# Patient Record
Sex: MALE | Race: WHITE | Employment: OTHER | URBAN - METROPOLITAN AREA
[De-identification: names, ages, dates, MRNs, and addresses within clinical notes are randomized per-mention and may not be internally consistent; named-entity substitution may affect disease eponyms.]

---

## 2017-03-01 ENCOUNTER — APPOINTMENT (OUTPATIENT)
Dept: CARDIAC REHAB | Age: 67
End: 2017-03-01
Attending: INTERNAL MEDICINE
Payer: MEDICARE

## 2017-03-13 ENCOUNTER — APPOINTMENT (OUTPATIENT)
Dept: CARDIAC REHAB | Age: 67
End: 2017-03-13
Attending: INTERNAL MEDICINE
Payer: MEDICARE

## 2017-03-15 ENCOUNTER — APPOINTMENT (OUTPATIENT)
Dept: CARDIAC REHAB | Age: 67
End: 2017-03-15
Attending: INTERNAL MEDICINE
Payer: MEDICARE

## 2017-03-17 ENCOUNTER — APPOINTMENT (OUTPATIENT)
Dept: CARDIAC REHAB | Age: 67
End: 2017-03-17
Attending: INTERNAL MEDICINE
Payer: MEDICARE

## 2017-03-20 ENCOUNTER — HOSPITAL ENCOUNTER (OUTPATIENT)
Dept: CARDIAC REHAB | Age: 67
Setting detail: THERAPIES SERIES
Discharge: HOME | End: 2017-03-20
Attending: INTERNAL MEDICINE | Admitting: INTERNAL MEDICINE
Payer: MEDICARE

## 2017-03-24 ENCOUNTER — APPOINTMENT (OUTPATIENT)
Dept: CARDIAC REHAB | Age: 67
End: 2017-03-24
Payer: MEDICARE

## 2017-03-27 ENCOUNTER — HOSPITAL ENCOUNTER (OUTPATIENT)
Dept: CARDIAC REHAB | Age: 67
Setting detail: THERAPIES SERIES
Discharge: HOME | End: 2017-03-27
Payer: MEDICARE

## 2017-03-27 VITALS — WEIGHT: 180.8 LBS | BODY MASS INDEX: 25.94 KG/M2

## 2017-03-27 PROCEDURE — 93798 PHYS/QHP OP CAR RHAB W/ECG: CPT

## 2017-03-29 ENCOUNTER — HOSPITAL ENCOUNTER (OUTPATIENT)
Dept: CARDIAC REHAB | Age: 67
Setting detail: THERAPIES SERIES
Discharge: HOME | End: 2017-03-29
Payer: MEDICARE

## 2017-03-29 VITALS — WEIGHT: 181 LBS | BODY MASS INDEX: 25.97 KG/M2

## 2017-03-29 PROCEDURE — 93798 PHYS/QHP OP CAR RHAB W/ECG: CPT

## 2017-03-31 ENCOUNTER — HOSPITAL ENCOUNTER (OUTPATIENT)
Dept: CARDIAC REHAB | Age: 67
Setting detail: THERAPIES SERIES
Discharge: HOME | End: 2017-03-31
Payer: MEDICARE

## 2017-03-31 VITALS — BODY MASS INDEX: 25.83 KG/M2 | WEIGHT: 180 LBS

## 2017-03-31 PROCEDURE — 93798 PHYS/QHP OP CAR RHAB W/ECG: CPT

## 2017-04-03 ENCOUNTER — HOSPITAL ENCOUNTER (OUTPATIENT)
Dept: CARDIAC REHAB | Age: 67
Setting detail: THERAPIES SERIES
Discharge: HOME | End: 2017-04-03
Payer: MEDICARE

## 2017-04-05 ENCOUNTER — APPOINTMENT (OUTPATIENT)
Dept: CARDIAC REHAB | Age: 67
End: 2017-04-05
Payer: MEDICARE

## 2017-04-07 ENCOUNTER — APPOINTMENT (OUTPATIENT)
Dept: CARDIAC REHAB | Age: 67
End: 2017-04-07
Payer: MEDICARE

## 2017-04-10 ENCOUNTER — APPOINTMENT (OUTPATIENT)
Dept: CARDIAC REHAB | Age: 67
End: 2017-04-10
Payer: MEDICARE

## 2017-04-12 ENCOUNTER — HOSPITAL ENCOUNTER (OUTPATIENT)
Dept: CARDIAC REHAB | Age: 67
Setting detail: THERAPIES SERIES
Discharge: HOME | End: 2017-04-12
Payer: MEDICARE

## 2017-04-12 VITALS — BODY MASS INDEX: 26.11 KG/M2 | WEIGHT: 182 LBS

## 2017-04-12 PROCEDURE — 93798 PHYS/QHP OP CAR RHAB W/ECG: CPT

## 2017-04-17 ENCOUNTER — HOSPITAL ENCOUNTER (OUTPATIENT)
Dept: CARDIAC REHAB | Age: 67
Setting detail: THERAPIES SERIES
Discharge: HOME | End: 2017-04-17
Payer: MEDICARE

## 2017-04-17 VITALS — WEIGHT: 180 LBS | BODY MASS INDEX: 25.83 KG/M2

## 2017-04-17 PROCEDURE — 93798 PHYS/QHP OP CAR RHAB W/ECG: CPT

## 2017-04-19 ENCOUNTER — HOSPITAL ENCOUNTER (OUTPATIENT)
Dept: CARDIAC REHAB | Age: 67
Setting detail: THERAPIES SERIES
Discharge: HOME | End: 2017-04-19
Payer: MEDICARE

## 2017-04-19 VITALS — WEIGHT: 182 LBS | BODY MASS INDEX: 26.11 KG/M2

## 2017-04-19 PROCEDURE — 93798 PHYS/QHP OP CAR RHAB W/ECG: CPT

## 2017-04-21 ENCOUNTER — HOSPITAL ENCOUNTER (OUTPATIENT)
Dept: CARDIAC REHAB | Age: 67
Setting detail: THERAPIES SERIES
Discharge: HOME | End: 2017-04-21
Attending: INTERNAL MEDICINE | Admitting: INTERNAL MEDICINE
Payer: MEDICARE

## 2017-04-21 VITALS — BODY MASS INDEX: 25.83 KG/M2 | WEIGHT: 180 LBS

## 2017-04-21 PROCEDURE — 93798 PHYS/QHP OP CAR RHAB W/ECG: CPT

## 2017-04-24 ENCOUNTER — HOSPITAL ENCOUNTER (OUTPATIENT)
Dept: CARDIAC REHAB | Age: 67
Setting detail: THERAPIES SERIES
Discharge: HOME | End: 2017-04-24
Attending: INTERNAL MEDICINE | Admitting: INTERNAL MEDICINE
Payer: MEDICARE

## 2017-04-24 VITALS — BODY MASS INDEX: 25.83 KG/M2 | WEIGHT: 180 LBS

## 2017-04-24 PROCEDURE — 93798 PHYS/QHP OP CAR RHAB W/ECG: CPT

## 2017-04-26 ENCOUNTER — HOSPITAL ENCOUNTER (OUTPATIENT)
Dept: CARDIAC REHAB | Age: 67
Setting detail: THERAPIES SERIES
Discharge: HOME | End: 2017-04-26
Attending: INTERNAL MEDICINE | Admitting: INTERNAL MEDICINE
Payer: MEDICARE

## 2017-04-26 VITALS — BODY MASS INDEX: 25.54 KG/M2 | WEIGHT: 178 LBS

## 2017-04-26 PROCEDURE — 93798 PHYS/QHP OP CAR RHAB W/ECG: CPT

## 2017-04-28 ENCOUNTER — HOSPITAL ENCOUNTER (OUTPATIENT)
Dept: CARDIAC REHAB | Age: 67
Setting detail: THERAPIES SERIES
Discharge: HOME | End: 2017-04-28
Payer: MEDICARE

## 2017-04-28 VITALS — BODY MASS INDEX: 25.97 KG/M2 | WEIGHT: 181 LBS

## 2017-04-28 PROCEDURE — 93798 PHYS/QHP OP CAR RHAB W/ECG: CPT

## 2017-05-03 ENCOUNTER — HOSPITAL ENCOUNTER (OUTPATIENT)
Dept: CARDIAC REHAB | Age: 67
Setting detail: THERAPIES SERIES
Discharge: HOME | End: 2017-05-03
Payer: MEDICARE

## 2017-05-05 ENCOUNTER — HOSPITAL ENCOUNTER (OUTPATIENT)
Dept: CARDIAC REHAB | Age: 67
Setting detail: THERAPIES SERIES
Discharge: HOME | End: 2017-05-05
Payer: MEDICARE

## 2017-05-05 VITALS — BODY MASS INDEX: 25.83 KG/M2 | WEIGHT: 180 LBS

## 2017-05-05 PROCEDURE — 93798 PHYS/QHP OP CAR RHAB W/ECG: CPT

## 2017-05-08 ENCOUNTER — HOSPITAL ENCOUNTER (OUTPATIENT)
Dept: CARDIAC REHAB | Age: 67
Setting detail: THERAPIES SERIES
Discharge: HOME | End: 2017-05-08
Payer: MEDICARE

## 2017-05-08 VITALS — BODY MASS INDEX: 26.26 KG/M2 | WEIGHT: 183 LBS

## 2017-05-08 PROCEDURE — 93798 PHYS/QHP OP CAR RHAB W/ECG: CPT

## 2017-05-10 ENCOUNTER — HOSPITAL ENCOUNTER (OUTPATIENT)
Dept: CARDIAC REHAB | Age: 67
Setting detail: THERAPIES SERIES
Discharge: HOME | End: 2017-05-10
Payer: MEDICARE

## 2017-05-10 PROCEDURE — 93798 PHYS/QHP OP CAR RHAB W/ECG: CPT

## 2017-05-12 ENCOUNTER — HOSPITAL ENCOUNTER (OUTPATIENT)
Dept: CARDIAC REHAB | Age: 67
Setting detail: THERAPIES SERIES
Discharge: HOME | End: 2017-05-12
Payer: MEDICARE

## 2017-05-12 VITALS — BODY MASS INDEX: 25.97 KG/M2 | WEIGHT: 181 LBS

## 2017-05-12 PROCEDURE — 93798 PHYS/QHP OP CAR RHAB W/ECG: CPT

## 2017-05-15 ENCOUNTER — HOSPITAL ENCOUNTER (OUTPATIENT)
Dept: CARDIAC REHAB | Age: 67
Setting detail: THERAPIES SERIES
Discharge: HOME | End: 2017-05-15
Payer: MEDICARE

## 2017-05-15 VITALS — BODY MASS INDEX: 26.26 KG/M2 | WEIGHT: 183 LBS

## 2017-05-15 PROCEDURE — 93798 PHYS/QHP OP CAR RHAB W/ECG: CPT

## 2017-05-17 VITALS — WEIGHT: 180 LBS | BODY MASS INDEX: 25.83 KG/M2

## 2017-05-19 ENCOUNTER — HOSPITAL ENCOUNTER (OUTPATIENT)
Dept: CARDIAC REHAB | Age: 67
Setting detail: THERAPIES SERIES
Discharge: HOME | End: 2017-05-19
Payer: MEDICARE

## 2017-05-19 VITALS — WEIGHT: 182 LBS | BODY MASS INDEX: 26.11 KG/M2

## 2017-05-19 PROCEDURE — 93798 PHYS/QHP OP CAR RHAB W/ECG: CPT

## 2017-05-22 ENCOUNTER — HOSPITAL ENCOUNTER (OUTPATIENT)
Dept: CARDIAC REHAB | Age: 67
Setting detail: THERAPIES SERIES
Discharge: HOME | End: 2017-05-22
Payer: MEDICARE

## 2017-05-22 VITALS — WEIGHT: 182.8 LBS | BODY MASS INDEX: 26.23 KG/M2

## 2017-05-22 PROCEDURE — 93798 PHYS/QHP OP CAR RHAB W/ECG: CPT

## 2017-05-24 ENCOUNTER — HOSPITAL ENCOUNTER (OUTPATIENT)
Dept: CARDIAC REHAB | Age: 67
Setting detail: THERAPIES SERIES
Discharge: HOME | End: 2017-05-24
Payer: MEDICARE

## 2017-05-24 VITALS — WEIGHT: 181 LBS | BODY MASS INDEX: 25.97 KG/M2

## 2017-05-24 PROCEDURE — 93798 PHYS/QHP OP CAR RHAB W/ECG: CPT

## 2017-05-31 ENCOUNTER — HOSPITAL ENCOUNTER (OUTPATIENT)
Dept: CARDIAC REHAB | Age: 67
Setting detail: THERAPIES SERIES
Discharge: HOME | End: 2017-05-31
Payer: MEDICARE

## 2017-05-31 VITALS — BODY MASS INDEX: 25.91 KG/M2 | HEIGHT: 70 IN | WEIGHT: 181 LBS

## 2017-05-31 PROCEDURE — 93798 PHYS/QHP OP CAR RHAB W/ECG: CPT

## 2017-06-02 ENCOUNTER — HOSPITAL ENCOUNTER (OUTPATIENT)
Dept: CARDIAC REHAB | Age: 67
Setting detail: THERAPIES SERIES
Discharge: HOME | End: 2017-06-02
Payer: MEDICARE

## 2017-06-02 VITALS — WEIGHT: 181 LBS | BODY MASS INDEX: 25.97 KG/M2

## 2017-06-02 PROCEDURE — 93798 PHYS/QHP OP CAR RHAB W/ECG: CPT

## 2017-09-07 ENCOUNTER — HOSPITAL ENCOUNTER (OUTPATIENT)
Age: 67
Setting detail: SPECIMEN
Discharge: HOME | End: 2017-09-07
Payer: MEDICARE

## 2017-09-11 LAB — DERMATOLOGY PATHOLOGY REPORT: NORMAL

## 2019-03-25 PROBLEM — G12.21 ALS (AMYOTROPHIC LATERAL SCLEROSIS) (HCC): Status: ACTIVE | Noted: 2019-03-25

## 2019-04-18 PROBLEM — M62.838 MUSCLE SPASM: Status: ACTIVE | Noted: 2019-04-18

## 2019-05-02 PROBLEM — I34.1 MVP (MITRAL VALVE PROLAPSE): Status: ACTIVE | Noted: 2019-05-02

## 2019-05-02 PROBLEM — G56.03 BILATERAL CARPAL TUNNEL SYNDROME: Status: ACTIVE | Noted: 2019-05-02

## 2019-05-02 PROBLEM — I71.22 ANEURYSM OF AORTIC ARCH: Status: ACTIVE | Noted: 2019-05-02

## 2019-05-02 PROBLEM — S62.639A: Status: ACTIVE | Noted: 2019-05-02

## 2019-05-02 PROBLEM — M48.00 SPINAL STENOSIS: Status: ACTIVE | Noted: 2019-05-02

## 2019-05-02 PROBLEM — G47.33 OBSTRUCTIVE SLEEP APNEA: Status: ACTIVE | Noted: 2019-05-02

## 2019-05-02 PROBLEM — I83.93 VARICOSE VEINS OF BOTH LOWER EXTREMITIES: Status: ACTIVE | Noted: 2019-05-02

## 2019-05-02 PROBLEM — S22.31XD CLOSED FRACTURE OF ONE RIB OF RIGHT SIDE WITH ROUTINE HEALING: Status: ACTIVE | Noted: 2019-05-02

## 2019-05-02 PROBLEM — M19.91 PRIMARY OSTEOARTHRITIS: Status: ACTIVE | Noted: 2019-05-02

## 2019-05-02 PROBLEM — I07.1 RHEUMATIC TRICUSPID VALVE REGURGITATION: Status: ACTIVE | Noted: 2019-05-02

## 2019-05-02 PROBLEM — N45.1 EPIDIDYMITIS: Status: ACTIVE | Noted: 2019-05-02

## 2019-05-02 PROBLEM — I35.9 AORTIC VALVE DISORDER: Status: ACTIVE | Noted: 2019-05-02

## 2019-05-02 PROBLEM — H93.13 TINNITUS OF BOTH EARS: Status: ACTIVE | Noted: 2019-05-02

## 2019-05-02 PROBLEM — L57.0 SENILE HYPERKERATOSIS: Status: ACTIVE | Noted: 2019-05-02

## 2019-05-02 PROBLEM — N28.1 ACQUIRED CYSTIC KIDNEY DISEASE: Status: ACTIVE | Noted: 2019-05-02

## 2019-05-02 PROBLEM — M48.061 SPINAL STENOSIS OF LUMBAR REGION: Status: ACTIVE | Noted: 2019-05-02

## 2019-05-02 PROBLEM — S06.0X9A CONCUSSION WITH LOSS OF CONSCIOUSNESS: Status: ACTIVE | Noted: 2019-05-02

## 2019-05-02 PROBLEM — I34.0 MITRAL VALVE REGURGITATION: Status: ACTIVE | Noted: 2019-05-02

## 2019-05-02 PROBLEM — H91.93 BILATERAL HEARING LOSS: Status: ACTIVE | Noted: 2019-05-02

## 2019-05-02 PROBLEM — I48.0 PAROXYSMAL ATRIAL FIBRILLATION (HCC): Status: ACTIVE | Noted: 2019-05-02

## 2019-05-02 PROBLEM — N20.2 CALCULUS OF KIDNEY AND URETER: Status: ACTIVE | Noted: 2019-05-02

## 2019-05-02 PROBLEM — Z86.79 HISTORY OF HYPERTENSION: Status: ACTIVE | Noted: 2019-05-02

## 2019-05-02 PROBLEM — I51.89 DIASTOLIC DYSFUNCTION: Status: ACTIVE | Noted: 2019-05-02

## 2019-05-02 PROBLEM — E16.2 HYPOGLYCEMIA: Status: ACTIVE | Noted: 2019-05-02

## 2019-05-02 PROBLEM — E21.3 HYPERPARATHYROIDISM (HCC): Status: ACTIVE | Noted: 2019-05-02

## 2019-05-02 PROBLEM — L20.89 OTHER ATOPIC DERMATITIS: Status: ACTIVE | Noted: 2019-05-02

## 2019-05-02 PROBLEM — N41.9 PROSTATITIS: Status: ACTIVE | Noted: 2019-05-02

## 2019-05-02 PROBLEM — I06.1 RHEUMATIC AORTIC VALVE INSUFFICIENCY: Status: ACTIVE | Noted: 2019-05-02

## 2019-05-02 PROBLEM — I77.810 AORTIC ROOT DILATATION (HCC): Status: ACTIVE | Noted: 2019-05-02

## 2019-05-02 PROBLEM — H52.4 PRESBYOPIA: Status: ACTIVE | Noted: 2019-05-02

## 2019-05-02 PROBLEM — M85.80 OSTEOPENIA: Status: ACTIVE | Noted: 2019-05-02

## 2019-05-02 PROBLEM — D75.89 MACROCYTOSIS: Status: ACTIVE | Noted: 2019-05-02

## 2019-05-02 PROBLEM — I49.1 SUPRAVENTRICULAR BEAT, PREMATURE: Status: ACTIVE | Noted: 2019-05-02

## 2019-05-02 PROBLEM — E78.49 OTHER HYPERLIPIDEMIA: Status: ACTIVE | Noted: 2019-05-02

## 2019-05-02 PROBLEM — N43.3 HYDROCELE: Status: ACTIVE | Noted: 2019-05-02

## 2019-05-02 PROBLEM — I51.7 BILATERAL ENLARGEMENT OF ATRIA: Status: ACTIVE | Noted: 2019-05-02

## 2019-06-09 PROBLEM — F43.9 STRESS: Status: ACTIVE | Noted: 2019-06-09

## 2019-06-09 PROBLEM — L30.9 DERMATITIS: Status: ACTIVE | Noted: 2019-06-09

## 2019-06-20 PROBLEM — R29.898 WEAKNESS OF BOTH LOWER EXTREMITIES: Status: ACTIVE | Noted: 2019-06-20

## 2019-07-24 PROBLEM — E88.81 INSULIN RESISTANCE: Status: ACTIVE | Noted: 2019-07-24

## 2019-07-30 PROBLEM — Z91.81 AT HIGH RISK FOR FALLS: Status: ACTIVE | Noted: 2019-07-30

## 2019-12-17 PROBLEM — R53.83 FATIGUE: Status: ACTIVE | Noted: 2019-12-17

## 2019-12-17 PROBLEM — D75.89 OTHER SPECIFIED DISEASES OF BLOOD AND BLOOD-FORMING ORGANS: Status: ACTIVE | Noted: 2018-10-10

## 2019-12-17 PROBLEM — E50.9 VITAMIN A DEFICIENCY: Status: ACTIVE | Noted: 2019-12-17

## 2019-12-17 PROBLEM — I51.7 BILATERAL ENLARGEMENT OF ATRIA: Status: ACTIVE | Noted: 2018-04-25

## 2019-12-17 PROBLEM — Z13.88: Status: ACTIVE | Noted: 2019-12-17

## 2019-12-17 PROBLEM — G12.21 AMYOTROPHIC LATERAL SCLEROSIS (ALS) (HCC): Status: ACTIVE | Noted: 2018-10-02

## 2019-12-17 PROBLEM — M62.50 MUSCULAR ATROPHY: Status: ACTIVE | Noted: 2019-12-17

## 2019-12-17 PROBLEM — M48.02 CERVICAL STENOSIS OF SPINE: Status: ACTIVE | Noted: 2019-12-17

## 2019-12-17 PROBLEM — Q20.9 CARDIAC SEGMENTAL CONFIGURATION WITH ATRIAL CONFIGURATION UNKNOWN, VENTRICULAR CONFIGURATION UNKNOWN, AND INVERTED NORMALLY ALIGNED GREAT ARTERIES: Status: ACTIVE | Noted: 2018-09-26

## 2019-12-17 PROBLEM — Q20.8 CARDIAC SEGMENTAL CONFIGURATION WITH ATRIAL CONFIGURATION UNKNOWN, VENTRICULAR CONFIGURATION UNKNOWN, AND INVERTED NORMALLY ALIGNED GREAT ARTERIES: Status: ACTIVE | Noted: 2018-09-26

## 2019-12-18 ENCOUNTER — HOSPITAL ENCOUNTER (OUTPATIENT)
Dept: MRI IMAGING | Age: 69
Discharge: HOME | End: 2019-12-20
Payer: MEDICARE

## 2019-12-18 ENCOUNTER — HOSPITAL ENCOUNTER (OUTPATIENT)
Age: 69
Discharge: HOME | End: 2019-12-18
Payer: MEDICARE

## 2019-12-18 DIAGNOSIS — M48.02 CERVICAL STENOSIS OF SPINE: ICD-10-CM

## 2019-12-18 DIAGNOSIS — R29.898 WEAKNESS OF BOTH LOWER EXTREMITIES: ICD-10-CM

## 2019-12-18 DIAGNOSIS — M62.50 MUSCULAR ATROPHY, UNSPECIFIED SITE: ICD-10-CM

## 2019-12-18 DIAGNOSIS — D75.89 MACROCYTOSIS: ICD-10-CM

## 2019-12-18 DIAGNOSIS — R53.83 FATIGUE, UNSPECIFIED TYPE: ICD-10-CM

## 2019-12-18 DIAGNOSIS — Z13.88 HEAVY METAL POISON. SCREEN: ICD-10-CM

## 2019-12-18 DIAGNOSIS — E78.49 OTHER HYPERLIPIDEMIA: ICD-10-CM

## 2019-12-18 DIAGNOSIS — G12.21 ALS (AMYOTROPHIC LATERAL SCLEROSIS) (HCC): ICD-10-CM

## 2019-12-18 DIAGNOSIS — Z91.81 AT HIGH RISK FOR FALLS: ICD-10-CM

## 2019-12-18 DIAGNOSIS — M62.838 MUSCLE SPASM: ICD-10-CM

## 2019-12-18 DIAGNOSIS — M48.061 SPINAL STENOSIS OF LUMBAR REGION, UNSPECIFIED WHETHER NEUROGENIC CLAUDICATION PRESENT: ICD-10-CM

## 2019-12-18 DIAGNOSIS — I48.0 PAROXYSMAL ATRIAL FIBRILLATION (HCC): ICD-10-CM

## 2019-12-18 DIAGNOSIS — E50.9 VITAMIN A DEFICIENCY: ICD-10-CM

## 2019-12-18 LAB
ABSOLUTE EOS #: 0.1 K/UL (ref 0–0.4)
ABSOLUTE IMMATURE GRANULOCYTE: ABNORMAL K/UL (ref 0–0.3)
ABSOLUTE LYMPH #: 1.1 K/UL (ref 1–4.8)
ABSOLUTE MONO #: 0.7 K/UL (ref 0.1–1.3)
ALBUMIN SERPL-MCNC: 3.9 G/DL (ref 3.5–5.2)
ALBUMIN/GLOBULIN RATIO: ABNORMAL (ref 1–2.5)
ALP BLD-CCNC: 75 U/L (ref 40–129)
ALT SERPL-CCNC: 55 U/L (ref 5–41)
ANION GAP SERPL CALCULATED.3IONS-SCNC: 9 MMOL/L (ref 9–17)
AST SERPL-CCNC: 58 U/L
BASOPHILS # BLD: 1 % (ref 0–2)
BASOPHILS ABSOLUTE: 0 K/UL (ref 0–0.2)
BILIRUB SERPL-MCNC: 0.58 MG/DL (ref 0.3–1.2)
BUN BLDV-MCNC: 17 MG/DL (ref 8–23)
BUN/CREAT BLD: ABNORMAL (ref 9–20)
CALCIUM SERPL-MCNC: 9.1 MG/DL (ref 8.6–10.4)
CERULOPLASMIN: 19 MG/DL (ref 15–30)
CHLORIDE BLD-SCNC: 98 MMOL/L (ref 98–107)
CO2: 28 MMOL/L (ref 20–31)
CREAT SERPL-MCNC: 0.54 MG/DL (ref 0.7–1.2)
DIFFERENTIAL TYPE: ABNORMAL
EOSINOPHILS RELATIVE PERCENT: 2 % (ref 0–4)
FERRITIN: 22 UG/L (ref 30–400)
GFR AFRICAN AMERICAN: >60 ML/MIN
GFR NON-AFRICAN AMERICAN: >60 ML/MIN
GFR SERPL CREATININE-BSD FRML MDRD: ABNORMAL ML/MIN/{1.73_M2}
GFR SERPL CREATININE-BSD FRML MDRD: ABNORMAL ML/MIN/{1.73_M2}
GLUCOSE FASTING: 95 MG/DL (ref 70–99)
HCT VFR BLD CALC: 47.8 % (ref 41–53)
HEMOGLOBIN: 15.9 G/DL (ref 13.5–17.5)
HIGH SENSITIVE C-REACTIVE PROTEIN: 0.6 MG/L
HOMOCYSTEINE: 7.3 UMOL/L
IMMATURE GRANULOCYTES: ABNORMAL %
IRON SATURATION: 39 % (ref 20–55)
IRON: 114 UG/DL (ref 59–158)
LYMPHOCYTES # BLD: 13 % (ref 24–44)
MCH RBC QN AUTO: 32.4 PG (ref 26–34)
MCHC RBC AUTO-ENTMCNC: 33.4 G/DL (ref 31–37)
MCV RBC AUTO: 97 FL (ref 80–100)
MONOCYTES # BLD: 9 % (ref 1–7)
NRBC AUTOMATED: ABNORMAL PER 100 WBC
PDW BLD-RTO: 15.4 % (ref 11.5–14.9)
PLATELET # BLD: 269 K/UL (ref 150–450)
PLATELET ESTIMATE: ABNORMAL
PMV BLD AUTO: 8.3 FL (ref 6–12)
POTASSIUM SERPL-SCNC: 4 MMOL/L (ref 3.7–5.3)
RBC # BLD: 4.92 M/UL (ref 4.5–5.9)
RBC # BLD: ABNORMAL 10*6/UL
SEDIMENTATION RATE, ERYTHROCYTE: 2 MM (ref 0–15)
SEG NEUTROPHILS: 75 % (ref 36–66)
SEGMENTED NEUTROPHILS ABSOLUTE COUNT: 6 K/UL (ref 1.3–9.1)
SODIUM BLD-SCNC: 135 MMOL/L (ref 135–144)
T3 FREE: 3.21 PG/ML (ref 2.02–4.43)
THYROXINE, FREE: 1.03 NG/DL (ref 0.93–1.7)
TOTAL CK: 1200 U/L (ref 39–308)
TOTAL IRON BINDING CAPACITY: 291 UG/DL (ref 250–450)
TOTAL PROTEIN: 7.4 G/DL (ref 6.4–8.3)
TSH SERPL DL<=0.05 MIU/L-ACNC: 3.29 MIU/L (ref 0.3–5)
UNSATURATED IRON BINDING CAPACITY: 177 UG/DL (ref 112–347)
URIC ACID: 4.3 MG/DL (ref 3.4–7)
VITAMIN B-12: 1997 PG/ML (ref 232–1245)
WBC # BLD: 8 K/UL (ref 3.5–11)
WBC # BLD: ABNORMAL 10*3/UL

## 2019-12-18 PROCEDURE — 83090 ASSAY OF HOMOCYSTEINE: CPT

## 2019-12-18 PROCEDURE — 85025 COMPLETE CBC W/AUTO DIFF WBC: CPT

## 2019-12-18 PROCEDURE — 84481 FREE ASSAY (FT-3): CPT

## 2019-12-18 PROCEDURE — 86355 B CELLS TOTAL COUNT: CPT

## 2019-12-18 PROCEDURE — 84443 ASSAY THYROID STIM HORMONE: CPT

## 2019-12-18 PROCEDURE — 84630 ASSAY OF ZINC: CPT

## 2019-12-18 PROCEDURE — 83735 ASSAY OF MAGNESIUM: CPT

## 2019-12-18 PROCEDURE — 72141 MRI NECK SPINE W/O DYE: CPT

## 2019-12-18 PROCEDURE — 82728 ASSAY OF FERRITIN: CPT

## 2019-12-18 PROCEDURE — 84590 ASSAY OF VITAMIN A: CPT

## 2019-12-18 PROCEDURE — 83540 ASSAY OF IRON: CPT

## 2019-12-18 PROCEDURE — 82390 ASSAY OF CERULOPLASMIN: CPT

## 2019-12-18 PROCEDURE — 82550 ASSAY OF CK (CPK): CPT

## 2019-12-18 PROCEDURE — 86357 NK CELLS TOTAL COUNT: CPT

## 2019-12-18 PROCEDURE — 85651 RBC SED RATE NONAUTOMATED: CPT

## 2019-12-18 PROCEDURE — 83550 IRON BINDING TEST: CPT

## 2019-12-18 PROCEDURE — 82747 ASSAY OF FOLIC ACID RBC: CPT

## 2019-12-18 PROCEDURE — 86141 C-REACTIVE PROTEIN HS: CPT

## 2019-12-18 PROCEDURE — 84550 ASSAY OF BLOOD/URIC ACID: CPT

## 2019-12-18 PROCEDURE — 86360 T CELL ABSOLUTE COUNT/RATIO: CPT

## 2019-12-18 PROCEDURE — 83520 IMMUNOASSAY QUANT NOS NONAB: CPT

## 2019-12-18 PROCEDURE — 82607 VITAMIN B-12: CPT

## 2019-12-18 PROCEDURE — 84439 ASSAY OF FREE THYROXINE: CPT

## 2019-12-18 PROCEDURE — 36415 COLL VENOUS BLD VENIPUNCTURE: CPT

## 2019-12-18 PROCEDURE — 86359 T CELLS TOTAL COUNT: CPT

## 2019-12-18 PROCEDURE — 80053 COMPREHEN METABOLIC PANEL: CPT

## 2019-12-18 PROCEDURE — 82525 ASSAY OF COPPER: CPT

## 2019-12-19 LAB
% NK (CD56): 5 % (ref 6–29)
AB NK (CD56): 58 /UL (ref 90–600)
ABSOLUTE CD 3: 895 /UL (ref 411–2061)
ABSOLUTE CD 4 HELPER: 720 /UL (ref 309–1571)
ABSOLUTE CD 8 (SUPP): 139 /UL (ref 282–999)
ABSOLUTE CD19 B CELL: 163 /UL (ref 71–567)
CD19 B CELL: 14 % (ref 5–25)
CD3 % TOTAL T CELLS: 77 % (ref 57–94)
CD4 % HELPER T CELL: 62 % (ref 27–64)
CD4:CD8: 5.17 (ref 0.6–2.8)
CD8 % SUPPRESSOR T CELL: 12 % (ref 17–48)
LYMPHOCYTES # BLD: 14 % (ref 24–44)
WBC # BLD: 8.3 K/UL (ref 3.5–11)

## 2019-12-20 LAB
COPPER: 81.1 UG/DL (ref 70–140)
HCT VFR BLD CALC: 47.7 % (ref 41–53)
RBC FOLATE: 453.5 NG/ML (ref 280–903)

## 2019-12-21 LAB
CARNITINE ESTER/FREE (RATIO): 0.4 (ref 0.1–1)
CARNITINE ESTERIFIED: 15 UMOL/L (ref 5–29)
CARNITINE FREE: 39 UMOL/L (ref 25–60)
CARNITINE TOTAL: 54 UMOL/L (ref 34–86)
RETINYL PALMITATE: <0.02 MG/L (ref 0–0.1)
VITAMIN A LEVEL: 0.53 MG/L (ref 0.3–1.2)
VITAMIN A, INTERP: NORMAL
ZINC: 71.4 UG/DL (ref 60–120)

## 2019-12-22 LAB
SEND OUT REPORT: NORMAL
TEST NAME: NORMAL

## 2019-12-23 LAB — MAGNESIUM RBC: 1.9 MMOL/L (ref 1.5–3.1)

## 2020-06-09 PROBLEM — R19.7 DIARRHEA: Status: ACTIVE | Noted: 2020-06-09

## 2020-06-26 ENCOUNTER — HOSPITAL ENCOUNTER (INPATIENT)
Age: 70
LOS: 6 days | Discharge: NURSING, REHAB OR LONG-TERM CARE | DRG: 243 | End: 2020-07-02
Attending: EMERGENCY MEDICINE | Admitting: INTERNAL MEDICINE
Payer: MEDICARE

## 2020-06-26 ENCOUNTER — APPOINTMENT (OUTPATIENT)
Dept: GENERAL RADIOLOGY | Age: 70
DRG: 243 | End: 2020-06-26
Payer: MEDICARE

## 2020-06-26 PROBLEM — I48.92 ATRIAL FLUTTER (HCC): Status: ACTIVE | Noted: 2020-06-26

## 2020-06-26 LAB
% CKMB: 7 % (ref 0–3.5)
ABSOLUTE EOS #: 0.03 K/UL (ref 0–0.44)
ABSOLUTE IMMATURE GRANULOCYTE: 0.03 K/UL (ref 0–0.3)
ABSOLUTE LYMPH #: 0.82 K/UL (ref 1.1–3.7)
ABSOLUTE MONO #: 0.89 K/UL (ref 0.1–1.2)
ANION GAP SERPL CALCULATED.3IONS-SCNC: 12 MMOL/L (ref 9–17)
BASOPHILS # BLD: 0 % (ref 0–2)
BASOPHILS ABSOLUTE: 0.03 K/UL (ref 0–0.2)
BNP INTERPRETATION: ABNORMAL
BUN BLDV-MCNC: 33 MG/DL (ref 8–23)
BUN/CREAT BLD: 75 (ref 9–20)
CALCIUM SERPL-MCNC: 9.4 MG/DL (ref 8.6–10.4)
CHLORIDE BLD-SCNC: 94 MMOL/L (ref 98–107)
CK MB: 26.3 NG/ML
CKMB INTERPRETATION: ABNORMAL
CO2: 29 MMOL/L (ref 20–31)
CREAT SERPL-MCNC: 0.44 MG/DL (ref 0.7–1.2)
DIFFERENTIAL TYPE: ABNORMAL
EOSINOPHILS RELATIVE PERCENT: 0 % (ref 1–4)
GFR AFRICAN AMERICAN: >60 ML/MIN
GFR NON-AFRICAN AMERICAN: >60 ML/MIN
GFR SERPL CREATININE-BSD FRML MDRD: ABNORMAL ML/MIN/{1.73_M2}
GFR SERPL CREATININE-BSD FRML MDRD: ABNORMAL ML/MIN/{1.73_M2}
GLUCOSE BLD-MCNC: 119 MG/DL (ref 70–99)
HCT VFR BLD CALC: 46.8 % (ref 40.7–50.3)
HEMOGLOBIN: 15.6 G/DL (ref 13–17)
IMMATURE GRANULOCYTES: 0 %
INR BLD: 1
LYMPHOCYTES # BLD: 9 % (ref 24–43)
MAGNESIUM: 2.3 MG/DL (ref 1.6–2.6)
MCH RBC QN AUTO: 34.2 PG (ref 25.2–33.5)
MCHC RBC AUTO-ENTMCNC: 33.3 G/DL (ref 28.4–34.8)
MCV RBC AUTO: 102.6 FL (ref 82.6–102.9)
MONOCYTES # BLD: 10 % (ref 3–12)
MYOGLOBIN: 304 NG/ML (ref 28–72)
NRBC AUTOMATED: 0 PER 100 WBC
PARTIAL THROMBOPLASTIN TIME: 28.2 SEC (ref 23.9–33.8)
PARTIAL THROMBOPLASTIN TIME: 29.6 SEC (ref 23.9–33.8)
PDW BLD-RTO: 13.1 % (ref 11.8–14.4)
PLATELET # BLD: 312 K/UL (ref 138–453)
PLATELET ESTIMATE: ABNORMAL
PMV BLD AUTO: 10.1 FL (ref 8.1–13.5)
POTASSIUM SERPL-SCNC: 4.3 MMOL/L (ref 3.7–5.3)
PRO-BNP: 1449 PG/ML
PROTHROMBIN TIME: 12.6 SEC (ref 11.5–14.2)
RBC # BLD: 4.56 M/UL (ref 4.21–5.77)
RBC # BLD: ABNORMAL 10*6/UL
SARS-COV-2, PCR: NORMAL
SARS-COV-2, RAPID: NOT DETECTED
SARS-COV-2: NORMAL
SEDIMENTATION RATE, ERYTHROCYTE: 5 MM (ref 0–20)
SEG NEUTROPHILS: 81 % (ref 36–65)
SEGMENTED NEUTROPHILS ABSOLUTE COUNT: 7.6 K/UL (ref 1.5–8.1)
SODIUM BLD-SCNC: 135 MMOL/L (ref 135–144)
SOURCE: NORMAL
THYROXINE, FREE: 1.25 NG/DL (ref 0.93–1.7)
TOTAL CK: 375 U/L (ref 39–308)
TROPONIN INTERP: ABNORMAL
TROPONIN T: ABNORMAL NG/ML
TROPONIN, HIGH SENSITIVITY: 102 NG/L (ref 0–22)
TROPONIN, HIGH SENSITIVITY: 112 NG/L (ref 0–22)
TROPONIN, HIGH SENSITIVITY: 89 NG/L (ref 0–22)
TSH SERPL DL<=0.05 MIU/L-ACNC: 3.54 MIU/L (ref 0.3–5)
WBC # BLD: 9.4 K/UL (ref 3.5–11.3)
WBC # BLD: ABNORMAL 10*3/UL

## 2020-06-26 PROCEDURE — 99285 EMERGENCY DEPT VISIT HI MDM: CPT

## 2020-06-26 PROCEDURE — 71045 X-RAY EXAM CHEST 1 VIEW: CPT

## 2020-06-26 PROCEDURE — 82550 ASSAY OF CK (CPK): CPT

## 2020-06-26 PROCEDURE — 2580000003 HC RX 258: Performed by: EMERGENCY MEDICINE

## 2020-06-26 PROCEDURE — 2500000003 HC RX 250 WO HCPCS: Performed by: EMERGENCY MEDICINE

## 2020-06-26 PROCEDURE — 6370000000 HC RX 637 (ALT 250 FOR IP): Performed by: NURSE PRACTITIONER

## 2020-06-26 PROCEDURE — 83874 ASSAY OF MYOGLOBIN: CPT

## 2020-06-26 PROCEDURE — 84439 ASSAY OF FREE THYROXINE: CPT

## 2020-06-26 PROCEDURE — 96361 HYDRATE IV INFUSION ADD-ON: CPT

## 2020-06-26 PROCEDURE — 85610 PROTHROMBIN TIME: CPT

## 2020-06-26 PROCEDURE — 84443 ASSAY THYROID STIM HORMONE: CPT

## 2020-06-26 PROCEDURE — 96375 TX/PRO/DX INJ NEW DRUG ADDON: CPT

## 2020-06-26 PROCEDURE — 85730 THROMBOPLASTIN TIME PARTIAL: CPT

## 2020-06-26 PROCEDURE — 83735 ASSAY OF MAGNESIUM: CPT

## 2020-06-26 PROCEDURE — 6360000002 HC RX W HCPCS: Performed by: EMERGENCY MEDICINE

## 2020-06-26 PROCEDURE — 83880 ASSAY OF NATRIURETIC PEPTIDE: CPT

## 2020-06-26 PROCEDURE — 85025 COMPLETE CBC W/AUTO DIFF WBC: CPT

## 2020-06-26 PROCEDURE — 80048 BASIC METABOLIC PNL TOTAL CA: CPT

## 2020-06-26 PROCEDURE — 2060000000 HC ICU INTERMEDIATE R&B

## 2020-06-26 PROCEDURE — 36415 COLL VENOUS BLD VENIPUNCTURE: CPT

## 2020-06-26 PROCEDURE — U0002 COVID-19 LAB TEST NON-CDC: HCPCS

## 2020-06-26 PROCEDURE — 2580000003 HC RX 258: Performed by: NURSE PRACTITIONER

## 2020-06-26 PROCEDURE — 6370000000 HC RX 637 (ALT 250 FOR IP): Performed by: INTERNAL MEDICINE

## 2020-06-26 PROCEDURE — 6370000000 HC RX 637 (ALT 250 FOR IP): Performed by: EMERGENCY MEDICINE

## 2020-06-26 PROCEDURE — 99223 1ST HOSP IP/OBS HIGH 75: CPT | Performed by: INTERNAL MEDICINE

## 2020-06-26 PROCEDURE — 85652 RBC SED RATE AUTOMATED: CPT

## 2020-06-26 PROCEDURE — 84484 ASSAY OF TROPONIN QUANT: CPT

## 2020-06-26 PROCEDURE — 96374 THER/PROPH/DIAG INJ IV PUSH: CPT

## 2020-06-26 PROCEDURE — 82553 CREATINE MB FRACTION: CPT

## 2020-06-26 PROCEDURE — 93005 ELECTROCARDIOGRAM TRACING: CPT | Performed by: EMERGENCY MEDICINE

## 2020-06-26 RX ORDER — DILTIAZEM HYDROCHLORIDE 5 MG/ML
10 INJECTION INTRAVENOUS ONCE
Status: COMPLETED | OUTPATIENT
Start: 2020-06-26 | End: 2020-06-26

## 2020-06-26 RX ORDER — SOTALOL HYDROCHLORIDE 80 MG/1
80 TABLET ORAL 2 TIMES DAILY
Status: DISCONTINUED | OUTPATIENT
Start: 2020-06-26 | End: 2020-06-27

## 2020-06-26 RX ORDER — ASPIRIN 81 MG/1
324 TABLET, CHEWABLE ORAL ONCE
Status: COMPLETED | OUTPATIENT
Start: 2020-06-26 | End: 2020-06-26

## 2020-06-26 RX ORDER — ADENOSINE 3 MG/ML
INJECTION, SOLUTION INTRAVENOUS
Status: DISPENSED
Start: 2020-06-26 | End: 2020-06-27

## 2020-06-26 RX ORDER — PANTOPRAZOLE SODIUM 20 MG/1
20 TABLET, DELAYED RELEASE ORAL DAILY
Status: DISCONTINUED | OUTPATIENT
Start: 2020-06-26 | End: 2020-07-02 | Stop reason: HOSPADM

## 2020-06-26 RX ORDER — HEPARIN SODIUM 1000 [USP'U]/ML
60 INJECTION, SOLUTION INTRAVENOUS; SUBCUTANEOUS ONCE
Status: COMPLETED | OUTPATIENT
Start: 2020-06-26 | End: 2020-06-26

## 2020-06-26 RX ORDER — HEPARIN SODIUM 1000 [USP'U]/ML
60 INJECTION, SOLUTION INTRAVENOUS; SUBCUTANEOUS PRN
Status: DISCONTINUED | OUTPATIENT
Start: 2020-06-26 | End: 2020-06-26 | Stop reason: ALTCHOICE

## 2020-06-26 RX ORDER — HEPARIN SODIUM 10000 [USP'U]/100ML
12 INJECTION, SOLUTION INTRAVENOUS CONTINUOUS
Status: DISCONTINUED | OUTPATIENT
Start: 2020-06-26 | End: 2020-06-26

## 2020-06-26 RX ORDER — ACETAMINOPHEN 325 MG/1
650 TABLET ORAL EVERY 6 HOURS PRN
Status: DISCONTINUED | OUTPATIENT
Start: 2020-06-26 | End: 2020-07-02 | Stop reason: HOSPADM

## 2020-06-26 RX ORDER — ADENOSINE 3 MG/ML
6 INJECTION, SOLUTION INTRAVENOUS ONCE
Status: COMPLETED | OUTPATIENT
Start: 2020-06-26 | End: 2020-06-26

## 2020-06-26 RX ORDER — ACETAMINOPHEN 650 MG/1
650 SUPPOSITORY RECTAL EVERY 6 HOURS PRN
Status: DISCONTINUED | OUTPATIENT
Start: 2020-06-26 | End: 2020-07-02 | Stop reason: HOSPADM

## 2020-06-26 RX ORDER — SODIUM CHLORIDE 0.9 % (FLUSH) 0.9 %
10 SYRINGE (ML) INJECTION EVERY 12 HOURS SCHEDULED
Status: DISCONTINUED | OUTPATIENT
Start: 2020-06-26 | End: 2020-07-01 | Stop reason: SDUPTHER

## 2020-06-26 RX ORDER — ONDANSETRON 2 MG/ML
4 INJECTION INTRAMUSCULAR; INTRAVENOUS EVERY 6 HOURS PRN
Status: DISCONTINUED | OUTPATIENT
Start: 2020-06-26 | End: 2020-06-28

## 2020-06-26 RX ORDER — VITAMIN B COMPLEX
1000 TABLET ORAL DAILY
Status: DISCONTINUED | OUTPATIENT
Start: 2020-06-26 | End: 2020-07-02 | Stop reason: HOSPADM

## 2020-06-26 RX ORDER — POLYETHYLENE GLYCOL 3350 17 G/17G
17 POWDER, FOR SOLUTION ORAL DAILY PRN
Status: DISCONTINUED | OUTPATIENT
Start: 2020-06-26 | End: 2020-07-02 | Stop reason: HOSPADM

## 2020-06-26 RX ORDER — HEPARIN SODIUM 1000 [USP'U]/ML
30 INJECTION, SOLUTION INTRAVENOUS; SUBCUTANEOUS PRN
Status: DISCONTINUED | OUTPATIENT
Start: 2020-06-26 | End: 2020-06-26 | Stop reason: ALTCHOICE

## 2020-06-26 RX ORDER — M-VIT,TX,IRON,MINS/CALC/FOLIC 27MG-0.4MG
1 TABLET ORAL DAILY
Status: DISCONTINUED | OUTPATIENT
Start: 2020-06-26 | End: 2020-07-02 | Stop reason: HOSPADM

## 2020-06-26 RX ORDER — SODIUM CHLORIDE 0.9 % (FLUSH) 0.9 %
10 SYRINGE (ML) INJECTION PRN
Status: DISCONTINUED | OUTPATIENT
Start: 2020-06-26 | End: 2020-07-01 | Stop reason: SDUPTHER

## 2020-06-26 RX ORDER — 0.9 % SODIUM CHLORIDE 0.9 %
1000 INTRAVENOUS SOLUTION INTRAVENOUS ONCE
Status: COMPLETED | OUTPATIENT
Start: 2020-06-26 | End: 2020-06-26

## 2020-06-26 RX ORDER — SOTALOL HYDROCHLORIDE 80 MG/1
80 TABLET ORAL 2 TIMES DAILY
Status: DISCONTINUED | OUTPATIENT
Start: 2020-06-26 | End: 2020-06-26

## 2020-06-26 RX ORDER — ASPIRIN 81 MG/1
81 TABLET ORAL DAILY
Status: DISCONTINUED | OUTPATIENT
Start: 2020-06-26 | End: 2020-07-02 | Stop reason: HOSPADM

## 2020-06-26 RX ADMIN — APIXABAN 5 MG: 5 TABLET, FILM COATED ORAL at 20:53

## 2020-06-26 RX ADMIN — ADENOSINE 6 MG: 3 INJECTION, SOLUTION INTRAVENOUS at 14:26

## 2020-06-26 RX ADMIN — SODIUM CHLORIDE, PRESERVATIVE FREE 10 ML: 5 INJECTION INTRAVENOUS at 20:54

## 2020-06-26 RX ADMIN — SOTALOL HYDROCHLORIDE 80 MG: 80 TABLET ORAL at 17:17

## 2020-06-26 RX ADMIN — FAMOTIDINE 20 MG: 10 INJECTION, SOLUTION INTRAVENOUS at 15:24

## 2020-06-26 RX ADMIN — SODIUM CHLORIDE 1000 ML: 9 INJECTION, SOLUTION INTRAVENOUS at 14:37

## 2020-06-26 RX ADMIN — HEPARIN SODIUM AND DEXTROSE 12 UNITS/KG/HR: 10000; 5 INJECTION INTRAVENOUS at 15:40

## 2020-06-26 RX ADMIN — DILTIAZEM HYDROCHLORIDE 15 MG: 5 INJECTION INTRAVENOUS at 14:31

## 2020-06-26 RX ADMIN — PANTOPRAZOLE SODIUM 20 MG: 20 TABLET, DELAYED RELEASE ORAL at 22:14

## 2020-06-26 RX ADMIN — HEPARIN SODIUM 3700 UNITS: 1000 INJECTION INTRAVENOUS; SUBCUTANEOUS at 15:41

## 2020-06-26 RX ADMIN — ASPIRIN 81 MG 324 MG: 81 TABLET ORAL at 15:24

## 2020-06-26 ASSESSMENT — PAIN SCALES - GENERAL: PAINLEVEL_OUTOF10: 0

## 2020-06-26 NOTE — PROGRESS NOTES
Patient had 6 second pause on monitor. Staff entered room and patient was asymptomatic but stated that prior to the pause, he could feel it and felt very short of breath. Patient HR back to 160 in a flutter. Will continue to closely monitor.

## 2020-06-26 NOTE — ED NOTES
Pt currently resting in the position of comfort, denies chest pain, denies needs.        Kathi Dyer RN  06/26/20 7502

## 2020-06-26 NOTE — PROGRESS NOTES
.Patient admitted to room 1026. VS taken, telemetry placed and call light given/within reach. Patient A&O and given room orientation. Orders released/reviewed, initial assessment completed and navigator started. See chart for more detail.

## 2020-06-26 NOTE — CONSULTS
Reason for consult: Atrial flutter with RVR    History of Present Illness  Mr. Juli Hurtado is a 66 yo patient known to our practice for paroxysmal atrial fibrillation, AVR, and hypercholesterolemia who was referred to the ED after he was seen in the office and found to be in atrial flutter with RVR. He has been progressively short of breath for the past month. He had to use oxygen and sleep in a recliner last night. He was supposed to have a sleep study done at the [facility]. He denies chest pain, PND, dizziness, palpitations, or leg edema. Cardiology is asked to evaluate. Patient History  Personal History:  Reviewed UTD  Comment:  - Essentially normal coronary arteries on a cardiac catheterization performed 9/7/16 which also revealed aortic aneurysm   - History of hypertension (in his teens)   - hypercholesterolemia   - paroxysmal atrial fibrillation   - history of PVCs   - history of MVP   - ALS   - History of +2 aortic valve regurgitation and aortic root aneurysm, status post insertion of biological composite valve graft   - eczema   - carpal tunnel syndrome   - hyperparathyroidism, status post parathyroid removal   - inguinal hernia repair   - vasectomy   - CARINA, untreated   - varicose veins   - DJD   - normal LV function, mild to moderately dilated LA, mildly dilated RA, well functioning AVR, MVP, mild MR, mild TR, borderline pulmonary hypertension, and evidence of LV diastolic dysfunction documented on an echocardiogram performed 4/17/18    Social History:  Never smoker  Light caffeine intake  Light alcohol intake  Exercises regularly    Family History:  Family hx of CAD * grandmother  Allergies  PENICILLIN   SULFA (sulfonamide)       Medications  --Coconut Oil 1 Tbsp tid PRN   --Brain Octane 1-3 tsp daily   --[id number] 13g BID   --TUDCA 2 po bid   --Echinacea 400 MG Capsule 2 po qday prn   --MitoQ 4 caps on an empty stomach   --Cod Liver Oil Oil 1 tsp PRN   --Liposomal C 1 tsp daily   --L-Serine . 5cc BID

## 2020-06-26 NOTE — PROGRESS NOTES
Second 6 second pause occurred around 15 minutes ago witnessed by charge nurse Jonathon Colon RN. Cariology paged. Patient up to room 2032. Report given to Nicko Anderson, [facility].

## 2020-06-26 NOTE — PROGRESS NOTES
Writer spoke with Dr. Dawit López regarding patients 2 pauses. No new orders at this time. Pearl Taveras RN updated.

## 2020-06-26 NOTE — PROGRESS NOTES
Dr. Sonali Ambrose at bedside. Plan to move patient upstairs to CVTU at shift change. Continuous pulse ox in place. Bed alarm in place. Will continue to closely monitor.

## 2020-06-26 NOTE — H&P
[facility]    HISTORY AND PHYSICAL EXAMINATION            Date:   6/26/2020  Patient name:  Hayden Nieto  Date of admission:  6/26/2020  2:19 PM  MRN:   [id number]  Account:  [de-identified]  YOB: 1950  PCP:    Jayna Kim MD  Room:   [de-identified]  Code Status:    Full Code    Chief Complaint:     Chief Complaint   Patient presents with    Tachycardia    Shortness of Breath       History Obtained From:     patient, spouse, family member -daughter, electronic medical record    History of Present Illness:     Hayden Nieto is a 65 y.o. Non-/non  male who presents with Tachycardia and Shortness of Breath  Is a known patient of paroxysmal atrial fibrillation, AVR and dyslipidemia with other multiple medical problems including ALS, went to his cardiologist office for regular visit where he was found to be in atrial flutter with RVR. Patient stated that he has been getting progressively short of breath for the last 1 to 2 months, he uses oxygen via concentrator by breathing and at home and he is not on oxygen via nasal cannula  Patient states he has lost 30 pounds weight in the last 2 months which he attributes to his ALS.   He is lost significant muscle mass recently more on the left side  He is not on prescription medicines for a long time and is taking multiple herbal medications    He was given Adenocard in ER and subsequently has been started on Cardizem drip as well as sotalol   COVID test is negative  He is supposed to get sleep study in near future  Post to get sleep study in near future  Past Medical History:     Past Medical History:   Diagnosis Date    ALS (amyotrophic lateral [facility]) ([facility]     Aortic root aneurysm ([facility] 11/08/2016    Aortic valve replaced 11/08/2016    Hypertension         Past Surgical History:     Past Surgical History:   Procedure Laterality Date    HERNIA REPAIR      PARATHYROID GLAND SURGERY  1980        Medications Prior to Admission:     Prior to Admission medications    Medication Sig Start Date End Date Taking? Authorizing Provider   SPIRULINA PO Take by mouth   Yes Historical Provider, MD   vitamin D (CHOLECALCIFEROL) 1000 UNIT TABS tablet Take 1,000 Units by mouth daily   Yes Historical Provider, MD   Vitamin E 100 UNITS TABS Take 60 Units by mouth daily   Yes Historical Provider, MD   SELENIUM PO Take 75 mcg by mouth daily   Yes Historical Provider, MD   SOYA LECITHIN PO Take 1,000 mg by mouth daily   Yes Historical Provider, MD   Multiple Vitamins-Minerals (THERAPEUTIC MULTIVITAMIN-MINERALS) tablet Take 1 tablet by mouth daily   Yes Historical Provider, MD   OMEGA-3 FATTY ACIDS-VITAMIN E PO Take 1 tablet by mouth daily   Yes Historical Provider, MD   Probiotic Product (PROBIOTIC PO) Take 1 tablet by mouth daily   Yes Historical Provider, MD   Ascorbic Acid (VITAMIN C) 500 MG tablet Take 120 mg by mouth daily   Yes Historical Provider, MD   fluticasone (FLONASE) 50 MCG/ACT nasal spray 1 spray by Nasal route daily  Patient taking differently: 1 spray by Nasal route daily as needed for Rhinitis or Allergies  3/31/20   Zuly Emmanuel MD   Handicap Placard MISC by Does not apply route Dx: spinal stenosis, ALS    Duration: 5 years till 2/25/2020 2/25/20   Zuly Emmanuel MD   aspirin 81 MG tablet Take 81 mg by mouth three times a week     Historical Provider, MD   acetaminophen (TYLENOL) 325 MG tablet Take 650 mg by mouth every 6 hours as needed for Pain    Historical Provider, MD        Allergies:     Ciprofloxacin; Lasix [furosemide]; Penicillins; Sulfa antibiotics; and Duloxetine    Social History:     Tobacco:    reports that he has never smoked. He has never used smokeless tobacco.  Alcohol:      reports current alcohol use of about 1.0 standard drinks of alcohol per week. Drug Use:  reports no history of drug use.     Family History:     Family History   Problem Relation Age of Onset    Lung Cancer Mother     Heart Disease Father        Review of Systems:     Positive and Negative as described in HPI. CONSTITUTIONAL:  negative for fevers, chills, sweats, +fatigue, +weight loss  HEENT:  negative for vision, hearing changes, runny nose, throat pain  RESPIRATORY:  + for shortness of breath, denies cough, congestion, wheezing  CARDIOVASCULAR:  negative for chest pain, +palpitations  GASTROINTESTINAL:  negative for nausea, vomiting, diarrhea, constipation, change in bowel habits, abdominal pain   GENITOURINARY:  negative for difficulty of urination, burning with urination, frequency   INTEGUMENT:  negative for rash, skin lesions, easy bruising   HEMATOLOGIC/LYMPHATIC:  negative for swelling/edema   ALLERGIC/IMMUNOLOGIC:  negative for urticaria , itching  ENDOCRINE:  negative increase in drinking, increase in urination, hot or cold intolerance  MUSCULOSKELETAL: + Progressive muscle loss due to ALS  NEUROLOGICAL:  negative for headaches, dizziness, lightheadedness, numbness, pain, tingling extremities  BEHAVIOR/PSYCH:  negative for depression, anxiety    Physical Exam:   /83   Pulse 156   Temp 98.1 °F (36.7 °C) (Oral)   Resp 30   Ht 5' 10\" (1.778 m)   Wt 136 lb (61.7 kg)   SpO2 99%   BMI 19.51 kg/m²   Temp (24hrs), Av °F (36.7 °C), Min:97.8 °F (36.6 °C), Max:98.1 °F (36.7 °C)    No results for input(s): POCGLU in the last 72 hours.   No intake or output data in the 24 hours ending 20 1714    General Appearance:  alert, ill looking, cachectic, short of breath while talking  Mental status: oriented to person, place, and time  Head:  normocephalic, atraumatic  Eye: no icterus, redness, pupils equal and reactive, extraocular eye movements intact, conjunctiva clear  Ear: normal external ear, no discharge, hearing intact  Nose:  no drainage noted  Mouth: mucous membranes moist  Neck: supple, no carotid bruits, thyroid not palpable  Lungs: Bilateral equal air entry, diminished breath sounds at bases, no wheezing, no Rales   cardiovascular: Irregular rhythm, no murmur, gallop, rub.   Abdomen: Soft, nontender, nondistended, normal bowel sounds, no hepatomegaly or splenomegaly  Neurologic: Significant muscle wasting in both upper and lower extremities left more than right  Skin: No gross lesions, rashes, bruising or bleeding on exposed skin area  Extremities:  peripheral pulses palpable, no pedal edema or calf pain with palpation  Psych: normal affect     Investigations:      Laboratory Testing:  Recent Results (from the past 24 hour(s))   EKG 12 Lead    Collection Time: 06/26/20  2:19 PM   Result Value Ref Range    Ventricular Rate 160 BPM    Atrial Rate 160 BPM    P-R Interval 136 ms    QRS Duration 100 ms    Q-T Interval 232 ms    QTc Calculation (Bazett) 378 ms    R Axis 81 degrees    T Axis -89 degrees   Basic Metabolic Panel    Collection Time: 06/26/20  2:27 PM   Result Value Ref Range    Glucose 119 (H) 70 - 99 mg/dL    BUN 33 (H) 8 - 23 mg/dL    CREATININE 0.44 (L) 0.70 - 1.20 mg/dL    Bun/Cre Ratio 75 (H) 9 - 20    Calcium 9.4 8.6 - 10.4 mg/dL    Sodium 135 135 - 144 mmol/L    Potassium 4.3 3.7 - 5.3 mmol/L    Chloride 94 (L) 98 - 107 mmol/L    CO2 29 20 - 31 mmol/L    Anion Gap 12 9 - 17 mmol/L    GFR Non-African American >60 >60 mL/min    GFR African American >60 >60 mL/min    GFR Comment          GFR Staging NOT REPORTED    Brain Natriuretic Peptide    Collection Time: 06/26/20  2:27 PM   Result Value Ref Range    Pro-BNP 1,449 (H) <300 pg/mL    BNP Interpretation Pro-BNP Reference Range:    CBC Auto Differential    Collection Time: 06/26/20  2:27 PM   Result Value Ref Range    WBC 9.4 3.5 - 11.3 k/uL    RBC 4.56 4.21 - 5.77 m/uL    Hemoglobin 15.6 13.0 - 17.0 g/dL    Hematocrit 46.8 40.7 - 50.3 %    .6 82.6 - 102.9 fL    MCH 34.2 (H) 25.2 - 33.5 pg    MCHC 33.3 28.4 - 34.8 g/dL    RDW 13.1 11.8 - 14.4 %    Platelets 872 372 - 251 k/uL    MPV 10.1 8.1 - 13.5 fL NRBC Automated 0.0 0.0 per 100 WBC    Differential Type NOT REPORTED     Seg Neutrophils 81 (H) 36 - 65 %    Lymphocytes 9 (L) 24 - 43 %    Monocytes 10 3 - 12 %    Eosinophils % 0 (L) 1 - 4 %    Basophils 0 0 - 2 %    Immature Granulocytes 0 0 %    Segs Absolute 7.60 1.50 - 8.10 k/uL    Absolute Lymph # 0.82 (L) 1.10 - 3.70 k/uL    Absolute Mono # 0.89 0.10 - 1.20 k/uL    Absolute Eos # 0.03 0.00 - 0.44 k/uL    Basophils Absolute 0.03 0.00 - 0.20 k/uL    Absolute Immature Granulocyte 0.03 0.00 - 0.30 k/uL    WBC Morphology NOT REPORTED     RBC Morphology NOT REPORTED     Platelet Estimate NOT REPORTED    Magnesium    Collection Time: 06/26/20  2:27 PM   Result Value Ref Range    Magnesium 2.3 1.6 - 2.6 mg/dL   TSH without Reflex    Collection Time: 06/26/20  2:27 PM   Result Value Ref Range    TSH 3.54 0.30 - 5.00 mIU/L   T4, Free    Collection Time: 06/26/20  2:27 PM   Result Value Ref Range    Thyroxine, Free 1.25 0.93 - 1.70 ng/dL   Sedimentation Rate    Collection Time: 06/26/20  2:27 PM   Result Value Ref Range    Sed Rate 5 0 - 20 mm   Creat Kinase-MB Iso    Collection Time: 06/26/20  2:27 PM   Result Value Ref Range    Total  (H) 39 - 308 U/L    CK-MB 26.3 (H) <10.5 ng/mL    % CKMB 7.0 (H) 0.0 - 3.5 %    CKMB Interpretation COMPATIBLE WITH CARDIAC MUSCLE ORIGIN    TROP/MYOGLOBIN    Collection Time: 06/26/20  2:27 PM   Result Value Ref Range    Troponin, High Sensitivity 112 (HH) 0 - 22 ng/L    Troponin T NOT REPORTED <0.03 ng/mL    Troponin Interp NOT REPORTED     Myoglobin 304 (H) 28 - 72 ng/mL   APTT    Collection Time: 06/26/20  2:27 PM   Result Value Ref Range    PTT 28.2 23.9 - 33.8 sec   Protime-INR    Collection Time: 06/26/20  2:27 PM   Result Value Ref Range    Protime 12.6 11.5 - 14.2 sec    INR 1.0    COVID-19    Collection Time: 06/26/20  4:00 PM   Result Value Ref Range    SARS-CoV-2          SARS-CoV-2, Rapid Not Detected Not Detected    Source . NASOPHARYNGEAL SWAB     SARS-CoV-2, PCR Imaging/Diagnostics:  Xr Chest Portable    Result Date: 6/26/2020  No radiographic evidence of acute pulmonary disease. Assessment :      Hospital Problems           Last Modified POA    * (Principal) Atrial flutter ([address] 6/26/2020 Yes    ALS (amyotrophic lateral sclerosis) ([address] 2/02/3273 Yes    Diastolic dysfunction 3/20/7949 Yes    Aneurysm of aortic arch ([address] 6/26/2020 Yes    Paroxysmal atrial fibrillation ([address] 6/26/2020 Yes    Aortic valve disorder 6/26/2020 Yes    Bilateral hearing loss 6/26/2020 Yes    History of hypertension 6/26/2020 Yes    Hyperparathyroidism ([address] 6/26/2020 Yes    MVP (mitral valve prolapse) 6/26/2020 Yes    Obstructive sleep apnea 6/26/2020 Yes    Muscular atrophy 6/26/2020 Yes          Plan:     Patient status inpatient in the Cardiovascular ICU    1. Continue Cardizem drip  2. Started on Eliquis by cardiology  3. Started on Betapace by cardiology  4. GI prophylaxis with Protonix  5. Monitor labs in morning    Consultations:   IP CONSULT TO CARDIOLOGY  IP CONSULT TO INTERNAL MEDICINE     Patient is admitted as inpatient status because of co-morbidities listed above, severity of signs and symptoms as outlined, requirement for current medical therapies and most importantly because of direct risk to patient if care not provided in a hospital setting.     Pia Francisco MD  6/26/2020  5:14 PM    Copy sent to Dr. Suhas Isabel MD

## 2020-06-26 NOTE — ED PROVIDER NOTES
[address] [place] [address] ED  eMERGENCY dEPARTMENT eNCOUnter      Pt Name: Kwame Whatley  MRN: [id number]  [place] 1950  Date of evaluation: 6/26/2020  Provider: Oneyda Jorgensen MD    [address] [place] [address]       Chief Complaint   Patient presents with    Tachycardia    Shortness of Breath         HISTORY OF PRESENT ILLNESS  (Location/Symptom, Timing/Onset, Context/Setting, Quality, Duration, Modifying Factors, Severity.)   Kwame Whatley is a 65 y.o. male who presents to the emergency department for evaluation of shortness of breath and palpitations. Patient was at the office of his cardiologist today due to his shortness of breath. He states he has had waxing and waning symptoms over the last several days. He was found to be and a tachydysrhythmia. EKG on arrival demonstrates a tachycardic rhythm in the 160s question SVT versus A. fib flutter pattern. Patient states he has shortness of breath but not really chest pain. Nursing Notes were reviewed. ALLERGIES     Ciprofloxacin; Lasix [furosemide]; Penicillins; Sulfa antibiotics; and Duloxetine    CURRENT MEDICATIONS       Previous Medications    ACETAMINOPHEN (TYLENOL) 325 MG TABLET    Take 650 mg by mouth every 6 hours as needed for Pain    AMIODARONE (PACERONE) 100 MG TABLET    Take 100 mg by mouth    ASCORBIC ACID (VITAMIN C) 500 MG TABLET    Take 120 mg by mouth daily    ASPIRIN 81 MG TABLET    Take 81 mg by mouth daily    B COMPLEX-C CAPS    Take 1 tablet by mouth daily    BETAMETHASONE DIPROPIONATE (DIPROLENE) 0.05 % OINTMENT    Apply topically daily.     BOSWELLIA TAIWO PO    Take 25 mg by mouth daily    CAPSICUM, CAYENNE, (CAYENNE PEPPER PO)    Take 20 mg by mouth daily    COLLAGEN 500 MG CAPS    Take 1,000 mg by mouth daily    FLUTICASONE (FLONASE) 50 MCG/ACT NASAL SPRAY    1 spray by Nasal route daily    GNP GARLIC EXTRACT PO    Take 600 mg by mouth daily    GUGGULIPID-BLACK PEPPER (GUGLIPID/BIOPERINE PO)    Take 2.5 mg by mouth daily HANDICAP PLACARD MISC    by Does not apply route Dx: spinal stenosis, ALS    Duration: 5 years till 2020    HAWTHORN BERRY PO    Take 600 mg by mouth daily    HORSE CHESTNUT PO    Take 400 mg by mouth daily    INOSINE POWD    7.5 mg by Does not apply route daily    LECITHIN PO    Take 3.1 g by mouth daily    LYCOPENE PO    Take 7 mg by mouth daily    MAGNESIUM OXIDE (MAG-OX PO)    Take 10 mg by mouth daily    MULTIPLE VITAMINS-MINERALS (THERAPEUTIC MULTIVITAMIN-MINERALS) TABLET    Take 1 tablet by mouth daily    OLIVE LEAF EXTRACT PO    Take 100 mg by mouth daily    OMEGA-3 FATTY ACIDS-VITAMIN E PO    Take 1 tablet by mouth daily    PANTOPRAZOLE (PROTONIX) 20 MG TABLET    Take 20 mg by mouth daily    PHOSPHORUS REPLACEMENT PROTOCOL    60 mg by Other route daily    PHYTOSTEROLS PO    Take 265 mg by mouth daily    PROBIOTIC PRODUCT (PROBIOTIC PO)    Take 1 tablet by mouth daily    QUERCETIN DIHYDRATE POWD    150 mg by Does not apply route daily    SAW PALMETTO 160 MG TABS    Take 320 mg by mouth daily    SELENIUM PO    Take 75 mcg by mouth daily    SOYA LECITHIN PO    Take 1,000 mg by mouth daily    SPIRULINA PO    Take by mouth    VITAMIN D (CHOLECALCIFEROL) 1000 UNIT TABS TABLET    Take 1,000 Units by mouth daily    VITAMIN E 100 UNITS TABS    Take 60 Units by mouth daily    WARFARIN (COUMADIN) 5 MG TABLET    Take 5 mg by mouth TAKE AS DIRECTED BY WALE SUMNER    ZINC 25 MG TABS    Take 25 mg by mouth daily       PAST MEDICAL HISTORY         Diagnosis Date    [facility]     Aortic root aneurysm (HCC) 2016    Aortic valve replaced 2016    Hypertension        SURGICAL HISTORY           Procedure Laterality Date    HERNIA REPAIR      PARATHYROID GLAND SURGERY           FAMILY HISTORY           Problem Relation Age of Onset    Lung Cancer Mother     Heart Disease Father      Family Status   Relation Name Status    Mother   at age 80    Father   at age 80        SOCIAL HISTORY      reports that he has never smoked. He has never used smokeless tobacco. He reports current alcohol use of about 1.0 standard drinks of alcohol per week. He reports that he does not use drugs. REVIEW OF SYSTEMS    (2-9 systems for level 4, 10 or more for level 5)     Review of Systems   All other systems reviewed and are negative. Marilee Dobson  Except as noted above the remainder of the review of systems was reviewed and negative. PHYSICAL EXAM    (up to 7 for level 4, 8 or more for level 5)     Vitals:    06/26/20 1428 06/26/20 1435 06/26/20 1440 06/26/20 1505   BP: (!) 126/108  116/81 110/78   Pulse: 158 79 104 79   Resp:   16 16   Temp:       TempSrc:       SpO2: 96% 94% 95% 96%   Weight:       Height:           Physical exam reflects a well-nourished well-hydrated male. He is in a tachydysrhythmia in the 160s. He does appear short of breath. He is mildly tachypneic. He is afebrile. Pulse ox is 96% on room air. He is not grossly hypoxic. He is alert and conversive and appropriate in behavior. Integument warm and dry. Oral pharyngeal exam is without lesion. Heart tachycardic but regular on initial evaluation. Lungs are diminished although clear. Abdomen is soft throughout no focal pain. Patient does have known history of ALS and does wear straps around his lower extremities. Extremities otherwise show no cyanosis clubbing or edema no calf swelling erythema or asymmetry. Visualized integument without rash or lesion. He has no new neurologic deficits with regard to his ALS symptoms are stable. DIAGNOSTIC RESULTS     EKG: All EKG's are interpreted by the Emergency Department Physician who either signs or Co-signs this chart in the absence of a cardiologist.    EKG demonstrates a regular tachycardic rhythm in the 160s versus A. fib flutter.     RADIOLOGY:   Non-plain film images such as CT, Ultrasound and MRI are read by the radiologist. Mark Forde radiographic images are visualized and preliminarily interpreted by the emergency physician with the below findings:        Interpretation per the Radiologist below, if available at the time of this note:    XR CHEST PORTABLE   Final Result   No radiographic evidence of acute pulmonary disease. LABS:  Labs Reviewed   BASIC METABOLIC PANEL - Abnormal; Notable for the following components:       Result Value    Glucose 119 (*)     BUN 33 (*)     CREATININE 0.44 (*)     Bun/Cre Ratio 75 (*)     Chloride 94 (*)     All other components within normal limits   BRAIN NATRIURETIC PEPTIDE - Abnormal; Notable for the following components:    Pro-BNP 1,449 (*)     All other components within normal limits   CBC WITH AUTO DIFFERENTIAL - Abnormal; Notable for the following components:    MCH 34.2 (*)     Seg Neutrophils 81 (*)     Lymphocytes 9 (*)     Eosinophils % 0 (*)     Absolute Lymph # 0.82 (*)     All other components within normal limits   CK ISOENZYMES - Abnormal; Notable for the following components: Total  (*)     CK-MB 26.3 (*)     % CKMB 7.0 (*)     All other components within normal limits   TROP/MYOGLOBIN - Abnormal; Notable for the following components:    Troponin, High Sensitivity 112 (*)     Myoglobin 304 (*)     All other components within normal limits   MAGNESIUM   TSH WITHOUT REFLEX   T4, FREE   SEDIMENTATION RATE   APTT   APTT       All other labs were within normal range or not returned as of this dictation. EMERGENCY DEPARTMENT COURSE and DIFFERENTIAL DIAGNOSIS/MDM:   Vitals:    Vitals:    06/26/20 1428 06/26/20 1435 06/26/20 1440 06/26/20 1505   BP: (!) 126/108  116/81 110/78   Pulse: 158 79 104 79   Resp:   16 16   Temp:       TempSrc:       SpO2: 96% 94% 95% 96%   Weight:       Height:             2:45 PM EDT  Patient had been evaluated on arrival.  Initially 6 mg of Adenocard utilized to isolate rhythm. Rhythm identified as a flutter. Rate did not remain controlled. Cardizem bolus utilized with success and rate control. Patient now remains in a flutter with a rate of approximately 100. He is much more comfortable. Cardizem drip at the bedside to be utilized if patient's ventricular response rate again climbs. Laboratory and imaging studies are requested. 3:11 PM EDT  Patient's ventricular response rate remained stable in the 70s. He is feeling better. Laboratory studies are returning thus far without significant abnormalities. Patient's cardiology service was contacted with regard to admission. Admission is requested to internal medicine service with cardiac consultation. 3:22 PM EDT  Patient's cardiac enzyme panel returned significantly elevated. Will heparinize. CONSULTS:  IP CONSULT TO CARDIOLOGY  IP CONSULT TO INTERNAL MEDICINE    PROCEDURES:  None    FINAL IMPRESSION      1. Atrial flutter, unspecified type [facility])          DISPOSITION/PLAN   DISPOSITION Decision To Admit 06/26/2020 03:04:18 PM      PATIENT REFERRED TO:   No follow-up provider specified. DISCHARGE MEDICATIONS:     New Prescriptions    No medications on file       CRITICAL CARE TIME   Total Critical Care time was 45 minutes, excluding separately reportable procedures. There was a high probability of clinically significant/life threatening deterioration in the patient's condition which required my urgent intervention. Care with regard to recurrent evaluation of patient vital signs and status, review of previous medical records, patient and family counseling, order and review of multiple medications to include titratable drips , order and review of multiple laboratory and imaging investigations, coordination of care with emergency department staff, coordination of care with consulting and admitting physicians, coordination of admission arrangements.     (Please note that portions of this note were completed with a voice recognition program.  Efforts were made to edit the dictations but occasionally words are mis-transcribed.)    Mateo Libman, MD  Attending Emergency Physician          Mateo Libman, MD  06/26/20 5871

## 2020-06-26 NOTE — PROGRESS NOTES
Transitions of Care Pharmacy Service   Medication Review    Patient does not currently take any Prescription medications. Patient takes a wide range of OTC supplements. Pharmacy attempted to inquire about them but patient does not currently have list on him. Wife to bring list and/or bottles of medications so list can be accurately updated.     Gloria Valencia [facility]  Transitions of Care Pharmacy Service  Phone:  [phone]  Fax: [phone]

## 2020-06-27 LAB
ABSOLUTE EOS #: <0.03 K/UL (ref 0–0.44)
ABSOLUTE IMMATURE GRANULOCYTE: 0.02 K/UL (ref 0–0.3)
ABSOLUTE LYMPH #: 0.82 K/UL (ref 1.1–3.7)
ABSOLUTE MONO #: 0.62 K/UL (ref 0.1–1.2)
ALBUMIN SERPL-MCNC: 3.5 G/DL (ref 3.5–5.2)
ALBUMIN/GLOBULIN RATIO: ABNORMAL (ref 1–2.5)
ALP BLD-CCNC: 58 U/L (ref 40–129)
ALT SERPL-CCNC: 39 U/L (ref 5–41)
ANION GAP SERPL CALCULATED.3IONS-SCNC: 9 MMOL/L (ref 9–17)
AST SERPL-CCNC: 27 U/L
BASOPHILS # BLD: 0 % (ref 0–2)
BASOPHILS ABSOLUTE: 0.03 K/UL (ref 0–0.2)
BILIRUB SERPL-MCNC: 0.36 MG/DL (ref 0.3–1.2)
BNP INTERPRETATION: ABNORMAL
BUN BLDV-MCNC: 26 MG/DL (ref 8–23)
BUN/CREAT BLD: ABNORMAL (ref 9–20)
CALCIUM SERPL-MCNC: 8.9 MG/DL (ref 8.6–10.4)
CHLORIDE BLD-SCNC: 97 MMOL/L (ref 98–107)
CO2: 30 MMOL/L (ref 20–31)
CREAT SERPL-MCNC: <0.4 MG/DL (ref 0.7–1.2)
DIFFERENTIAL TYPE: ABNORMAL
EKG ATRIAL RATE: 152 BPM
EKG ATRIAL RATE: 160 BPM
EKG P AXIS: 99 DEGREES
EKG P-R INTERVAL: 118 MS
EKG P-R INTERVAL: 136 MS
EKG Q-T INTERVAL: 232 MS
EKG Q-T INTERVAL: 296 MS
EKG QRS DURATION: 100 MS
EKG QRS DURATION: 100 MS
EKG QTC CALCULATION (BAZETT): 378 MS
EKG QTC CALCULATION (BAZETT): 470 MS
EKG R AXIS: 81 DEGREES
EKG R AXIS: 88 DEGREES
EKG T AXIS: -89 DEGREES
EKG T AXIS: 110 DEGREES
EKG VENTRICULAR RATE: 152 BPM
EKG VENTRICULAR RATE: 160 BPM
EOSINOPHILS RELATIVE PERCENT: 0 % (ref 1–4)
GFR AFRICAN AMERICAN: ABNORMAL ML/MIN
GFR NON-AFRICAN AMERICAN: ABNORMAL ML/MIN
GFR SERPL CREATININE-BSD FRML MDRD: ABNORMAL ML/MIN/{1.73_M2}
GFR SERPL CREATININE-BSD FRML MDRD: ABNORMAL ML/MIN/{1.73_M2}
GLUCOSE BLD-MCNC: 143 MG/DL (ref 70–99)
HCT VFR BLD CALC: 45.8 % (ref 40.7–50.3)
HEMOGLOBIN: 14.9 G/DL (ref 13–17)
IMMATURE GRANULOCYTES: 0 %
INR BLD: 1
LV EF: 65 %
LVEF MODALITY: NORMAL
LYMPHOCYTES # BLD: 11 % (ref 24–43)
MAGNESIUM: 2 MG/DL (ref 1.6–2.6)
MCH RBC QN AUTO: 33.6 PG (ref 25.2–33.5)
MCHC RBC AUTO-ENTMCNC: 32.5 G/DL (ref 28.4–34.8)
MCV RBC AUTO: 103.4 FL (ref 82.6–102.9)
MONOCYTES # BLD: 9 % (ref 3–12)
MYOGLOBIN: 164 NG/ML (ref 28–72)
NRBC AUTOMATED: 0 PER 100 WBC
PARTIAL THROMBOPLASTIN TIME: 29.5 SEC (ref 23.9–33.8)
PARTIAL THROMBOPLASTIN TIME: 31 SEC (ref 23.9–33.8)
PARTIAL THROMBOPLASTIN TIME: 31.1 SEC (ref 23.9–33.8)
PDW BLD-RTO: 12.9 % (ref 11.8–14.4)
PLATELET # BLD: 282 K/UL (ref 138–453)
PLATELET ESTIMATE: ABNORMAL
PMV BLD AUTO: 10.1 FL (ref 8.1–13.5)
POTASSIUM SERPL-SCNC: 4.6 MMOL/L (ref 3.7–5.3)
PRO-BNP: 1287 PG/ML
PROTHROMBIN TIME: 13.4 SEC (ref 11.5–14.2)
RBC # BLD: 4.43 M/UL (ref 4.21–5.77)
RBC # BLD: ABNORMAL 10*6/UL
SEG NEUTROPHILS: 80 % (ref 36–65)
SEGMENTED NEUTROPHILS ABSOLUTE COUNT: 5.77 K/UL (ref 1.5–8.1)
SODIUM BLD-SCNC: 136 MMOL/L (ref 135–144)
TOTAL CK: 259 U/L (ref 39–308)
TOTAL PROTEIN: 6.3 G/DL (ref 6.4–8.3)
TSH SERPL DL<=0.05 MIU/L-ACNC: 2.7 MIU/L (ref 0.3–5)
WBC # BLD: 7.3 K/UL (ref 3.5–11.3)
WBC # BLD: ABNORMAL 10*3/UL

## 2020-06-27 PROCEDURE — 84443 ASSAY THYROID STIM HORMONE: CPT

## 2020-06-27 PROCEDURE — 83880 ASSAY OF NATRIURETIC PEPTIDE: CPT

## 2020-06-27 PROCEDURE — 93306 TTE W/DOPPLER COMPLETE: CPT

## 2020-06-27 PROCEDURE — 83874 ASSAY OF MYOGLOBIN: CPT

## 2020-06-27 PROCEDURE — 85025 COMPLETE CBC W/AUTO DIFF WBC: CPT

## 2020-06-27 PROCEDURE — 85610 PROTHROMBIN TIME: CPT

## 2020-06-27 PROCEDURE — 6360000002 HC RX W HCPCS: Performed by: INTERNAL MEDICINE

## 2020-06-27 PROCEDURE — 2500000003 HC RX 250 WO HCPCS: Performed by: INTERNAL MEDICINE

## 2020-06-27 PROCEDURE — 0JH606Z INSERTION OF PACEMAKER, DUAL CHAMBER INTO CHEST SUBCUTANEOUS TISSUE AND FASCIA, OPEN APPROACH: ICD-10-PCS | Performed by: INTERNAL MEDICINE

## 2020-06-27 PROCEDURE — 82550 ASSAY OF CK (CPK): CPT

## 2020-06-27 PROCEDURE — 2580000003 HC RX 258: Performed by: NURSE PRACTITIONER

## 2020-06-27 PROCEDURE — 6370000000 HC RX 637 (ALT 250 FOR IP): Performed by: NURSE PRACTITIONER

## 2020-06-27 PROCEDURE — 94761 N-INVAS EAR/PLS OXIMETRY MLT: CPT

## 2020-06-27 PROCEDURE — 2060000000 HC ICU INTERMEDIATE R&B

## 2020-06-27 PROCEDURE — 85730 THROMBOPLASTIN TIME PARTIAL: CPT

## 2020-06-27 PROCEDURE — 80053 COMPREHEN METABOLIC PANEL: CPT

## 2020-06-27 PROCEDURE — 93005 ELECTROCARDIOGRAM TRACING: CPT | Performed by: NURSE PRACTITIONER

## 2020-06-27 PROCEDURE — 99232 SBSQ HOSP IP/OBS MODERATE 35: CPT | Performed by: INTERNAL MEDICINE

## 2020-06-27 PROCEDURE — 2700000000 HC OXYGEN THERAPY PER DAY

## 2020-06-27 PROCEDURE — 94660 CPAP INITIATION&MGMT: CPT

## 2020-06-27 PROCEDURE — 6370000000 HC RX 637 (ALT 250 FOR IP): Performed by: INTERNAL MEDICINE

## 2020-06-27 PROCEDURE — 36415 COLL VENOUS BLD VENIPUNCTURE: CPT

## 2020-06-27 PROCEDURE — 83735 ASSAY OF MAGNESIUM: CPT

## 2020-06-27 PROCEDURE — 6360000002 HC RX W HCPCS

## 2020-06-27 RX ORDER — HYDROCODONE BITARTRATE AND ACETAMINOPHEN 5; 325 MG/1; MG/1
1 TABLET ORAL EVERY 4 HOURS PRN
Status: DISCONTINUED | OUTPATIENT
Start: 2020-06-27 | End: 2020-07-02 | Stop reason: HOSPADM

## 2020-06-27 RX ORDER — FENTANYL CITRATE 50 UG/ML
100 INJECTION, SOLUTION INTRAMUSCULAR; INTRAVENOUS ONCE
Status: COMPLETED | OUTPATIENT
Start: 2020-06-27 | End: 2020-06-27

## 2020-06-27 RX ORDER — NALOXONE HYDROCHLORIDE 0.4 MG/ML
INJECTION, SOLUTION INTRAMUSCULAR; INTRAVENOUS; SUBCUTANEOUS
Status: COMPLETED
Start: 2020-06-27 | End: 2020-06-27

## 2020-06-27 RX ORDER — SOTALOL HYDROCHLORIDE 80 MG/1
40 TABLET ORAL 2 TIMES DAILY
Status: DISCONTINUED | OUTPATIENT
Start: 2020-06-27 | End: 2020-06-27

## 2020-06-27 RX ORDER — NALOXONE HYDROCHLORIDE 0.4 MG/ML
0.4 INJECTION, SOLUTION INTRAMUSCULAR; INTRAVENOUS; SUBCUTANEOUS PRN
Status: DISCONTINUED | OUTPATIENT
Start: 2020-06-27 | End: 2020-07-02 | Stop reason: HOSPADM

## 2020-06-27 RX ORDER — FLUMAZENIL 0.1 MG/ML
0.2 INJECTION, SOLUTION INTRAVENOUS ONCE
Status: COMPLETED | OUTPATIENT
Start: 2020-06-27 | End: 2020-06-27

## 2020-06-27 RX ORDER — HYDROCODONE BITARTRATE AND ACETAMINOPHEN 5; 325 MG/1; MG/1
2 TABLET ORAL EVERY 4 HOURS PRN
Status: DISCONTINUED | OUTPATIENT
Start: 2020-06-27 | End: 2020-07-02 | Stop reason: HOSPADM

## 2020-06-27 RX ORDER — MIDAZOLAM HYDROCHLORIDE 1 MG/ML
2 INJECTION INTRAMUSCULAR; INTRAVENOUS ONCE
Status: COMPLETED | OUTPATIENT
Start: 2020-06-27 | End: 2020-06-27

## 2020-06-27 RX ADMIN — NALOXONE HYDROCHLORIDE 0.2 MG: 0.4 INJECTION, SOLUTION INTRAMUSCULAR; INTRAVENOUS; SUBCUTANEOUS at 11:45

## 2020-06-27 RX ADMIN — FENTANYL CITRATE 50 MCG: 50 INJECTION, SOLUTION INTRAMUSCULAR; INTRAVENOUS at 11:32

## 2020-06-27 RX ADMIN — MIDAZOLAM 2 MG: 1 INJECTION INTRAMUSCULAR; INTRAVENOUS at 11:33

## 2020-06-27 RX ADMIN — SOTALOL HYDROCHLORIDE 40 MG: 80 TABLET ORAL at 05:31

## 2020-06-27 RX ADMIN — PANTOPRAZOLE SODIUM 20 MG: 20 TABLET, DELAYED RELEASE ORAL at 09:22

## 2020-06-27 RX ADMIN — MELATONIN 1000 UNITS: at 09:22

## 2020-06-27 RX ADMIN — MULTIPLE VITAMINS W/ MINERALS TAB 1 TABLET: TAB at 09:22

## 2020-06-27 RX ADMIN — APIXABAN 5 MG: 5 TABLET, FILM COATED ORAL at 09:22

## 2020-06-27 RX ADMIN — APIXABAN 5 MG: 5 TABLET, FILM COATED ORAL at 19:57

## 2020-06-27 RX ADMIN — ACETAMINOPHEN 650 MG: 325 TABLET ORAL at 03:36

## 2020-06-27 RX ADMIN — SODIUM CHLORIDE, PRESERVATIVE FREE 10 ML: 5 INJECTION INTRAVENOUS at 19:57

## 2020-06-27 RX ADMIN — ASPIRIN 81 MG: 81 TABLET, COATED ORAL at 09:21

## 2020-06-27 RX ADMIN — HYDROCODONE BITARTRATE AND ACETAMINOPHEN 1 TABLET: 5; 325 TABLET ORAL at 05:30

## 2020-06-27 RX ADMIN — SODIUM CHLORIDE, PRESERVATIVE FREE 10 ML: 5 INJECTION INTRAVENOUS at 09:21

## 2020-06-27 RX ADMIN — FLUMAZENIL 0.2 MG: 0.1 INJECTION, SOLUTION INTRAVENOUS at 11:40

## 2020-06-27 ASSESSMENT — PAIN SCALES - GENERAL
PAINLEVEL_OUTOF10: 5
PAINLEVEL_OUTOF10: 0
PAINLEVEL_OUTOF10: 0
PAINLEVEL_OUTOF10: 10
PAINLEVEL_OUTOF10: 6
PAINLEVEL_OUTOF10: 0

## 2020-06-27 NOTE — PLAN OF CARE
Problem: Falls - Risk of:  Goal: Will remain free from falls  Description: Will remain free from falls  6/27/2020 1750 by Shahrzad Herrera RN  Outcome: Ongoing  6/27/2020 0729 by Kel Addison RN  Outcome: Ongoing  6/27/2020 0728 by Kel Addison RN  Outcome: Ongoing  Goal: Absence of physical injury  Description: Absence of physical injury  6/27/2020 1750 by Shahrzad Herrera RN  Outcome: Ongoing  6/27/2020 0729 by Kel Addison RN  Outcome: Ongoing  6/27/2020 0728 by Kel Addison RN  Outcome: Ongoing     Problem: Airway Clearance - Ineffective:  Goal: Ability to maintain a clear airway will improve  Description: Ability to maintain a clear airway will improve  6/27/2020 1750 by Shahrzad Herrera RN  Outcome: Ongoing  6/27/2020 0729 by Kel Addison RN  Outcome: Ongoing     Problem: Cardiac:  Goal: Ability to maintain an adequate cardiac output will improve  Description: Ability to maintain an adequate cardiac output will improve  6/27/2020 1750 by Shahrzad Herrera RN  Outcome: Ongoing  6/27/2020 0729 by Kel Addison RN  Outcome: Ongoing  Goal: Complications related to the disease process, condition or treatment will be avoided or minimized  Description: Complications related to the disease process, condition or treatment will be avoided or minimized  6/27/2020 1750 by Shahrzad Herrera RN  Outcome: Ongoing  6/27/2020 0729 by Kel Addison RN  Outcome: Ongoing  Goal: Hemodynamic stability will improve  Description: Hemodynamic stability will improve  6/27/2020 1750 by Shahrzad Herrera RN  Outcome: Ongoing  6/27/2020 0729 by Kel Addison RN  Outcome: Ongoing  Goal: Risk factors for ineffective tissue perfusion will decrease  Description: Risk factors for ineffective tissue perfusion will decrease  6/27/2020 1750 by Shahrzad Herrera RN  Outcome: Ongoing  6/27/2020 0729 by Kel Addison RN  Outcome: Ongoing

## 2020-06-27 NOTE — PROGRESS NOTES
Cardiovascular progress Note          Patient name: Rishabh Lockhart    YOB: 1950  Date of admission:  6/26/2020       Patient seen, examined. Previous clinical entries reviewed. All available laboratory, imaging and ancillary data reviewed. Subjective:      He had multiple episodes of atrial flutter with rapid ventricular response with heart rate reaching into the 150s and pulses following low dose of sotalol earlier this morning. His blood pressure dropped down into the 80s. He was electively cardioverted back in sinus rhythm. He is resting comfortably now. Systems review:  Constitutional: No fever/chills. HENT: No headache, neck pain or neck stiffness. No sore throat or dysphagia. Gastrointestinal: No abdominal pain, nausea or vomiting. Cardiac: As Above  Respiratory: As above  Neurologic: No new focal weakness or numbness  Psychiatric: Normal mood and mentation       Examination:   Vitals: BP 88/71   Pulse 112   Temp 97.8 °F (36.6 °C) (Temporal)   Resp 26   Ht 5' 10\" (1.778 m)   Wt 136 lb (61.7 kg)   SpO2 (!) 89%   BMI 19.51 kg/m²     Intake/Output Summary (Last 24 hours) at 6/27/2020 1225  Last data filed at 6/27/2020 0716  Gross per 24 hour   Intake --   Output 500 ml   Net -500 ml       General appearance: Comfortable in no apparent distress. HEENT: No pallor. No icterus  Neck: Supple. Lungs:Generally decreased breath sounds  Heart: S1,S2  Abdomen: Soft  Extremities: No peripheral edema  Skin: No obvious rashes. Musculoskeletal: No obvious deformities. Neurologic: No focal deficits.      Labs/ Ancillary data:     CBC:   Recent Labs     06/26/20  1427 06/27/20  0347   WBC 9.4 7.3   HGB 15.6 14.9    282     BMP:    Recent Labs     06/26/20  1427 06/27/20  0347    136   K 4.3 4.6   CL 94* 97*   CO2 29 30   BUN 33* 26*   CREATININE 0.44* <0.40*   GLUCOSE 119* 143*     Hepatic:   Recent Labs     06/27/20  0347   AST 27   ALT 39   BILITOT 0.36   ALKPHOS 58 Troponin:   Recent Labs     06/26/20  1427 06/26/20  1743 06/26/20  2159   TROPONINT NOT REPORTED NOT REPORTED NOT REPORTED     INR:   Recent Labs     06/26/20  1427 06/27/20  0347   INR 1.0 1.0         Medications:   Scheduled Meds:   aspirin  81 mg Oral Daily    therapeutic multivitamin-minerals  1 tablet Oral Daily    pantoprazole  20 mg Oral Daily    Vitamin D  1,000 Units Oral Daily    sodium chloride flush  10 mL Intravenous 2 times per day    apixaban  5 mg Oral BID     Continuous Infusions:   dilTIAZem (CARDIZEM) 125 mg in dextrose 5% 125 mL infusion         Assessment/ Plan :   Atrial flutter-status post electrical cardioversion. Possible sick sinus syndrome. History of AVR. We will keep patient off sotalol for right now. Monitor patient closely. Follow-up on echocardiogram  We will follow patient with you. Thank you very much for allowing us to participate in the care of this patient. Please call us with any questions.       Electronically signed by Madison Warner MD on 6/27/2020 at 12:25 PM

## 2020-06-27 NOTE — PROCEDURES
Indication: Atrial flutter, Hemodynamic instability. Procedure: After Conscious sedation was achieved with 2 mg of Versed and 50 mcg of Fentanyl, a 250 J synchronous shock was given. Patient converted to sinus rhythm. Romazicon 0.2 mg and Narcan 0.2 mg IV were given to reverse sedation. Impression:  Electrical cardioversion of atrial fibrillation to sinus rhythm. Plan:  Continue anticoagulation.     Electronically signed by Seymour Boggs MD on 6/27/2020 at 12:24 PM

## 2020-06-27 NOTE — PROGRESS NOTES
Output 800 ml   Net -440 ml       Labs:  Hematology:  Recent Labs     06/26/20  1427 06/27/20  0347   WBC 9.4 7.3   RBC 4.56 4.43   HGB 15.6 14.9   HCT 46.8 45.8   .6 103.4*   MCH 34.2* 33.6*   MCHC 33.3 32.5   RDW 13.1 12.9    282   MPV 10.1 10.1   SEDRATE 5  --    INR 1.0 1.0     Chemistry:  Recent Labs     06/26/20  1427 06/26/20  1743 06/26/20  2159 06/27/20  0347     --   --  136   K 4.3  --   --  4.6   CL 94*  --   --  97*   CO2 29  --   --  30   GLUCOSE 119*  --   --  143*   BUN 33*  --   --  26*   CREATININE 0.44*  --   --  <0.40*   MG 2.3  --   --  2.0   ANIONGAP 12  --   --  9   LABGLOM >60  --   --  CANNOT BE CALCULATED   GFRAA >60  --   --  CANNOT BE CALCULATED   CALCIUM 9.4  --   --  8.9   PROBNP 1,449*  --   --  1,287*   TROPHS 112* 89* 102*  --    CKTOTAL 375*  --   --  259   CKMB 26.3*  --   --   --    MYOGLOBIN 304*  --   --  164*     Recent Labs     06/27/20 0347   PROT 6.3*   LABALBU 3.5   TSH 2.70   AST 27   ALT 39   ALKPHOS 58   BILITOT 0.36     ABG:No results found for: POCPH, PHART, PH, POCPCO2, SAT7ZVJ, PCO2, POCPO2, PO2ART, PO2, POCHCO3, FJE0EMK, HCO3, NBEA, PBEA, BEART, BE, THGBART, THB, EVF5ZXI, XHMB2IOU, K7JSJVDA, O2SAT, FIO2  No results found for: SPECIAL  No results found for: CULTURE    Radiology:  Xr Chest Portable    Result Date: 6/26/2020  No radiographic evidence of acute pulmonary disease.        Physical Examination:        General appearance:  alert, cachectic, still short of breath while talking  Mental Status:  oriented to person, place and time and normal affect  Lungs: Diminished breath sounds at bases  Heart: Irregular rhythm, no murmur  Abdomen:  soft, nontender, nondistended, normal bowel sounds, no masses, hepatomegaly, splenomegaly  Extremities: 6 significant wasting in both upper and lower extremities, left more than right  Skin:  no gross lesions, rashes, induration    Assessment:        Hospital Problems           Last Modified POA    * (Principal) Atrial flutter ([address] 6/26/2020 Yes    ALS (amyotrophic lateral sclerosis) ([address] 0/39/3075 Yes    Diastolic dysfunction 3/39/7486 Yes    Aneurysm of aortic arch ([address] 6/26/2020 Yes    Paroxysmal atrial fibrillation ([address] 6/26/2020 Yes    Aortic valve disorder 6/26/2020 Yes    Bilateral hearing loss 6/26/2020 Yes    History of hypertension 6/26/2020 Yes    Hyperparathyroidism ([address] 6/26/2020 Yes    MVP (mitral valve prolapse) 6/26/2020 Yes    Obstructive sleep apnea 6/26/2020 Yes    Muscular atrophy 6/26/2020 Yes          Plan:        1. Cardioversion as planned by cardiology  2. Continue Eliquis  3. Other cardiac medicines as recommended by cardiology after cardioversion  4. Continue oral PPIs  5.  Follow echo result    Emmanuelle Butler MD  6/27/2020  3:40 PM

## 2020-06-27 NOTE — PLAN OF CARE
Problem: Airway Clearance - Ineffective:  Goal: Ability to maintain a clear airway will improve  Description: Ability to maintain a clear airway will improve  Outcome: Ongoing     Problem: Cardiac:  Goal: Ability to maintain an adequate cardiac output will improve  Description: Ability to maintain an adequate cardiac output will improve  Outcome: Ongoing  Goal: Complications related to the disease process, condition or treatment will be avoided or minimized  Description: Complications related to the disease process, condition or treatment will be avoided or minimized  Outcome: Ongoing  Goal: Hemodynamic stability will improve  Description: Hemodynamic stability will improve  Outcome: Ongoing  Goal: Risk factors for ineffective tissue perfusion will decrease  Description: Risk factors for ineffective tissue perfusion will decrease  Outcome: Ongoing

## 2020-06-27 NOTE — FLOWSHEET NOTE
At the request of  wants to perform bedside Cardioversion, Genet ESTRELLA(Clinical Lead) notified and at bedside with crash cart, suction, oxygen, meds removed from omnicell a per 's request, consent obtained from patient, Cardiac patches on patient & hooked up to AED & telemetry,  at bedside.

## 2020-06-27 NOTE — CARE COORDINATION
Case Management Initial Discharge Plan  Pawel Mckeon,         Readmission Risk              Risk of Unplanned Readmission:        11             Met with:patient to discuss discharge plans. Information verified: address, contacts, phone number, , insurance Yes  PCP: Carlee Farrell MD  Date of last visit: 20    Insurance Provider: Ana Zavaleta and [facility]    Discharge Planning  Current Residence:  [address] [place] [address]  Living Arrangements:  Spouse/Significant Other   Home has 1 stories/3 stairs to climb  Support Systems:  Spouse/Significant Other, Family Members  Current Services PTA:  none Agency:   Patient able to perform ADL's:Assisted  DME in home:  Rollator, scooter  DME used to aid ambulation prior to admission:     DME used during admission:      Potential Assistance Needed:  Outpatient PT/OT, [place]    Pharmacy:    Potential Assistance Purchasing Medications:  No  Does patient want to participate in local refill/ meds to beds program?  No    Patient agreeable to home care: Yes  Freedom of choice provided:  yes      Type of Home Care Services:  None  Patient expects to be discharged to:  home    Prior SNF/Rehab Placement and Facility: none  Agreeable to SNF/Rehab: yes  [place] of choice provided: yes   Evaluation: yes    Expected Discharge date:  20  Follow Up Appointment: Best Day/ Time: Monday AM    Transportation provider: family  Transportation arrangements needed for discharge: tbd    Discharge Plan: Met with patient and wife at bedside to discuss dc plan. Pt and spouse reside in 1 story home, and are going to be moving soon. Pt has ALS and follows with [facility] Justin [facility]. Patient is requesting PT/OT in the home, specifically someone with experience with neurological deficit disorders. Follow for home care and dme needs.        Electronically signed by NIKOLE Mendez on 20 at 2:10 PM EDT

## 2020-06-27 NOTE — FLOWSHEET NOTE
Patient shares about his ALS, states about his medical conditions. States the struggles he is going through. Shares about his wife and how the disease has effected her. Frank states to patient, a time line of 5-6 years. Patient shares feelings about what he is going through. States strong carloz.  shared in prayer, listening, support. Follow up as needed.      06/27/20 1334   Encounter Summary   Services provided to: Patient   [facility]   Support System Spouse   Continue Visiting   (6-27-20)   Complexity of Encounter Moderate   Length of Encounter 30 minutes   Spiritual Assessment Completed Yes   Routine   Type Initial   Spiritual/Tenriism   Type Spiritual support   Assessment Approachable;Calm   Intervention Active listening;Explored feelings, thoughts, concerns;Prayer;Discussed belief system/Cheondoism practices/carloz;Discussed relationship with God;Discussed illness/injury and it's impact   Outcome Expressed gratitude;Receptive;Engaged in conversation;Expressed feelings/needs/concerns

## 2020-06-27 NOTE — PROGRESS NOTES
06/27/20 0431   NIV Type   NIV Started/Stopped Off  (pt not tolerating)   attempted ST stand with pt, pt not tolerating.  RN infomred

## 2020-06-28 LAB
ABSOLUTE EOS #: 0.05 K/UL (ref 0–0.44)
ABSOLUTE IMMATURE GRANULOCYTE: 0.02 K/UL (ref 0–0.3)
ABSOLUTE LYMPH #: 1.11 K/UL (ref 1.1–3.7)
ABSOLUTE MONO #: 0.74 K/UL (ref 0.1–1.2)
ALBUMIN SERPL-MCNC: 3.5 G/DL (ref 3.5–5.2)
ALBUMIN/GLOBULIN RATIO: ABNORMAL (ref 1–2.5)
ALP BLD-CCNC: 56 U/L (ref 40–129)
ALT SERPL-CCNC: 33 U/L (ref 5–41)
ANION GAP SERPL CALCULATED.3IONS-SCNC: 6 MMOL/L (ref 9–17)
AST SERPL-CCNC: 20 U/L
BASOPHILS # BLD: 0 % (ref 0–2)
BASOPHILS ABSOLUTE: 0.03 K/UL (ref 0–0.2)
BILIRUB SERPL-MCNC: 0.24 MG/DL (ref 0.3–1.2)
BUN BLDV-MCNC: 25 MG/DL (ref 8–23)
BUN/CREAT BLD: ABNORMAL (ref 9–20)
CALCIUM SERPL-MCNC: 8.8 MG/DL (ref 8.6–10.4)
CHLORIDE BLD-SCNC: 96 MMOL/L (ref 98–107)
CO2: 34 MMOL/L (ref 20–31)
CREAT SERPL-MCNC: <0.4 MG/DL (ref 0.7–1.2)
DIFFERENTIAL TYPE: ABNORMAL
EOSINOPHILS RELATIVE PERCENT: 1 % (ref 1–4)
GFR AFRICAN AMERICAN: ABNORMAL ML/MIN
GFR NON-AFRICAN AMERICAN: ABNORMAL ML/MIN
GFR SERPL CREATININE-BSD FRML MDRD: ABNORMAL ML/MIN/{1.73_M2}
GFR SERPL CREATININE-BSD FRML MDRD: ABNORMAL ML/MIN/{1.73_M2}
GLUCOSE BLD-MCNC: 108 MG/DL (ref 70–99)
GLUCOSE BLD-MCNC: 116 MG/DL (ref 75–110)
HCT VFR BLD CALC: 45.1 % (ref 40.7–50.3)
HEMOGLOBIN: 14.4 G/DL (ref 13–17)
IMMATURE GRANULOCYTES: 0 %
LYMPHOCYTES # BLD: 13 % (ref 24–43)
MCH RBC QN AUTO: 34.1 PG (ref 25.2–33.5)
MCHC RBC AUTO-ENTMCNC: 31.9 G/DL (ref 28.4–34.8)
MCV RBC AUTO: 106.9 FL (ref 82.6–102.9)
MONOCYTES # BLD: 9 % (ref 3–12)
NRBC AUTOMATED: 0 PER 100 WBC
PDW BLD-RTO: 12.8 % (ref 11.8–14.4)
PLATELET # BLD: 258 K/UL (ref 138–453)
PLATELET ESTIMATE: ABNORMAL
PMV BLD AUTO: 9.8 FL (ref 8.1–13.5)
POTASSIUM SERPL-SCNC: 4.4 MMOL/L (ref 3.7–5.3)
RBC # BLD: 4.22 M/UL (ref 4.21–5.77)
RBC # BLD: ABNORMAL 10*6/UL
SEG NEUTROPHILS: 77 % (ref 36–65)
SEGMENTED NEUTROPHILS ABSOLUTE COUNT: 6.32 K/UL (ref 1.5–8.1)
SODIUM BLD-SCNC: 136 MMOL/L (ref 135–144)
TOTAL PROTEIN: 6.2 G/DL (ref 6.4–8.3)
WBC # BLD: 8.3 K/UL (ref 3.5–11.3)
WBC # BLD: ABNORMAL 10*3/UL

## 2020-06-28 PROCEDURE — 36415 COLL VENOUS BLD VENIPUNCTURE: CPT

## 2020-06-28 PROCEDURE — 2500000003 HC RX 250 WO HCPCS: Performed by: INTERNAL MEDICINE

## 2020-06-28 PROCEDURE — 82947 ASSAY GLUCOSE BLOOD QUANT: CPT

## 2020-06-28 PROCEDURE — 85025 COMPLETE CBC W/AUTO DIFF WBC: CPT

## 2020-06-28 PROCEDURE — 2060000000 HC ICU INTERMEDIATE R&B

## 2020-06-28 PROCEDURE — 2580000003 HC RX 258: Performed by: INTERNAL MEDICINE

## 2020-06-28 PROCEDURE — 2580000003 HC RX 258: Performed by: NURSE PRACTITIONER

## 2020-06-28 PROCEDURE — 6360000002 HC RX W HCPCS: Performed by: INTERNAL MEDICINE

## 2020-06-28 PROCEDURE — 6370000000 HC RX 637 (ALT 250 FOR IP): Performed by: INTERNAL MEDICINE

## 2020-06-28 PROCEDURE — 6370000000 HC RX 637 (ALT 250 FOR IP): Performed by: NURSE PRACTITIONER

## 2020-06-28 PROCEDURE — 99232 SBSQ HOSP IP/OBS MODERATE 35: CPT | Performed by: INTERNAL MEDICINE

## 2020-06-28 PROCEDURE — 80053 COMPREHEN METABOLIC PANEL: CPT

## 2020-06-28 RX ORDER — ONDANSETRON 2 MG/ML
4 INJECTION INTRAMUSCULAR; INTRAVENOUS EVERY 4 HOURS PRN
Status: DISCONTINUED | OUTPATIENT
Start: 2020-06-28 | End: 2020-07-02 | Stop reason: HOSPADM

## 2020-06-28 RX ORDER — SOTALOL HYDROCHLORIDE 80 MG/1
40 TABLET ORAL 2 TIMES DAILY
Status: DISCONTINUED | OUTPATIENT
Start: 2020-06-28 | End: 2020-06-28

## 2020-06-28 RX ADMIN — SODIUM CHLORIDE, PRESERVATIVE FREE 10 ML: 5 INJECTION INTRAVENOUS at 20:51

## 2020-06-28 RX ADMIN — ONDANSETRON 4 MG: 2 INJECTION INTRAMUSCULAR; INTRAVENOUS at 17:16

## 2020-06-28 RX ADMIN — MULTIPLE VITAMINS W/ MINERALS TAB 1 TABLET: TAB at 08:23

## 2020-06-28 RX ADMIN — Medication 10 ML: at 08:24

## 2020-06-28 RX ADMIN — GLUCAGON HYDROCHLORIDE 3 MG/HR: KIT at 14:09

## 2020-06-28 RX ADMIN — APIXABAN 5 MG: 5 TABLET, FILM COATED ORAL at 20:51

## 2020-06-28 RX ADMIN — PANTOPRAZOLE SODIUM 20 MG: 20 TABLET, DELAYED RELEASE ORAL at 08:23

## 2020-06-28 RX ADMIN — ASPIRIN 81 MG: 81 TABLET, COATED ORAL at 08:23

## 2020-06-28 RX ADMIN — APIXABAN 5 MG: 5 TABLET, FILM COATED ORAL at 08:23

## 2020-06-28 RX ADMIN — SODIUM CHLORIDE, PRESERVATIVE FREE 10 ML: 5 INJECTION INTRAVENOUS at 08:22

## 2020-06-28 RX ADMIN — MELATONIN 1000 UNITS: at 08:23

## 2020-06-28 RX ADMIN — SOTALOL HYDROCHLORIDE 40 MG: 80 TABLET ORAL at 11:29

## 2020-06-28 RX ADMIN — GLUCAGON HYDROCHLORIDE 5 MG/HR: KIT at 16:30

## 2020-06-28 ASSESSMENT — PAIN SCALES - GENERAL
PAINLEVEL_OUTOF10: 0

## 2020-06-28 NOTE — PLAN OF CARE
Problem: Falls - Risk of:  Goal: Will remain free from falls  Description: Will remain free from falls  6/27/2020 2203 by Marcus Rosales RN  Outcome: Ongoing  6/27/2020 1750 by Brenton Luque RN  Outcome: Ongoing  Goal: Absence of physical injury  Description: Absence of physical injury  6/27/2020 2203 by Marcus Rosales RN  Outcome: Ongoing  6/27/2020 1750 by Brenton Luque RN  Outcome: Ongoing     Problem: Airway Clearance - Ineffective:  Goal: Ability to maintain a clear airway will improve  Description: Ability to maintain a clear airway will improve  6/27/2020 2203 by Marcus Rosales RN  Outcome: Ongoing  6/27/2020 1750 by Brenton Luque RN  Outcome: Ongoing     Problem: Cardiac:  Goal: Ability to maintain an adequate cardiac output will improve  Description: Ability to maintain an adequate cardiac output will improve  6/27/2020 2203 by Marcus Rosales RN  Outcome: Ongoing  6/27/2020 1750 by Brenton Luque RN  Outcome: Ongoing  Goal: Complications related to the disease process, condition or treatment will be avoided or minimized  Description: Complications related to the disease process, condition or treatment will be avoided or minimized  6/27/2020 2203 by Marcus Rosales RN  Outcome: Ongoing  6/27/2020 1750 by Brenton Luque RN  Outcome: Ongoing  Goal: Hemodynamic stability will improve  Description: Hemodynamic stability will improve  6/27/2020 2203 by Marcus Rosales RN  Outcome: Ongoing  6/27/2020 1750 by Brenton Luque RN  Outcome: Ongoing  Goal: Risk factors for ineffective tissue perfusion will decrease  Description: Risk factors for ineffective tissue perfusion will decrease  6/27/2020 2203 by Marcus Rosales RN  Outcome: Ongoing  6/27/2020 1750 by Brenton Luque RN  Outcome: Ongoing

## 2020-06-28 NOTE — PLAN OF CARE
Problem: Falls - Risk of:  Goal: Will remain free from falls  Description: Will remain free from falls  Outcome: Ongoing  Goal: Absence of physical injury  Description: Absence of physical injury  Outcome: Ongoing     Problem: Airway Clearance - Ineffective:  Goal: Ability to maintain a clear airway will improve  Description: Ability to maintain a clear airway will improve  Outcome: Ongoing     Problem: Cardiac:  Goal: Ability to maintain an adequate cardiac output will improve  Description: Ability to maintain an adequate cardiac output will improve  Outcome: Ongoing  Goal: Complications related to the disease process, condition or treatment will be avoided or minimized  Description: Complications related to the disease process, condition or treatment will be avoided or minimized  Outcome: Ongoing  Goal: Hemodynamic stability will improve  Description: Hemodynamic stability will improve  Outcome: Ongoing  Goal: Risk factors for ineffective tissue perfusion will decrease  Description: Risk factors for ineffective tissue perfusion will decrease  Outcome: Ongoing

## 2020-06-28 NOTE — PROGRESS NOTES
[address]    Progress Note    6/28/2020    8:37 AM    Name:   Desean Aquino  MRN:     [id number]     Acct:      [de-identified]   Room:   2032/2032-01  IP Day:  2  Admit Date:  6/26/2020  2:19 PM    PCP:   Kwame Murdock MD  Code Status:  Full Code    Subjective:     C/C:   Chief Complaint   Patient presents with    Tachycardia    Shortness of Breath     Interval History Status:   Patient had cardioversion yesterday and sinus rhythm was restored  Overnight heart rate had gone down to 30s while sleeping but on waking up was around 67  Still mildly short of breath while talking  Echocardiogram result is pending  Brief History:   Desean Aquino is a 65 y.o. Non-/non  male who presents with Tachycardia and Shortness of Breath  Is a known patient of paroxysmal atrial fibrillation, AVR and dyslipidemia with other multiple medical problems including ALS, went to his cardiologist office for regular visit where he was found to be in atrial flutter with RVR. Patient stated that he has been getting progressively short of breath for the last 1 to 2 months, he uses oxygen via concentrator by breathing and at home and he is not on oxygen via nasal cannula  Patient states he has lost 30 pounds weight in the last 2 months which he attributes to his ALS.   He is lost significant muscle mass recently more on the left side  He is not on prescription medicines for a long time and is taking multiple herbal medications     He was given Adenocard in ER and subsequently has been started on Cardizem drip as well as sotalol   COVID test is negative  He is supposed to get sleep study in near future    6/27/2020  Patient remains in atrial flutter  Was started on aspirin, sotalol and Eliquis by cardiology yesterday  Had 6 seconds pauses x2 yesterday but went into atrial flutter after that  Denies any symptoms at present  Cardiology plans to do cardioversion today    Review of Systems:     Constitutional:  negative for chills, fevers, sweats,+ generalized weakness  Respiratory:  negative for cough,+ dyspnea on exertion, + dyspnea while talking  Cardiovascular:  negative for chest pain, chest pressure/discomfort, lower extremity edema, palpitations  Gastrointestinal:  negative for abdominal pain, constipation, diarrhea, nausea, vomiting  Neurological:  negative for dizziness, headache    Medications: Allergies: Allergies   Allergen Reactions    Ciprofloxacin      Can not take anything in this group with his ALS    Lasix [Furosemide]     Penicillins     Sulfa Antibiotics     Duloxetine        Current Meds:   Scheduled Meds:    aspirin  81 mg Oral Daily    therapeutic multivitamin-minerals  1 tablet Oral Daily    pantoprazole  20 mg Oral Daily    Vitamin D  1,000 Units Oral Daily    sodium chloride flush  10 mL Intravenous 2 times per day    apixaban  5 mg Oral BID     Continuous Infusions:    dilTIAZem (CARDIZEM) 125 mg in dextrose 5% 125 mL infusion       PRN Meds: HYDROcodone 5 mg - acetaminophen **OR** HYDROcodone 5 mg - acetaminophen, naloxone, sodium chloride flush, acetaminophen **OR** acetaminophen, polyethylene glycol, perflutren lipid microspheres, ondansetron    Data:     Past Medical History:   has a past medical history of ALS (amyotrophic lateral sclerosis) ([facility] [address] Aortic root aneurysm (HCC), Aortic valve replaced, and Hypertension. Social History:   reports that he has never smoked. He has never used smokeless tobacco. He reports current alcohol use of about 1.0 standard drinks of alcohol per week. He reports that he does not use drugs.      Family History:   Family History   Problem Relation Age of Onset    Lung Cancer Mother     Heart Disease Father        Vitals:  BP (!) 143/72   Pulse 67   Temp 97.9 °F (36.6 °C) (Temporal)   Resp 18   Ht 5' 10\" (1.778 m)   Wt 136 lb (61.7 kg)   SpO2 95%   BMI 19.51 kg/m²   Temp (24hrs), Av.7 °F (36.5 °C), Min:97.3 °F (36.3 °C), Max:97.9 °F (36.6 °C)    No results for input(s): POCGLU in the last 72 hours. I/O (24Hr): Intake/Output Summary (Last 24 hours) at 2020 0837  Last data filed at 2020 0800  Gross per 24 hour   Intake 360 ml   Output 500 ml   Net -140 ml       Labs:  Hematology:  Recent Labs     20  1427 20  0336   WBC 9.4 7.3 8.3   RBC 4.56 4.43 4.22   HGB 15.6 14.9 14.4   HCT 46.8 45.8 45.1   .6 103.4* 106.9*   MCH 34.2* 33.6* 34.1*   MCHC 33.3 32.5 31.9   RDW 13.1 12.9 12.8    282 258   MPV 10.1 10.1 9.8   SEDRATE 5  --   --    INR 1.0 1.0  --      Chemistry:  Recent Labs     20  1427 20  1743 20  2159 20  0336     --   --  136 136   K 4.3  --   --  4.6 4.4   CL 94*  --   --  97* 96*   CO2 29  --   --  30 34*   GLUCOSE 119*  --   --  143* 108*   BUN 33*  --   --  26* 25*   CREATININE 0.44*  --   --  <0.40* <0.40*   MG 2.3  --   --  2.0  --    ANIONGAP 12  --   --  9 6*   LABGLOM >60  --   --  CANNOT BE CALCULATED CANNOT BE CALCULATED   GFRAA >60  --   --  CANNOT BE CALCULATED CANNOT BE CALCULATED   CALCIUM 9.4  --   --  8.9 8.8   PROBNP 1,449*  --   --  1,287*  --    TROPHS 112* 89* 102*  --   --    CKTOTAL 375*  --   --  259  --    CKMB 26.3*  --   --   --   --    MYOGLOBIN 304*  --   --  164*  --      Recent Labs     20  0347 20  0336   PROT 6.3* 6.2*   LABALBU 3.5 3.5   TSH 2.70  --    AST 27 20   ALT 39 33   ALKPHOS 58 56   BILITOT 0.36 0.24*     ABG:No results found for: POCPH, PHART, PH, POCPCO2, WAA7ESZ, PCO2, POCPO2, PO2ART, PO2, POCHCO3, NFX9GEH, HCO3, NBEA, PBEA, BEART, BE, THGBART, THB, BWO2PDP, ERHH1DDS, M7AOYVUU, O2SAT, FIO2  No results found for: SPECIAL  No results found for: CULTURE    Radiology:  Xr Chest Portable    Result Date: 2020  No radiographic evidence of acute pulmonary disease.        Physical Examination:        General appearance:  alert, cachectic, still short of breath while talking  Mental Status:  oriented to person, place and time and normal affect  Lungs: Diminished breath sounds at bases  Heart: Regular rhythm, no murmur  Abdomen:  soft, nontender, nondistended, normal bowel sounds, no masses, hepatomegaly, splenomegaly  Extremities:significant wasting in both upper and lower extremities, left more than right  Skin:  no gross lesions, rashes, induration    Assessment:        Hospital Problems           Last Modified POA    * (Principal) Atrial flutter (HCC)-SR status post cardioversion 6/26/2020 Yes    ALS (amyotrophic lateral sclerosis) ([address] 3/90/4370 Yes    Diastolic dysfunction 6/03/7733 Yes    Aneurysm of aortic arch ([address] 6/26/2020 Yes    Paroxysmal atrial fibrillation ([address] 6/26/2020 Yes    Aortic valve disorder 6/26/2020 Yes    Bilateral hearing loss 6/26/2020 Yes    History of hypertension 6/26/2020 Yes    Hyperparathyroidism ([address] 6/26/2020 Yes    MVP (mitral valve prolapse) 6/26/2020 Yes    Obstructive sleep apnea 6/26/2020 Yes    Muscular atrophy 6/26/2020 Yes          Plan:          1. Continue Eliquis  2. Other cardiac medicines as recommended by cardiology after cardioversion  3. Continue oral PPIs  4. Follow echo result  5.  Incentive spirometry    Amparo Dejesus MD  6/28/2020  8:37 AM

## 2020-06-28 NOTE — PROGRESS NOTES
Cardiovascular progress Note          Patient name: Lucinda Choe    YOB: 1950  Date of admission:  6/26/2020       Patient seen, examined. Previous clinical entries reviewed. All available laboratory, imaging and ancillary data reviewed. Subjective:      Is remained in sinus rhythm after the cardioversion. Occasional episodes of bradycardia with heart rates going all the way down to the 30s. Blood pressure is slightly elevated. Echocardiogram showed normal aortic valve function. Left ventricular function appears to be preserved. Systems review:  Constitutional: No fever/chills. HENT: No headache, neck pain or neck stiffness. No sore throat or dysphagia. Gastrointestinal: No abdominal pain, nausea or vomiting. Cardiac: As Above  Respiratory: As above  Neurologic: No new focal weakness or numbness  Psychiatric: Normal mood and mentation       Examination:   Vitals: BP (!) 143/72   Pulse 67   Temp 97.9 °F (36.6 °C) (Temporal)   Resp 18   Ht 5' 10\" (1.778 m)   Wt 136 lb (61.7 kg)   SpO2 95%   BMI 19.51 kg/m²     Intake/Output Summary (Last 24 hours) at 6/28/2020 1053  Last data filed at 6/28/2020 0900  Gross per 24 hour   Intake 360 ml   Output 300 ml   Net 60 ml       General appearance: Comfortable in no apparent distress. HEENT: No pallor. No icterus  Neck: Supple. Lungs:Generally decreased breath sounds  Heart: S1,S2  Abdomen: Soft  Extremities: No peripheral edema  Skin: No obvious rashes. Musculoskeletal: No obvious deformities. Neurologic: No focal deficits.      Labs/ Ancillary data:     CBC:   Recent Labs     06/26/20  1427 06/27/20  0347 06/28/20  0336   WBC 9.4 7.3 8.3   HGB 15.6 14.9 14.4    282 258     BMP:    Recent Labs     06/26/20  1427 06/27/20  0347 06/28/20  0336    136 136   K 4.3 4.6 4.4   CL 94* 97* 96*   CO2 29 30 34*   BUN 33* 26* 25*   CREATININE 0.44* <0.40* <0.40*   GLUCOSE 119* 143* 108*     Hepatic:   Recent Labs 06/27/20  0347 06/28/20  0336   AST 27 20   ALT 39 33   BILITOT 0.36 0.24*   ALKPHOS 58 56     Troponin:   Recent Labs     06/26/20  1427 06/26/20  1743 06/26/20  2159   TROPONINT NOT REPORTED NOT REPORTED NOT REPORTED     INR:   Recent Labs     06/26/20  1427 06/27/20  0347   INR 1.0 1.0         Medications:   Scheduled Meds:   sotalol  40 mg Oral BID    aspirin  81 mg Oral Daily    therapeutic multivitamin-minerals  1 tablet Oral Daily    pantoprazole  20 mg Oral Daily    Vitamin D  1,000 Units Oral Daily    sodium chloride flush  10 mL Intravenous 2 times per day    apixaban  5 mg Oral BID     Continuous Infusions:      Assessment/ Plan :   Atrial flutter-status post electrical cardioversion. Possible sick sinus syndrome. History of AVR. We will restart patient on low-dose sotalol later today. Monitor patient closely. We will follow patient with you. Thank you very much for allowing us to participate in the care of this patient. Please call us with any questions.       Electronically signed by Marianna Bejarano MD on 6/28/2020 at 10:53 AM

## 2020-06-28 NOTE — FLOWSHEET NOTE
phoned RN instructed he ordered a glucagon drip and spoke with the pharmacy and they will send up medication when ready.

## 2020-06-28 NOTE — FLOWSHEET NOTE
responded to page was informed of patient having 4-6 second pauses Order for discountinue sotalol and physician will call back. Continue to monitor. Cardiac patches applied to patient.

## 2020-06-29 LAB
ABSOLUTE EOS #: <0.03 K/UL (ref 0–0.44)
ABSOLUTE IMMATURE GRANULOCYTE: 0.06 K/UL (ref 0–0.3)
ABSOLUTE LYMPH #: 0.96 K/UL (ref 1.1–3.7)
ABSOLUTE MONO #: 0.79 K/UL (ref 0.1–1.2)
ALBUMIN SERPL-MCNC: 4.1 G/DL (ref 3.5–5.2)
ALBUMIN/GLOBULIN RATIO: ABNORMAL (ref 1–2.5)
ALP BLD-CCNC: 65 U/L (ref 40–129)
ALT SERPL-CCNC: 43 U/L (ref 5–41)
ANION GAP SERPL CALCULATED.3IONS-SCNC: 11 MMOL/L (ref 9–17)
AST SERPL-CCNC: 33 U/L
BASOPHILS # BLD: 0 % (ref 0–2)
BASOPHILS ABSOLUTE: 0.05 K/UL (ref 0–0.2)
BILIRUB SERPL-MCNC: 0.34 MG/DL (ref 0.3–1.2)
BUN BLDV-MCNC: 23 MG/DL (ref 8–23)
BUN/CREAT BLD: 55 (ref 9–20)
CALCIUM SERPL-MCNC: 9.4 MG/DL (ref 8.6–10.4)
CHLORIDE BLD-SCNC: 92 MMOL/L (ref 98–107)
CO2: 35 MMOL/L (ref 20–31)
CREAT SERPL-MCNC: 0.42 MG/DL (ref 0.7–1.2)
DIFFERENTIAL TYPE: ABNORMAL
EOSINOPHILS RELATIVE PERCENT: 0 % (ref 1–4)
GFR AFRICAN AMERICAN: >60 ML/MIN
GFR NON-AFRICAN AMERICAN: >60 ML/MIN
GFR SERPL CREATININE-BSD FRML MDRD: ABNORMAL ML/MIN/{1.73_M2}
GFR SERPL CREATININE-BSD FRML MDRD: ABNORMAL ML/MIN/{1.73_M2}
GLUCOSE BLD-MCNC: 69 MG/DL (ref 70–99)
GLUCOSE BLD-MCNC: 94 MG/DL (ref 75–110)
HCT VFR BLD CALC: 50.1 % (ref 40.7–50.3)
HEMOGLOBIN: 15.9 G/DL (ref 13–17)
IMMATURE GRANULOCYTES: 1 %
LYMPHOCYTES # BLD: 8 % (ref 24–43)
MCH RBC QN AUTO: 33.9 PG (ref 25.2–33.5)
MCHC RBC AUTO-ENTMCNC: 31.7 G/DL (ref 28.4–34.8)
MCV RBC AUTO: 106.8 FL (ref 82.6–102.9)
MONOCYTES # BLD: 7 % (ref 3–12)
NRBC AUTOMATED: 0 PER 100 WBC
PDW BLD-RTO: 12.6 % (ref 11.8–14.4)
PLATELET # BLD: 278 K/UL (ref 138–453)
PLATELET ESTIMATE: ABNORMAL
PMV BLD AUTO: 10.1 FL (ref 8.1–13.5)
POTASSIUM SERPL-SCNC: 5.2 MMOL/L (ref 3.7–5.3)
RBC # BLD: 4.69 M/UL (ref 4.21–5.77)
RBC # BLD: ABNORMAL 10*6/UL
SEG NEUTROPHILS: 84 % (ref 36–65)
SEGMENTED NEUTROPHILS ABSOLUTE COUNT: 9.76 K/UL (ref 1.5–8.1)
SODIUM BLD-SCNC: 138 MMOL/L (ref 135–144)
TOTAL PROTEIN: 7.3 G/DL (ref 6.4–8.3)
WBC # BLD: 11.6 K/UL (ref 3.5–11.3)
WBC # BLD: ABNORMAL 10*3/UL

## 2020-06-29 PROCEDURE — 82947 ASSAY GLUCOSE BLOOD QUANT: CPT

## 2020-06-29 PROCEDURE — 6370000000 HC RX 637 (ALT 250 FOR IP): Performed by: NURSE PRACTITIONER

## 2020-06-29 PROCEDURE — 97530 THERAPEUTIC ACTIVITIES: CPT

## 2020-06-29 PROCEDURE — 97110 THERAPEUTIC EXERCISES: CPT

## 2020-06-29 PROCEDURE — 97167 OT EVAL HIGH COMPLEX 60 MIN: CPT

## 2020-06-29 PROCEDURE — 2580000003 HC RX 258: Performed by: NURSE PRACTITIONER

## 2020-06-29 PROCEDURE — 99232 SBSQ HOSP IP/OBS MODERATE 35: CPT | Performed by: INTERNAL MEDICINE

## 2020-06-29 PROCEDURE — 97116 GAIT TRAINING THERAPY: CPT

## 2020-06-29 PROCEDURE — 36415 COLL VENOUS BLD VENIPUNCTURE: CPT

## 2020-06-29 PROCEDURE — 2700000000 HC OXYGEN THERAPY PER DAY

## 2020-06-29 PROCEDURE — 97162 PT EVAL MOD COMPLEX 30 MIN: CPT

## 2020-06-29 PROCEDURE — 94761 N-INVAS EAR/PLS OXIMETRY MLT: CPT

## 2020-06-29 PROCEDURE — 2060000000 HC ICU INTERMEDIATE R&B

## 2020-06-29 PROCEDURE — 85025 COMPLETE CBC W/AUTO DIFF WBC: CPT

## 2020-06-29 PROCEDURE — 80053 COMPREHEN METABOLIC PANEL: CPT

## 2020-06-29 PROCEDURE — 97535 SELF CARE MNGMENT TRAINING: CPT

## 2020-06-29 RX ADMIN — SODIUM CHLORIDE, PRESERVATIVE FREE 10 ML: 5 INJECTION INTRAVENOUS at 07:43

## 2020-06-29 RX ADMIN — APIXABAN 5 MG: 5 TABLET, FILM COATED ORAL at 07:42

## 2020-06-29 RX ADMIN — MULTIPLE VITAMINS W/ MINERALS TAB 1 TABLET: TAB at 07:42

## 2020-06-29 RX ADMIN — ASPIRIN 81 MG: 81 TABLET, COATED ORAL at 07:42

## 2020-06-29 RX ADMIN — SODIUM CHLORIDE, PRESERVATIVE FREE 10 ML: 5 INJECTION INTRAVENOUS at 21:26

## 2020-06-29 RX ADMIN — MELATONIN 1000 UNITS: at 07:42

## 2020-06-29 RX ADMIN — Medication 10 ML: at 07:42

## 2020-06-29 RX ADMIN — PANTOPRAZOLE SODIUM 20 MG: 20 TABLET, DELAYED RELEASE ORAL at 07:42

## 2020-06-29 ASSESSMENT — PAIN SCALES - GENERAL
PAINLEVEL_OUTOF10: 0

## 2020-06-29 NOTE — PROGRESS NOTES
(amyotrophic lateral sclerosis) ([facility] [address] Aortic root aneurysm ([facility] [address] Aortic valve replaced, and Hypertension. has a past surgical history that includes Parathyroid gland surgery (1980); hernia repair; and Cardioversion (06/27/2020). Restrictions  Restrictions/Precautions  Restrictions/Precautions: General Precautions, Fall Risk  Required Braces or Orthoses?: No  Position Activity Restriction  Other position/activity restrictions: up with assist, telemetry  Vision/Hearing  Vision: Within Functional Limits  Hearing: Within functional limits     Subjective  General  Chart Reviewed: Yes  Patient assessed for rehabilitation services?: Yes  Additional Pertinent Hx: ALS, aortic valve replacement  Response To Previous Treatment: Not applicable  General Comment  Comments: RNmalinda PT  Subjective  Subjective: Pt agreeable to PT  Pain Screening  Patient Currently in Pain: Denies          Orientation  Orientation  Overall Orientation Status: Within Functional Limits(but has slow response)  Social/Functional History  Social/Functional History  Lives With: Spouse  Type of Home: (Condo)  Home Layout: One level  Home Access: Stairs to enter with rails  Entrance Stairs - Rails: Left  Bathroom Shower/Tub: Walk-in shower  Bathroom Equipment: Grab bars in shower, Built-in shower seat  Home Equipment: 4 wheeled walker  ADL Assistance: Needs assistance  Homemaking Assistance: Needs assistance  Active : No  Patient's  Info: spouse  Mode of Transportation: Family  Occupation: Retired  Type of occupation: IT  Leisure & Hobbies: reading  Additional Comments: pt fell within last month  Cognition   Cognition  Overall Cognitive Status: Exceptions  Arousal/Alertness: Delayed responses to stimuli  Following Commands: Follows one step commands with increased time; Follows one step commands with repetition; Inconsistently follows commands  Attention Span: Attends with cues to redirect  Memory: Appears intact  Safety Judgement: Decreased awareness of need for assistance;Decreased awareness of need for safety  Problem Solving: Decreased awareness of errors;Assistance required to identify errors made;Assistance required to correct errors made;Assistance required to generate solutions;Assistance required to implement solutions  Insights: Decreased awareness of deficits  Initiation: Does not require cues  Sequencing: Requires cues for some    Objective     Observation/Palpation  Observation: resting in bed. thin & frail, has multiple lines including O2@ 2L/min    AROM RLE (degrees)  RLE AROM: WFL  AROM LLE (degrees)  LLE AROM : WFL  AROM RUE (degrees)  RUE AROM : WFL  AROM LUE (degrees)  LUE AROM : WFL  Strength RLE  Comment: 4/5 except DF/PF 3-/5  Strength LLE  Comment: 4/5 except DF/PF 3-/5  Strength RUE  Strength RUE: WNL  Strength LUE  Strength LUE: WNL  Tone RLE  RLE Tone: Normotonic  Tone LLE  LLE Tone: Normotonic  Motor Control  Gross Motor?: WFL  Sensation  Overall Sensation Status: WFL  Bed mobility  Rolling to Left: Supervision  Rolling to Right: Supervision  Supine to Sit: Minimal assistance  Sit to Supine: Minimal assistance  Scooting: Minimal assistance  Comment: Cues/assist to reach to bedrail & use of upper body to assist   Pt sat at EOB for 5 minutes to rest after bed mobility & prepare lines for standing & ambulation. Transfers  Sit to Stand:  Moderate Assistance  Stand to sit: Moderate Assistance  Bed to Chair: Moderate assistance  Stand Pivot Transfers: Minimal Assistance  Lateral Transfers: Minimal Assistance  Comment: Needed Ed on use of upper body for safe sit/stand  Ambulation  Ambulation?: Yes  Ambulation 1  Surface: level tile  Device: Rollator  Assistance: Minimal assistance  Quality of Gait: step to pattern  Gait Deviations: Decreased step length;Decreased step height  Distance: 12ft     Balance  Sitting - Static: Good  Sitting - Dynamic: Good  Standing - Static: Good;-  Standing - Dynamic: Fair  Exercises  Comments:  Ed pt on functional mobility, safety awareness, prevention of sedentary complications   All lines intact, call light within reach, and patient positioned comfortably at end of treatment. All patient needs addressed prior to ending therapy session. Plan   Plan  Times per week: 1-2x/d,5-6d/week  Current Treatment Recommendations: Strengthening, Balance Training, Transfer Training, Endurance Training, Gait Training, Stair training, Home Exercise Program, Safety Education & Training  Safety Devices  Type of devices: Bed alarm in place, Call light within reach, Chair alarm in place, Gait belt, Patient at risk for falls, Left in chair, Nurse notified    G-Code       OutComes Score                                                  AM-PAC Score  AM-PAC Inpatient Mobility Raw Score : 16 (06/29/20 0827)  AM-PAC Inpatient T-Scale Score : 40.78 (06/29/20 0827)  Mobility Inpatient CMS 0-100% Score: 54.16 (06/29/20 0827)  Mobility Inpatient CMS G-Code Modifier : CK (06/29/20 0827)          Goals  Short term goals  Time Frame for Short term goals: 12 visits  Short term goal 1: Inc bed-mobility & transfers to independent to enable pt to safely get in/OOB  Short term goal 2: Inc gait to amb 350ft or > indep w/ RW to enable pt to return to previous level of independence  Short term goal 3: Pt able to go up/down 3 steps with one rail indep; Short term goal 4: Pt able to tolerate 30-40 min of activity to include 15-20 reps of ex & functional mobility including 5 minutes of standing to facilitate activity tolerance to [facility];   Short term goal 5: Ed pt on home ex's, safety & energy principles & issue written home program;       Therapy Time   Individual Concurrent Group Co-treatment   Time In 0745         Time Out 0829         Minutes 44           Additional 10 minutes for chart review    Timed Code Treatment Minutes: Rick Marcelo, PT

## 2020-06-29 NOTE — PROGRESS NOTES
Subjective: Denies CP. Breathing is feeling better. RN reports patient has been off balance and had intermittent confusion today. No further bradycardia or pauses.      VS: /73   Pulse 90   Temp 98.5 °F (36.9 °C) (Temporal)   Resp 18   Ht 5' 10\" (1.778 m)   Wt 134 lb (60.8 kg)   SpO2 95%   BMI 19.23 kg/m²     Wt:     I/O: In: 120 [P.O.:120]  Out: 1375 [Urine:1375]    Monitor: SR    Meds: Scheduled Meds:   aspirin  81 mg Oral Daily    therapeutic multivitamin-minerals  1 tablet Oral Daily    pantoprazole  20 mg Oral Daily    Vitamin D  1,000 Units Oral Daily    sodium chloride flush  10 mL Intravenous 2 times per day    apixaban  5 mg Oral BID     Continuous Infusions:   glucagon (rDNA) infusion Stopped (06/28/20 1820)     PRN Meds:.ondansetron, HYDROcodone 5 mg - acetaminophen **OR** HYDROcodone 5 mg - acetaminophen, naloxone, sodium chloride flush, acetaminophen **OR** acetaminophen, polyethylene glycol, perflutren lipid microspheres     Exam:General Appearance: AOX3, NAD  Skin:pink, warm, dry  Pulmonary/Chest: Diminished, tachypneic  Cardiovascular:S1S2, RRR, negative JVD  Extremities:No peripheral edema    Labs:  CBC with Differential:    Lab Results   Component Value Date    WBC 11.6 06/29/2020    RBC 4.69 06/29/2020    HGB 15.9 06/29/2020    HCT 50.1 06/29/2020     06/29/2020    .8 06/29/2020    MCH 33.9 06/29/2020    MCHC 31.7 06/29/2020    RDW 12.6 06/29/2020    LYMPHOPCT 8 06/29/2020    MONOPCT 7 06/29/2020    BASOPCT 0 06/29/2020    MONOSABS 0.79 06/29/2020    LYMPHSABS 0.96 06/29/2020    EOSABS <0.03 06/29/2020    BASOSABS 0.05 06/29/2020    DIFFTYPE NOT REPORTED 06/29/2020     CMP:    Lab Results   Component Value Date     06/29/2020    K 5.2 06/29/2020    CL 92 06/29/2020    CO2 35 06/29/2020    BUN 23 06/29/2020    CREATININE 0.42 06/29/2020    GFRAA >60 06/29/2020    LABGLOM >60 06/29/2020    GLUCOSE 69 06/29/2020    PROT 7.3 06/29/2020    LABALBU 4.1 06/29/2020 CALCIUM 9.4 06/29/2020    BILITOT 0.34 06/29/2020    ALKPHOS 65 06/29/2020    AST 33 06/29/2020    ALT 43 06/29/2020       A/P:1. Paroxysmal atrial flutter  -In SR. Antidysrhythmics discontinued d/t bradycardia/pauses. AC with Eliquis.      2. Hx AVR  -Stable. No evidence of CHF.        Will discuss with Dr. Gladys Moy    Electronically signed by MATEO Baumann CNP on 6/29/2020 at 1:16 PM     Chart reviewed  Pt examined  Agree with above  Tachybrady syndrome  Options were discussed with pt and wife. Will proceed with  on Wed.   Will hold eliquis

## 2020-06-29 NOTE — PROGRESS NOTES
(1980); hernia repair; and Cardioversion (06/27/2020).            Restrictions  Restrictions/Precautions  Restrictions/Precautions: General Precautions, Fall Risk  Required Braces or Orthoses?: No  Position Activity Restriction  Other position/activity restrictions: up with assist, telemetry, O2(B LE AFO- should be worn with stand and mobility)    Subjective   General  Chart Reviewed: Yes  Patient assessed for rehabilitation services?: Yes  Family / Caregiver Present: Yes  Patient Currently in Pain: Denies  Pain Assessment  Pain Assessment: 0-10  Pain Level: 0  Vital Signs  Temp: 98.5 °F (36.9 °C)  Temp Source: Temporal  Heart Rate Source: Monitor  Resp: 18  BP: 116/73  BP Location: Right upper arm  BP Upper/Lower: Upper  Patient Position: Semi fowlers  Level of Consciousness: Alert  Patient Currently in Pain: Denies  Oxygen Therapy  SpO2: 95 %  Pulse Oximeter Device Mode: Continuous  Pulse Oximeter Device Location: Finger  O2 Device: Nasal cannula  O2 Flow Rate (L/min): 2 L/min  Social/Functional History  Social/Functional History  Lives With: Spouse  Type of Home: (condo)  Home Layout: One level(planning to currently move to smaller home)  Home Access: Stairs to enter with rails  Entrance Stairs - Rails: Left  Bathroom Shower/Tub: Walk-in shower  Bathroom Equipment: Grab bars in shower, Built-in shower seat, Hand-held shower(taller commode)  Home Equipment: 4 wheeled walker  Receives Help From: (wife and family in the area)  ADL Assistance: Needs assistance  Bath: Independent  Dressing: Independent  Grooming: Independent  Feeding: Independent  Toileting: Independent  Homemaking Assistance: Needs assistance  Homemaking Responsibilities: No  Ambulation Assistance: Independent(4 wheel walker)  Transfer Assistance: Independent  Active : No  Patient's  Info: spouse  Mode of Transportation: Family  Occupation: Retired  Type of occupation: IT  Leisure & Hobbies: reading  Additional Comments: Pt has fallen 2-3 times within last month per wife       Objective   Vision: Impaired  Vision Exceptions: Wears glasses for reading  Hearing: Within functional limits    Orientation  Overall Orientation Status: Impaired  Orientation Level: Oriented X4  Observation/Palpation  Posture: Good(use of RW for UB support)  Observation: resting in bed. thin & frail, has multiple lines including O2@ 2L/min  Balance  Sitting Balance: Contact guard assistance  Standing Balance: Minimal assistance  Standing Balance  Time: 4 minutes, then second time 2 minutes, then 1 minute  Activity: weight shift and LE movement, B UE support on RW  Functional Mobility  Functional - Mobility Device: Rolling Walker  Activity: Other(Move to [facility])  Assist Level: Moderate assistance(cues to squence task and hand/foot placement for safety)  Functional Mobility Comments: Pt wearing B LE othotics, Lt LE spontaneously buckled X 2  Toilet Transfers  Toilet Transfer: Moderate assistance(Lt LE spontaneously buckle)  ADL  Feeding: Minimal assistance(difficulty holding utensils)  Grooming: Minimal assistance(difficulty holding items)  UE Bathing: Minimal assistance  LE Bathing: Maximum assistance  UE Dressing: Minimal assistance  LE Dressing: Maximum assistance  Toileting: Moderate assistance  Tone RUE  RUE Tone: Hypertonic  Tone LUE  LUE Tone: Hypertonic  Coordination  Movements Are Fluid And Coordinated: No  Coordination and Movement description: Fine motor impairments;Gross motor impairments; Ataxia; Tremors;Decreased accuracy; Right UE;Left UE  Functional Activity Tolerance  Functional Activity Tolerance:  Tolerates 30 min exercise with multiple rests  Additional Comments: Pt kept closing his eyes- cues for alert level throughout session(Instructions on pursed lip breathing- handout provided)  Bed mobility  Rolling to Left: Supervision  Rolling to Right: Supervision  Supine to Sit: Contact guard assistance  Sit to Supine: Contact guard assistance  Scooting: Contact guard assistance  Comment: cue for hand position   Transfers  Sit to stand: Minimal assistance  Stand to sit: Minimal assistance     Cognition  Overall Cognitive Status: Exceptions  Arousal/Alertness: Delayed responses to stimuli  Following Commands: Follows one step commands with increased time; Follows one step commands with repetition; Inconsistently follows commands  Attention Span: Attends with cues to redirect  Memory: Appears intact  Safety Judgement: Decreased awareness of need for assistance;Decreased awareness of need for safety  Problem Solving: Decreased awareness of errors;Assistance required to identify errors made;Assistance required to correct errors made;Assistance required to generate solutions;Assistance required to implement solutions  Insights: Decreased awareness of deficits  Initiation: Does not require cues  Sequencing: Requires cues for some        Sensation  Overall Sensation Status: Impaired  Additional Comments: Numbness B fingertips        LUE AROM (degrees)  LUE AROM : WNL  Left Hand AROM (degrees)  Left Hand AROM: WNL  RUE AROM (degrees)  RUE AROM : WNL  Right Hand AROM (degrees)  Right Hand AROM: WNL  LUE Strength  Gross LUE Strength: Exceptions to [facility]  L Shoulder Flex: 4/5  L Elbow Flex: 4/5  L Hand General: 4-/5  RUE Strength  Gross RUE Strength: Exceptions to [facility]  R Shoulder Flex: 4/5  R Elbow Flex: 4/5  R Hand General: 4-/5     Hand Dominance  Hand Dominance: Right             Plan   Plan  Times per week: 5-6X/wk  Times per day: Daily  Current Treatment Recommendations: Strengthening, Balance Training, Functional Mobility Training, Endurance Training, Safety Education & Training, Patient/Caregiver Education & Training, Equipment Evaluation, Education, & procurement, Self-Care / ADL    G-Code     OutComes Score                                                  AM-PAC Score        -[facility] Inpatient Daily Activity Raw Score: 14 (06/29/20 9340)  AM-PAC Inpatient ADL T-Scale Score : 33.39

## 2020-06-29 NOTE — FLOWSHEET NOTE
06/28/20 2300   Provider Notification   Reason for Communication Review case   Provider Name Dr. Juve Anthony   Provider Notification Physician   Method of Communication Call   Response No new orders   Notification Time [phone]     RN spoke to the physician regarding the pt's elevated BP. Update given. Physician stated he is ok with a SBP as high as 200 d/t the pt's sensitivity BP medications and lowering his HR. No new orders placed. Will continue to monitor.

## 2020-06-29 NOTE — CARE COORDINATION
Social work: Noted PT/OT recommendation of acute rehab. Met with patient and spouse, they would like referral to [facility]. Referral faxed. SW asked RN to get order for PM&R Dr. Meredith Perkins from Dr. Debi Tolliver. The Plan for Transition of Care is related to the following treatment goals: Discharge to SNF to gain strength while recovering with PT/OT strengthening, teaching, training, monitoring and medication management. The Patient and/or patient representative  was provided with a choice of provider and agrees   with the discharge plan. [x] Yes [] No    Freedom of choice list was provided with basic dialogue that supports the patient's individualized plan of care/goals, treatment preferences and shares the quality data associated with the providers.  [x] Yes [] No

## 2020-06-29 NOTE — PLAN OF CARE
Problem: Falls - Risk of:  Goal: Will remain free from falls  Description: Will remain free from falls  6/29/2020 0421 by Beata Jesus RN  Outcome: Ongoing     Problem: Falls - Risk of:  Goal: Absence of physical injury  Description: Absence of physical injury  6/29/2020 0421 by Beata Jesus RN  Outcome: Ongoing     Problem: Airway Clearance - Ineffective:  Goal: Ability to maintain a clear airway will improve  Description: Ability to maintain a clear airway will improve  6/29/2020 0421 by Beata Jesus RN  Outcome: Ongoing     Problem: Cardiac:  Goal: Ability to maintain an adequate cardiac output will improve  Description: Ability to maintain an adequate cardiac output will improve  6/29/2020 0421 by Beata Jesus RN  Outcome: Ongoing     Problem: Cardiac:  Goal: Complications related to the disease process, condition or treatment will be avoided or minimized  Description: Complications related to the disease process, condition or treatment will be avoided or minimized  6/29/2020 0421 by Beata Jesus RN  Outcome: Ongoing     Problem: Cardiac:  Goal: Hemodynamic stability will improve  Description: Hemodynamic stability will improve  6/29/2020 0421 by Beata Jesus RN  Outcome: Ongoing     Problem: Cardiac:  Goal: Risk factors for ineffective tissue perfusion will decrease  Description: Risk factors for ineffective tissue perfusion will decrease  6/29/2020 0421 by Beata Jesus RN  Outcome: Ongoing

## 2020-06-29 NOTE — PROGRESS NOTES
[facility]    Progress Note    6/29/2020    3:58 PM    Name:   Bonny Boggs  MRN:     [id number]     [place]:      [de-identified]   Room:   2032/2032-01  IP Day:  3  Admit Date:  6/26/2020  2:19 PM    PCP:   Anika Mcclure MD  Code Status:  Full Code    Subjective:     C/C:   Chief Complaint   Patient presents with    Tachycardia    Shortness of Breath     Interval History Status:   Patient had cardioversion 3/27/2020 and sinus rhythm was restored currently heart rate is 90s  Overnight heart rate had gone down to 30s while sleeping but on waking up was around 67 was on glucagon drip which has been stopped after midnight  Still mildly short of breath while talking  Blood sugars were 69 today which have improved after eating  Patient was confused since morning which is new  Echocardiogram result is pending  Brief History:   Bonny Boggs is a 65 y.o. Non-/non  male who presents with Tachycardia and Shortness of Breath  Is a known patient of paroxysmal atrial fibrillation, AVR and dyslipidemia with other multiple medical problems including ALS, went to his cardiologist office for regular visit where he was found to be in atrial flutter with RVR. Patient stated that he has been getting progressively short of breath for the last 1 to 2 months, he uses oxygen via concentrator by breathing and at home and he is not on oxygen via nasal cannula  Patient states he has lost 30 pounds weight in the last 2 months which he attributes to his ALS.   He is lost significant muscle mass recently more on the left side  He is not on prescription medicines for a long time and is taking multiple herbal medications     He was given Adenocard in ER and subsequently has been started on Cardizem drip as well as sotalol   COVID test is negative  He is supposed to get sleep study in near future    6/27/2020  Patient remains in atrial flutter  Was started on aspirin, sotalol and Eliquis by cardiology yesterday  Had 6 seconds pauses x2 yesterday but went into atrial flutter after that  Denies any symptoms at present  Cardiology plans to do cardioversion today    Review of Systems:     Constitutional:  negative for chills, fevers, sweats,+ generalized weakness,+ confusion  Respiratory:  negative for cough,+ dyspnea on exertion, + dyspnea while talking  Cardiovascular:  negative for chest pain, chest pressure/discomfort, lower extremity edema, palpitations  Gastrointestinal:  negative for abdominal pain, constipation, diarrhea, nausea, vomiting  Neurological:  negative for dizziness, headache    Medications: Allergies: Allergies   Allergen Reactions    Ciprofloxacin      Can not take anything in this group with his ALS    Lasix [Furosemide]     Penicillins     Sulfa Antibiotics     Duloxetine        Current Meds:   Scheduled Meds:    aspirin  81 mg Oral Daily    therapeutic multivitamin-minerals  1 tablet Oral Daily    pantoprazole  20 mg Oral Daily    Vitamin D  1,000 Units Oral Daily    sodium chloride flush  10 mL Intravenous 2 times per day     Continuous Infusions:    glucagon (rDNA) infusion Stopped (06/28/20 1820)     PRN Meds: ondansetron, HYDROcodone 5 mg - acetaminophen **OR** HYDROcodone 5 mg - acetaminophen, naloxone, sodium chloride flush, acetaminophen **OR** acetaminophen, polyethylene glycol, perflutren lipid microspheres    Data:     Past Medical History:   has a past medical history of ALS (amyotrophic lateral sclerosis) ([facility] [address] Aortic root aneurysm ([facility] [address] Aortic valve replaced, and Hypertension. Social History:   reports that he has never smoked. He has never used smokeless tobacco. He reports current alcohol use of about 1.0 standard drinks of alcohol per week. He reports that he does not use drugs.      Family History:   Family History   Problem Relation Age of Onset    Lung Cancer Mother     Heart Disease Father        Vitals:  BP 116/73   Pulse 90   Temp 98.5 °F (36.9 °C) (Temporal)   Resp 18   Ht 5' 10\" (1.778 m)   Wt 134 lb (60.8 kg)   SpO2 95%   BMI 19.23 kg/m²   Temp (24hrs), Av °F (36.7 °C), Min:97.5 °F (36.4 °C), Max:98.5 °F (36.9 °C)    Recent Labs     20  1706 20  0851   POCGLU 116* 94       I/O (24Hr):     Intake/Output Summary (Last 24 hours) at 2020 1558  Last data filed at 2020 1438  Gross per 24 hour   Intake 360 ml   Output 1575 ml   Net -1215 ml       Labs:  Hematology:  Recent Labs     20  0336 20  0429   WBC 7.3 8.3 11.6*   RBC 4.43 4.22 4.69   HGB 14.9 14.4 15.9   HCT 45.8 45.1 50.1   .4* 106.9* 106.8*   MCH 33.6* 34.1* 33.9*   MCHC 32.5 31.9 31.7   RDW 12.9 12.8 12.6    258 278   MPV 10.1 9.8 10.1   INR 1.0  --   --      Chemistry:  Recent Labs     20  1743 20  2159 20  0336 20  0429   NA  --   --  136 136 138   K  --   --  4.6 4.4 5.2   CL  --   --  97* 96* 92*   CO2  --   --  30 34* 35*   GLUCOSE  --   --  143* 108* 69*   BUN  --   --  26* 25* 23   CREATININE  --   --  <0.40* <0.40* 0.42*   MG  --   --  2.0  --   --    ANIONGAP  --   --  9 6* 11   LABGLOM  --   --  CANNOT BE CALCULATED CANNOT BE CALCULATED >60   GFRAA  --   --  CANNOT BE CALCULATED CANNOT BE CALCULATED >60   CALCIUM  --   --  8.9 8.8 9.4   PROBNP  --   --  1,287*  --   --    TROPHS 89* 102*  --   --   --    CKTOTAL  --   --  259  --   --    MYOGLOBIN  --   --  164*  --   --      Recent Labs     20  0347 20  0336 20  1706 20  0429 20  0851   PROT 6.3* 6.2*  --  7.3  --    LABALBU 3.5 3.5  --  4.1  --    TSH 2.70  --   --   --   --    AST 27 20  --  33  --    ALT 39 33  --  43*  --    ALKPHOS 58 56  --  65  --    BILITOT 0.36 0.24*  --  0.34  --    POCGLU  --   --  116*  --  94     ABG:No results found for: POCPH, PHART, PH, POCPCO2, WMA6RTK, PCO2, POCPO2, PO2ART, PO2, POCHCO3, VBO3HNC, HCO3, NBEA, PBEA, BEART, BE, THGBART, THB, LDU5EJL, PXOT3UPR, X1QLWIMM, O2SAT, FIO2  No results found for: SPECIAL  No results found for: CULTURE    Radiology:  Xr Chest Portable    Result Date: 6/26/2020  No radiographic evidence of acute pulmonary disease. Physical Examination:        General appearance:  alert, cachectic, still short of breath while talking  Mental Status: Having mild confusion today  Lungs: Diminished breath sounds at bases  Heart: Regular rhythm, no murmur  Abdomen:  soft, nontender, nondistended, normal bowel sounds, no masses, hepatomegaly, splenomegaly  Extremities:significant wasting in both upper and lower extremities, left more than right  Skin:  no gross lesions, rashes, induration    Assessment:        Hospital Problems           Last Modified POA    * (Principal) Atrial flutter (HCC)-SR status post cardioversion 6/26/2020 Yes    ALS (amyotrophic lateral sclerosis) ([address] 9/18/4034 Yes    Diastolic dysfunction 9/56/4344 Yes    Aneurysm of aortic arch ([address] 6/26/2020 Yes    Paroxysmal atrial fibrillation ([address] 6/26/2020 Yes    Aortic valve disorder 6/26/2020 Yes    Bilateral hearing loss 6/26/2020 Yes    History of hypertension 6/26/2020 Yes    Hyperparathyroidism ([address] 6/26/2020 Yes    MVP (mitral valve prolapse) 6/26/2020 Yes    Obstructive sleep apnea 6/26/2020 Yes    Muscular atrophy 6/26/2020 Yes          Plan:          1. Continue Eliquis till evaluated by by cardiology today  2. Other cardiac medicines as recommended by cardiology after cardioversion  3. Continue oral PPIs  4. Follow echo result  5.  Incentive spirometry    [address] MD  6/29/2020  3:58 PM

## 2020-06-30 PROBLEM — Z79.01 ANTICOAGULATED ON COUMADIN: Status: ACTIVE | Noted: 2020-06-30

## 2020-06-30 PROBLEM — I49.5 TACHY-BRADY SYNDROME (HCC): Status: ACTIVE | Noted: 2020-06-30

## 2020-06-30 LAB
ABSOLUTE EOS #: 0.06 K/UL (ref 0–0.44)
ABSOLUTE IMMATURE GRANULOCYTE: 0.01 K/UL (ref 0–0.3)
ABSOLUTE LYMPH #: 1.17 K/UL (ref 1.1–3.7)
ABSOLUTE MONO #: 0.86 K/UL (ref 0.1–1.2)
ALBUMIN SERPL-MCNC: 3.7 G/DL (ref 3.5–5.2)
ALBUMIN/GLOBULIN RATIO: ABNORMAL (ref 1–2.5)
ALP BLD-CCNC: 57 U/L (ref 40–129)
ALT SERPL-CCNC: 34 U/L (ref 5–41)
ANION GAP SERPL CALCULATED.3IONS-SCNC: 7 MMOL/L (ref 9–17)
AST SERPL-CCNC: 24 U/L
BASOPHILS # BLD: 0 % (ref 0–2)
BASOPHILS ABSOLUTE: <0.03 K/UL (ref 0–0.2)
BILIRUB SERPL-MCNC: 0.46 MG/DL (ref 0.3–1.2)
BUN BLDV-MCNC: 20 MG/DL (ref 8–23)
BUN/CREAT BLD: ABNORMAL (ref 9–20)
CALCIUM SERPL-MCNC: 9.3 MG/DL (ref 8.6–10.4)
CHLORIDE BLD-SCNC: 91 MMOL/L (ref 98–107)
CO2: 38 MMOL/L (ref 20–31)
CREAT SERPL-MCNC: <0.4 MG/DL (ref 0.7–1.2)
DIFFERENTIAL TYPE: ABNORMAL
EOSINOPHILS RELATIVE PERCENT: 1 % (ref 1–4)
GFR AFRICAN AMERICAN: ABNORMAL ML/MIN
GFR NON-AFRICAN AMERICAN: ABNORMAL ML/MIN
GFR SERPL CREATININE-BSD FRML MDRD: ABNORMAL ML/MIN/{1.73_M2}
GFR SERPL CREATININE-BSD FRML MDRD: ABNORMAL ML/MIN/{1.73_M2}
GLUCOSE BLD-MCNC: 98 MG/DL (ref 70–99)
HCT VFR BLD CALC: 43.5 % (ref 40.7–50.3)
HEMOGLOBIN: 14.2 G/DL (ref 13–17)
IMMATURE GRANULOCYTES: 0 %
LYMPHOCYTES # BLD: 16 % (ref 24–43)
MCH RBC QN AUTO: 34.5 PG (ref 25.2–33.5)
MCHC RBC AUTO-ENTMCNC: 32.6 G/DL (ref 28–38)
MCV RBC AUTO: 105.6 FL (ref 82.6–102.9)
MONOCYTES # BLD: 12 % (ref 3–12)
NRBC AUTOMATED: ABNORMAL PER 100 WBC
PDW BLD-RTO: 12.7 % (ref 11.8–14.4)
PLATELET # BLD: 256 K/UL (ref 138–453)
PLATELET ESTIMATE: ABNORMAL
PMV BLD AUTO: 9.8 FL (ref 8.1–13.5)
POTASSIUM SERPL-SCNC: 4.5 MMOL/L (ref 3.7–5.3)
RBC # BLD: 4.12 M/UL (ref 4.21–5.77)
RBC # BLD: ABNORMAL 10*6/UL
SEG NEUTROPHILS: 72 % (ref 36–65)
SEGMENTED NEUTROPHILS ABSOLUTE COUNT: 5.35 K/UL (ref 1.5–8.1)
SODIUM BLD-SCNC: 136 MMOL/L (ref 135–144)
TOTAL PROTEIN: 6.3 G/DL (ref 6.4–8.3)
WBC # BLD: 7.5 K/UL (ref 3.5–11.3)
WBC # BLD: ABNORMAL 10*3/UL

## 2020-06-30 PROCEDURE — 97530 THERAPEUTIC ACTIVITIES: CPT

## 2020-06-30 PROCEDURE — 99232 SBSQ HOSP IP/OBS MODERATE 35: CPT | Performed by: INTERNAL MEDICINE

## 2020-06-30 PROCEDURE — 2700000000 HC OXYGEN THERAPY PER DAY

## 2020-06-30 PROCEDURE — 6370000000 HC RX 637 (ALT 250 FOR IP): Performed by: NURSE PRACTITIONER

## 2020-06-30 PROCEDURE — 2060000000 HC ICU INTERMEDIATE R&B

## 2020-06-30 PROCEDURE — 6370000000 HC RX 637 (ALT 250 FOR IP): Performed by: INTERNAL MEDICINE

## 2020-06-30 PROCEDURE — 97116 GAIT TRAINING THERAPY: CPT

## 2020-06-30 PROCEDURE — 97110 THERAPEUTIC EXERCISES: CPT

## 2020-06-30 PROCEDURE — 2580000003 HC RX 258: Performed by: NURSE PRACTITIONER

## 2020-06-30 PROCEDURE — 36415 COLL VENOUS BLD VENIPUNCTURE: CPT

## 2020-06-30 PROCEDURE — 80053 COMPREHEN METABOLIC PANEL: CPT

## 2020-06-30 PROCEDURE — 85025 COMPLETE CBC W/AUTO DIFF WBC: CPT

## 2020-06-30 PROCEDURE — 97535 SELF CARE MNGMENT TRAINING: CPT

## 2020-06-30 RX ORDER — BISACODYL 10 MG
10 SUPPOSITORY, RECTAL RECTAL DAILY PRN
Status: DISCONTINUED | OUTPATIENT
Start: 2020-06-30 | End: 2020-07-02 | Stop reason: HOSPADM

## 2020-06-30 RX ORDER — HYDRALAZINE HYDROCHLORIDE 20 MG/ML
10 INJECTION INTRAMUSCULAR; INTRAVENOUS EVERY 6 HOURS PRN
Status: DISCONTINUED | OUTPATIENT
Start: 2020-06-30 | End: 2020-07-02 | Stop reason: HOSPADM

## 2020-06-30 RX ORDER — DOCUSATE SODIUM 100 MG/1
100 CAPSULE, LIQUID FILLED ORAL DAILY
Status: DISCONTINUED | OUTPATIENT
Start: 2020-06-30 | End: 2020-07-02 | Stop reason: HOSPADM

## 2020-06-30 RX ADMIN — BISACODYL 10 MG: 10 SUPPOSITORY RECTAL at 18:18

## 2020-06-30 RX ADMIN — POLYETHYLENE GLYCOL (3350) 17 G: 17 POWDER, FOR SOLUTION ORAL at 15:59

## 2020-06-30 RX ADMIN — DOCUSATE SODIUM 100 MG: 100 CAPSULE, LIQUID FILLED ORAL at 20:14

## 2020-06-30 RX ADMIN — SODIUM CHLORIDE, PRESERVATIVE FREE 10 ML: 5 INJECTION INTRAVENOUS at 08:24

## 2020-06-30 RX ADMIN — SODIUM CHLORIDE, PRESERVATIVE FREE 10 ML: 5 INJECTION INTRAVENOUS at 20:33

## 2020-06-30 RX ADMIN — MULTIPLE VITAMINS W/ MINERALS TAB 1 TABLET: TAB at 08:22

## 2020-06-30 RX ADMIN — MELATONIN 1000 UNITS: at 08:22

## 2020-06-30 RX ADMIN — PANTOPRAZOLE SODIUM 20 MG: 20 TABLET, DELAYED RELEASE ORAL at 08:22

## 2020-06-30 ASSESSMENT — PAIN SCALES - GENERAL
PAINLEVEL_OUTOF10: 0

## 2020-06-30 ASSESSMENT — ENCOUNTER SYMPTOMS
SHORTNESS OF BREATH: 1
CONSTIPATION: 1
ABDOMINAL PAIN: 0
VOMITING: 0
NAUSEA: 0
COUGH: 0

## 2020-06-30 NOTE — PLAN OF CARE
Problem: Falls - Risk of:  Goal: Will remain free from falls  Description: Will remain free from falls  Outcome: Met This Shift  Patient is a fall risk during this admission. Fall risk assessment was performed. Patient is absent of falls. Bed is in the lowest position. Wheels on the bed are locked. Call light and bed side table are within reach. Clutter is removed. Patient was educated to call out when needing assistance or wanting to get out of bed. Patient offered toileting assistance during rounding. Hourly rounds have been performed.        Problem: Airway Clearance - Ineffective:  Goal: Ability to maintain a clear airway will improve  Description: Ability to maintain a clear airway will improve  Outcome: Met This Shift     Problem: Cardiac:  Goal: Ability to maintain an adequate cardiac output will improve  Description: Ability to maintain an adequate cardiac output will improve  Outcome: Ongoing

## 2020-06-30 NOTE — PROGRESS NOTES
[facility]    Patient Name:  Minerva Galindo    :1950       SUBJECTIVE:  Patient sitting up in the chair. He denies any chest pain or dyspnea at this time. Plans for PPM tomorrow. VS: BP (!) 128/100   Pulse 76   Temp 98.2 °F (36.8 °C) (Temporal)   Resp 14   Ht 5' 10\" (1.778 m)   Wt 134 lb (60.8 kg)   SpO2 98%   BMI 19.23 kg/m²   Wt: Weight change:   I/O: I/O last 3 completed shifts: In: 240 [P.O.:240]  Out: 925 [Urine:925]  I/O this shift:  In: 210 [P.O.:200; I.V.:10]  Out: -   Monitor: SR    Meds:  Scheduled Meds:   aspirin  81 mg Oral Daily    therapeutic multivitamin-minerals  1 tablet Oral Daily    pantoprazole  20 mg Oral Daily    Vitamin D  1,000 Units Oral Daily    sodium chloride flush  10 mL Intravenous 2 times per day      Continuous Infusions:   glucagon (rDNA) infusion Stopped (20 1820)     PRN Meds: ondansetron, HYDROcodone 5 mg - acetaminophen **OR** HYDROcodone 5 mg - acetaminophen, naloxone, sodium chloride flush, acetaminophen **OR** acetaminophen, polyethylene glycol, perflutren lipid microspheres     Physical Exam:    General Appearance: A&Ox3, NAD. Skin: Pink, warm and dry. Pulmonary/Chest: Diminished throughout. Cardiovascular: S1S2, RRR, negative JVD. Extremities: Negative peripheral edema.     Labs:    CBC with Differential:    Lab Results   Component Value Date    WBC 7.5 2020    RBC 4.12 2020    HGB 14.2 2020    HCT 43.5 2020     2020    .6 2020    MCH 34.5 2020    MCHC 32.6 2020    RDW 12.7 2020     CMP:     Lab Results   Component Value Date     2020    K 4.5 2020    CL 91 2020    CO2 38 2020    BUN 20 2020    CREATININE <0.40 2020    PROT 6.3 2020    LABALBU 3.7 2020    CALCIUM 9.3 2020    BILITOT 0.46 2020         Assessment/Plan:    Principal Problem:    Atrial flutter (HCC)  Active Problems:    ALS

## 2020-06-30 NOTE — CARE COORDINATION
nHpredict Inpatient Visit    Patient/family seen: No: writer offsite     Provided patient/family with copy of nHpredict tool: No: writer offsite      All questions answered at the time of visit. Informed patient/family that the nHpredict is a tool, to be used as a guide, and should not alter their currently established discharge plan. Current discharge plan: IPR at [facility] completed with case management: Yes- secure email to [employer] of CM/ SW   Copy of predict tool and interqual report imported to Media       Please find attached the InterQual Assessment, InterQual Review Summary and nHPredict Outcome Report for Gian Lindsay,  1950. Criteria for IRF is NOT MET due to documentation does not support that the patient can tolerate at least three hours/day at least five days/week of intensive rehabilitative therapies. The nHPredict is indicating slightly greater gain from SNF with a 15 day estimated length of stay versus home health with 5 visits.     Thank you,          Jennifer Bhardwaj RN, BSN  Centralized Care Coordinator  C: [phone]

## 2020-06-30 NOTE — PROGRESS NOTES
[facility]    Progress Note    6/30/2020    5:56 PM    Name:   Dar Ho  MRN:     [id number]     [place]:      [de-identified]   Room:   2032/2032-01  IP Day:  4  Admit Date:  6/26/2020  2:19 PM    PCP:   Nya Clayton MD  Code Status:  Full Code    Subjective:     C/C:   Chief Complaint   Patient presents with    Tachycardia    Shortness of Breath     Interval History Status:   BP still labile at times  Constipated  Up to chair  No chest pain  No palpitations  Less dyspnea on exertion    Data Base Updates:  Glucose 98 mg/dL     BUN 20 mg/dL   CREATININE <0.40Low       Brief History:     As documented in the medical record:  Lindsay Turner is a 65 y.o. Non-/non  male who presents with Tachycardia and Shortness of Breath  Is a known patient of paroxysmal atrial fibrillation, AVR and dyslipidemia with other multiple medical problems including ALS, went to his cardiologist office for regular visit where he was found to be in atrial flutter with RVR. Patient stated that he has been getting progressively short of breath for the last 1 to 2 months, he uses oxygen via concentrator by breathing and at home and he is not on oxygen via nasal cannula  Patient states he has lost 30 pounds weight in the last 2 months which he attributes to his ALS.   He is lost significant muscle mass recently more on the left side  He is not on prescription medicines for a long time and is taking multiple herbal medications     He was given Adenocard in ER and subsequently has been started on Cardizem drip as well as sotalol   COVID test is negative  He is supposed to get sleep study in near future     6/27/2020  Patient remains in atrial flutter  Was started on aspirin, sotalol and Eliquis by cardiology yesterday  Had 6 seconds pauses x2 yesterday but went into atrial flutter after that  Denies any symptoms at present  Cardiology plans to do cardioversion today\"     Database has included:    Results for Bree Bell (MRN [id number]) as of 6/30/2020 17:57   Ref. Range 7/10/2019 00:00 12/18/2019 09:56 6/26/2020 14:27 6/27/2020 03:47   T3, Free Latest Ref Range: 2.02 - 4.43 pg/mL  3.21     TSH Latest Ref Range: 0.30 - 5.00 mIU/L 1.95 3.29 3.54 2.70   Thyroxine, Free Latest Ref Range: 0.93 - 1.70 ng/dL  1.03 1.25    T4 Free Unknown 0.80        The patient was cardioverted  HR's remain labile   PPM evaluation in progress     Medications: Allergies: Allergies   Allergen Reactions    Ciprofloxacin      Can not take anything in this group with his ALS    Lasix [Furosemide]     Penicillins     Sulfa Antibiotics     Duloxetine        Current Meds:   Scheduled Meds:    [START ON 7/1/2020] vancomycin  1,000 mg Intravenous Once    aspirin  81 mg Oral Daily    therapeutic multivitamin-minerals  1 tablet Oral Daily    pantoprazole  20 mg Oral Daily    Vitamin D  1,000 Units Oral Daily    sodium chloride flush  10 mL Intravenous 2 times per day     Continuous Infusions:    glucagon (rDNA) infusion Stopped (06/28/20 1820)     PRN Meds: ondansetron, HYDROcodone 5 mg - acetaminophen **OR** HYDROcodone 5 mg - acetaminophen, naloxone, sodium chloride flush, acetaminophen **OR** acetaminophen, polyethylene glycol, perflutren lipid microspheres    Data:     Past Medical History:   has a past medical history of ALS (amyotrophic lateral sclerosis) ([address] Aortic root aneurysm ([facility] [place] [address] Aortic valve replaced, and Hypertension. Social History:   reports that he has never smoked. He has never used smokeless tobacco. He reports current alcohol use of about 1.0 standard drinks of alcohol per week. He reports that he does not use drugs.      Family History:   Family History   Problem Relation Age of Onset    Lung Cancer Mother     Heart Disease Father        Vitals:  BP (!) 186/83   Pulse 81   Temp 98.5 °F (36.9 °C) (Temporal)   Resp 16   Ht 5' 10\" (1.778 m) Wt 134 lb (60.8 kg)   SpO2 92%   BMI 19.23 kg/m²   Temp (24hrs), Av.3 °F (36.8 °C), Min:98 °F (36.7 °C), Max:98.7 °F (37.1 °C)    Recent Labs     20  1706 20  0851   POCGLU 116* 94       I/O (24Hr): Intake/Output Summary (Last 24 hours) at 2020 1756  Last data filed at 2020 1200  Gross per 24 hour   Intake 610 ml   Output 725 ml   Net -115 ml         Review of Systems:     Review of Systems   Constitutional: Positive for activity change (Decreased). Respiratory: Positive for shortness of breath (Dyspnea on exertion). Negative for cough. Cardiovascular: Negative for chest pain and palpitations. Gastrointestinal: Positive for constipation. Negative for abdominal pain, nausea and vomiting. Genitourinary: Negative for flank pain and hematuria. Neurological: Negative for dizziness and syncope. Chronic foot drop from known ALS       Physical Examination:        Physical Exam  Vitals signs reviewed. Constitutional:       General: He is not in acute distress. Appearance: He is not diaphoretic. HENT:      Head: Normocephalic. Nose: Nose normal.   Eyes:      General: No scleral icterus. Conjunctiva/sclera: Conjunctivae normal.   Neck:      Musculoskeletal: Neck supple. Trachea: No tracheal deviation. Cardiovascular:      Rate and Rhythm: Normal rate and regular rhythm. Pulmonary:      Effort: Pulmonary effort is normal. No respiratory distress. Breath sounds: Normal breath sounds. No wheezing or rales. Chest:      Chest wall: No tenderness. Abdominal:      General: Bowel sounds are normal. There is no distension. Palpations: Abdomen is soft. Tenderness: There is no abdominal tenderness. Musculoskeletal:         General: No tenderness. Skin:     General: Skin is warm and dry.    Neurological:      Comments: Left foot drop           Labs:  Hematology:  Recent Labs     20  0336 20  0429 20  0451   WBC 8.3 11.6* 7. 5   RBC 4.22 4.69 4.12*   HGB 14.4 15.9 14.2   HCT 45.1 50.1 43.5   .9* 106.8* 105.6*   MCH 34.1* 33.9* 34.5*   MCHC 31.9 31.7 32.6   RDW 12.8 12.6 12.7    278 256   MPV 9.8 10.1 9.8     Chemistry:  Recent Labs     06/28/20  0336 06/29/20  0429 06/30/20  0451    138 136   K 4.4 5.2 4.5   CL 96* 92* 91*   CO2 34* 35* 38*   GLUCOSE 108* 69* 98   BUN 25* 23 20   CREATININE <0.40* 0.42* <0.40*   ANIONGAP 6* 11 7*   LABGLOM CANNOT BE CALCULATED >60 CANNOT BE CALCULATED   GFRAA CANNOT BE CALCULATED >60 CANNOT BE CALCULATED   CALCIUM 8.8 9.4 9.3     Recent Labs     06/28/20 0336 06/28/20  1706 06/29/20 0429 06/29/20  0851 06/30/20  0451   PROT 6.2*  --  7.3  --  6.3*   LABALBU 3.5  --  4.1  --  3.7   AST 20  --  33  --  24   ALT 33  --  43*  --  34   ALKPHOS 56  --  65  --  57   BILITOT 0.24*  --  0.34  --  0.46   POCGLU  --  116*  --  94  --          Radiology:    Xr Chest Portable    Result Date: 6/26/2020  No radiographic evidence of acute pulmonary disease.        Assessment:        Primary Problem  Tachy-ralf syndrome [facility])    Active Hospital Problems    Diagnosis Date Noted    Tachy-ralf syndrome ([facility] [address] [I49.5] 06/30/2020    Atrial flutter ([facility] [address] [I48.92] 06/26/2020    Muscular atrophy [M62.50] 12/17/2019    Bilateral hearing loss [H91.93] 10/67/8737    Diastolic dysfunction [S66.38] 05/02/2019    History of hypertension [Z86.79] 05/02/2019    Hyperparathyroidism ([facility] [address] [E21.3] 05/02/2019    MVP (mitral valve prolapse) [I34.1] 05/02/2019    Obstructive sleep apnea [G47.33] 05/02/2019    Paroxysmal atrial fibrillation ([facility] [I48.0] 05/02/2019    ALS (amyotrophic lateral sclerosis) ([facility] [G12.21] 10/02/2018    Aneurysm of aortic arch ([facility] [I71.2] 12/22/2016    Aortic valve disorder [I35.9] 11/11/2016    S/P CABG (coronary artery bypass graft) [Z95.1] 11/11/2016       Plan:        Cardiology evaluation in progress  Pacemaker implantation scheduled for tomorrow  Blood Pressure - Monitor and control: hydralazine prn   Optimize cardio-pulmonary function  CPAP  Anticoagulation per cardiology  Follow-up as scheduled at [facility] for ALS  Laxatives as needed    IP CONSULT TO CARDIOLOGY  IP CONSULT TO INTERNAL MEDICINE  IP CONSULT TO RESPIRATORY CARE  IP CONSULT TO Max Mitchell DO  6/30/2020  5:56 PM

## 2020-06-30 NOTE — DISCHARGE INSTR - COC
Continuity of Care Form    Patient Name: Judie Devries   :  1950  MRN:  [id number]    Admit date:  2020  Discharge date:  20    Code Status Order: Full Code   Advance Directives:   Advance Care Flowsheet Documentation     Date/Time Healthcare Directive Type of Healthcare Directive Copy in [address] Agent's Name Healthcare Agent's Phone Number    [phone]  Yes, patient has an advance directive for healthcare treatment  Durable power of  for health care;Living will  No, copy requested from family  Spouse  Renée Cardenas  --          Admitting Physician:  Robbie Serrano DO  PCP: Suhas Isabel MD    Discharging Nurse: [facility] Unit/Room#:   Discharging Unit Phone Number: [phone]    Emergency Contact:   Extended Emergency Contact Information  Primary Emergency Contact: Shelby Cats  Address: [address] [place] [address],[address]           [address] Phone: [phone]  Relation: Spouse  Secondary Emergency Contact: [address] Phone: [phone]  Relation: Child    Past Surgical History:  Past Surgical History:   Procedure Laterality Date    CARDIOVERSION  2020    w/    HERNIA REPAIR      PARATHYROID GLAND SURGERY         Immunization History: There is no immunization history on file for this patient.     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    Spinal stenosis of lumbar region M48.061    Calculus of kidney and ureter N20.2    Hypoglycemia Z59.0    Diastolic dysfunction Q38.16    Bilateral enlargement of atria I51.7    Mitral valve regurgitation I34.0    Aneurysm of aortic arch ([facility]) I71.2    Paroxysmal atrial fibrillation ([facility]) I48.0    Aortic valve disorder I35.9    Bilateral hearing loss H91.93    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 31XD    Tinnitus of both ears H93.13    Concussion with loss of consciousness S06. 0X9A    Supraventricular beat, premature I49.1    Varicose veins of both lower extremities I83.93    Intervertebral disc prolapse with impingement M51.9    Obstructive sleep apnea G47.33    Prostatitis N41.9    Stress F43.9    Dermatitis L30.9    Weakness of both lower extremities R29.898    Insulin resistance E88.81    At high risk for falls Z91.81    Other specified diseases of blood and blood-forming organs D75.89    S/P CABG (coronary artery bypass graft) Z95.1    Cardiac segmental configuration with atrial configuration unknown, ventricular configuration unknown, and inverted normally aligned great arteries Q20.8    Vitamin A deficiency E50.9    Fatigue R53.83    Heavy metal poison.  screen Z13.88    Muscular atrophy M62.50    Cervical stenosis of spine M48.02    Diarrhea R19.7    Atrial flutter (HCC) I48.92       Isolation/Infection:   Isolation          No Isolation        Patient Infection Status     Infection Onset Added Last Indicated Last Indicated By Review Planned Expiration Resolved Resolved By    None active    Resolved    C-diff Rule Out 06/25/20 06/25/20 06/15/20 Clostridium Difficile Toxin/Antigen (Ordered)   06/25/20 Rule-Out Test Resulted          Nurse Assessment:  Last Vital Signs: BP (!) 128/100   Pulse 76   Temp 98.2 °F (36.8 °C) (Temporal)   Resp 14   Ht 5' 10\" (1.778 m)   Wt 134 lb (60.8 kg)   SpO2 98%   BMI 19.23 kg/m²     Last documented pain score (0-10 scale): Pain Level: 0  Last Weight:   Wt Readings from Last 1 Encounters:   06/29/20 134 lb (60.8 kg)     Mental Status: Disoriented to time  disoriented, oriented and alert    IV Access:  - None    Nursing Mobility/ADLs:  Walking   Assisted  Transfer  Assisted  Bathing  Assisted  Dressing  Assisted  Toileting  Assisted  Feeding  Assisted  Med Admin  Assisted  Med Delivery   whole    Wound Care Documentation and Therapy: Skin checks . Incision left chest pacemaker- can remove dressing in the AM         Elimination:  Continence:   · Bowel: No at times   · Bladder: Yes  Urinary Catheter: None   Colostomy/Ileostomy/Ileal Conduit: No       Date of Last BM: 7/2/20    Intake/Output Summary (Last 24 hours) at 6/30/2020 1545  Last data filed at 6/30/2020 1200  Gross per 24 hour   Intake 610 ml   Output 725 ml   Net -115 ml     I/O last 3 completed shifts: In: 56 [P.O.:600; I.V.:10]  Out: 725 [Urine:725]    Safety Concerns:     History of Falls (last 30 days), bleeding risk- coumadin to start 7/3 Dr. Chichi Siddiqui following results. Impairments/Disabilities:      Paralysis - LLE and weakness , hx of ALS     Nutrition Therapy:  Current Nutrition Therapy:   - Oral Diet:  Cardiac    Routes of Feeding: Oral  Liquids: No Restrictions  Daily Fluid Restriction: no  Last Modified Barium Swallow with Video (Video Swallowing Test): not done    Treatments at the Time of Hospital Discharge:   Respiratory Treatments: see mar   Oxygen Therapy:  is not on home oxygen therapy.   Ventilator:    - No ventilator support    Rehab Therapies: Physical Therapy and Occupational Therapy  Weight Bearing Status/Restrictions: No weight bearing restirctions, Post pacemaker restrictions to LUE   Other Medical Equipment (for information only, NOT a DME order):  walker and braces Bilt foot /ankle   Other Treatments:   1. Skilled RN assessment  2. If eliquis to be restarted RN did place call to patient pharmacy for cost assessment and would be over 400 dollars. Patient cannot afford, will need to discuss with cardiology regarding potential transition to coumadin. Patient's personal belongings (please select all that are sent with patient):  Glasses    RN SIGNATURE:  Electronically signed by Shari Allred RN on 7/2/20 at 6:03 PM EDT    CASE MANAGEMENT/SOCIAL WORK SECTION    Inpatient Status Date: 6/26    Readmission Risk Assessment Score:  Readmission Risk              Risk of Unplanned Readmission:        9           Discharging to Facility/ Agency   Name: [facility]:   [facility]   Address:  [address], [address] [place] [address]  · Phone:   [phone]  · Fax: [phone]    / signature: Electronically signed by NIKOLE Sanders on 6/30/20 at 3:46 PM EDT    PHYSICIAN SECTION    Prognosis: {Prognosis:7758953600}    Condition at Discharge: [address] [place] [address] Patient Condition:[id number]}    Rehab Potential (if transferring to Rehab): {Prognosis:9559270987}    Recommended Labs or Other Treatments After Discharge: ***    Physician Certification: I certify the above information and transfer of Tapan Monk  is necessary for the continuing treatment of the diagnosis listed and that he requires {Admit to Appropriate Level of Care:54661} for {GREATER/LESS:487818638} 30 days.      Update Admission H&P: {CHP DME Changes in GCJAM:064369896}    PHYSICIAN SIGNATURE:  {Esignature:[id number]}

## 2020-06-30 NOTE — CARE COORDINATION
Social work: [facility] to f/u on status of acceptance. Await call back. Update provided to pt/family, they are still interested in ARU at this time.

## 2020-06-30 NOTE — PROGRESS NOTES
Physical Therapy  Facility/Department: [facility]  Daily Treatment Note  NAME: Kwame Whatley  : 1950  MRN: [id number]    Date of Service: 2020    Discharge Recommendations:  Inpatient Rehab       Assessment   Body structures, Functions, Activity limitations: Decreased functional mobility ; Decreased strength;Decreased safe awareness;Decreased endurance;Decreased balance  Assessment: Pt tolerated session well with deficits noted in bed mobility, transfers, ambulation, balance, and endurance this session. Recommend D/C to IP rehab. Prognosis: Good  Decision Making: Medium Complexity  Clinical Presentation: evolving  REQUIRES PT FOLLOW UP: Yes  Activity Tolerance  Activity Tolerance: Patient limited by fatigue;Patient limited by endurance     Patient Diagnosis(es): The encounter diagnosis was Atrial flutter, unspecified type ([facility] [address] has a past medical history of ALS (amyotrophic lateral sclerosis) ([facility] [address] Aortic root aneurysm ([facility] [address] Aortic valve replaced, and Hypertension. has a past surgical history that includes Parathyroid gland surgery (); hernia repair; and Cardioversion (2020). Restrictions  Restrictions/Precautions  Restrictions/Precautions: General Precautions, Fall Risk  Required Braces or Orthoses?: No  Position Activity Restriction  Other position/activity restrictions: up with assist, telemetry, O2, BLE AFO  Subjective   General  Chart Reviewed: Yes  Additional Pertinent Hx: ALS, aortic valve replacement  Subjective  Subjective: Pt agreeable to PT  General Comment  Comments: Lanette ESTRELLA PT          Orientation  Orientation  Overall Orientation Status: Within Functional Limits  Cognition      Objective      Transfers  Sit to Stand:  Moderate Assistance  Stand to sit: Minimal Assistance  Bed to Chair: Minimal assistance  Stand Pivot Transfers: Minimal Assistance  Lateral Transfers: Minimal Assistance  Ambulation  Ambulation?: Yes  More Ambulation?: Yes  Ambulation 1  Surface: level tile  Device: Rollator ( B AFO's)  Assistance: Minimal assistance  Quality of Gait: step to pattern, slightly unsteady  Gait Deviations: Decreased step length;Decreased step height  Distance: 25ft  Comments: Verbal cues to reach back for chair before sitting. (SpO2 95% after ambulation on room air)  Ambulation 2  Surface - 2: level tile  Device 2: Rollator  Assistance 2: Contact guard assistance;Minimal assistance  Quality of Gait 2: slightly unsteady at times, decreased endurance  Gait Deviations: Slow Agueda;Decreased step length;Decreased step height  Distance: 25ft  Comments: SpO2 92-95% after ambulation on room air. Stairs/Curb  Stairs?: No     Balance  Sitting - Static: Good  Sitting - Dynamic: Good  Standing - Static: Good;-  Standing - Dynamic: Fair  Exercises  Comments: Instruction on LE exercises for ROM and circulation  Other Activities: Assisted pt to the bathroom with Min A for donning undergarments and CGA for hand washing. AM-PAC Score     AM-PAC Inpatient Mobility without Stair Climbing Raw Score : 14 (06/30/20 1621)  AM-PAC Inpatient without Stair Climbing T-Scale Score : 40.85 (06/30/20 1621)  Mobility Inpatient CMS 0-100% Score: 53.33 (06/30/20 1621)  Mobility Inpatient without Stair CMS G-Code Modifier : CK (06/30/20 1621)       Goals  Short term goals  Time Frame for Short term goals: 12 visits  Short term goal 1: Inc bed-mobility & transfers to independent to enable pt to safely get in/OOB  Short term goal 2: Inc gait to amb 350ft or > indep w/ RW to enable pt to return to previous level of independence  Short term goal 3: Pt able to go up/down 3 steps with one rail indep; Short term goal 4: Pt able to tolerate 30-40 min of activity to include 15-20 reps of ex & functional mobility including 5 minutes of standing to facilitate activity tolerance to [facility];   Short term goal 5: Ed pt on home ex's, safety & energy principles & issue written home program;    Plan    Plan  Times per week: 1-2x/d,5-6d/week  Current Treatment Recommendations: Strengthening, Balance Training, Transfer Training, Endurance Training, Gait Training, Stair training, Home Exercise Program, Safety Education & Training  Safety Devices  Type of devices: Nurse notified, Left in chair, Gait belt, Call light within reach, Chair alarm in place, All fall risk precautions in place     Therapy Time   Individual Concurrent Group Co-treatment   Time In  1111         Time Out  1159         Minutes  [address] [place], [place]

## 2020-06-30 NOTE — CONSULTS
Physical Medicine & Rehabilitation    CHART REVIEW      Admitting Physician: Adonis Paulino DO    Primary Care Provider: Jayna Kim MD     Reason for Consult:  Acute Inpatient Rehabilitation    Chief Complaint: Cardioversion, ALS    History of Present Illness:  Referring Provider is requesting an evaluation for appropriate placement upon discharge from acute care. Mr. Hayden Nieto is a 65 y.o. male who was admitted to [facility] on 6/26/2020 with Tachycardia and Shortness of Breath    64-year-old male with history of ALS, paroxysmal atrial fibrillation, AVR, hypercholesterolemia admitted with atrial flutter. He has had progressive shortness of breath for the past month. .  He was supposed to have it sleep study at [facility]. Cardiology: Status post cardioversion, now in sinus rhythm, antiarrhythmics discontinued due to bradycardia/pauses. Plan on BPMP on Wednesday? Eliquis now to be on hold    Internal medicine-follow echo results, incentive spirometry, as post cardioversion. Functional History:  PTA: Independent with all activities. Current:  PT:  Restrictions/Precautions: General Precautions, Fall Risk  Other position/activity restrictions: up with assist, telemetry, O2(B LE AFO- should be worn with stand and mobility)   Transfers  Sit to Stand:  Moderate Assistance  Stand to sit: Moderate Assistance  Bed to Chair: Moderate assistance  Stand Pivot Transfers: Minimal Assistance  Lateral Transfers: Minimal Assistance  Comment: Needed Ed on use of upper body for safe sit/stand  Ambulation 1  Surface: level tile  Device: Rollator  Assistance: Minimal assistance  Quality of Gait: step to pattern  Gait Deviations: Decreased step length, Decreased step height  Distance: 12ft        OT:   ADL  Feeding: Minimal assistance(difficulty holding utensils)  Grooming: Minimal assistance(difficulty holding items)  UE Bathing: Minimal assistance  LE Bathing: Maximum assistance  UE Dressing: Minimal assistance  LE Dressing: Maximum assistance  Toileting: Moderate assistance      ST:     Past Medical History:        Diagnosis Date    ALS [facility]amyotrophic lateral [facility]) ([facility] [address]     Aortic root aneurysm ([facility] [address] 11/08/2016    Aortic valve replaced 11/08/2016    Hypertension        Past Surgical History:        Procedure Laterality Date    CARDIOVERSION  06/27/2020    w/    HERNIA REPAIR      PARATHYROID GLAND SURGERY  1980       Allergies:     Allergies   Allergen Reactions    Ciprofloxacin      Can not take anything in this group with his ALS    Lasix [Furosemide]     Penicillins     Sulfa Antibiotics     Duloxetine         Current Medications:   Current Facility-Administered Medications: glucagon (rDNA) 10 mg in dextrose 5 % 50 mL infusion, 3 mg/hr, Intravenous, Continuous  ondansetron (ZOFRAN) injection 4 mg, 4 mg, Intravenous, Q4H PRN  HYDROcodone-acetaminophen (NORCO) 5-325 MG per tablet 1 tablet, 1 tablet, Oral, Q4H PRN **OR** HYDROcodone-acetaminophen (NORCO) 5-325 MG per tablet 2 tablet, 2 tablet, Oral, Q4H PRN  naloxone (NARCAN) injection 0.4 mg, 0.4 mg, Intravenous, PRN  aspirin EC tablet 81 mg, 81 mg, Oral, Daily  therapeutic multivitamin-minerals 1 tablet, 1 tablet, Oral, Daily  pantoprazole (PROTONIX) tablet 20 mg, 20 mg, Oral, Daily  vitamin D (CHOLECALCIFEROL) tablet 1,000 Units, 1,000 Units, Oral, Daily  sodium chloride flush 0.9 % injection 10 mL, 10 mL, Intravenous, 2 times per day  sodium chloride flush 0.9 % injection 10 mL, 10 mL, Intravenous, PRN  acetaminophen (TYLENOL) tablet 650 mg, 650 mg, Oral, Q6H PRN **OR** acetaminophen (TYLENOL) suppository 650 mg, 650 mg, Rectal, Q6H PRN  polyethylene glycol (GLYCOLAX) packet 17 g, 17 g, Oral, Daily PRN  perflutren lipid microspheres (DEFINITY) injection 1.65 mg, 1.5 mL, Intravenous, ONCE PRN    Social History:  Social History     Socioeconomic History    Marital status:      Spouse name: Not on file    Number of children: Not on file    Years of education: Not on file    Highest education level: Not on file   Occupational History    Not on file   Social Needs    Financial resource strain: Not on file    Food insecurity     Worry: Not on file     Inability: Not on file    Transportation needs     Medical: Not on file     Non-medical: Not on file   Tobacco Use    Smoking status: Never Smoker    Smokeless tobacco: Never Used   Substance and Sexual Activity    Alcohol use:  Yes     Alcohol/week: 1.0 standard drinks     Types: 1 Cans of beer per week    Drug use: No    Sexual activity: Not on file   Lifestyle    Physical activity     Days per week: Not on file     Minutes per session: Not on file    Stress: Not on file   Relationships    Social connections     Talks on phone: Not on file     Gets together: Not on file     Attends Gnosticist service: Not on file     Active member of club or organization: Not on file     Attends meetings of clubs or organizations: Not on file     Relationship status: Not on file    Intimate partner violence     Fear of current or ex partner: Not on file     Emotionally abused: Not on file     Physically abused: Not on file     Forced sexual activity: Not on file   Other Topics Concern    Not on file   Social History Narrative    Not on file     Social/Functional History  Lives With: Spouse  Type of Home: (condo)  Home Layout: One level(planning to currently move to smaller home)  Home Access: Stairs to enter with rails  Entrance Stairs - Rails: Left  Bathroom Shower/Tub: Walk-in shower  Bathroom Equipment: Grab bars in shower, Built-in shower seat, Hand-held shower(taller commode)  Home Equipment: 4 wheeled walker  Receives Help From: (wife and family in the area)  ADL Assistance: Needs assistance  Bath: Independent  Dressing: Independent  Grooming: Independent  Feeding: Independent  Toileting: Independent  Homemaking Assistance: Needs assistance  Homemaking Responsibilities: No  Ambulation Assistance: Independent(4 wheel walker)  Transfer Assistance: Independent  Active : No  Patient's  Info: spouse  Mode of Transportation: Family  Occupation: Retired  Type of occupation: IT  Leisure & Hobbies: reading  Additional Comments: Pt has fallen 2-3 times within last month per wife    Family History:       Problem Relation Age of Onset    Lung Cancer Mother     Heart Disease Father            BP (!) 130/90   Pulse 60   Temp 98.7 °F (37.1 °C) (Temporal)   Resp 16   Ht 5' 10\" (1.778 m)   Wt 134 lb (60.8 kg)   SpO2 93%   BMI 19.23 kg/m²       Diagnostics:  CBC   Lab Results   Component Value Date    WBC 7.5 06/30/2020    RBC 4.12 06/30/2020    HGB 14.2 06/30/2020    HCT 43.5 06/30/2020    .6 06/30/2020    RDW 12.7 06/30/2020     06/30/2020     BMP    Lab Results   Component Value Date     06/30/2020    K 4.5 06/30/2020    CL 91 06/30/2020    CO2 38 06/30/2020    BUN 20 06/30/2020     Uric Acid  No components found for: URIC  VITAMIN B12 No components found for: B12  PT/INR  No results found for: PTINR    Radiology:     Impression: Mr. Puja Ingarm is a 65 y.o. male with a history of Atrial flutter ([facility] [address]    1. Paroxysmal A. fib-status post cardioversion-aspirin  2. ALS(amyotrophic lateral sclerosis)  3. Obstructive sleep apnea- sleep study pending  4. Hypertension, aortic valve replacement  5. Pain-Norco    Recommendations:  1. Diagnosis: Cardioversion, history of ALS  2. Therapy: Min to mod assist ADLs, ambulates min assist, transfers mod assist  3. Medical  Necessity: As above, ALS  4. Support: Clarify, spouse  5. Rehab recommendation: Clarify baseline function-required assistance prior, if decline in function would benefit from acute inpatient rehabilitation to get back to baseline and family training  6. DVT proph: Currently off Eliquis today, recommend Lovenox/heparin for DVT prophylaxis     Please call with questions. Bertha Garcia. Luke Sheldon MD          This note is created with the assistance of a speech recognition program.  While intending to generate a document that actually reflects the content of the visit, the document can still have some errors including those of syntax and sound a like substitutions which may escape proof reading.   In such instances, actual meaning can be extrapolated by contextual diversion

## 2020-06-30 NOTE — PROGRESS NOTES
Occupational Therapy  Facility/Department: [facility]  Daily Treatment Note  NAME: Tracey Alonso  : 1950  MRN: [id number]    Date of Service: 2020    Discharge Recommendations:  IP Rehab       Assessment   Performance deficits / Impairments: Decreased functional mobility ; Decreased ADL status; Decreased strength;Decreased endurance;Decreased balance;Decreased high-level IADLs;Decreased fine motor control;Decreased coordination  Prognosis: Good  OT Education: OT Role;ADL Adaptive Strategies; Plan of Care;Transfer Training;Family Education;Equipment; Energy Conservation  Patient Education: education provided on sock aid, reacher, pursed lip breathing, importance of use of AFO's, importance of rest breaks  REQUIRES OT FOLLOW UP: Yes  Activity Tolerance  Activity Tolerance: Patient Tolerated treatment well  Safety Devices  Safety Devices in place: Yes  Type of devices: Call light within reach;Nurse notified; Bed alarm in place; Left in chair;Chair alarm in place; Patient at risk for falls  Equipment Recommendations  Equipment Needed: Yes         Patient Diagnosis(es): The encounter diagnosis was Atrial flutter, unspecified type ([facility] [address] has a past medical history of ALS (amyotrophic lateral sclerosis) ([facility] [address] Aortic root aneurysm ([facility] [address] Aortic valve replaced, and Hypertension. has a past surgical history that includes Parathyroid gland surgery (); hernia repair; and Cardioversion (2020).     Restrictions  Restrictions/Precautions  Restrictions/Precautions: General Precautions, Fall Risk  Required Braces or Orthoses?: No  Position Activity Restriction  Other position/activity restrictions: up with assist, telemetry, O2, BLE AFO  Subjective   General  Chart Reviewed: Yes  Patient assessed for rehabilitation services?: Yes  Response to previous treatment: Patient with no complaints from previous session  Family / Caregiver Present: No      Orientation  Orientation  Overall Orientation Status: Within Functional Limits  Objective    ADL  Grooming: Setup;Stand by assistance; Increased time to complete(increased time and effort to complete d/t FM impairments)  UE Bathing: Setup;Stand by assistance; Increased time to complete(increased time)  LE Bathing: Setup; Increased time to complete;Stand by assistance  UE Dressing: Minimal assistance;Setup; Increased time to complete(to don/doff gown with line mgt)  LE Dressing: Setup; Increased time to complete;Contact guard assistance;Stand by assistance(CGA to increase steadiness while standing to don underwear. SBA to don/doff B socks seated in chair)  Toileting: Contact guard assistance  Additional Comments: Patient participated in sponge bath seated in chair requiring increased time and cues for redirection d/t patient conversing about situation and past medical history.          Balance  Sitting Balance: Stand by assistance  Standing Balance: Contact guard assistance  Standing Balance  Time: ~3 minutes  Activity: self care tasks  Comment: Patient is more stedy with rollator for support vs standing at chair for geovany hygiene, patient required hand held assist for increased safety  Functional Mobility  Functional - Mobility Device: 4-Wheeled Walker  Activity: To/from bathroom  Assist Level: Contact guard assistance  Functional Mobility Comments: Patient completed functional mobility from EOB>bathroom then to bed side chair patient required cues to focus on BLE's d/t L foot drop during mobility, environment scanning, pacing, and pursed lip breathing to increase safety  Toilet Transfers  Toilet - Technique: Ambulating  Equipment Used: Grab bars  Toilet Transfer: Contact guard assistance  Toilet Transfers Comments: verbal cue for locking rollator and use of grab bars  Bed mobility  Supine to Sit: Contact guard assistance  Sit to Supine: Contact guard assistance  Scooting: Contact guard assistance  Comment: HOB elvated  Transfers  Sit to stand: Contact guard assistance  Stand to home       Therapy Time   Individual Concurrent Group Co-treatment   Time In 3300         Time Out 0940         Minutes 61           Upon writer exit, call light within reach, pt retired to chair. All lines intact and patient positioned comfortably. All patient needs addressed prior to ending therapy session. Chart reviewed prior to treatment and patient is agreeable for therapy. RN reports patient is medically stable for therapy treatment this date. Stimulating environment provided through opening blinds, turning on lights, turning on TV, turning up volume to appropriate level, raising HOB, and opening door.       Derryl Buzz, TEE/L

## 2020-07-01 ENCOUNTER — APPOINTMENT (OUTPATIENT)
Dept: GENERAL RADIOLOGY | Age: 70
DRG: 243 | End: 2020-07-01
Payer: MEDICARE

## 2020-07-01 PROBLEM — Z95.0 HISTORY OF PERMANENT CARDIAC PACEMAKER PLACEMENT: Status: ACTIVE | Noted: 2020-07-01

## 2020-07-01 LAB
ABSOLUTE EOS #: 0.03 K/UL (ref 0–0.44)
ABSOLUTE IMMATURE GRANULOCYTE: 0.03 K/UL (ref 0–0.3)
ABSOLUTE LYMPH #: 0.95 K/UL (ref 1.1–3.7)
ABSOLUTE MONO #: 0.92 K/UL (ref 0.1–1.2)
ALBUMIN SERPL-MCNC: 3.9 G/DL (ref 3.5–5.2)
ALBUMIN/GLOBULIN RATIO: ABNORMAL (ref 1–2.5)
ALP BLD-CCNC: 63 U/L (ref 40–129)
ALT SERPL-CCNC: 35 U/L (ref 5–41)
ANION GAP SERPL CALCULATED.3IONS-SCNC: 8 MMOL/L (ref 9–17)
AST SERPL-CCNC: 26 U/L
BASOPHILS # BLD: 0 % (ref 0–2)
BASOPHILS ABSOLUTE: 0.03 K/UL (ref 0–0.2)
BILIRUB SERPL-MCNC: 0.46 MG/DL (ref 0.3–1.2)
BUN BLDV-MCNC: 17 MG/DL (ref 8–23)
BUN/CREAT BLD: ABNORMAL (ref 9–20)
CALCIUM SERPL-MCNC: 9.3 MG/DL (ref 8.6–10.4)
CHLORIDE BLD-SCNC: 90 MMOL/L (ref 98–107)
CO2: 35 MMOL/L (ref 20–31)
CREAT SERPL-MCNC: <0.4 MG/DL (ref 0.7–1.2)
DIFFERENTIAL TYPE: ABNORMAL
EOSINOPHILS RELATIVE PERCENT: 0 % (ref 1–4)
GFR AFRICAN AMERICAN: ABNORMAL ML/MIN
GFR NON-AFRICAN AMERICAN: ABNORMAL ML/MIN
GFR SERPL CREATININE-BSD FRML MDRD: ABNORMAL ML/MIN/{1.73_M2}
GFR SERPL CREATININE-BSD FRML MDRD: ABNORMAL ML/MIN/{1.73_M2}
GLUCOSE BLD-MCNC: 124 MG/DL (ref 70–99)
HCT VFR BLD CALC: 43.6 % (ref 40.7–50.3)
HEMOGLOBIN: 14.8 G/DL (ref 13–17)
IMMATURE GRANULOCYTES: 0 %
LYMPHOCYTES # BLD: 9 % (ref 24–43)
MCH RBC QN AUTO: 34.6 PG (ref 25.2–33.5)
MCHC RBC AUTO-ENTMCNC: 33.9 G/DL (ref 28.4–34.8)
MCV RBC AUTO: 101.9 FL (ref 82.6–102.9)
MONOCYTES # BLD: 8 % (ref 3–12)
NRBC AUTOMATED: 0 PER 100 WBC
PDW BLD-RTO: 12.5 % (ref 11.8–14.4)
PLATELET # BLD: 261 K/UL (ref 138–453)
PLATELET ESTIMATE: ABNORMAL
PMV BLD AUTO: 9.5 FL (ref 8.1–13.5)
POTASSIUM SERPL-SCNC: 4.5 MMOL/L (ref 3.7–5.3)
RBC # BLD: 4.28 M/UL (ref 4.21–5.77)
RBC # BLD: ABNORMAL 10*6/UL
SEG NEUTROPHILS: 83 % (ref 36–65)
SEGMENTED NEUTROPHILS ABSOLUTE COUNT: 9.23 K/UL (ref 1.5–8.1)
SODIUM BLD-SCNC: 133 MMOL/L (ref 135–144)
TOTAL PROTEIN: 6.6 G/DL (ref 6.4–8.3)
WBC # BLD: 11.2 K/UL (ref 3.5–11.3)
WBC # BLD: ABNORMAL 10*3/UL

## 2020-07-01 PROCEDURE — 97110 THERAPEUTIC EXERCISES: CPT

## 2020-07-01 PROCEDURE — C1894 INTRO/SHEATH, NON-LASER: HCPCS

## 2020-07-01 PROCEDURE — 80053 COMPREHEN METABOLIC PANEL: CPT

## 2020-07-01 PROCEDURE — 6370000000 HC RX 637 (ALT 250 FOR IP): Performed by: NURSE PRACTITIONER

## 2020-07-01 PROCEDURE — 6360000002 HC RX W HCPCS: Performed by: INTERNAL MEDICINE

## 2020-07-01 PROCEDURE — 85025 COMPLETE CBC W/AUTO DIFF WBC: CPT

## 2020-07-01 PROCEDURE — 2709999900 HC NON-CHARGEABLE SUPPLY

## 2020-07-01 PROCEDURE — 6360000002 HC RX W HCPCS

## 2020-07-01 PROCEDURE — 2500000003 HC RX 250 WO HCPCS

## 2020-07-01 PROCEDURE — 97116 GAIT TRAINING THERAPY: CPT

## 2020-07-01 PROCEDURE — 99232 SBSQ HOSP IP/OBS MODERATE 35: CPT | Performed by: INTERNAL MEDICINE

## 2020-07-01 PROCEDURE — 71045 X-RAY EXAM CHEST 1 VIEW: CPT

## 2020-07-01 PROCEDURE — 2060000000 HC ICU INTERMEDIATE R&B

## 2020-07-01 PROCEDURE — C1898 LEAD, PMKR, OTHER THAN TRANS: HCPCS

## 2020-07-01 PROCEDURE — 5A2204Z RESTORATION OF CARDIAC RHYTHM, SINGLE: ICD-10-PCS | Performed by: INTERNAL MEDICINE

## 2020-07-01 PROCEDURE — 2580000003 HC RX 258: Performed by: INTERNAL MEDICINE

## 2020-07-01 PROCEDURE — 97535 SELF CARE MNGMENT TRAINING: CPT

## 2020-07-01 PROCEDURE — 6370000000 HC RX 637 (ALT 250 FOR IP): Performed by: INTERNAL MEDICINE

## 2020-07-01 PROCEDURE — C1769 GUIDE WIRE: HCPCS

## 2020-07-01 PROCEDURE — 02H63JZ INSERTION OF PACEMAKER LEAD INTO RIGHT ATRIUM, PERCUTANEOUS APPROACH: ICD-10-PCS | Performed by: INTERNAL MEDICINE

## 2020-07-01 PROCEDURE — 33208 INSRT HEART PM ATRIAL & VENT: CPT | Performed by: INTERNAL MEDICINE

## 2020-07-01 PROCEDURE — 36415 COLL VENOUS BLD VENIPUNCTURE: CPT

## 2020-07-01 PROCEDURE — C1785 PMKR, DUAL, RATE-RESP: HCPCS

## 2020-07-01 PROCEDURE — 2580000003 HC RX 258: Performed by: NURSE PRACTITIONER

## 2020-07-01 PROCEDURE — 02HK3JZ INSERTION OF PACEMAKER LEAD INTO RIGHT VENTRICLE, PERCUTANEOUS APPROACH: ICD-10-PCS | Performed by: INTERNAL MEDICINE

## 2020-07-01 RX ORDER — SODIUM CHLORIDE 0.9 % (FLUSH) 0.9 %
10 SYRINGE (ML) INJECTION PRN
Status: DISCONTINUED | OUTPATIENT
Start: 2020-07-01 | End: 2020-07-02 | Stop reason: HOSPADM

## 2020-07-01 RX ORDER — SODIUM CHLORIDE 0.9 % (FLUSH) 0.9 %
10 SYRINGE (ML) INJECTION EVERY 12 HOURS SCHEDULED
Status: DISCONTINUED | OUTPATIENT
Start: 2020-07-01 | End: 2020-07-02 | Stop reason: HOSPADM

## 2020-07-01 RX ADMIN — SODIUM CHLORIDE, PRESERVATIVE FREE 10 ML: 5 INJECTION INTRAVENOUS at 08:45

## 2020-07-01 RX ADMIN — POLYETHYLENE GLYCOL (3350) 17 G: 17 POWDER, FOR SOLUTION ORAL at 20:27

## 2020-07-01 RX ADMIN — VANCOMYCIN HYDROCHLORIDE 1000 MG: 1 INJECTION, POWDER, LYOPHILIZED, FOR SOLUTION INTRAVENOUS at 14:00

## 2020-07-01 RX ADMIN — PANTOPRAZOLE SODIUM 20 MG: 20 TABLET, DELAYED RELEASE ORAL at 08:43

## 2020-07-01 RX ADMIN — DOCUSATE SODIUM 100 MG: 100 CAPSULE, LIQUID FILLED ORAL at 08:42

## 2020-07-01 RX ADMIN — MULTIPLE VITAMINS W/ MINERALS TAB 1 TABLET: TAB at 08:42

## 2020-07-01 RX ADMIN — MELATONIN 1000 UNITS: at 08:42

## 2020-07-01 ASSESSMENT — PAIN SCALES - GENERAL
PAINLEVEL_OUTOF10: 0

## 2020-07-01 ASSESSMENT — ENCOUNTER SYMPTOMS
ABDOMINAL PAIN: 0
VOMITING: 0
NAUSEA: 0
CONSTIPATION: 1
COUGH: 0
SHORTNESS OF BREATH: 1

## 2020-07-01 NOTE — PROCEDURES
Procedure: Dual chamber pacer placement. Indication: Sick sinus syndrome, atrial flutter, Severe bradycardia with pauses. Method: After informed consent was obtained, patient was brought to EP lab and attached to electrocardiographic and hemodynamic monitoring. Conscious sedation was performed with Versed and Fentanyl. Left deltopectoral area was prepped and draped in the normal fashion. Access was obtained in the left subclavian vein and a guidewire was retained in place. Following that a transverse incision was Made and the guidewire was tunneled into incision. Using a standar sheath, an additional guidewire was retained in place. Using one retained guidewire and a standard sheath, a ventricular lead was sutured in place after after suitable pacing and sensing thresholds were obtained. Using the second retained guidewire, an atrial lead was advance to the right atrium and sutured in place after suitable pacing and sensing thresholds were obtained. Wound was irrigated with antibiotic solution, the leads were attached to the generator and the wound was closed with 2 layers of 2.0 Vicryl and one layer of 4.0 Vicryl. Steri-strip was placed over the incision and a pressure dressing was applied over the wound. Medications: Versed 3 mg, Fentanyl 50 mcg, Vancomycin 1 gm. Technical data:   Generator:New device -- Biotronik, Model #: Edora 8 DR-T, Serial [id number]  Right Atrial lead: Biotronik Model #: Solia [id number] Serial #: [id number], Impedance - 546 ohms, p-wave 3.3 mV, Threshold -0.6 V @0.4 mS  Right ventricular lead: Biotronik - Model #:Solia S60 Serial #: [id number], Impedance - 741 ohms, R-wave 13.4 mV, Threshold - 0.6 V @ 0.4 mS     Complications: None    Blood loss: Less than 20 mL. Impression:   Successful implantation of dual chamber pacemaker. Plan:   Follow up chest X-ray. Wound check, Device check tomorrow.     Electronically signed by Javi Fernandes MD on 7/1/2020 at 2:54 PM

## 2020-07-01 NOTE — PROGRESS NOTES
Occupational Therapy  Facility/Department: [facility]  Daily Treatment Note  NAME: Conrad Perales  : 1950  MRN: [id number]    Date of Service: 2020    Discharge Recommendations:  IP Rehab       Assessment   Performance deficits / Impairments: Decreased functional mobility ; Decreased ADL status; Decreased strength;Decreased endurance;Decreased balance;Decreased high-level IADLs;Decreased fine motor control;Decreased coordination  Prognosis: Good  OT Education: OT Role;Plan of Care;Transfer Training;Energy Conservation  REQUIRES OT FOLLOW UP: Yes  Activity Tolerance  Activity Tolerance: Patient Tolerated treatment well  Safety Devices  Safety Devices in place: Yes  Type of devices: Call light within reach;Nurse notified; Left in chair;Chair alarm in place; Patient at risk for falls  Equipment Recommendations  Equipment Needed: Yes         Patient Diagnosis(es): The encounter diagnosis was Atrial flutter, unspecified type ([facility] [address] has a past medical history of ALS (amyotrophic lateral sclerosis) ([facility] [address] Aortic root aneurysm ([facility] [address] Aortic valve replaced, and Hypertension. has a past surgical history that includes Parathyroid gland surgery (); hernia repair; and Cardioversion (2020). Restrictions  Restrictions/Precautions  Restrictions/Precautions: General Precautions, Fall Risk  Required Braces or Orthoses?: No  Position Activity Restriction  Other position/activity restrictions: up with assist, telemetry, O2, BLE AFO  Subjective   General  Chart Reviewed: Yes  Patient assessed for rehabilitation services?: Yes  Response to previous treatment: Patient with no complaints from previous session  Family / Caregiver Present: No      Orientation  Orientation  Overall Orientation Status: Within Functional Limits  Objective    ADL  Grooming: Setup;Stand by assistance; Increased time to complete;Maximum assistance(MAX A for shaving d/t patient's poor FMC.  SBA and increased time to complete for standing at sink for additional grooming tasks)  LE Dressing: Setup; Increased time to complete;Stand by assistance;Minimal assistance(Patient able to don/doff socks, don shoes/AFO, but required assist to tie B shoes d/t poor [address])        Balance  Sitting Balance: Supervision  Standing Balance: Stand by assistance  Standing Balance  Time: ~5 minutes  Activity: self care tasks  Functional Mobility  Functional - Mobility Device: 4-Wheeled Walker  Activity: To/from bathroom  Assist Level: Contact guard assistance  Functional Mobility Comments: Patient completed functional mobility to/from bathroom with CGA for safety with AFO's on. Patient is more steady with AFO's but requires MAX cues for posture and min cues for environment scanning  Bed mobility  Supine to Sit: Contact guard assistance  Sit to Supine: Contact guard assistance  Scooting: Contact guard assistance  Transfers  Sit to stand: Stand by assistance  Stand to sit: Stand by assistance  Transfer Comments: cues for safety with 4WW                       Cognition  Overall Cognitive Status: Exceptions  Arousal/Alertness: Appropriate responses to stimuli  Following Commands: Follows all commands without difficulty  Attention Span: Appears intact; Attends with cues to redirect  Memory: Appears intact  Safety Judgement: Decreased awareness of need for assistance;Decreased awareness of need for safety  Problem Solving: Decreased awareness of errors;Assistance required to identify errors made;Assistance required to correct errors made;Assistance required to generate solutions;Assistance required to implement solutions  Insights: Decreased awareness of deficits  Initiation: Does not require cues  Sequencing: Requires cues for some                                         Plan   Plan  Times per week: 5-6X/wk  Times per day: Daily  Current Treatment Recommendations: Strengthening, Balance Training, Functional Mobility Training, Endurance Training, Safety Education & Training, Patient/Caregiver Education & Training, Equipment Evaluation, Education, & procurement, Self-Care / ADL    AM-PAC Score        AM-PAC Inpatient Daily Activity Raw Score: 18 (07/01/20 1026)  AM-PAC Inpatient ADL T-Scale Score : 38.66 (07/01/20 1026)  ADL Inpatient CMS 0-100% Score: 46.65 (07/01/20 1026)  ADL Inpatient CMS G-Code Modifier : CK (07/01/20 1026)    Goals  Short term goals  Time Frame for Short term goals: During LOS, pt will complete:   Short term goal 1: self feeding with Vishal, AE PRN. Short term goal 2: grooming task with Vishal, AE PRN. Short term goal 3: complete UB bathe and dress with SBA. Short term goal 4: complete LB bathe and dress with min assist.  Short term goal 5: complete ADL transfers with Vishal. Patient Goals   Patient goals : Return home       Therapy Time   Individual Concurrent Group Co-treatment   Time In 0813         Time Out 7745         Minutes 34           Upon writer exit, call light within reach, pt retired to chair. All lines intact and patient positioned comfortably. All patient needs addressed prior to ending therapy session. Chart reviewed prior to treatment and patient is agreeable for therapy. RN reports patient is medically stable for therapy treatment this date. Stimulating environment provided through opening blinds, turning on lights, turning on TV, turning up volume to appropriate level, raising HOB, and opening door.       SATYA Salcido

## 2020-07-01 NOTE — PROGRESS NOTES
[facility]    Progress Note    7/1/2020    6:37 PM    Name:   Dar Ho  MRN:     [id number]     [place]:      [de-identified]   Room:   2042/2042-01  IP Day:  5  Admit Date:  6/26/2020  2:19 PM    PCP:   Nya Clayton MD  Code Status:  Full Code    Subjective:     C/C:   Chief Complaint   Patient presents with    Tachycardia    Shortness of Breath     Interval History Status:   Has been n.p.o. On call for pacemaker  Had BM after suppository    Data Base Updates:  Sodium 133Low     Glucose 124High mg/dL     BUN 17 mg/dL CREATININE <0.40Low       Brief History:     As documented in the medical record:  Lindsay Turner is a 65 y.o. Non-/non  male who presents with Tachycardia and Shortness of Breath  Is a known patient of paroxysmal atrial fibrillation, AVR and dyslipidemia with other multiple medical problems including ALS, went to his cardiologist office for regular visit where he was found to be in atrial flutter with RVR. Patient stated that he has been getting progressively short of breath for the last 1 to 2 months, he uses oxygen via concentrator by breathing and at home and he is not on oxygen via nasal cannula  Patient states he has lost 30 pounds weight in the last 2 months which he attributes to his ALS.   He is lost significant muscle mass recently more on the left side  He is not on prescription medicines for a long time and is taking multiple herbal medications     He was given Adenocard in ER and subsequently has been started on Cardizem drip as well as sotalol   COVID test is negative  He is supposed to get sleep study in near future     6/27/2020  Patient remains in atrial flutter  Was started on aspirin, sotalol and Eliquis by cardiology yesterday  Had 6 seconds pauses x2 yesterday but went into atrial flutter after that  Denies any symptoms at present  Cardiology plans to do cardioversion today\" Database has included:    Results for Blanca Nix (MRN [id number]) as of 6/30/2020 17:57   Ref. Range 7/10/2019 00:00 12/18/2019 09:56 6/26/2020 14:27 6/27/2020 03:47   T3, Free Latest Ref Range: 2.02 - 4.43 pg/mL  3.21     TSH Latest Ref Range: 0.30 - 5.00 mIU/L 1.95 3.29 3.54 2.70   Thyroxine, Free Latest Ref Range: 0.93 - 1.70 ng/dL  1.03 1.25    T4 Free Unknown 0.80        The patient was cardioverted  HR's remain labile     On 7/1 the patient underwent  Dual-chamber permanent pacemaker implantation    Follow-up chest x-ray:  Impression:   No acute cardiopulmonary process. New pacemaker with leads presumably in the right atrium and right ventricle. Medications: Allergies: Allergies   Allergen Reactions    Ciprofloxacin      Can not take anything in this group with his ALS    Lasix [Furosemide]     Penicillins     Sulfa Antibiotics     Duloxetine        Current Meds:   Scheduled Meds:    sodium chloride flush  10 mL Intravenous 2 times per day    docusate sodium  100 mg Oral Daily    aspirin  81 mg Oral Daily    therapeutic multivitamin-minerals  1 tablet Oral Daily    pantoprazole  20 mg Oral Daily    Vitamin D  1,000 Units Oral Daily     Continuous Infusions:    glucagon (rDNA) infusion Stopped (06/28/20 1820)     PRN Meds: sodium chloride flush, bisacodyl, hydrALAZINE, ondansetron, HYDROcodone 5 mg - acetaminophen **OR** HYDROcodone 5 mg - acetaminophen, naloxone, acetaminophen **OR** acetaminophen, polyethylene glycol, perflutren lipid microspheres    Data:     Past Medical History:   has a past medical history of ALS (amyotrophic lateral sclerosis) ([facility] [address] Aortic root aneurysm ([facility] [address] Aortic valve replaced, and Hypertension. Social History:   reports that he has never smoked. He has never used smokeless tobacco. He reports current alcohol use of about 1.0 standard drinks of alcohol per week. He reports that he does not use drugs.      Family History:   Family History   Problem Relation Age of Onset    Lung Cancer Mother     Heart Disease Father        Vitals:  /84   Pulse 82   Temp 97.4 °F (36.3 °C) (Temporal)   Resp 16   Ht 5' 10\" (1.778 m)   Wt 145 lb (65.8 kg)   SpO2 100%   BMI 20.81 kg/m²   Temp (24hrs), Av.2 °F (36.8 °C), Min:97.4 °F (36.3 °C), Max:99 °F (37.2 °C)    Recent Labs     20  0851   POCGLU 94       I/O (24Hr): Intake/Output Summary (Last 24 hours) at 2020 1837  Last data filed at 2020 1549  Gross per 24 hour   Intake 15 ml   Output 500 ml   Net -485 ml         Review of Systems:     Review of Systems   Constitutional: Positive for activity change (Decreased). Respiratory: Positive for shortness of breath (Dyspnea on exertion). Negative for cough. Cardiovascular: Positive for chest pain (Mild incisional pain). Negative for palpitations. Gastrointestinal: Positive for constipation. Negative for abdominal pain, nausea and vomiting. Genitourinary: Negative for flank pain and hematuria. Neurological: Negative for dizziness and syncope. Chronic foot drop from known ALS       Physical Examination:        Physical Exam  Vitals signs reviewed. Constitutional:       General: He is not in acute distress. Appearance: He is not diaphoretic. HENT:      Head: Normocephalic. Nose: Nose normal.   Eyes:      General: No scleral icterus. Conjunctiva/sclera: Conjunctivae normal.   Neck:      Musculoskeletal: Neck supple. Trachea: No tracheal deviation. Cardiovascular:      Rate and Rhythm: Normal rate and regular rhythm. Pulmonary:      Effort: Pulmonary effort is normal. No respiratory distress. Breath sounds: Normal breath sounds. No wheezing or rales. Chest:      Chest wall: No tenderness. Abdominal:      General: Bowel sounds are normal. There is no distension. Palpations: Abdomen is soft. Tenderness: There is no abdominal tenderness. Musculoskeletal:         General: No tenderness. Skin:     General: Skin is warm and dry. Comments: Incision dressed, dry, clean   Neurological:      Comments: Left foot drop           Labs:  Hematology:  Recent Labs     06/29/20 0429 06/30/20 0451 07/01/20 0457   WBC 11.6* 7.5 11.2   RBC 4.69 4.12* 4.28   HGB 15.9 14.2 14.8   HCT 50.1 43.5 43.6   .8* 105.6* 101.9   MCH 33.9* 34.5* 34.6*   MCHC 31.7 32.6 33.9   RDW 12.6 12.7 12.5    256 261   MPV 10.1 9.8 9.5     Chemistry:  Recent Labs     06/29/20 0429 06/30/20 0451 07/01/20 0457    136 133*   K 5.2 4.5 4.5   CL 92* 91* 90*   CO2 35* 38* 35*   GLUCOSE 69* 98 124*   BUN 23 20 17   CREATININE 0.42* <0.40* <0.40*   ANIONGAP 11 7* 8*   LABGLOM >60 CANNOT BE CALCULATED CANNOT BE CALCULATED   GFRAA >60 CANNOT BE CALCULATED CANNOT BE CALCULATED   CALCIUM 9.4 9.3 9.3     Recent Labs     06/29/20 0429 06/29/20  0851 06/30/20 0451 07/01/20 0457   PROT 7.3  --  6.3* 6.6   LABALBU 4.1  --  3.7 3.9   AST 33  --  24 26   ALT 43*  --  34 35   ALKPHOS 65  --  57 63   BILITOT 0.34  --  0.46 0.46   POCGLU  --  94  --   --          Radiology:    Xr Chest Portable    Result Date: 6/26/2020  No radiographic evidence of acute pulmonary disease.        Assessment:        Primary Problem  Tachy-ralf syndrome [facility])    Active Hospital Problems    Diagnosis Date Noted    Tachy-ralf syndrome ([facility] [address] [I49.5] 06/30/2020    Anticoagulated on Coumadin [Z79.01] 06/30/2020    Atrial flutter ([facility] [address] [I48.92] 06/26/2020    Muscular atrophy [M62.50] 12/17/2019    Bilateral hearing loss [H91.93] 90/01/8720    Diastolic dysfunction [B36.94] 05/02/2019    History of hypertension [Z86.79] 05/02/2019    [facility]roidism ([facility] [E21.3] 05/02/2019    MVP (mitral valve prolapse) [I34.1] 05/02/2019    Obstructive sleep apnea [G47.33] 05/02/2019    Paroxysmal atrial fibrillation ([facility] [I48.0] 05/02/2019    ALS (amyotrophic lateral sclerosis) ([facility] [G12.21] 10/02/2018    Aneurysm of aortic arch ([facility] [I71.2] 12/22/2016    Aortic valve disorder [I35.9] 11/11/2016    S/P CABG (coronary artery bypass graft) [Z95.1] 11/11/2016       Plan:        Cardiology evaluation in progress  Pacemaker implanted  Blood Pressure - Monitor and control: hydralazine prn   Optimize cardio-pulmonary function  CPAP  Anticoagulation per cardiology  Follow-up as scheduled at [facility] for ALS  Laxatives as needed  Fall precautions  Anticipate discharge 7/2 if stable    IP CONSULT TO CARDIOLOGY  IP CONSULT TO INTERNAL MEDICINE  IP CONSULT TO RESPIRATORY CARE  IP CONSULT TO Max Mitchell DO  7/1/2020  6:37 PM

## 2020-07-01 NOTE — CARE COORDINATION
Discharge planning    Chart reviewed. Patient to go to [facility] acute rehab on discharge. Was on eliquis and now held . Did add to [address] [place] to have CM check on eliquis for cost analysis and to see if PA is needed. If cost too high may have to follow up with cardiology regarding transition to coumadin.

## 2020-07-01 NOTE — PROGRESS NOTES
Physical Therapy  Facility/Department: [facility] CVICU  Daily Treatment Note  NAME: Shaun Lorenzo  : 1950  MRN: [id number]    Date of Service: 2020    Discharge Recommendations:  Home with Home health PT        Assessment   Body structures, Functions, Activity limitations: Decreased functional mobility ; Decreased strength;Decreased safe awareness;Decreased endurance;Decreased balance  Assessment: Pt states  that he is having second thoughts on going to IP rehab and wants to be D/C to home. Pt states he is more comfortable at home and wants to get back to his routine. Pt requires continued IP PT & is appropriate to D/C Home with 24 hr supervision assist & HH PT to maximize independence with functional mobility, balance, safety awareness & activity tolerance. Prognosis: Good  Decision Making: Medium Complexity  REQUIRES PT FOLLOW UP: Yes  Activity Tolerance  Activity Tolerance: Patient limited by endurance; Patient limited by fatigue     Patient Diagnosis(es): The encounter diagnosis was Atrial flutter, unspecified type ([address] has a past medical history of ALS (amyotrophic lateral sclerosis) ([facility] [address] Aortic root aneurysm ([facility] [address] Aortic valve replaced, and Hypertension. has a past surgical history that includes Parathyroid gland surgery (); hernia repair; and Cardioversion (2020). Restrictions  Restrictions/Precautions  Restrictions/Precautions: General Precautions, Fall Risk  Required Braces or Orthoses?: No  Position Activity Restriction  Other position/activity restrictions: up with assist, telemetry, O2, BLE AFO  Subjective   General  Chart Reviewed:  Yes  Additional Pertinent Hx: ALS, aortic valve replacement  Subjective  Subjective: Pt agreeable to PT  General Comment  Comments: Jossie ESTRELLA PT          Orientation  Orientation  Overall Orientation Status: Within Functional Limits  Cognition      Objective   Bed mobility  Rolling: Contact guard assistance  Sit to supine: Minimal assistance for LE's  Scooting: Contact guard assistance  Transfers  Sit to Stand: Minimal Assistance(unsteady initial stand)  Stand to sit: Contact guard assistance  Bed to Chair: Minimal assistance  Lateral Transfers: Minimal Assistance  Ambulation  Ambulation?: Yes  More Ambulation?: No  Ambulation 1  Surface: level tile  Device: Rollator  Assistance: Contact guard assistance;Minimal assistance  Quality of Gait: step to pattern, slightly unsteady  Gait Deviations: Decreased step length;Decreased step height  Distance: 25ft  Comments: Improved safety with transfers  Stairs/Curb  Stairs?: No     Balance  Sitting - Static: Good  Sitting - Dynamic: Good  Standing - Static: Good;-  Standing - Dynamic: Fair  Exercises  Comments: reviewed UE restrictions post op pacemaker placement        AM-PAC Score     AM-PAC Inpatient Mobility without Stair Climbing Raw Score : 14 (07/01/20 1008)  AM-PAC Inpatient without Stair Climbing T-Scale Score : 40.85 (07/01/20 1008)  Mobility Inpatient CMS 0-100% Score: 53.33 (07/01/20 1008)  Mobility Inpatient without Stair CMS G-Code Modifier : CK (07/01/20 1008)       Goals  Short term goals  Time Frame for Short term goals: 12 visits  Short term goal 1: Inc bed-mobility & transfers to independent to enable pt to safely get in/OOB  Short term goal 2: Inc gait to amb 350ft or > indep w/ RW to enable pt to return to previous level of independence  Short term goal 3: Pt able to go up/down 3 steps with one rail indep; Short term goal 4: Pt able to tolerate 30-40 min of activity to include 15-20 reps of ex & functional mobility including 5 minutes of standing to facilitate activity tolerance to [facility];   Short term goal 5: Ed pt on home ex's, safety & energy principles & issue written home program;    Plan    Plan  Times per week: 1-2x/d,5-6d/week  Current Treatment Recommendations: Strengthening, Balance Training, Transfer Training, Endurance Training, Gait Training, Stair training, Home Exercise Program, Safety Education & Training  Safety Devices  Type of devices: Nurse notified, Left in bed, Gait belt, Call light within reach, Bed alarm in place, All fall risk precautions in place     Therapy Time   Individual Concurrent Group Co-treatment   Time In  0932         Time Out  1000         Minutes  [place], [place]

## 2020-07-01 NOTE — PROGRESS NOTES
Patient arrived to unit from cath lab at this time. Per Report:   Patient underwent pacer placement   with Dr. Bobo Luke    Pt received 3 of Versed & 50 of Fentanyl. No complications noted to site at this time and distal pulses remain palpable. Pt denies all pain and complaints at this time. Vitals stable as charted . See flowsheets for further info. Writer will continue to monitor.

## 2020-07-01 NOTE — PLAN OF CARE
Problem: Falls - Risk of:  Goal: Will remain free from falls  Description: Will remain free from falls  Outcome: Met This Shift     Problem: Airway Clearance - Ineffective:  Goal: Ability to maintain a clear airway will improve  Description: Ability to maintain a clear airway will improve  Outcome: Met This Shift     Problem: Cardiac:  Goal: Ability to maintain an adequate cardiac output will improve  Description: Ability to maintain an adequate cardiac output will improve  Outcome: Met This Shift

## 2020-07-02 ENCOUNTER — HOSPITAL ENCOUNTER (INPATIENT)
Age: 70
LOS: 11 days | Discharge: HOME | DRG: 308 | End: 2020-07-13
Attending: PHYSICAL MEDICINE & REHABILITATION | Admitting: PHYSICAL MEDICINE & REHABILITATION
Payer: MEDICARE

## 2020-07-02 ENCOUNTER — APPOINTMENT (OUTPATIENT)
Dept: GENERAL RADIOLOGY | Age: 70
DRG: 243 | End: 2020-07-02
Payer: MEDICARE

## 2020-07-02 VITALS
SYSTOLIC BLOOD PRESSURE: 135 MMHG | HEART RATE: 67 BPM | DIASTOLIC BLOOD PRESSURE: 78 MMHG | BODY MASS INDEX: 19.97 KG/M2 | RESPIRATION RATE: 18 BRPM | HEIGHT: 70 IN | TEMPERATURE: 98.6 F | OXYGEN SATURATION: 92 % | WEIGHT: 139.5 LBS

## 2020-07-02 PROBLEM — Q24.9 CARDIAC ABNORMALITY: Status: ACTIVE | Noted: 2020-07-02

## 2020-07-02 LAB
ANION GAP SERPL CALCULATED.3IONS-SCNC: 9 MMOL/L (ref 9–17)
BUN BLDV-MCNC: 13 MG/DL (ref 8–23)
BUN/CREAT BLD: ABNORMAL (ref 9–20)
CALCIUM SERPL-MCNC: 9.2 MG/DL (ref 8.6–10.4)
CHLORIDE BLD-SCNC: 91 MMOL/L (ref 98–107)
CO2: 36 MMOL/L (ref 20–31)
CREAT SERPL-MCNC: <0.4 MG/DL (ref 0.7–1.2)
GFR AFRICAN AMERICAN: ABNORMAL ML/MIN
GFR NON-AFRICAN AMERICAN: ABNORMAL ML/MIN
GFR SERPL CREATININE-BSD FRML MDRD: ABNORMAL ML/MIN/{1.73_M2}
GFR SERPL CREATININE-BSD FRML MDRD: ABNORMAL ML/MIN/{1.73_M2}
GLUCOSE BLD-MCNC: 112 MG/DL (ref 70–99)
HCT VFR BLD CALC: 44.4 % (ref 40.7–50.3)
HEMOGLOBIN: 14.6 G/DL (ref 13–17)
MAGNESIUM: 1.7 MG/DL (ref 1.6–2.6)
MCH RBC QN AUTO: 34 PG (ref 25.2–33.5)
MCHC RBC AUTO-ENTMCNC: 32.9 G/DL (ref 28.4–34.8)
MCV RBC AUTO: 103.3 FL (ref 82.6–102.9)
NRBC AUTOMATED: 0 PER 100 WBC
PDW BLD-RTO: 12.6 % (ref 11.8–14.4)
PLATELET # BLD: 250 K/UL (ref 138–453)
PMV BLD AUTO: 9.6 FL (ref 8.1–13.5)
POTASSIUM SERPL-SCNC: 4.3 MMOL/L (ref 3.7–5.3)
RBC # BLD: 4.3 M/UL (ref 4.21–5.77)
SODIUM BLD-SCNC: 136 MMOL/L (ref 135–144)
WBC # BLD: 9.6 K/UL (ref 3.5–11.3)

## 2020-07-02 PROCEDURE — 2580000003 HC RX 258: Performed by: INTERNAL MEDICINE

## 2020-07-02 PROCEDURE — 97535 SELF CARE MNGMENT TRAINING: CPT

## 2020-07-02 PROCEDURE — 6370000000 HC RX 637 (ALT 250 FOR IP): Performed by: INTERNAL MEDICINE

## 2020-07-02 PROCEDURE — 80048 BASIC METABOLIC PNL TOTAL CA: CPT

## 2020-07-02 PROCEDURE — 99239 HOSP IP/OBS DSCHRG MGMT >30: CPT | Performed by: INTERNAL MEDICINE

## 2020-07-02 PROCEDURE — 6370000000 HC RX 637 (ALT 250 FOR IP): Performed by: NURSE PRACTITIONER

## 2020-07-02 PROCEDURE — 97530 THERAPEUTIC ACTIVITIES: CPT

## 2020-07-02 PROCEDURE — 1180000000 HC REHAB R&B

## 2020-07-02 PROCEDURE — 83735 ASSAY OF MAGNESIUM: CPT

## 2020-07-02 PROCEDURE — 85027 COMPLETE CBC AUTOMATED: CPT

## 2020-07-02 PROCEDURE — 36415 COLL VENOUS BLD VENIPUNCTURE: CPT

## 2020-07-02 PROCEDURE — 71045 X-RAY EXAM CHEST 1 VIEW: CPT

## 2020-07-02 RX ORDER — SOTALOL HYDROCHLORIDE 80 MG/1
40 TABLET ORAL 2 TIMES DAILY
Status: CANCELLED | OUTPATIENT
Start: 2020-07-02

## 2020-07-02 RX ORDER — DOCUSATE SODIUM 100 MG/1
100 CAPSULE, LIQUID FILLED ORAL DAILY
Status: CANCELLED | OUTPATIENT
Start: 2020-07-03

## 2020-07-02 RX ORDER — SOTALOL HYDROCHLORIDE 80 MG/1
40 TABLET ORAL 2 TIMES DAILY
Status: DISCONTINUED | OUTPATIENT
Start: 2020-07-03 | End: 2020-07-13 | Stop reason: HOSPADM

## 2020-07-02 RX ORDER — PANTOPRAZOLE SODIUM 20 MG/1
20 TABLET, DELAYED RELEASE ORAL DAILY
Status: CANCELLED | OUTPATIENT
Start: 2020-07-03

## 2020-07-02 RX ORDER — WARFARIN SODIUM 2.5 MG/1
2.5 TABLET ORAL DAILY
Status: DISCONTINUED | OUTPATIENT
Start: 2020-07-02 | End: 2020-07-02

## 2020-07-02 RX ORDER — ASPIRIN 81 MG/1
81 TABLET ORAL DAILY
Status: DISCONTINUED | OUTPATIENT
Start: 2020-07-03 | End: 2020-07-13 | Stop reason: HOSPADM

## 2020-07-02 RX ORDER — SOTALOL HYDROCHLORIDE 80 MG/1
40 TABLET ORAL 2 TIMES DAILY
Status: DISCONTINUED | OUTPATIENT
Start: 2020-07-02 | End: 2020-07-02 | Stop reason: HOSPADM

## 2020-07-02 RX ORDER — ACETAMINOPHEN 325 MG/1
650 TABLET ORAL EVERY 6 HOURS PRN
Status: DISCONTINUED | OUTPATIENT
Start: 2020-07-02 | End: 2020-07-13 | Stop reason: HOSPADM

## 2020-07-02 RX ORDER — ONDANSETRON 2 MG/ML
4 INJECTION INTRAMUSCULAR; INTRAVENOUS EVERY 4 HOURS PRN
Status: CANCELLED | OUTPATIENT
Start: 2020-07-02

## 2020-07-02 RX ORDER — NALOXONE HYDROCHLORIDE 0.4 MG/ML
0.4 INJECTION, SOLUTION INTRAMUSCULAR; INTRAVENOUS; SUBCUTANEOUS PRN
Status: DISCONTINUED | OUTPATIENT
Start: 2020-07-02 | End: 2020-07-13 | Stop reason: HOSPADM

## 2020-07-02 RX ORDER — BISACODYL 10 MG
10 SUPPOSITORY, RECTAL RECTAL DAILY PRN
Status: DISCONTINUED | OUTPATIENT
Start: 2020-07-02 | End: 2020-07-13 | Stop reason: HOSPADM

## 2020-07-02 RX ORDER — POLYETHYLENE GLYCOL 3350 17 G/17G
17 POWDER, FOR SOLUTION ORAL DAILY PRN
Status: DISCONTINUED | OUTPATIENT
Start: 2020-07-02 | End: 2020-07-02 | Stop reason: SDUPTHER

## 2020-07-02 RX ORDER — HYDROCODONE BITARTRATE AND ACETAMINOPHEN 5; 325 MG/1; MG/1
2 TABLET ORAL EVERY 4 HOURS PRN
Status: DISCONTINUED | OUTPATIENT
Start: 2020-07-02 | End: 2020-07-13 | Stop reason: HOSPADM

## 2020-07-02 RX ORDER — DOCUSATE SODIUM 100 MG/1
100 CAPSULE, LIQUID FILLED ORAL DAILY
Status: DISCONTINUED | OUTPATIENT
Start: 2020-07-03 | End: 2020-07-06

## 2020-07-02 RX ORDER — ACETAMINOPHEN 650 MG/1
650 SUPPOSITORY RECTAL EVERY 6 HOURS PRN
Status: DISCONTINUED | OUTPATIENT
Start: 2020-07-02 | End: 2020-07-13 | Stop reason: HOSPADM

## 2020-07-02 RX ORDER — SODIUM CHLORIDE 0.9 % (FLUSH) 0.9 %
10 SYRINGE (ML) INJECTION EVERY 12 HOURS SCHEDULED
Status: CANCELLED | OUTPATIENT
Start: 2020-07-02

## 2020-07-02 RX ORDER — ASPIRIN 81 MG/1
81 TABLET ORAL DAILY
Status: CANCELLED | OUTPATIENT
Start: 2020-07-03

## 2020-07-02 RX ORDER — VITAMIN B COMPLEX
1000 TABLET ORAL DAILY
Status: DISCONTINUED | OUTPATIENT
Start: 2020-07-03 | End: 2020-07-13 | Stop reason: HOSPADM

## 2020-07-02 RX ORDER — HYDRALAZINE HYDROCHLORIDE 20 MG/ML
10 INJECTION INTRAMUSCULAR; INTRAVENOUS EVERY 6 HOURS PRN
Status: DISCONTINUED | OUTPATIENT
Start: 2020-07-02 | End: 2020-07-02

## 2020-07-02 RX ORDER — M-VIT,TX,IRON,MINS/CALC/FOLIC 27MG-0.4MG
1 TABLET ORAL DAILY
Status: DISCONTINUED | OUTPATIENT
Start: 2020-07-03 | End: 2020-07-13 | Stop reason: HOSPADM

## 2020-07-02 RX ORDER — BISACODYL 10 MG
10 SUPPOSITORY, RECTAL RECTAL DAILY PRN
Status: DISCONTINUED | OUTPATIENT
Start: 2020-07-02 | End: 2020-07-02 | Stop reason: SDUPTHER

## 2020-07-02 RX ORDER — SENNA PLUS 8.6 MG/1
1 TABLET ORAL DAILY PRN
Status: DISCONTINUED | OUTPATIENT
Start: 2020-07-02 | End: 2020-07-03

## 2020-07-02 RX ORDER — SODIUM CHLORIDE 0.9 % (FLUSH) 0.9 %
10 SYRINGE (ML) INJECTION PRN
Status: DISCONTINUED | OUTPATIENT
Start: 2020-07-02 | End: 2020-07-08

## 2020-07-02 RX ORDER — POLYETHYLENE GLYCOL 3350 17 G/17G
17 POWDER, FOR SOLUTION ORAL DAILY PRN
Status: CANCELLED | OUTPATIENT
Start: 2020-07-02

## 2020-07-02 RX ORDER — BISACODYL 10 MG
10 SUPPOSITORY, RECTAL RECTAL DAILY PRN
Status: CANCELLED | OUTPATIENT
Start: 2020-07-02

## 2020-07-02 RX ORDER — ONDANSETRON 2 MG/ML
4 INJECTION INTRAMUSCULAR; INTRAVENOUS EVERY 4 HOURS PRN
Status: DISCONTINUED | OUTPATIENT
Start: 2020-07-02 | End: 2020-07-02 | Stop reason: SDUPTHER

## 2020-07-02 RX ORDER — WARFARIN SODIUM 2.5 MG/1
2.5 TABLET ORAL DAILY
Status: DISCONTINUED | OUTPATIENT
Start: 2020-07-03 | End: 2020-07-02 | Stop reason: HOSPADM

## 2020-07-02 RX ORDER — POLYETHYLENE GLYCOL 3350 17 G/17G
17 POWDER, FOR SOLUTION ORAL DAILY
Status: DISCONTINUED | OUTPATIENT
Start: 2020-07-03 | End: 2020-07-06

## 2020-07-02 RX ORDER — HYDROCODONE BITARTRATE AND ACETAMINOPHEN 5; 325 MG/1; MG/1
2 TABLET ORAL EVERY 4 HOURS PRN
Status: CANCELLED | OUTPATIENT
Start: 2020-07-02

## 2020-07-02 RX ORDER — HYDROCODONE BITARTRATE AND ACETAMINOPHEN 5; 325 MG/1; MG/1
1 TABLET ORAL EVERY 4 HOURS PRN
Status: DISCONTINUED | OUTPATIENT
Start: 2020-07-02 | End: 2020-07-13 | Stop reason: HOSPADM

## 2020-07-02 RX ORDER — NALOXONE HYDROCHLORIDE 0.4 MG/ML
0.4 INJECTION, SOLUTION INTRAMUSCULAR; INTRAVENOUS; SUBCUTANEOUS PRN
Status: CANCELLED | OUTPATIENT
Start: 2020-07-02

## 2020-07-02 RX ORDER — PANTOPRAZOLE SODIUM 20 MG/1
20 TABLET, DELAYED RELEASE ORAL DAILY
Status: DISCONTINUED | OUTPATIENT
Start: 2020-07-03 | End: 2020-07-13 | Stop reason: HOSPADM

## 2020-07-02 RX ORDER — ACETAMINOPHEN 650 MG/1
650 SUPPOSITORY RECTAL EVERY 6 HOURS PRN
Status: CANCELLED | OUTPATIENT
Start: 2020-07-02

## 2020-07-02 RX ORDER — M-VIT,TX,IRON,MINS/CALC/FOLIC 27MG-0.4MG
1 TABLET ORAL DAILY
Status: CANCELLED | OUTPATIENT
Start: 2020-07-03

## 2020-07-02 RX ORDER — SODIUM CHLORIDE 0.9 % (FLUSH) 0.9 %
10 SYRINGE (ML) INJECTION PRN
Status: CANCELLED | OUTPATIENT
Start: 2020-07-02

## 2020-07-02 RX ORDER — ONDANSETRON 4 MG/1
4 TABLET, FILM COATED ORAL EVERY 6 HOURS PRN
Status: DISCONTINUED | OUTPATIENT
Start: 2020-07-02 | End: 2020-07-13 | Stop reason: HOSPADM

## 2020-07-02 RX ORDER — HYDRALAZINE HYDROCHLORIDE 20 MG/ML
10 INJECTION INTRAMUSCULAR; INTRAVENOUS EVERY 6 HOURS PRN
Status: CANCELLED | OUTPATIENT
Start: 2020-07-02

## 2020-07-02 RX ORDER — ACETAMINOPHEN 325 MG/1
650 TABLET ORAL EVERY 6 HOURS PRN
Status: CANCELLED | OUTPATIENT
Start: 2020-07-02

## 2020-07-02 RX ORDER — VITAMIN B COMPLEX
1000 TABLET ORAL DAILY
Status: CANCELLED | OUTPATIENT
Start: 2020-07-03

## 2020-07-02 RX ORDER — SODIUM CHLORIDE 0.9 % (FLUSH) 0.9 %
10 SYRINGE (ML) INJECTION EVERY 12 HOURS SCHEDULED
Status: DISCONTINUED | OUTPATIENT
Start: 2020-07-03 | End: 2020-07-08

## 2020-07-02 RX ORDER — HYDROCODONE BITARTRATE AND ACETAMINOPHEN 5; 325 MG/1; MG/1
1 TABLET ORAL EVERY 4 HOURS PRN
Status: CANCELLED | OUTPATIENT
Start: 2020-07-02

## 2020-07-02 RX ADMIN — MELATONIN 1000 UNITS: at 08:37

## 2020-07-02 RX ADMIN — DOCUSATE SODIUM 100 MG: 100 CAPSULE, LIQUID FILLED ORAL at 08:37

## 2020-07-02 RX ADMIN — PANTOPRAZOLE SODIUM 20 MG: 20 TABLET, DELAYED RELEASE ORAL at 08:37

## 2020-07-02 RX ADMIN — Medication 10 ML: at 08:38

## 2020-07-02 RX ADMIN — MULTIPLE VITAMINS W/ MINERALS TAB 1 TABLET: TAB at 08:37

## 2020-07-02 RX ADMIN — SOTALOL HYDROCHLORIDE 40 MG: 80 TABLET ORAL at 18:32

## 2020-07-02 RX ADMIN — ASPIRIN 81 MG: 81 TABLET, COATED ORAL at 08:37

## 2020-07-02 ASSESSMENT — PAIN SCALES - GENERAL
PAINLEVEL_OUTOF10: 0

## 2020-07-02 ASSESSMENT — ENCOUNTER SYMPTOMS
NAUSEA: 0
CONSTIPATION: 1
ABDOMINAL PAIN: 0
VOMITING: 0
SHORTNESS OF BREATH: 1
COUGH: 0

## 2020-07-02 NOTE — PROGRESS NOTES
study in near future     6/27/2020  Patient remains in atrial flutter  Was started on aspirin, sotalol and Eliquis by cardiology yesterday  Had 6 seconds pauses x2 yesterday but went into atrial flutter after that  Denies any symptoms at present  Cardiology plans to do cardioversion today\"     Database has included:    Results for Farhan Chin (MRN [id number]) as of 6/30/2020 17:57   Ref. Range 7/10/2019 00:00 12/18/2019 09:56 6/26/2020 14:27 6/27/2020 03:47   T3, Free Latest Ref Range: 2.02 - 4.43 pg/mL  3.21     TSH Latest Ref Range: 0.30 - 5.00 mIU/L 1.95 3.29 3.54 2.70   Thyroxine, Free Latest Ref Range: 0.93 - 1.70 ng/dL  1.03 1.25    T4 Free Unknown 0.80        The patient was cardioverted  HR's remain labile     The patient did develop a fecal impaction  He was disimpacted  Laxatives ordered as needed    On 7/1 the patient underwent  Dual-chamber permanent pacemaker implantation    Postop day 1 permanent pacemaker implantation  Had some oozing last night  Dr. Rita Joseph has cleaned the wound, applied new Steri-Strips, and pressure dressing    Follow-up chest x-ray:  Impression:   No acute cardiopulmonary process. New pacemaker with leads presumably in the right atrium and right ventricle. The patient's condition was stabilized  Discharge planning initiated      Medications: Allergies:     Allergies   Allergen Reactions    Ciprofloxacin      Can not take anything in this group with his ALS    Lasix [Furosemide]     Penicillins     Sulfa Antibiotics     Duloxetine        Current Meds:   Scheduled Meds:    sotalol  40 mg Oral BID    sodium chloride flush  10 mL Intravenous 2 times per day    docusate sodium  100 mg Oral Daily    aspirin  81 mg Oral Daily    therapeutic multivitamin-minerals  1 tablet Oral Daily    pantoprazole  20 mg Oral Daily    Vitamin D  1,000 Units Oral Daily     Continuous Infusions:    glucagon (rDNA) infusion Stopped (06/28/20 1820)     PRN Meds: sodium chloride flush, Nose: Nose normal.   Eyes:      General: No scleral icterus. Conjunctiva/sclera: Conjunctivae normal.   Neck:      Musculoskeletal: Neck supple. Trachea: No tracheal deviation. Cardiovascular:      Rate and Rhythm: Normal rate and regular rhythm. Pulmonary:      Effort: Pulmonary effort is normal. No respiratory distress. Breath sounds: Normal breath sounds. No wheezing or rales. Chest:      Chest wall: No tenderness. Abdominal:      General: Bowel sounds are normal. There is no distension. Palpations: Abdomen is soft. Tenderness: There is no abdominal tenderness. Musculoskeletal:         General: No tenderness. Skin:     General: Skin is warm and dry. Comments: Incision dressed, dry, clean   Neurological:      Comments: Left foot drop           Labs:  Hematology:  Recent Labs     06/30/20 0451 07/01/20 0457 07/02/20 0441   WBC 7.5 11.2 9.6   RBC 4.12* 4.28 4.30   HGB 14.2 14.8 14.6   HCT 43.5 43.6 44.4   .6* 101.9 103.3*   MCH 34.5* 34.6* 34.0*   MCHC 32.6 33.9 32.9   RDW 12.7 12.5 12.6    261 250   MPV 9.8 9.5 9.6     Chemistry:  Recent Labs     06/30/20 0451 07/01/20 0457 07/02/20 0441    133* 136   K 4.5 4.5 4.3   CL 91* 90* 91*   CO2 38* 35* 36*   GLUCOSE 98 124* 112*   BUN 20 17 13   CREATININE <0.40* <0.40* <0.40*   MG  --   --  1.7   ANIONGAP 7* 8* 9   LABGLOM CANNOT BE CALCULATED CANNOT BE CALCULATED CANNOT BE CALCULATED   GFRAA CANNOT BE CALCULATED CANNOT BE CALCULATED CANNOT BE CALCULATED   CALCIUM 9.3 9.3 9.2     Recent Labs     06/30/20 0451 07/01/20 0457   PROT 6.3* 6.6   LABALBU 3.7 3.9   AST 24 26   ALT 34 35   ALKPHOS 57 61   BILITOT 0.46 0.46         Radiology:    Xr Chest Portable    Result Date: 6/26/2020  No radiographic evidence of acute pulmonary disease.        Assessment:        Primary Problem  Tachy-ralf syndrome [facility])    Active Hospital Problems    Diagnosis Date Noted    History of permanent cardiac pacemaker placement

## 2020-07-02 NOTE — PROGRESS NOTES
DATE: 2020    NAME: Howard Melo  MRN: [id number]   : 1950    Patient not seen this date for Physical Therapy due to:  [] Blood transfusion in progress  [x] Cancel by RN  [] Hemodialysis  []  Refusal by Patient   [] Spine Precautions   [] Strict Bedrest  [] Surgery  [] Testing      [] Other        [] PT being discontinued at this time. Patient independent. No further needs. [] PT being discontinued at this time as the patient has been transferred to hospice care. No further needs.     LISET CARRASQUILLO, PTA

## 2020-07-02 NOTE — PROGRESS NOTES
Cardiomediastinal silhouette and pulmonary vasculature are within normal   limits.  No focal airspace consolidation, pneumothorax, or pleural effusion. No free air beneath the diaphragm.  No acute osseous abnormality.           Impression   No acute intrathoracic process.           Assessment/Plan:    Principal Problem:    Tachy-ralf syndrome (HCC)  Active Problems:    ALS (amyotrophic lateral sclerosis) (HCC)    Diastolic dysfunction    Aneurysm of aortic arch (HCC)    Paroxysmal atrial fibrillation (HCC)    Aortic valve disorder    Bilateral hearing loss    History of hypertension    Hyperparathyroidism (HCC)    MVP (mitral valve prolapse)    Obstructive sleep apnea    S/P CABG (coronary artery bypass graft)    Muscular atrophy    Atrial flutter (HCC)    Anticoagulated on Coumadin    History of permanent cardiac pacemaker placement 7/1/2020:    Resolved Problems:    * No resolved hospital problems. *     1. Paroxysmal atrial flutter  -In SR. Eliquis and Sotalol remain on hold. Paper script written for cost analysis on Eliquis.     2. Hx AVR  -Stable. No evidence of CHF. 3. Tachy-ralf syndrome  -S/P PPM. All parameters WNL. Site developed oozing around 0400. Dr. Yamileth Pedraza at bedside to clean the site and apply new pressure dressing. Reviewed with Dr. Sunitha Gomez restart Sotalol 40mg BID. Electronically signed by MATEO Garcia CNP on 7/2/2020 at 11:32 AM      Chart reviewed  Pt examined  Agree with above  Sotalol to prevent A fib  Coumadin for anticoagulation  Ok to discharge to [facility] acute rehab.

## 2020-07-02 NOTE — PROGRESS NOTES
Physical Medicine & Rehabilitation  F/u Chart review note    7/2/2020 10:07 AM     CC: Ambulatory and ADL dysfunction due to     Brief history    78-year-old male with history of ALS, proximal atrial fibrillation, AVR, hypercholesterolemia admitted with a flutter-underwent cardioversion now in sinus rhythm. Off Eliquis. Have sleep apnea-was to have study at  of -deferred due to current admission. Rehabilitation:   PT:  Restrictions/Precautions: General Precautions, Fall Risk  Other position/activity restrictions: up with assist, telemetry, O2, BLE AFO   Transfers  Sit to Stand: Minimal Assistance(unsteady initial stand)  Stand to sit: Contact guard assistance  Bed to Chair: Minimal assistance  Stand Pivot Transfers: Minimal Assistance  Lateral Transfers: Minimal Assistance  Comment: Needed Ed on use of upper body for safe sit/stand  Ambulation 1  Surface: level tile  Device: Rollator  Assistance: Contact guard assistance, Minimal assistance  Quality of Gait: step to pattern, slightly unsteady  Gait Deviations: Decreased step length, Decreased step height  Distance: 25ft  Comments: Improved safety with transfers        OT:   ADL  Grooming: Setup;Stand by assistance; Increased time to complete;Maximum assistance(MAX A for shaving d/t patient's poor FMC. SBA and increased time to complete for standing at sink for additional grooming tasks)  LE Dressing: Setup; Increased time to complete;Stand by assistance;Minimal assistance(Patient able to don/doff socks, don shoes/AFO, but required assist to tie B shoes d/t poor FMC)  Balance  Sitting Balance: Supervision  Standing Balance: Stand by assistance  Standing Balance  Time: ~5 minutes      Objective:  /76   Pulse 79   Temp 97.7 °F (36.5 °C) (Temporal)   Resp 16   Ht 5' 10\" (1.778 m)   Wt 139 lb 8 oz (63.3 kg)   SpO2 93%   BMI 20.02 kg/m²  I Body mass index is 20.02 kg/m².  I   Wt Readings from Last 1 Encounters:   07/02/20 139 lb 8 oz (63.3 kg)      Temp (24hrs), Av.1 °F (36.7 °C), Min:97.4 °F (36.3 °C), Max:98.8 °F (37.1 °C)               Medications   Scheduled Meds:   sodium chloride flush  10 mL Intravenous 2 times per day    docusate sodium  100 mg Oral Daily    aspirin  81 mg Oral Daily    therapeutic multivitamin-minerals  1 tablet Oral Daily    pantoprazole  20 mg Oral Daily    Vitamin D  1,000 Units Oral Daily     Continuous Infusions:   glucagon (rDNA) infusion Stopped (20 1820)     PRN Meds:.sodium chloride flush, bisacodyl, hydrALAZINE, ondansetron, HYDROcodone 5 mg - acetaminophen **OR** HYDROcodone 5 mg - acetaminophen, naloxone, acetaminophen **OR** acetaminophen, polyethylene glycol, perflutren lipid microspheres     Diagnostics:     CBC:   Recent Labs     20   WBC 7.5 11.2 9.6   RBC 4.12* 4.28 4.30   HGB 14.2 14.8 14.6   HCT 43.5 43.6 44.4   .6* 101.9 103.3*   RDW 12.7 12.5 12.6    261 250     BMP:   Recent Labs     20    133* 136   K 4.5 4.5 4.3   CL 91* 90* 91*   CO2 38* 35* 36*   BUN 20 17 13   CREATININE <0.40* <0.40* <0.40*     BNP: No results for input(s): BNP in the last 72 hours. PT/INR: No results for input(s): PROTIME, INR in the last 72 hours. APTT: No results for input(s): APTT in the last 72 hours. CARDIAC ENZYMES: No results for input(s): CKMB, CKMBINDEX, TROPONINT in the last 72 hours. Invalid input(s): CKTOTAL;3  FASTING LIPID PANEL:  Lab Results   Component Value Date    CHOL 166 07/10/2019    HDL 40 07/10/2019    TRIG 62 07/10/2019     LIVER PROFILE:   Recent Labs     20   AST 24 26   ALT 34 35   BILITOT 0.46 0.46   ALKPHOS 57 63        I/O (24Hr): Intake/Output Summary (Last 24 hours) at 2020 1007  Last data filed at 2020 0600  Gross per 24 hour   Intake --   Output 500 ml   Net -500 ml       Glu last 24 hour  No results for input(s): POCGLU in the last 72 hours.     No results for input(s): CLARITYU, COLORU, PHUR, SPECGRAV, PROTEINU, RBCUA, BLOODU, BACTERIA, NITRU, WBCUA, LEUKOCYTESUR, YEAST, GLUCOSEU, BILIRUBINUR in the last 72 hours. Impression: Mr. Dar Ho is a 65 y.o. male with a history of Atrial flutter (HCC)     1. Paroxysmal A. fib-status post cardioversion-aspirin, plan for device check today  2. ALS(amyotrophic lateral sclerosis)  3. Obstructive sleep apnea- sleep study pending outpatient BiPAP  4. Hypertension, aortic valve replacement  5. Pain-Norco     Recommendations:  1. Diagnosis: Cardioversion, ALS  2. Therapy: Min to mod assist ADLs, ambulates min assist, transfers mod assist  3. Medical  Necessity: As above, ALS  4. Support: Clarify, spouse  5. Rehab recommendation:  informed decline in function from baseline, would benefit from acute inpatient rehabilitation when medically ready  6. DVT proph: Currently off Eliquis today, recommend Lovenox/heparin for DVT prophylaxis, if unable to start/contra indicated- will need Doppler screen      Please call with questions. Erasmo Luevano. Sarah Keating MD       This note is created with the assistance of a speech recognition program.  While intending to generate a document that actually reflects the content of the visit, the document can still have some errors including those of syntax and sound a like substitutions which may escape proof reading.   In such instances, actual meaning can be extrapolated by contextual diversion

## 2020-07-02 NOTE — CARE COORDINATION
Discharge planning    See notes per ss and acute rehab. Patient to go home now. SS looking into home care and waiting to connect with wife. Patient is new to eliquis and to hold for 24 hours per cardiology notes today. Spoke with Welliko and she will call rx into [facility] pharmacy to see if PA is needed. If needed will call writer back. CHARLOTTE in epic and will follow and assist as needed. Per Welliko  After first 30 day trial prescription would cost $400 a month with pt current insurance    Met with patient and updated on cost and will need to transition to coumadin as he cannot afford the cost of eliquis. He then stated that he is reconsidering acute rehab. Call to VLAD [facility] and she was already aware as she just spoke with wife.      Updated CHARLOTTE regarding eliquis

## 2020-07-02 NOTE — PROGRESS NOTES
Occupational Therapy  Facility/Department: [facility] CVICU  Daily Treatment Note  NAME: Tapan Monk  : 1950  MRN: [id number]    Date of Service: 2020    Discharge Recommendations:  IP Rehab       Assessment   Performance deficits / Impairments: Decreased functional mobility ; Decreased ADL status; Decreased strength;Decreased endurance;Decreased balance;Decreased high-level IADLs;Decreased fine motor control;Decreased coordination  Prognosis: Good  OT Education: OT Role;Plan of Care;Transfer Training;Energy Conservation;Precautions  REQUIRES OT FOLLOW UP: Yes  Activity Tolerance  Activity Tolerance: Patient Tolerated treatment well  Safety Devices  Safety Devices in place: Yes  Type of devices: Call light within reach;Nurse notified; Left in chair;Chair alarm in place; Patient at risk for falls         Patient Diagnosis(es): The encounter diagnosis was Atrial flutter, unspecified type ([facility] [address] has a past medical history of ALS (amyotrophic lateral sclerosis) ([facility] [address] Aortic root aneurysm ([facility] [address] Aortic valve replaced, and Hypertension. has a past surgical history that includes Parathyroid gland surgery (); hernia repair; and Cardioversion (2020).     Restrictions  Restrictions/Precautions  Restrictions/Precautions: General Precautions, Fall Risk  Required Braces or Orthoses?: No  Position Activity Restriction  Other position/activity restrictions: up with assist, telemetry, O2, BLE AFO, pacemaker precautions, sling   Subjective   General  Chart Reviewed: Yes  Patient assessed for rehabilitation services?: Yes  Response to previous treatment: Patient with no complaints from previous session  Family / Caregiver Present: No      Orientation  Orientation  Overall Orientation Status: Within Functional Limits  Objective    ADL  Grooming: Setup;Minimal assistance(seated, min A to squeeze tooth paste onto brush)  UE Dressing: Maximum assistance(TO don/doff gown with line mgt and don/doff sling for proper positioning)        Balance  Sitting Balance: Supervision  Standing Balance: Minimal assistance  Functional Mobility  Functional - Mobility Device: Other  Activity: Other  Assist Level: Minimal assistance(x2)  Functional Mobility Comments: Patient required MIN A x2 for functional mobility from EOB>bed side chair d/t RN reporting bleeding from incision site last night RN did not want patient using LUE on 4WW for support. Hand held assist provided for patient min A x2  and cues for pacing and safety  Bed mobility  Supine to Sit: Minimal assistance  Scooting: Minimal assistance  Comment: HOB elevated, increased time and effort for bed mob with sling limiting preformance  Transfers  Sit to stand: Minimal assistance  Stand to sit: Minimal assistance  Transfer Comments: sit<>stand min A with sling on patient and RN stated she did not want patient using LUE d/t bleeding espisode last night. Hand held assist provided d/t patient leaning over 4WW during mobility for support. Cognition  Overall Cognitive Status: Exceptions  Arousal/Alertness: Appropriate responses to stimuli  Following Commands: Follows all commands without difficulty  Attention Span: Appears intact; Attends with cues to redirect  Memory: Appears intact  Safety Judgement: Decreased awareness of need for assistance;Decreased awareness of need for safety  Problem Solving: Decreased awareness of errors;Assistance required to identify errors made;Assistance required to correct errors made;Assistance required to generate solutions;Assistance required to implement solutions  Insights: Decreased awareness of deficits  Initiation: Does not require cues  Sequencing: Requires cues for some                                         Plan   Plan  Times per week: 5-6X/wk  Times per day: Daily  Current Treatment Recommendations: Strengthening, Balance Training, Functional Mobility Training, Endurance Training, Safety Education & Training, Patient/Caregiver Education & Training, Equipment Evaluation, Education, & procurement, Self-Care / ADL  AM-PAC Score        AM-PAC Inpatient Daily Activity Raw Score: 18 (07/02/20 1253)  AM-PAC Inpatient ADL T-Scale Score : 38.66 (07/02/20 1253)  ADL Inpatient CMS 0-100% Score: 46.65 (07/02/20 1253)  ADL Inpatient CMS G-Code Modifier : CK (07/02/20 1253)    Goals  Short term goals  Time Frame for Short term goals: During LOS, pt will complete:   Short term goal 1: self feeding with Vishal, AE PRN. Short term goal 2: grooming task with Vishal, AE PRN. Short term goal 3: complete UB bathe and dress with SBA. Short term goal 4: complete LB bathe and dress with min assist.  Short term goal 5: complete ADL transfers with Vishal. Patient Goals   Patient goals : Return home       Therapy Time   Individual Concurrent Group Co-treatment   Time In 0559         Time Out 0935         Minutes 30           Upon writer exit, call light within reach, pt retired to chair. All lines intact and patient positioned comfortably. All patient needs addressed prior to ending therapy session. Chart reviewed prior to treatment and patient is agreeable for therapy. RN reports patient is medically stable for therapy treatment this date.       RANDAL Cano/SEDA

## 2020-07-02 NOTE — CARE COORDINATION
[facility] will need [facility]/Readmit [facility]. They will also need to know plan for anticoagulation.  Phi Barker

## 2020-07-02 NOTE — PROGRESS NOTES
Was notified by RN that patient had bleeding from the pacemaker site and his dressing was changed. I was never notified on this bleeding overnight or early this morning. On exam, dressing was soaked with blood. Dressing removed. Wound opened up. Steri-strips removed. Wound cleaned. Pressure applied. New steri-trips attached. Pressure dressing applied. If the wound has any more bleeding, he should be monitored. Otherwise can be discharged later today. Hold Eliquis for 24 hours.       Electronically signed by Ronny Atwood MD on 7/2/2020 at 11:40 AM

## 2020-07-02 NOTE — PROGRESS NOTES
Rcv'd vm from Donal Brewer, [facility], who states pt has stated he feels he is able to go home at this time. Rcv'd call from Donal Brewer stating she spoke with pt's wife who states pt needs to go to rehab.  Daniella states pt is agreeable to ARU. Notified  [facility] that per Dr Raeann Chakraborty pt is approp for ARU when medically ready. Requested plan for DVT prophylaxis as pt is requesting something other than Eliquis d/t cost.  Notified Donal Brewer will need d/c readmit completed, DVT plan documented, initiated, and continued, as well as report called to [phone] prior to pt's admit to ARU. Also notified Donal Brewer that per Dr Raeann Chakraborty, if pt resumes anticoagulation for DVT prophylaxis within a couple days, pt is ok to admit to ARU without BLE doppler screen, but if pt does not start anticoagulation for DVT prophylaxis within a few days, pt will need negative BLE dopplers prior to ARU admit. If dopplers would happen to be positive, pt would need tx plan documented and initiated prior to ARU admit. Pt must admit to ARU by Saturday 12 noon or wait until Monday to admit to ARU after another Admission Assessment can be completed. Admission Assessment completed and Dr Raeann Chakraborty notified.

## 2020-07-02 NOTE — CARE COORDINATION
Social Work-[facility] can admit tonight if patient is discharged and there is a plan for anticoagulation.  [address]

## 2020-07-02 NOTE — CARE COORDINATION
Social  Work-Phone call to wife. They are now considering [facility]. Left message for [facility].  Susan Jamison

## 2020-07-02 NOTE — PROGRESS NOTES
[facility]   Acute Inpatient Rehab Preadmission Assessment    Patient Name: Gaurang Rene        MRN: [id number]    : 1950  (65 y.o.)  Gender: male     Ad[facility]   [facility]  [place]   [x][facility]   []Outside Admission - Location:                                 [x]Initial         []Updated    Date of Onset / admission to the acute hospital:  20    Impairment group:  9 Cardiac    Did patient have surgery?   [x]No  []Yes:      Physicians: Daniel Valentin for clinical complications:  Mild to Moderate    Co-morbidities: ALS, CARINA, HTN, Pain    Financial Information  Primary insurance:  [x]Medicare [] Medicare HMO  []Commercial insurance    []Medicaid   [] Medicaid HMO []Workers Compensation   []Personal Pay    Secondary Insurance:  []Medicare [] Medicare HMO  [x]Commercial insurance    []Medicaid   []Workers Compensation []None    Precautions:   [x]Cardiac Precautions:  Pacemaker precautions    []Total hip precautions    []Weight Bearing status:  [x]Safety Precautions/Concerns  [x]Visually impaired:  Wears glasses for reading     []Hard of Hearing     Isolation Precautions:         []Yes  [x]No  If Yes:  [] Droplet  []Contact []Airborne     []VRE     []MRSA     []C-diff         [] TB       [] Other:        Physiatrist:  [x]        []   Dr. Jorge Doe  []   Dr. Martha Shah    Patients Occupation: Retired    Reviewed Lab and Diagnostic reports from Current Admission: Yes    Patients Prior Functional  Level: Prior Function  Receives Help From: (wife and family in the area)  ADL Assistance: Needs assistance  Bath: Independent  Dressing: Independent  Grooming: Independent  Feeding: Independent  Toileting: Independent  Homemaking Assistance: Needs assistance  Ambulation Assistance: Independent(4 wheel walker)  Transfer Assistance: Independent  Additional Comments: Pt has fallen 2-3 times within last month per wife    History of current illness:  68-year-old male with history of ALS, paroxysmal atrial fibrillation, AVR, hypercholesterolemia admitted with atrial flutter. He has had progressive shortness of breath for the past month. .  He was supposed to have it sleep study at [facility].      Cardiology: Status post cardioversion, now in sinus rhythm, antiarrhythmics discontinued due to bradycardia/pauses. Plan on BPMP on Wednesday? Eliquis now to be on hold     Internal medicine-follow echo results, incentive spirometry, as post cardioversion.       Current functional status for upper extremity ADLs:  UE Bathing: Setup, Stand by assistance, Increased time to complete(increased time)  UE Dressing: Maximum assistance(TO don/doff gown with line mgt and don/doff sling for proper positioning)    Current functional status for lower extremity ADLs:  LE Bathing: Setup, Increased time to complete, Stand by assistance  LE Dressing: Setup, Increased time to complete, Stand by assistance, Minimal assistance(Patient able to don/doff socks, don shoes/AFO, but required assist to tie B shoes d/t poor FMC)    Current functional status for bed, chair, wheelchair transfers:  Transfers  Sit to Stand: Minimal Assistance(unsteady initial stand)  Stand to sit: Contact guard assistance  Bed to Chair: Minimal assistance  Stand Pivot Transfers: Minimal Assistance  Lateral Transfers: Minimal Assistance  Comment: Needed Ed on use of upper body for safe sit/stand    Current functional status for toilet transfers:   Toilet Transfers  Toilet - Technique: Ambulating  Equipment Used: Grab bars  Toilet Transfer: Contact guard assistance  Toilet Transfers Comments: verbal cue for locking rollator and use of grab bars    Current functional status for locomotion:  Ambulation  Ambulation?: Yes  More Ambulation?: No  Ambulation 1  Surface: level tile  Device: Rollator  Assistance: Contact guard assistance, Minimal assistance  Quality of Gait: step to pattern, slightly unsteady  Gait Deviations: Decreased step length, Decreased step height  Distance: 25ft  Comments: Improved safety with transfers    Current functional status for comprehension: Complete independence    Current functional status for expression: Complete independence    Current functional status for social interaction: Complete independence    Current functional status for problem solving: Complete independence    Current functional status for memory: Complete independence    Current Deficits R/T Impairment: Impaired Functional Mobility and Decreased ADLs    Required Therapy:   [x] Physical Therapy  [x] Occupational Therapy   [] Speech Therapy    Additional Services:  [x]   [x] Recreational Therapy  [x] Nutrition  [] Dialysis  [] Other:     Rehab Justification:  Needs 3 hrs therapy per day or 15 hours per week:  Yes  Identified Rehab Nursing needs: Yes  Intense Interdisciplinary need:  Yes  Need for 24 hr physician supervision:  Yes  Measurable improved quality of life:  Yes  Willingness to participate:  Yes  Medical Necessity:  Yes  Patient able to tolerate care proposed:  Yes    Expected Discharge Destination/Functional Level:  Home with assist  Expected length of time to achieve that level of improvement: 1-2 weeks  Expected Post Discharge Treatments: Home with possible Home Care    Other information relevant to the care needs:  n/a    Acute Inpatient Rehabilitation Disclosure Statement provided to patient. Patient verbalized understanding. Copy placed on patient's light chart. I have reviewed and concur with the findings and results of the pre-admission screening assessment completed by the Inpatient Rehabilitation Admissions Coordinator.

## 2020-07-02 NOTE — PROGRESS NOTES
Script called in for Eliquis 5 mg BID . After first 30 day trial prescription would cost $400 a month with pt current insurance. Will update case management and Cardiology .

## 2020-07-02 NOTE — CARE COORDINATION
Social Work- Met with patient to discuss dc options. He states that he would like to return home. Discussed home care. mHe is agreeable. He states that his wife had home care in the past and would like to ujse the same agency. He thinks that it was [facility]. Phone call to wife. SW was unable to leave message. Will call wife again for preference.  Hesham Corea

## 2020-07-03 PROBLEM — E43 SEVERE MALNUTRITION (HCC): Chronic | Status: ACTIVE | Noted: 2020-07-03

## 2020-07-03 LAB
ALLEN TEST: ABNORMAL
ANION GAP SERPL CALCULATED.3IONS-SCNC: 6 MMOL/L (ref 9–17)
BUN BLDV-MCNC: 17 MG/DL (ref 8–23)
BUN/CREAT BLD: ABNORMAL (ref 9–20)
CALCIUM SERPL-MCNC: 9.4 MG/DL (ref 8.6–10.4)
CARBOXYHEMOGLOBIN: 1.2 % (ref 0–5)
CHLORIDE BLD-SCNC: 94 MMOL/L (ref 98–107)
CO2: 35 MMOL/L (ref 20–31)
CREAT SERPL-MCNC: <0.4 MG/DL (ref 0.7–1.2)
FIO2: ABNORMAL
GFR AFRICAN AMERICAN: ABNORMAL ML/MIN
GFR NON-AFRICAN AMERICAN: ABNORMAL ML/MIN
GFR SERPL CREATININE-BSD FRML MDRD: ABNORMAL ML/MIN/{1.73_M2}
GFR SERPL CREATININE-BSD FRML MDRD: ABNORMAL ML/MIN/{1.73_M2}
GLUCOSE BLD-MCNC: 108 MG/DL (ref 70–99)
HCO3 ARTERIAL: 40.5 MMOL/L (ref 22–26)
HCT VFR BLD CALC: 46.2 % (ref 41–53)
HEMOGLOBIN: 15.8 G/DL (ref 13.5–17.5)
INR BLD: 1
MCH RBC QN AUTO: 34.7 PG (ref 26–34)
MCHC RBC AUTO-ENTMCNC: 34.2 G/DL (ref 31–37)
MCV RBC AUTO: 101.5 FL (ref 80–100)
METHEMOGLOBIN: 0.9 % (ref 0–1.9)
MODE: ABNORMAL
NEGATIVE BASE EXCESS, ART: ABNORMAL MMOL/L (ref 0–2)
NOTIFICATION TIME: ABNORMAL
NOTIFICATION: ABNORMAL
NRBC AUTOMATED: ABNORMAL PER 100 WBC
O2 DEVICE/FLOW/%: ABNORMAL
O2 SAT, ARTERIAL: 93.1 % (ref 95–98)
OXYHEMOGLOBIN: ABNORMAL % (ref 95–98)
PATIENT TEMP: ABNORMAL
PCO2 ARTERIAL: 73.2 MMHG (ref 35–45)
PCO2, ART, TEMP ADJ: ABNORMAL (ref 35–45)
PDW BLD-RTO: 13.5 % (ref 11.5–14.9)
PEEP/CPAP: ABNORMAL
PH ARTERIAL: 7.35 (ref 7.35–7.45)
PH, ART, TEMP ADJ: ABNORMAL (ref 7.35–7.45)
PLATELET # BLD: 268 K/UL (ref 150–450)
PMV BLD AUTO: 8.3 FL (ref 6–12)
PO2 ARTERIAL: 81.2 MMHG (ref 80–100)
PO2, ART, TEMP ADJ: ABNORMAL MMHG (ref 80–100)
POSITIVE BASE EXCESS, ART: 14.9 MMOL/L (ref 0–2)
POTASSIUM SERPL-SCNC: 4.4 MMOL/L (ref 3.7–5.3)
PROTHROMBIN TIME: 13.4 SEC (ref 11.8–14.6)
PSV: ABNORMAL
PT. POSITION: ABNORMAL
RBC # BLD: 4.55 M/UL (ref 4.5–5.9)
RESPIRATORY RATE: 19
SAMPLE SITE: ABNORMAL
SET RATE: ABNORMAL
SODIUM BLD-SCNC: 135 MMOL/L (ref 135–144)
TEXT FOR RESPIRATORY: ABNORMAL
TOTAL HB: ABNORMAL G/DL (ref 12–16)
TOTAL RATE: ABNORMAL
VT: ABNORMAL
WBC # BLD: 8.2 K/UL (ref 3.5–11)

## 2020-07-03 PROCEDURE — 80048 BASIC METABOLIC PNL TOTAL CA: CPT

## 2020-07-03 PROCEDURE — 97530 THERAPEUTIC ACTIVITIES: CPT

## 2020-07-03 PROCEDURE — 36415 COLL VENOUS BLD VENIPUNCTURE: CPT

## 2020-07-03 PROCEDURE — 82805 BLOOD GASES W/O2 SATURATION: CPT

## 2020-07-03 PROCEDURE — 92610 EVALUATE SWALLOWING FUNCTION: CPT

## 2020-07-03 PROCEDURE — 6370000000 HC RX 637 (ALT 250 FOR IP): Performed by: INTERNAL MEDICINE

## 2020-07-03 PROCEDURE — 99222 1ST HOSP IP/OBS MODERATE 55: CPT | Performed by: INTERNAL MEDICINE

## 2020-07-03 PROCEDURE — 97116 GAIT TRAINING THERAPY: CPT

## 2020-07-03 PROCEDURE — 36600 WITHDRAWAL OF ARTERIAL BLOOD: CPT

## 2020-07-03 PROCEDURE — 99223 1ST HOSP IP/OBS HIGH 75: CPT | Performed by: PHYSICAL MEDICINE & REHABILITATION

## 2020-07-03 PROCEDURE — 85027 COMPLETE CBC AUTOMATED: CPT

## 2020-07-03 PROCEDURE — 85610 PROTHROMBIN TIME: CPT

## 2020-07-03 PROCEDURE — 6370000000 HC RX 637 (ALT 250 FOR IP): Performed by: PHYSICAL MEDICINE & REHABILITATION

## 2020-07-03 PROCEDURE — 97162 PT EVAL MOD COMPLEX 30 MIN: CPT

## 2020-07-03 PROCEDURE — 97110 THERAPEUTIC EXERCISES: CPT

## 2020-07-03 PROCEDURE — 97167 OT EVAL HIGH COMPLEX 60 MIN: CPT

## 2020-07-03 PROCEDURE — 92523 SPEECH SOUND LANG COMPREHEN: CPT

## 2020-07-03 PROCEDURE — 97535 SELF CARE MNGMENT TRAINING: CPT

## 2020-07-03 PROCEDURE — 1180000000 HC REHAB R&B

## 2020-07-03 PROCEDURE — 94660 CPAP INITIATION&MGMT: CPT

## 2020-07-03 RX ORDER — SENNA PLUS 8.6 MG/1
2 TABLET ORAL DAILY PRN
Status: DISCONTINUED | OUTPATIENT
Start: 2020-07-03 | End: 2020-07-13 | Stop reason: HOSPADM

## 2020-07-03 RX ORDER — WARFARIN SODIUM 5 MG/1
5 TABLET ORAL
Status: COMPLETED | OUTPATIENT
Start: 2020-07-03 | End: 2020-07-03

## 2020-07-03 RX ADMIN — PANTOPRAZOLE SODIUM 20 MG: 20 TABLET, DELAYED RELEASE ORAL at 07:39

## 2020-07-03 RX ADMIN — MULTIPLE VITAMINS W/ MINERALS TAB 1 TABLET: TAB at 07:39

## 2020-07-03 RX ADMIN — SOTALOL HYDROCHLORIDE 40 MG: 80 TABLET ORAL at 07:42

## 2020-07-03 RX ADMIN — SENNOSIDES 17.2 MG: 8.6 TABLET, FILM COATED ORAL at 20:58

## 2020-07-03 RX ADMIN — SOTALOL HYDROCHLORIDE 40 MG: 80 TABLET ORAL at 20:57

## 2020-07-03 RX ADMIN — WARFARIN SODIUM 5 MG: 5 TABLET ORAL at 17:07

## 2020-07-03 RX ADMIN — ASPIRIN 81 MG: 81 TABLET, COATED ORAL at 07:39

## 2020-07-03 RX ADMIN — DOCUSATE SODIUM 100 MG: 100 CAPSULE, LIQUID FILLED ORAL at 07:39

## 2020-07-03 RX ADMIN — HYDROCODONE BITARTRATE AND ACETAMINOPHEN 1 TABLET: 5; 325 TABLET ORAL at 20:58

## 2020-07-03 RX ADMIN — MELATONIN 1000 UNITS: at 07:38

## 2020-07-03 ASSESSMENT — PAIN DESCRIPTION - LOCATION
LOCATION: NECK

## 2020-07-03 ASSESSMENT — PAIN DESCRIPTION - PAIN TYPE
TYPE: CHRONIC PAIN

## 2020-07-03 ASSESSMENT — PAIN SCALES - GENERAL
PAINLEVEL_OUTOF10: 5
PAINLEVEL_OUTOF10: 5
PAINLEVEL_OUTOF10: 4

## 2020-07-03 ASSESSMENT — PAIN DESCRIPTION - DESCRIPTORS
DESCRIPTORS: ACHING
DESCRIPTORS: ACHING

## 2020-07-03 ASSESSMENT — 9 HOLE PEG TEST
TEST_RESULT: NOT TESTED
TEST_RESULT: NOT TESTED

## 2020-07-03 NOTE — PROGRESS NOTES
Exceptions: Wears glasses for reading  Hearing  Hearing: Within functional limits  Social/Functional History  Lives With: Spouse  Type of Home: House(Planning to move to an accessible house with level entryway )  Home Layout: One level  Home Access: Stairs to enter with rails  Entrance Stairs - Number of Steps: 3 steps at entrance   Entrance Stairs - Rails: Left(ascending handrail )  Bathroom Shower/Tub: Walk-in shower  Bathroom Toilet: Standard  Bathroom Equipment: Grab bars in shower, Built-in shower seat, Hand-held shower, Toilet raiser, Grab bars around toilet  Bathroom Accessibility: Walker accessible  Home Equipment: 4 wheeled walker(One one in vehicle - another walker in the house  )  [address] Help From: Family  ADL Assistance: Independent  Homemaking Responsibilities: No(SPouse performs )  Ambulation Assistance: Independent(4-wheeled walker )  Transfer Assistance: Independent  Active : No  Patient's  Info: spouse  Mode of Transportation: Family  Occupation: Retired  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   Leisure & Hobbies: reading  Additional Comments: Per Chart - Wife reports that pt has fallen 2-3 times within last month  Pain Assessment  Pain Assessment: 0-10  Pain Level: 5  Patient's Stated Pain Goal: No pain  Pain Type: Chronic pain  Pain Location: Neck  Pain Descriptors: Aching    Objective      Cognition  Arousal/Alertness: Appropriate responses to stimuli  Following Commands:  Follows one step commands with repetition(occasional episodes of decreased alertness )  Attention Span: Attends with cues to redirect  Memory: Appears intact  Safety Judgement: Good awareness of safety precautions, Decreased awareness of need for assistance  Problem Solving: Assistance required to generate solutions, Assistance required to implement solutions  Insights: Decreased awareness of deficits   Sensation  Overall Sensation Status: Impaired  Stereognosis: Partial deficits in the RUE, Partial deficits in the LUE  Additional Comments: Reports Numbness Bilater hand  fingertips     UE Function  Hand Dominance  Hand Dominance: Right        LUE Strength  L Elbow Flex: 4/5  L Hand General: 4/5  LUE Strength Comment: Post-pacemaker insertion left shoulder ROM restrictions; lacking intrinsic muscle strength   Left Hand Strength -  (lbs)  Handle Setting 2: 42# (norms 56-94# )   LUE Tone: Hypotonic     LUE AROM (degrees)  LUE General AROM: Left shoulder restrictions related to PaceMaker insertion      Left Hand AROM (degrees)  Left Hand AROM: WFL  Left Hand General AROM: Lacks intrinsic muscle strength - unable to spread finger and difficulty opposing to thumb   RUE Strength  Gross RUE Strength: WFL  R Hand General: 4/5  RUE Strength Comment: lacking intrinsic muscle strength    Right Hand Strength -  (lbs)  Handle Setting 2: 32#  (Norms 56-94# )   RUE Tone: Hypotonic     RUE AROM (degrees)  RUE AROM : WFL     Right Hand AROM (degrees)  Right Hand AROM: WFL  Right Hand General AROM: Lacks intrinsic muscle strength - unable to spread finger and difficulty opposing to thumb   Not measurable pressure            Left 9-Hole Peg Test: Not Tested(  Unable to prehend and manipulated test pegs )  Right 9-Hole Peg Test: Not Tested(  Unable to prehend and manipulated test pegs )         Fine Motor Skills  Coordination  Coordination and Movement description: Fine motor impairments, Ataxia, Gross motor impairments, Right UE, Left UE   Comment: Utilizes Tenodysis  pattern to hold utensils - lacking Hand Intrinsic Muscle strength    Left Hand Strength - Pinch (lbs)  Lateral: 3#   Tip: Not measurable pressure   Other: lacking intrinsic muscle strength - functionally uses tenodysis  patterns      Right Hand Strength - Pinch (lbs)  Lateral: 2#   Tip: Not measurable pressure   Other: lacking intrinsic muscle strength - functionally uses tenodysis  patterns      Quality of Movement Other  Comment: Utilizes Tenodysis  pattern to hold utensils - lacking Hand Intrinsic Muscle strength   Hand Assessment Comment: Impaired Hand fucntion related to his ALS with intrinsic muscle weakness - functionally uses a tenodysis grasp-realse pattern      Mobility  Supine to Sit: Contact guard assistance       Balance  Sitting Balance: Supervision  Standing Balance: Minimal assistance  Standing Balance  Time: ~5 minutes  Activity: self care tasks  Comment: Improved standing balance with hand-hold support   Functional Mobility  Functional - Mobility Device: Wheelchair  Activity: To/from bathroom  Assist Level: Minimal assistance  Bed mobility  Rolling to Right: Contact guard assistance  Supine to Sit: Contact guard assistance  Scooting: Contact guard assistance     Transfers  Stand Pivot Transfers: Moderate assistance  Toilet Transfers  Toilet - Technique: Stand pivot  Equipment Used: Raised toilet seat with rails  Toilet Transfer: Moderate assistance  Toilet Transfers Comments: Cuing and assist for transfer - aware to check for locked brakes on wheelchair and plans for hand position    Wheelchair Bed Transfers  Wheelchair/Bed - Technique: Stand pivot  Equipment Used: Bed, Wheelchair  Level of Asssistance: Moderate assistance  Wheelchair Transfers Comments: For initial transfer had hospital footies on feet, but not his AFOs       Activity Tolerance: Patient limited by fatigue, Patient limited by pain  Activity Tolerance: Requires therapuetic rest periods and use of supplemental O2 to maintain O2 sats in the 90s  range          ADL     ADL  Feeding: Scoop assist;Increased time to complete;Verbal cueing;Setup; Adaptive utensils (comment)(Enlarged Handles )  Grooming: Moderate assistance;Maximum assistance  UE Bathing: Setup;Stand by assistance; Increased time to complete  LE Bathing: Minimal assistance  UE Dressing:  Moderate assistance  LE Dressing: Maximum assistance(Bilateral AFO )  Toileting: Minimal assistance(Clothing Management )  Additional Functional Mobility Training, Endurance Training, Pain Management, Safety Education & Training, Patient/Caregiver Education & Training, Positioning, Self-Care / ADL  OT Education  OT Education: OT Role, ADL Adaptive Strategies, Energy Conservation, Orientation, Equipment, Plan of Care, Precautions       OT Individual Minutes  Time In: 0800  Time Out: 4916  Minutes: 110    Electronically signed by Robert Cerrato OT on 7/3/20 at 12:29 PM EDT

## 2020-07-03 NOTE — FLOWSHEET NOTE
Talked with Dr Shamir Garcia regarding bridging the pt since coumadin was started today. He states no need to bridge with bleeding around pacemaker.

## 2020-07-03 NOTE — CARE COORDINATION
BPCI-A Medical Bundle Patient:  Working DR  Admitting Location: ARU  Adm Date:   Date of Discharge:     Bundle End Date:     90-day post-acute outreach and tracking with qualifying DRG.

## 2020-07-03 NOTE — PROGRESS NOTES
Physical Therapy  [facility]: [facility]  Acute Rehabilitation Physical Therapy Evaluation     Date: 7/3/20  Patient Name: Lashon Shah       Room: 5281/7053-85  MRN: [id number]   Account: [de-identified]   : 1950  (65 y.o.)   Gender: male   Referring Practitioner: Dr Ethlyn Curling   Diagnosis: Cardiac abnormality;  history of Amyotrophic Lateral Sclerosis     Treatment Diagnosis: Difficulty walking, impaired tolerance to activity. Additional Pertinent Hx:  History of Amyotrphic Lateral Sclerosis,  Pacemaker placement 2020; Aortic Root Aneursim; Aortic Valve Replacement   Restrictions/Precautions: General Precautions; Fall Risk;Surgical Protocols;Cardiac(  Pacemaker Implant; ALS; Bilateral AFOs  )  Required Braces or Orthoses?: Yes    Right Lower Extremity Brace: Ankle Foot Orthotics  Left Lower Extremity Brace: Letty Foot Orthotics  Other position/activity restrictions: New Pacemaker precautions; Past Medical History:  has a past medical history of ALS (amyotrophic lateral sclerosis) ([address] Aortic root aneurysm ([address] Aortic valve replaced, and Hypertension. Past Surgical History:   has a past surgical history that includes Parathyroid gland surgery (); hernia repair; and Cardioversion (2020). Vital Signs  BP Location: Right Arm  Level of Consciousness: Alert  Patient Currently in Pain: Denies   Pain Screening  Patient Currently in Pain: Denies     Oxygen Therapy  SpO2: 98 %  O2 Device: Nasal cannula  O2 Flow Rate (L/min): 2 L/min          Lives With: Spouse  Type of Home: House(Planning to move to an accessible house with level entryway )  Home Layout: One level  Home Access: Stairs to enter with rails  Entrance Stairs - Number of Steps: 3  Entrance Stairs - Rails: Left(ascending handrail )  Bathroom Shower/Tub: Walk-in shower  Bathroom Toilet: Standard  Bathroom Equipment: Grab bars in shower;Built-in shower seat;Hand-held shower; Toilet raiser;Grab bars around toilet  Bathroom Accessibility: Walker accessible  Home Equipment: 4 wheeled walker(One one in vehicle - another walker in the house  )  Receives Help From: Family  ADL Assistance: Independent  Bath: Independent  Dressing: Independent  Grooming: Independent  Feeding: Independent  Toileting: Independent  Homemaking Assistance: Needs assistance  Homemaking Responsibilities: No(SPouse performs )  Ambulation Assistance: Independent(4-wheeled walker )  Transfer Assistance: Independent  Active : No  Patient's  Info: spouse  Mode of Transportation: Family  Occupation: Retired  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   Leisure & Hobbies: reading  Additional Comments: Per Chart - Wife reports that pt has fallen 2-3 times within last month  Overall Orientation Status: Within Functional Limits  Vision  Vision: Impaired  Vision Exceptions: Wears glasses for reading  Hearing  Hearing: Within functional limits  Subjective: Pt agreeable to PT    Objective:   AROM RLE (degrees)  RLE AROM: WFL  AROM LLE (degrees)  LLE AROM : WFL  Strength RLE  Comment: 4/5 except DF/PF 2/5  Strength LLE  Comment: 4-/5 except DF/PF 0/5  AROM RUE (degrees)  RUE AROM : WFL  AROM LUE (degrees)  LUE AROM : WFL  Strength RUE  Strength RUE: WNL  Strength LUE  Strength LUE: WNL              Gross Motor?: WFL  Overall Sensation Status: Impaired  Stereognosis: Partial deficits in the RUE;Partial deficits in the LUE  Additional Comments: Reports Numbness Bilater hand  fingertips    Bed Mobility:   Bed mobility  Rolling to Left: Unable to assess(Deferred- pt has new pace maker on Left side.)  Rolling to Right: Contact guard assistance  Supine to Sit: Contact guard assistance  Sit to Supine: Contact guard assistance  Scooting: Contact guard assistance  Comment: Moves slow, exertion increases with minimal acitivity.    Bed Mobility  Rolling: Contact guard assistance  Sit to Supine: Minimal assistance(for LE's)  Scooting: Contact guard assistance    Transfers:  Sit to Stand: Minimal Assistance  Stand to sit: Contact guard assistance  Bed to Chair: Minimal assistance  Stand Pivot Transfers: Minimal Assistance  Lateral Transfers: Minimal Assistance  Comment: Educated in minimizing use of UE, especially L UE due to new pacemaker. Ambulation 1  Surface: level tile  Device: Rollator  Other Apparatus: Left;Right;AFO  Assistance: Contact guard assistance;Minimal assistance  Quality of Gait: step to pattern, slightly unsteady, Left knee hyperextends. Fatiques easily, severe exertion with minimal activity, sao2 94% after ambulation, HR 68, no use of o2. needs atleast 3 to 4 minutes rest period prior toa nother ambulation. Gait Deviations: Decreased step length;Decreased step height  Distance: 38 ft , rests and another 30 ft (2MWT)  Comments: Improved safety with transfers, pt very fatiqued , had OT and PT session, assisted back to bed after PT session. Ambulation 2  Surface - 2: level tile  Device 2: Rollator  Other Apparatus 2: AFO; Left;Right  Assistance 2: Contact guard assistance;Minimal assistance  Quality of Gait 2: slightly unsteady at times, decreased endurance, sao2 94% after ambultion without o2. Gait Deviations: Slow [place]ued[place];Decreased step length;Decreased step height  Distance: 25ft (P:M)       Stairs/Curb  Stairs?: No                                                     Balance  Posture: Fair  Sitting - Static: Good  Sitting - Dynamic: Good  Standing - Static: Good;-  Standing - Dynamic: Fair  Comments: Standing balance with rollator. Exercises  Comments: reviewed UE restrictions after pacemaker placement                        Outcome measures  2MWT : 38 ft+30 ft with rollator.                Assessment  Body structures, Functions, Activity limitations: Decreased functional mobility , Decreased strength, Decreased safe awareness, Decreased endurance, Decreased balance  Assessment: Pt tolerated session fair with deficits noted in bed mobility, transfers, ambulation, balance, and endurance this session. Pt requires continued therapy to maximize independence with functional mobility, balance, safety awareness & activity tolerance  Specific instructions for Next Treatment: Monitor sao2 level, increase ambulation as able. Avoid pushing/pulling and overhaed activity with L UE due to new pacemaker insertion. Prognosis: Good  Discharge Recommendations: Home with Home health PT  Activity Tolerance: Patient limited by endurance; Patient limited by fatigue     Type of devices: Nurse notified, Left in bed, Gait belt, Call light within reach, All fall risk precautions in place     Plan  Times per week: 900 minutes /week for combined therapy of PT?OT due to decreased tolerance to activity. Strengthening, Balance Training, Transfer Training, Endurance Training, Gait Training, Stair training, Home Exercise Program, Safety Education & Training, Functional Mobility Training, Patient/Caregiver Education & Training, Equipment Evaluation, Education, & procurement    G-Codes       Patient Education  New Education Provided:  PT POC, GOAL, deep breathing, pacing activities. Learner: patient  Method: demonstration       Outcome: needs reinforcement     Current Treatment Recommendations: Strengthening, Balance Training, Transfer Training, Endurance Training, Gait Training, Stair training, Home Exercise Program, Safety Education & Training, Functional Mobility Training, Patient/Caregiver Education & Training, Equipment Evaluation, Education, & procurement    Goals  Short term goals  Time Frame for Short term goals: 5 days  Short term goal 1: Pt able to perfrom supine<>sit independently  Short term goal 2: Pt able to ambulate 50 to 70 ft with Rollator, SBA, mod exertion. Short term goal 3: Pt able to go up/down 3 steps with 2 rail min A  Short term goal 4: Pt able to tolerate 30-40 min of activity with pacing skills to improve endurance.   Short term goal 5: Pt able to transfer wit Rollator at [address] term goals  Time Frame for Long term goals : By LOS  Long term goal 1: Pt able to perform transfers independently. Long term goal 2:  Pt able to ambulate with a rollator dist of 80 to 100 ft, independently on level surfaces.    Long term goal 3: Pt able to  go up and down 5 steps with 1 HR, SBA  Long term goal 4: Improve 2MWT dist to 80 ft to improve endurance           07/03/20 0950 07/03/20 1550   PT Individual Minutes   Time In 0950 1455   Time Out 1036 1525   Minutes 46 30   Time Code Minutes   Timed Code Treatment Minutes 25 Minutes 30 Minutes       Electronically signed by Mona Avina PT on 7/3/20 at 3:51 PM EDT

## 2020-07-03 NOTE — DISCHARGE SUMMARY
W 84     Discharge Summary     Patient ID: Eda Guevara  :  1950   MRN: [id number]     ACCOUNT:  [de-identified]   Patient's PCP: Bill Maki MD  Admit Date: 2020   Discharge Date: 2020    Discharge Physician: Kush Coelho DO     The patient was seen and examined on day of discharge and this discharge summary is in conjunction with any daily progress note from day of discharge. Active Discharge Diagnoses:     Primary Problem  Tachy-ralf syndrome ([address] Problems    Diagnosis Date Noted    History of permanent cardiac pacemaker placement 2020:   [Z95.0] 2020    Tachy-ralf syndrome ([address] [I49.5] 2020    Anticoagulated on Coumadin [Z79.01] 2020    Atrial flutter ([address] [I48.92] 2020    Muscular atrophy [M62.50] 2019    Bilateral hearing loss [H91.93]     Diastolic dysfunction [L83.98] 2019    History of hypertension [Z86.79] 2019    Hyperparathyroidism ([address] [E21.3] 2019    MVP (mitral valve prolapse) [I34.1] 2019    Obstructive sleep apnea [G47.33] 2019    Paroxysmal atrial fibrillation ([address] [I48.0] 2019    ALS (amyotrophic lateral sclerosis) ([address] [G12.21] 10/02/2018    Aneurysm of aortic arch ([address] [I71.2] 2016    Aortic valve disorder [I35.9] 2016    S/P CABG (coronary artery bypass graft) [Z95.1] 2016         Hospital Course:     Brief History:  As documented in the medical record:  Maggy Ryan is a 65 y.o. Non-/non  male who presents with Tachycardia and Shortness of Breath  Is a known patient of paroxysmal atrial fibrillation, AVR and dyslipidemia with other multiple medical problems including ALS, went to his cardiologist office for regular visit where he was found to be in atrial flutter with RVR.   Patient stated that he has been getting progressively short of breath for the last 1 to 2 months, he uses oxygen via concentrator by breathing and at home and he is not on oxygen via nasal cannula  Patient states he has lost 30 pounds weight in the last 2 months which he attributes to his ALS. He is lost significant muscle mass recently more on the left side  He is not on prescription medicines for a long time and is taking multiple herbal medications     He was given Adenocard in ER and subsequently has been started on Cardizem drip as well as sotalol   COVID test is negative  He is supposed to get sleep study in near future     6/27/2020  Patient remains in atrial flutter  Was started on aspirin, sotalol and Eliquis by cardiology yesterday  Had 6 seconds pauses x2 yesterday but went into atrial flutter after that  Denies any symptoms at present  Cardiology plans to do cardioversion today\"      Database has included:     Results for Isis Hobbs (MRN [id number]) as of 6/30/2020 17:57    Ref. Range 7/10/2019 00:00 12/18/2019 09:56 6/26/2020 14:27 6/27/2020 03:47   T3, Free Latest Ref Range: 2.02 - 4.43 pg/mL   3.21       TSH Latest Ref Range: 0.30 - 5.00 mIU/L 1.95 3.29 3.54 2.70   Thyroxine, Free Latest Ref Range: 0.93 - 1.70 ng/dL   1.03 1.25     T4 Free Unknown 0.80            The patient was cardioverted  HR's remain labile      The patient did develop a fecal impaction  He was disimpacted  Laxatives ordered as needed     On 7/1 the patient underwent  Dual-chamber permanent pacemaker implantation     Postop day 1 permanent pacemaker implantation  Had some oozing last night  Dr. Hawk Palmer has cleaned the wound, applied new Steri-Strips, and pressure dressing     Follow-up chest x-ray:  Impression:   No acute cardiopulmonary process. New pacemaker with leads presumably in the right atrium and right ventricle.       The patient's condition was stabilized  Discharge planning initiated         Discharge plan:     Cardiology evaluation in progress  Pacemaker implanted  Blood Pressure - Monitor and control: hydralazine prn   Optimize cardio-pulmonary function  CPAP  Anticoagulation per cardiology  Eliquis on hold for 24 hours due to incisional bleeding  Follow-up as scheduled at [facility] for ALS  Laxatives as needed  Fall precautions  Discharge planning  Has been accepted at [facility] acute rehab  Will discharge when arrangements complete and ok with other services. Follow-up with PCP in one week, Reshma Villavicencio MD  Notify PCP of discharge   Med rec done  Discharge readmit orders placed  DCP 36 minutes plus      No discharge procedures on file.     Significant Diagnostic Studies:     Labs / Micro:  Labs:  Hematology:  Recent Labs     07/01/20 0457 07/02/20 0441 07/03/20  0548   WBC 11.2 9.6 8.2   RBC 4.28 4.30 4.55   HGB 14.8 14.6 15.8   HCT 43.6 44.4 46.2   .9 103.3* 101.5*   MCH 34.6* 34.0* 34.7*   MCHC 33.9 32.9 34.2   RDW 12.5 12.6 13.5    250 268   MPV 9.5 9.6 8.3   INR  --   --  1.0     Chemistry:  Recent Labs     07/01/20 0457 07/02/20 0441 07/03/20  0548   * 136 135   K 4.5 4.3 4.4   CL 90* 91* 94*   CO2 35* 36* 35*   GLUCOSE 124* 112* 108*   BUN 17 13 17   CREATININE <0.40* <0.40* <0.40*   MG  --  1.7  --    ANIONGAP 8* 9 6*   LABGLOM CANNOT BE CALCULATED CANNOT BE CALCULATED CANNOT BE CALCULATED   GFRAA CANNOT BE CALCULATED CANNOT BE CALCULATED CANNOT BE CALCULATED   CALCIUM 9.3 9.2 9.4     Recent Labs     07/01/20 0457   PROT 6.6   LABALBU 3.9   AST 26   ALT 35   ALKPHOS 63   BILITOT 0.46         Radiology:    Xr Chest Portable    Result Date: 7/2/2020  EXAMINATION: ONE XRAY VIEW OF THE CHEST 7/2/2020 11:00 am COMPARISON: 07/01/2020 HISTORY: ORDERING SYSTEM PROVIDED HISTORY: pacemaker placement TECHNOLOGIST PROVIDED HISTORY: pacemaker placement Reason for Exam: Port upright AP CXR - pacemaker placement Acuity: Unknown Type of Exam: Unknown FINDINGS: Left chest pacemaker device and median sternotomy wires are again noted. Cardiomediastinal silhouette and pulmonary vasculature are within normal limits. No focal airspace consolidation, pneumothorax, or pleural effusion. No free air beneath the diaphragm. No acute osseous abnormality. No acute intrathoracic process. Xr Chest Portable    Result Date: 7/1/2020  EXAMINATION: ONE XRAY VIEW OF THE CHEST 7/1/2020 3:24 pm COMPARISON: Chest radiograph performed 06/26/2020. HISTORY: ORDERING SYSTEM PROVIDED HISTORY: Pacemaker placement and rule out pneumothorax TECHNOLOGIST PROVIDED HISTORY: Pacemaker placement and rule out pneumothorax Reason for Exam: s/p pacemaker Acuity: Acute Type of Exam: Initial FINDINGS: There is no acute consolidation or effusion. There is no pneumothorax. The mediastinal structures are stable. There is a pacemaker with leads presumably in the right atrium and right ventricle. The upper abdomen is unremarkable. The extrathoracic soft tissues are unremarkable. No acute cardiopulmonary process. New pacemaker with leads presumably in the right atrium and right ventricle. Xr Chest Portable    Result Date: 6/26/2020  EXAMINATION: ONE XRAY VIEW OF THE CHEST 6/26/2020 2:53 pm COMPARISON: None. HISTORY: ORDERING SYSTEM PROVIDED HISTORY: Chest Pain TECHNOLOGIST PROVIDED HISTORY: Chest Pain Reason for Exam: Pt c/o SOB, tachy. AP UPRIGHT PORT Acuity: Acute Type of Exam: Initial FINDINGS: HEART/MEDIASTINUM: The cardiomediastinal silhouette is within normal limits. Median sternotomy wires are noted. Prosthetic cardiac valve is noted. PLEURA/LUNGS: There are no focal consolidations or pleural effusions. There is no appreciable pneumothorax. BONES/SOFT TISSUE: No acute abnormality. No radiographic evidence of acute pulmonary disease.          Consultations:    Consults:     Final Specialist Recommendations/Findings:   IP CONSULT TO CARDIOLOGY  IP CONSULT TO INTERNAL MEDICINE  IP CONSULT TO RESPIRATORY CARE  IP CONSULT TO PHYSICAL MEDICINE REHAB        Discharged Condition:    Stable     Disposition: [facility]    Physician Follow Up:   Andrew Dixon MD  [address] [place] [address]  [address]  [phone]    In 1 week      Jerelene Simmonds, MD [address] [facility]  [phone]    In 2 weeks         Activity:  activity as tolerated    Diet:  cardiac diet    Discharge Medications:      Medication List      ASK your doctor about these medications    acetaminophen 325 MG tablet  Commonly known as:  TYLENOL     aspirin 81 MG tablet     fluticasone 50 MCG/ACT nasal spray  Commonly known as:  Flonase  1 spray by Nasal route daily     Handicap Placard Misc  by Does not apply route Dx: spinal stenosis, ALS    Duration: 5 years till 2/25/2020     OMEGA-3 FATTY ACIDS-VITAMIN E PO     PROBIOTIC PO     SELENIUM PO     SOYA LECITHIN PO  Ask about: Which instructions should I use? SPIRULINA PO     therapeutic multivitamin-minerals tablet     vitamin C 500 MG tablet  Commonly known as:  ASCORBIC ACID     vitamin D 1000 UNIT Tabs tablet  Commonly known as:  CHOLECALCIFEROL     Vitamin E 100 units Tabs            Time Spent on discharge is  36 mins in patient examination, evaluation, counseling, medication reconciliation, discharge plan and follow up. Electronically signed by   Mk Parmar DO  7/3/2020  7:36 AM      Thank you Dr. Andrew Dixon MD for the opportunity to be involved in this patient's care.

## 2020-07-03 NOTE — PROGRESS NOTES
07/02- CXR- nothing acute    Date of Eval: 7/3/2020  Evaluating Therapist: Roxana Salomon    Current Diet level:  Current Diet : Regular  Current Liquid Diet : Thin      Primary Complaint   Pt. Has ALS, reports fatigue while eating. Pt. Reports he sees a dietician in [address] [facility] who specializes in [address] [facility] who is recommending PEG placement now, while he is able to tolerate the surgery, so that he will have it to supplement oral intake as his disease progresses. Pain:  Pain Assessment  Pain Assessment: 0-10  Pain Level: 5  Patient's Stated Pain Goal: No pain  Pain Type: Chronic pain  Pain Location: Neck  Pain Descriptors: Aching    Reason for Referral  Minerva Galindo was referred for a bedside swallow evaluation to assess the efficiency of his swallow function, identify signs and symptoms of aspiration and make recommendations regarding safe dietary consistencies, effective compensatory strategies, and safe eating environment. Impression  Dysphagia Diagnosis: Swallow function appears grossly intact  Dysphagia Impression : Pt. demonstrates no overt s/s aspiration on consistencies given. Due to pt. diagnosis, pt. endorses fatigue while eating. Soft and bite sized diet with thin liquids is recommended to conserve energy when eating. Dysphagia Outcome Severity Scale: Level 6: Within functional limits/Modified independence     Treatment Plan  Requires SLP Intervention: Yes             Recommended Diet and Intervention  Diet Solids Recommendation: Dysphagia Soft and Bite-Sized (Dysphagia III)  Liquid Consistency Recommendation: Thin        Therapeutic Interventions: Diet tolerance monitoring(recommended in 3-4 days, to give pt. time to see if this diet improves energy conservation when eating. )      ST agrees with dietician recommendations of PEG tube placement in the near future  to supplement oral intake as disease progresses and pt. If  Not able to meet nutritional needs orally.      Compensatory Swallowing

## 2020-07-03 NOTE — PROGRESS NOTES
Bipap set up and on standby in pt's room. Pt educated on treatment modality and is willing to try it later tonight.

## 2020-07-03 NOTE — CONSULTS
07/03/20  5:48 AM   Result Value Ref Range    WBC 8.2 3.5 - 11.0 k/uL    RBC 4.55 4.5 - 5.9 m/uL    Hemoglobin 15.8 13.5 - 17.5 g/dL    Hematocrit 46.2 41 - 53 %    .5 (H) 80 - 100 fL    MCH 34.7 (H) 26 - 34 pg    MCHC 34.2 31 - 37 g/dL    RDW 13.5 11.5 - 14.9 %    Platelets 766 049 - 424 k/uL    MPV 8.3 6.0 - 12.0 fL    NRBC Automated NOT REPORTED per 100 WBC   Basic Metabolic Panel w/ Reflex to MG    Collection Time: 07/03/20  5:48 AM   Result Value Ref Range    Glucose 108 (H) 70 - 99 mg/dL    BUN 17 8 - 23 mg/dL    CREATININE <0.40 (L) 0.70 - 1.20 mg/dL    Bun/Cre Ratio NOT REPORTED 9 - 20    Calcium 9.4 8.6 - 10.4 mg/dL    Sodium 135 135 - 144 mmol/L    Potassium 4.4 3.7 - 5.3 mmol/L    Chloride 94 (L) 98 - 107 mmol/L    CO2 35 (H) 20 - 31 mmol/L    Anion Gap 6 (L) 9 - 17 mmol/L    GFR Non- CANNOT BE CALCULATED >60 mL/min    GFR  CANNOT BE CALCULATED >60 mL/min    GFR Comment          GFR Staging NOT REPORTED    PROTIME-INR    Collection Time: 07/03/20  5:48 AM   Result Value Ref Range    Protime 13.4 11.8 - 14.6 sec    INR 1.0    Arterial Blood Gases    Collection Time: 07/03/20  2:45 PM   Result Value Ref Range    pH, Arterial 7.351 7.350 - 7.450    pCO2, Arterial 73.2 (HH) 35.0 - 45.0 mmHg    pO2, Arterial 81.2 80.0 - 100.0 mmHg    HCO3, Arterial 40.5 (H) 22.0 - 26.0 mmol/L    Positive Base Excess, Art 14.9 (H) 0.0 - 2.0 mmol/L    Negative Base Excess, Art NOT REPORTED 0.0 - 2.0 mmol/L    O2 Sat, Arterial 93.1 (L) 95 - 98 %    Total Hb NOT REPORTED 12.0 - 16.0 g/dl    Oxyhemoglobin NOT REPORTED 95.0 - 98.0 %    Carboxyhemoglobin 1.2 0 - 5 %    Methemoglobin 0.9 0.0 - 1.9 %    Pt Temp NOT REPORTED     pH, Art, Temp Adj NOT REPORTED 7.350 - 7.450    pCO2, Art, Temp Adj NOT REPORTED 35.0 - 45.0    pO2, Art, Temp Adj NOT REPORTED 80.0 - 100.0 mmHg    O2 Device/Flow/% ROOM AIR     Respiratory Rate 19     Rosalino Test PASS     Sample Site Right Radial Artery     Pt.  Position NOT REPORTED     Mode NOT REPORTED     Set Rate NOT REPORTED     Total Rate NOT REPORTED     VT NOT REPORTED     FIO2 NOT REPORTED     Peep/Cpap NOT REPORTED     PSV NOT REPORTED     Text for Respiratory NOT REPORTED     NOTIFICATION NOT REPORTED     NOTIFICATION TIME NOT REPORTED      No results for input(s): POCGLU in the last 72 hours. Xr Chest Portable    Result Date: 7/2/2020  EXAMINATION: ONE XRAY VIEW OF THE CHEST 7/2/2020 11:00 am COMPARISON: 07/01/2020 HISTORY: ORDERING SYSTEM PROVIDED HISTORY: pacemaker placement TECHNOLOGIST PROVIDED HISTORY: pacemaker placement Reason for Exam: Port upright AP CXR - pacemaker placement Acuity: Unknown Type of Exam: Unknown FINDINGS: Left chest pacemaker device and median sternotomy wires are again noted. Cardiomediastinal silhouette and pulmonary vasculature are within normal limits. No focal airspace consolidation, pneumothorax, or pleural effusion. No free air beneath the diaphragm. No acute osseous abnormality. No acute intrathoracic process. --Note from [address]  Was notified by RN that patient had bleeding from the pacemaker site and his dressing was changed. I was never notified on this bleeding overnight or early this morning. On exam, dressing was soaked with blood. Dressing removed. Wound opened up. Steri-strips removed. Wound cleaned. Pressure applied. New steri-trips attached. Pressure dressing applied. If the wound has any more bleeding, he should be monitored. Otherwise can be discharged later today. Hold Eliquis for 24 hours. Dr Sundar Mckeon note     1. Paroxysmal atrial flutter  -In SR. Eliquis and Sotalol remain on hold. Paper script written for cost analysis on Eliquis. 2. Hx AVR  -Stable. No evidence of CHF. 3. Tachy-ralf syndrome  -S/P PPM. All parameters WNL. Site developed oozing around 0400. Dr. Michelle Valentin at bedside to clean the site and apply new pressure dressing. Reviewed with Dr. Deanna Haynes.  Will restart Sotalol 40mg BID. 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 He has never used smokeless tobacco.  Alcohol:      reports current alcohol use of about 1.0 standard drinks of alcohol per week. Neurological ;                 No focal motor deficit ,                 No focal sensory deficit ,  Drug Use: reports no history of drug use. Review of Systems:     POSITIVE AND NEGATIVES AS DESCRIBED IN HISTORY OF PRESENT ILLNESS ;  IN ADDITION ;   Review of Systems          All other systems negative              Physical Exam:     Vitals:  /84   Pulse 67   Temp 97.5 °F (36.4 °C) (Oral)   Resp 20   Ht 5' 10\" (1.778 m)   Wt 136 lb 4.8 oz (61.8 kg)   SpO2 91%   BMI 19.56 kg/m²                 Body mass index is 19.56 kg/m². General Appearance:   Alert , CO-OPERATIVE ,     H E:E N T      No icterus, no pallor . No ptosis. No gross asymmetry  Or abnormality face            Skin:                             No rash or erythema  Neck:                            No mass , no thyroid enlargement                                           Pulmonary/Chest:        Clear to auscultation bilaterally . No wheezes, rales or rhonchi . Cardiovascular:            Normal rate, regular rhythm,                                          No murmur orGallop . Abdomen:                       Soft, non-tender                                           Normalbowels sounds,                                             Extremities:                    No  Edema .         Has a recent new pacemaker put in  History of CABG  History of aortic valve replacement  History of ALS with bilateral lower extremity weakness     Data:     Labs:    URINE ANALYSIS: No results found for: LABURIN     CBC:  Lab Results   Component Value Date    WBC 8.2 07/03/2020    HGB 15.8 07/03/2020     07/03/2020        BMP:    Lab Results   Component Value Date     07/03/2020    K 4.4 07/03/2020    CL 94 07/03/2020    CO2 35 07/03/2020    BUN 17 07/03/2020    CREATININE <0.40 07/03/2020    GLUCOSE 108 07/03/2020      LIVER PROFILE:  Lab Results   Component Value Date    ALT 35 07/01/2020    AST 26 07/01/2020    PROT 6.6 07/01/2020    BILITOT 0.46 07/01/2020    LABALBU 3.9 07/01/2020              Radiology:       Assessment :      Primary Problem  Active Problems:    Cardiac abnormality  Resolved Problems:    * No resolved hospital problems. *       No results for input(s): POCGLU in the last 72 hours. Vitals:    07/03/20 0830 07/03/20 0950 07/03/20 1050 07/03/20 1215   BP:       Pulse: 76 67     Resp:       Temp:       TempSrc:       SpO2: 94% 96% 98% 91%   Weight:       Height:             Plan:     1. Patient with ALS  2. Paroxysmal atrial fibrillation  3. History of tachybradycardia syndrome  4. Status post recent permanent pacemaker placement  5. On sotalol now  6. Was at [facility]  7. History of CABG and aortic valve replacement    7/3/20    · Patient did have some bleeding around the pacemaker placement and he prior to that was on Eliquis  · He was sent here on Coumadin pharmacy is dosing  · In view of recent surgery bridging is no indicated 1. Medications: Allergies:     Allergies   Allergen Reactions    Ciprofloxacin      Can not take anything in this group with his ALS    Lasix [Furosemide]     Penicillins     Sulfa Antibiotics     Duloxetine        Current Meds:   Scheduled Meds:    warfarin (COUMADIN) daily dosing (placeholder)   Other RX Placeholder    warfarin  5 mg Oral Once    sodium chloride flush 10 mL Intravenous 2 times per day    aspirin  81 mg Oral Daily    docusate sodium  100 mg Oral Daily    pantoprazole  20 mg Oral Daily    sotalol  40 mg Oral BID    therapeutic multivitamin-minerals  1 tablet Oral Daily    Vitamin D  1,000 Units Oral Daily    polyethylene glycol  17 g Oral Daily     Continuous Infusions:   PRN Meds: senna, magnesium hydroxide, sodium chloride flush, acetaminophen **OR** acetaminophen, HYDROcodone 5 mg - acetaminophen **OR** HYDROcodone 5 mg - acetaminophen, naloxone, bisacodyl, ondansetron       Thanks for consulting us . Will monitor vitals and clinical course , and  Optimize therapy  as needed . Sebasitán Zavala MD      Copy sentto Dr. Wilian Barcenas MD    Please note that this chart was generated using voice recognition Dragon dictation software. Although every effort was made to ensure the accuracy of this automated transcription, some errors in transcription may have occurred.

## 2020-07-03 NOTE — PROGRESS NOTES
Cardiac Consult to Dr Gladys Moy  Notified Dr Gladys Moy patient was transferred from [facility] [address] this eveing  Clarified orders with Dr Gladys Moy asked to be contacted when needed.

## 2020-07-03 NOTE — FLOWSHEET NOTE
Pt desaturates this AM after taking his o2 off. Has been 96% when checked and awake. Sleepy this AM, and a little groggy. Note discharge plan included CPAP but that no order was written. Dr Balbuena Seen consulted to see this pt. ABG ordered STAT

## 2020-07-03 NOTE — PROGRESS NOTES
ABG ordered with results:    PH - 7.35  CO2 - 73  PO2 - 81 (room air)  HCO3 - 40.5     COhb - 1.2%  methb - 0.9%    Compensated chronic respiratory failure. Pt awake and alert x 4. NAD noted.

## 2020-07-03 NOTE — FLOWSHEET NOTE
Pt transferred to care of transport service at 2100 for transfer to [address]  [facility] facility. Pt AOx4, pain free and transported via wheelchair on 2L O2. Pt departed with all personal belongings and paperwork.

## 2020-07-03 NOTE — PROGRESS NOTES
Speech Language Pathology  Facility/Department: [facility] ACUTE REHAB  Initial Speech/Language/Cognitive Assessment    NAME: Hayden Nieto  : 1950   MRN: [id number]  ADMISSION DATE: 2020  ADMITTING DIAGNOSIS: has ALS (amyotrophic lateral sclerosis) ([address] Muscle spasm; Macrocytosis; Rheumatic tricuspid valve regurgitation; Spinal stenosis of lumbar region; Calculus of kidney and ureter; Hypoglycemia; Diastolic dysfunction; Bilateral enlargement of atria; Mitral valve regurgitation; Aneurysm of aortic arch ([address] Paroxysmal atrial fibrillation ([address] Aortic valve disorder; Bilateral hearing loss; Acquired cystic kidney disease; History of hypertension; Other atopic dermatitis; Closed fracture of base of distal phalanx of finger; Epididymitis; Osteopenia; Hyperparathyroidism ([address] Primary osteoarthritis; Senile hyperkeratosis; MVP (mitral valve prolapse); Presbyopia; Hydrocele; Other hyperlipidemia; Carpal tunnel syndrome; Rheumatic aortic valve insufficiency; Closed fracture of one rib of right side with routine healing; Tinnitus of both ears; Concussion with loss of consciousness; Supraventricular beat, premature; Varicose veins of both lower extremities; Intervertebral disc prolapse with impingement; Obstructive sleep apnea; Prostatitis; Stress; Dermatitis; Weakness of both lower extremities; Insulin resistance; At high risk for falls; Other specified diseases of blood and blood-forming organs; S/P CABG (coronary artery bypass graft); Cardiac segmental configuration with atrial configuration unknown, ventricular configuration unknown, and inverted normally aligned great arteries; Vitamin A deficiency; Fatigue; Heavy metal poison. screen; Muscular atrophy; Cervical stenosis of spine; Diarrhea; Atrial flutter ([address] Tachy-ralf syndrome ([address] Anticoagulated on Coumadin;  History of permanent cardiac pacemaker placement 2020:  ; and Cardiac abnormality on their problem list.      Date of Eval: 7/3/2020 Evaluating Therapist: RAZ Colon    RECENT RESULTS  CT OF HEAD/MRI:   n/a    Primary Complaint:  Pt. Has ALS. Diagnosed 2 years ago, onset of symptoms began 3.5 years ago. Pain:  Pain Assessment  Pain Assessment: 0-10  Pain Level: 5  Patient's Stated Pain Goal: No pain  Pain Type: Chronic pain  Pain Location: Neck  Pain Descriptors: Aching    Assessment:      Diagnosis: Pt. demonstrated functional comprehension, expression and cognition during conversation. Pt. demonstrates reduced respiration for speaking, characterized by frequent breaths taken upon phonatory exhalation. ST discussed diaphragmatic breathing exercises,  pt. demonstrated good understanding and able to demonstrate [place]. No further ST recommended at this time. Pt. Encouraged to continue these breathing exercises on his own. Recommendations:  Requires SLP Intervention: No             Patient/family involved in developing goals and treatment plan: family not present    Subjective:   Previous level of function and limitations:         Vision  Vision: Impaired  Vision Exceptions: Wears glasses for reading  Hearing  Hearing: Within functional limits           Objective:     Oral/Motor  Oral Motor: Within functional limits    Auditory Comprehension  Comprehension: Within Functional Limits         Expression  Primary Mode of Expression: Verbal    Verbal Expression  Verbal Expression: Within functional limits         Motor Speech  Motor Speech:  Within functional limits           Cognition:      Orientation  Overall Orientation Status: Within Normal Limits  Attention  Attention: Within Functional Limits  Memory  Memory: Within Funtional Limits  Problem Solving  Problem Solving: Within Functional Limits  Abstract Reasoning  Abstract Reasoning: Within Functional Limits  Safety/Judgement  Safety/Judgement: Within Functional Limits         Education:  Patient Education Response: Verbalizes understanding;Demonstrated understanding Therapy Time:   Individual Concurrent Group Co-treatment   Time In 1300         Time Out 1315         Minutes [facility]. A.CCC/SLP    7/3/2020 2:30 PM

## 2020-07-03 NOTE — PROGRESS NOTES
Pt arrived to unit via EMS by wheelchair. Pt alert and oriented.   Oriented to unit    Nurse called [facility] to receive report

## 2020-07-03 NOTE — PLAN OF CARE
Nutrition Problem: Severe malnutrition  Intervention: Food and/or Nutrient Delivery: Continue current diet, Start ONS  Nutritional Goals: PO intake to meet % of estimated nutrition needs

## 2020-07-03 NOTE — CARE COORDINATION
Patient resting on Room Air with a Pulse Ox reading of 93%. Patient is in no apparent distress at this time. Patient states he has not worn BIPAP when he was a recent patient at [address] [place] [address] 100. Patient states he was suppose to have a sleep study done but this was not completed due to a recent heart admission. Patient states he is resting comfortably on nasal cannula at 2.0 lpm which is what he was on at [address] [place] [address]. Patient states he was not receiving any respiratory treatments while he was a patient at [address] [place] [address]. He was just discharged from [address] [place] [address] and transferred to this Rehab unit hours ago. Patient states he wears a mouth device at night to prevent any snoring or obstructions. Patient has this device in his possession. Patient states he has not taken any breathing treatments at home but does have home oxygen for when he uses an exercise bike. Above discussed with the patient's RN.     Oral Mason RRT

## 2020-07-03 NOTE — PLAN OF CARE
Problem: Falls - Risk of:  Goal: Will remain free from falls  Description: Will remain free from falls  Outcome: Ongoing     Problem: Falls - Risk of:  Goal: Absence of physical injury  Description: Absence of physical injury  Outcome: Ongoing     Problem: SAFETY  Goal: Free from accidental physical injury  Outcome: Ongoing     Problem: PAIN  Goal: Patient's pain/discomfort is manageable  Outcome: Ongoing     Problem: SKIN INTEGRITY  Goal: Skin integrity is maintained or improved  Outcome: Ongoing

## 2020-07-03 NOTE — CONSULTS
[address]                 [address] [facility], [address]                                  CONSULTATION    PATIENT NAME: Hardik Tran                   :        1950  MED REC NO:   [id number]                              ROOM:       4181  ACCOUNT NO:   [de-identified]                           ADMIT DATE: 2020  PROVIDER:     Vikash Bryson    CONSULT DATE:  2020    REASON FOR CONSULTATION:  History of ALS, evaluate for need for  noninvasive ventilator. HISTORY OF PRESENT ILLNESS:  The patient is an extremely pleasant  66-year-old male. He is a nonsmoker past or present. The patient was  admitted to [place] [address]. The patient was admitted  because of tachycardia, dyspnea. The patient does have a history of  atrial fibrillation with dyslipidemia, history of ALS. His ALS was  diagnosed 23 months ago. He had symptoms for 18 months. He follows  [facility]. Of note, there were plans for him to do a sleep study a few  days ago at [address] [place] [address],[address], [facility], but this was not  performed because the patient was admitted to the hospital.  He  reportedly was getting progressively worsening shortness of breath for  the past 1-2 months. He has oxygen via concentrator at home. Reportedly, he had 30-pound weight loss for two months. The patient  underwent medical management. He eventually was transferred to the [address] [place] [address] for strength training. He reportedly was not  started on a BiPAP at [facility] that is per his report. He is  on Coumadin, Betapace for his heart. PAST MEDICAL HISTORY:  Notable for a diagnosis of ALS, history of aortic  root aneurysm, history of aortic valve replacement in 2016,  history of hypertension.     PAST SURGICAL HISTORY:  Also includes parathyroid gland surgery and  hernia repair. ALLERGIES:  CIPROFLOXACIN, LASIX, PENICILLIN, SULFA, DULOXETINE. MEDICATIONS:  Per chart. SOCIAL HISTORY:  He is a nonsmoker past or present. REVIEW OF SYSTEMS:  As per HPI, otherwise systems reviewed and negative. PHYSICAL EXAMINATION:  GENERAL:  The patient is an extremely pleasant 64-year-old male. VITAL SIGNS:  Blood pressure 137/84, heart rate 78, respirations 20,  temperature max 98.1, O2 saturations 91-94% on 2 liters. His BMI is  only 19. 6. HEENT:  No supraclavicular lymph node enlargement. LUNGS:  Clear. No crackles, rales or wheezes. CARDIOVASCULAR:  Regular rhythm. Heart tones distant, but no obvious  murmur. ABDOMEN:  Soft. I do hear bowel sounds. EXTREMITIES:  No gross  pretibial edema. LABORATORY RESULTS:  BUN and creatinine 17/less than 0.4. I have a  serum bicarbonate on 06/26 which was only 30, now up to 35 on 07/03. White count normal at 8.2, hemoglobin 15.8, platelet count of 450. Arterial blood gas, the patient has a pH of 7.35, pCO2 of 73.2 with a  pO2 of 81.2. IMPRESSION:  The patient indeed has evidence of chronic respiratory  failure with moderate to severe hypercapnia. This is likely due to his  neuromuscular disease ALS. PLAN:  We will start the patient on BiPAP with continuous pulse ox in  the hopes that we can get his pCO2 under check. His pH is low normal at  this time. He currently is ordered Coumadin at this time. We will follow the patient while in-house and thank you Dr. Vicente Pruett for  the consult.         Penelope Barrera    D: 07/03/2020 15:40:21       T: 07/03/2020 16:47:01     [email or web address]  Job#: [id number]     Doc#: [id number]    CC:

## 2020-07-04 LAB
INR BLD: 1
PROTHROMBIN TIME: 13.4 SEC (ref 11.8–14.6)

## 2020-07-04 PROCEDURE — 99231 SBSQ HOSP IP/OBS SF/LOW 25: CPT | Performed by: INTERNAL MEDICINE

## 2020-07-04 PROCEDURE — 6370000000 HC RX 637 (ALT 250 FOR IP): Performed by: INTERNAL MEDICINE

## 2020-07-04 PROCEDURE — 85610 PROTHROMBIN TIME: CPT

## 2020-07-04 PROCEDURE — 2700000000 HC OXYGEN THERAPY PER DAY

## 2020-07-04 PROCEDURE — 1180000000 HC REHAB R&B

## 2020-07-04 PROCEDURE — 97110 THERAPEUTIC EXERCISES: CPT

## 2020-07-04 PROCEDURE — 94660 CPAP INITIATION&MGMT: CPT

## 2020-07-04 PROCEDURE — 6370000000 HC RX 637 (ALT 250 FOR IP): Performed by: PHYSICAL MEDICINE & REHABILITATION

## 2020-07-04 PROCEDURE — 94761 N-INVAS EAR/PLS OXIMETRY MLT: CPT

## 2020-07-04 PROCEDURE — 97530 THERAPEUTIC ACTIVITIES: CPT

## 2020-07-04 PROCEDURE — 92526 ORAL FUNCTION THERAPY: CPT

## 2020-07-04 PROCEDURE — 99232 SBSQ HOSP IP/OBS MODERATE 35: CPT | Performed by: PHYSICAL MEDICINE & REHABILITATION

## 2020-07-04 PROCEDURE — 36415 COLL VENOUS BLD VENIPUNCTURE: CPT

## 2020-07-04 PROCEDURE — 97116 GAIT TRAINING THERAPY: CPT

## 2020-07-04 RX ORDER — WARFARIN SODIUM 5 MG/1
5 TABLET ORAL
Status: COMPLETED | OUTPATIENT
Start: 2020-07-04 | End: 2020-07-04

## 2020-07-04 RX ADMIN — MELATONIN 1000 UNITS: at 08:31

## 2020-07-04 RX ADMIN — ASPIRIN 81 MG: 81 TABLET, COATED ORAL at 08:31

## 2020-07-04 RX ADMIN — DOCUSATE SODIUM 100 MG: 100 CAPSULE, LIQUID FILLED ORAL at 08:31

## 2020-07-04 RX ADMIN — PANTOPRAZOLE SODIUM 20 MG: 20 TABLET, DELAYED RELEASE ORAL at 08:31

## 2020-07-04 RX ADMIN — POLYETHYLENE GLYCOL 3350 17 G: 17 POWDER, FOR SOLUTION ORAL at 08:31

## 2020-07-04 RX ADMIN — SOTALOL HYDROCHLORIDE 40 MG: 80 TABLET ORAL at 08:32

## 2020-07-04 RX ADMIN — SOTALOL HYDROCHLORIDE 40 MG: 80 TABLET ORAL at 20:27

## 2020-07-04 RX ADMIN — WARFARIN SODIUM 5 MG: 5 TABLET ORAL at 18:04

## 2020-07-04 RX ADMIN — MULTIPLE VITAMINS W/ MINERALS TAB 1 TABLET: TAB at 08:31

## 2020-07-04 ASSESSMENT — PAIN DESCRIPTION - LOCATION: LOCATION: NECK

## 2020-07-04 ASSESSMENT — PAIN DESCRIPTION - PAIN TYPE: TYPE: ACUTE PAIN

## 2020-07-04 ASSESSMENT — PAIN - FUNCTIONAL ASSESSMENT: PAIN_FUNCTIONAL_ASSESSMENT: ACTIVITIES ARE NOT PREVENTED

## 2020-07-04 ASSESSMENT — PAIN SCALES - GENERAL
PAINLEVEL_OUTOF10: 4
PAINLEVEL_OUTOF10: 0
PAINLEVEL_OUTOF10: 0

## 2020-07-04 ASSESSMENT — PAIN DESCRIPTION - FREQUENCY: FREQUENCY: INTERMITTENT

## 2020-07-04 ASSESSMENT — PAIN DESCRIPTION - ORIENTATION: ORIENTATION: POSTERIOR

## 2020-07-04 ASSESSMENT — PAIN DESCRIPTION - DESCRIPTORS: DESCRIPTORS: ACHING

## 2020-07-04 NOTE — PLAN OF CARE
Problem: Falls - Risk of:  Goal: Will remain free from falls  Description: Will remain free from falls  Outcome: Met This Shift  Goal: Absence of physical injury  Description: Absence of physical injury  Outcome: Met This Shift     Problem: SAFETY  Goal: Free from accidental physical injury  Outcome: Met This Shift  Goal: Free from intentional harm  Outcome: Met This Shift     Problem: PAIN  Goal: Patient's pain/discomfort is manageable  Outcome: Met This Shift     Problem: SKIN INTEGRITY  Goal: Skin integrity is maintained or improved  Outcome: Met This Shift     Problem: Pain:  Goal: Pain level will decrease  Description: Pain level will decrease  Outcome: Met This Shift  Goal: Control of acute pain  Description: Control of acute pain  Outcome: Met This Shift  Goal: Control of chronic pain  Description: Control of chronic pain  Outcome: Met This Shift     Problem: Nutrition  Goal: Optimal nutrition therapy  Outcome: Met This Shift     Problem: Skin Integrity:  Goal: Will show no infection signs and symptoms  Description: Will show no infection signs and symptoms  Outcome: Met This Shift  Goal: Absence of new skin breakdown  Description: Absence of new skin breakdown  Outcome: Met This Shift

## 2020-07-04 NOTE — PROGRESS NOTES
[facility] Internal Medicine    CONSULTATION / HISTORY AND PHYSICAL EXAMINATION            Date:   7/4/2020  Patient name:  Meg Tovar  Date of admission:  7/2/2020  9:58 PM  MRN:   [id number]  Account:  [de-identified]  YOB: 1950  PCP:    Andrew Dixon MD  Room:   [de-identified]  Code Status:    Full Code    Physician Requesting Consult: Quentin Calderón MD    Reason for Consult: Medical management    Chief Complaint:     No chief complaint on file. ALS    History Obtained From:     patient, electronic medical record    History of Present Illness/ Interval History:   78-year-old with known ALS,  diagnosed 2 years ago, paroxysmal A. Fib on Coumadin, status post pacemaker placement for management of tachybradycardia syndrome. Engaging with PT OT, overall improving. Past Medical History:     Past Medical History:   Diagnosis Date    ALS (amyotrophic lateral sclerosis) ([facility]     Aortic root aneurysm ([facility] 11/08/2016    Aortic valve replaced 11/08/2016    Hypertension         Past Surgical History:     Past Surgical History:   Procedure Laterality Date    CARDIOVERSION  06/27/2020    w/    HERNIA REPAIR      PARATHYROID GLAND SURGERY  1980        Medications Prior to Admission:     Prior to Admission medications    Medication Sig Start Date End Date Taking?  Authorizing Provider   fluticasone (FLONASE) 50 MCG/ACT nasal spray 1 spray by Nasal route daily  Patient taking differently: 1 spray by Nasal route daily as needed for Rhinitis or Allergies  3/31/20   Andrew Dixon MD   Handicap Placard MISC by Does not apply route Dx: spinal stenosis, ALS    Duration: 5 years till 2/25/2020 2/25/20   Andrew Dixon MD   SPIRULINA PO Take by mouth    Historical Provider, MD   vitamin D (CHOLECALCIFEROL) 1000 UNIT TABS tablet Take 1,000 Units by mouth daily    Historical Provider, MD   Vitamin E 100 UNITS TABS Take 60 Units by mouth daily    Historical Provider, MD   SELENIUM PO Take 75 mcg by mouth daily    Historical Provider, MD   SOYA LECITHIN PO Take 1,000 mg by mouth daily    Historical Provider, MD   aspirin 81 MG tablet Take 81 mg by mouth three times a week     Historical Provider, MD   acetaminophen (TYLENOL) 325 MG tablet Take 650 mg by mouth every 6 hours as needed for Pain    Historical Provider, MD   Multiple Vitamins-Minerals (THERAPEUTIC MULTIVITAMIN-MINERALS) tablet Take 1 tablet by mouth daily    Historical Provider, MD   OMEGA-3 FATTY ACIDS-VITAMIN E PO Take 1 tablet by mouth daily    Historical Provider, MD   Probiotic Product (PROBIOTIC PO) Take 1 tablet by mouth daily    Historical Provider, MD   Ascorbic Acid (VITAMIN C) 500 MG tablet Take 120 mg by mouth daily    Historical Provider, MD        Allergies:     Ciprofloxacin; Lasix [furosemide]; Penicillins; Sulfa antibiotics; and Duloxetine    Social History:     Tobacco:    reports that he has never smoked. He has never used smokeless tobacco.  Alcohol:      reports current alcohol use of about 1.0 standard drinks of alcohol per week. Drug Use:  reports no history of drug use. Family History:     Family History   Problem Relation Age of Onset    Lung Cancer Mother     Heart Disease Father        Review of Systems:     Positive and Negative as described in HPI. Constitutional: Negative for chills, fatigue, fever and unexpected weight change. HENT: Negative for ear discharge, facial swelling, nosebleeds, sore throat, trouble swallowing and voice change. Eyes: Negative for redness and visual disturbance. Respiratory: Negative for cough, shortness of breath and wheezing. Cardiovascular: Negative for chest pain, palpitations and leg swelling. Gastrointestinal: Negative for abdominal pain, blood in stool, diarrhea and vomiting. Genitourinary: Negative for difficulty urinating, dysuria and flank pain. Musculoskeletal: Negative for joint swelling.    Skin: Negative for color change and rash. Neurological: Weakness secondary to ALS, negative for dizziness, seizures, syncope and headaches. Hematological: Negative for adenopathy. Does not bruise/bleed easily. Physical Exam:     BP (!) 130/99   Pulse 72 Comment: seated after amb  Temp 97.7 °F (36.5 °C) (Axillary)   Resp 16   Ht 5' 10\" (1.778 m)   Wt 136 lb 11 oz (62 kg)   SpO2 98%   BMI 19.61 kg/m²   Temp (24hrs), Av °F (36.7 °C), Min:97.7 °F (36.5 °C), Max:98.2 °F (36.8 °C)      General appearance:  alert, cooperative and no distress  Eyes: Anicteric sclera. Pupils are equally round and reactive to light. Extraocular movements are intact.   Lungs:  clear to auscultation bilaterally, normal effort  Heart:  regular rate and rhythm, no murmur, pacemaker site insertion dry, clean  Abdomen:  soft, nontender, nondistended, normal bowel sounds, no masses, hepatomegaly, splenomegaly  Extremities:  no edema, redness, tenderness in the calves  Skin:  no gross lesions, rashes, induration  Neuro:  Alert, oriented X 3, chronic foot drop    Investigations:      Laboratory Testing:  Lab Results   Component Value Date    WBC 8.2 2020    HGB 15.8 2020    HCT 46.2 2020    .5 (H) 2020     2020     Lab Results   Component Value Date     2020    K 4.4 2020    CL 94 2020    CO2 35 2020    BUN 17 2020    CREATININE <0.40 2020    GLUCOSE 108 2020    CALCIUM 9.4 2020         Assessment :      Primary Problem  <principal problem not specified>    Active Hospital Problems    Diagnosis Date Noted    Severe malnutrition ([facility] [E43] 2020    Cardiac abnormality [Q24.9] 2020    History of permanent cardiac pacemaker placement 2020:   [Z95.0] 2020    Anticoagulated on Coumadin [Z79.01] 2020    Tachy-ralf syndrome ([facility] [I49.5] 2020    Weakness of both lower extremities [R29.898] 2019    Paroxysmal atrial fibrillation ([facility] [I48.0] 05/02/2019    Rheumatic aortic valve insufficiency [I06.1] 05/02/2019    ALS (amyotrophic lateral sclerosis) ([facility] [G12.21] 10/02/2018    S/P CABG (coronary artery bypass graft) [Z95.1] 11/11/2016       Plan: Active Problems:    ALS (amyotrophic lateral sclerosis) (HCC)    Paroxysmal atrial fibrillation (HCC)    Rheumatic aortic valve insufficiency    Weakness of both lower extremities    S/P CABG (coronary artery bypass graft)    Tachy-ralf syndrome (HCC)    Anticoagulated on Coumadin    History of permanent cardiac pacemaker placement 7/1/2020:      Cardiac abnormality    Severe malnutrition ([facility]  Resolved Problems:    * No resolved hospital problems. *        1. Paroxysmal atrial flutter-currently in sinus rhythm, on  sotalol, Coumadin was started yesterday, INR 1, patient does not for bridging due to recent bleed around the time of pacemaker placement. 2. Tachybradycardia syndrome-currently heart rate controlled. 3. ALS-stable. 4. History of coronary artery disease status post CABG and aortic valve replacement-lately asymptomatic    Consultations:   IP CONSULT TO RESPIRATORY CARE  IP CONSULT TO INTERNAL MEDICINE  IP CONSULT TO DIETITIAN  IP CONSULT TO SOCIAL WORK  IP CONSULT TO RECREATION THERAPY  IP CONSULT TO CARDIOLOGY  PHARMACY TO DOSE WARFARIN  IP CONSULT TO PULMONOLOGY      Yessi White MD  7/4/2020  3:51 PM    Copy sent to Dr. Elli Louis MD    Please note that this chart was generated using voice recognition Dragon dictation software. Although every effort was made to ensure the accuracy of this automated transcription, some errors in transcription may have occurred.

## 2020-07-04 NOTE — PROGRESS NOTES
Physical Medicine & Rehabilitation  Progress Note      Subjective:      65year-old gentleman with debility secondary to atrial flutter with RVR and comorbid ALS. Patient is well, and has had no acute complaints or problems overnight. He reports improved sleep with BIPAP overnight. He is participating in therapies. Some poor appetite noted today. ROS:  Denies fevers, chills, sweats. No chest pain, palpitations, lightheadedness. Denies coughing, wheezing or shortness of breath. Denies abdominal pain, nausea, diarrhea or constipation. No new areas of joint pain. Denies new areas of numbness or weakness. Denies new anxiety or depression issues. No new skin problems. Rehabilitation:   Progressing in therapies. PT:  Restrictions/Precautions: General Precautions, Fall Risk, Surgical Protocols, Cardiac( Pacemaker Implant; ALS; Bilateral AFOs  )  Other position/activity restrictions: New Pacemaker precautions; Required Braces or Orthoses  Right Lower Extremity Brace: Ankle Foot Orthotics  Left Lower Extremity Brace: Letty Foot Orthotics   Transfers  Sit to Stand: Minimal Assistance  Stand to sit: Contact guard assistance  Bed to Chair: Minimal assistance  Stand Pivot Transfers: Minimal Assistance  Lateral Transfers: Minimal Assistance  Comment: Educated in minimizing use of UE, especially L UE due to new pacemaker. Ambulation 1  Surface: level tile  Device: Rollator  Other Apparatus: Left, Right, AFO  Assistance: Contact guard assistance, Minimal assistance  Quality of Gait: step to pattern, slightly unsteady, Left knee hyperextends. Fatiques easily, severe exertion with minimal activity, sao2 94% after ambulation, HR 68, no use of o2. needs atleast 3 to 4 minutes rest period prior toa nother ambulation.    Gait Deviations: Decreased step length, Decreased step height  Distance: 38 ft , rests and another 30 ft (2MWT)  Comments: Improved safety with transfers, pt very fatiqued , had OT and PT session, assisted back to bed after PT session. Transfers  Sit to Stand: Minimal Assistance  Stand to sit: Contact guard assistance  Bed to Chair: Minimal assistance  Stand Pivot Transfers: Minimal Assistance  Lateral Transfers: Minimal Assistance  Comment: Educated in minimizing use of UE, especially L UE due to new pacemaker. Ambulation  Ambulation?: Yes  More Ambulation?: No  Ambulation 1  Surface: level tile  Device: Rollator  Other Apparatus: Left, Right, AFO  Assistance: Contact guard assistance, Minimal assistance  Quality of Gait: step to pattern, slightly unsteady, Left knee hyperextends. Fatiques easily, severe exertion with minimal activity, sao2 94% after ambulation, HR 68, no use of o2. needs atleast 3 to 4 minutes rest period prior toa nother ambulation. Gait Deviations: Decreased step length, Decreased step height  Distance: 38 ft , rests and another 30 ft (2MWT)  Comments: Improved safety with transfers, pt very fatiqued , had OT and PT session, assisted back to bed after PT session. Surface: level tile  Ambulation 1  Surface: level tile  Device: Rollator  Other Apparatus: Left, Right, AFO  Assistance: Contact guard assistance, Minimal assistance  Quality of Gait: step to pattern, slightly unsteady, Left knee hyperextends. Fatiques easily, severe exertion with minimal activity, sao2 94% after ambulation, HR 68, no use of o2. needs atleast 3 to 4 minutes rest period prior toa nother ambulation. Gait Deviations: Decreased step length, Decreased step height  Distance: 38 ft , rests and another 30 ft (2MWT)  Comments: Improved safety with transfers, pt very fatiqued , had OT and PT session, assisted back to bed after PT session. OT:  ADL  Feeding: Scoop assist, Increased time to complete, Verbal cueing, Setup, Adaptive utensils (comment)(Enlarged Handles )  Grooming:  Moderate assistance, Maximum assistance  UE Bathing: Setup, Stand by assistance, Increased time to complete  LE Bathing: Minimal assistance  UE Dressing: Moderate assistance  LE Dressing: Maximum assistance(Bilateral AFO )  Toileting: Minimal assistance(Clothing Management )  Additional Comments: Patient participated in sponge bath seated in wheelchair at sink, requiring increased time and supplemental O2          Balance  Sitting Balance: Supervision  Standing Balance: Minimal assistance   Standing Balance  Time: ~5 minutes  Activity: self care tasks  Comment: Improved standing balance with hand-hold support   Functional Mobility  Functional - Mobility Device: Wheelchair  Activity: To/from bathroom  Assist Level: Minimal assistance  Apparatus Needs  Apparatus Needs: AFO  Bed mobility  Rolling to Left: Unable to assess(Deferred- pt has new pace maker on Left side.)  Rolling to Right: Contact guard assistance  Supine to Sit: Contact guard assistance  Sit to Supine: Contact guard assistance  Scooting: Contact guard assistance  Comment: Moves slow, exertion increases with minimal acitivity. Transfers  Stand Pivot Transfers: Moderate assistance   Toilet Transfers  Toilet - Technique: Stand pivot  Equipment Used: Raised toilet seat with rails  Toilet Transfer: Moderate assistance  Toilet Transfers Comments: Cuing and assist for transfer - aware to check for locked brakes on wheelchair and plans for hand position          Wheelchair Bed Transfers  Wheelchair/Bed - Technique: Stand pivot  Equipment Used: Bed, Wheelchair  Level of Asssistance: Moderate assistance  Wheelchair Transfers Comments: For initial transfer had hospital footies on feet, but not his AFOs     SPEECH:      Objective:  BP (!) 130/99   Pulse 70   Temp 97.7 °F (36.5 °C) (Axillary)   Resp 16   Ht 5' 10\" (1.778 m)   Wt 136 lb 11 oz (62 kg)   SpO2 96%   BMI 19.61 kg/m²       GEN: Well developed, thin gentleman, in NAD  HEENT:  NCAT. PERRL. EOMI. Mucous membranes pink and moist.   PULM:  Clear to ausculation. No rales or rhonchi. Respirations WNL and unlabored.    CV: Regular rate rhythm. No murmurs or gallops. Short shallow breaths. Wearing nasal cannula. GI:  Abdomen soft. Nontender. Non-distended. BS + and equal.    NEUROLOGICAL: A&O x3. Sensation intact to light touch. MSK:  Functional ROM all extremities. Motor testing 4/5 key muscles all extremities. SKIN: Warm dry and intact. Good turgor. Mild tenderness over L upper chest pacemaker site. EXTREMITIES:  No calf tenderness to palpation. No edema BLEs. Heather Sleet PSYCH: Mood WNL. Appropriately interactive. Affect WNL. Diagnostics:     CBC:   Recent Labs     07/02/20  0441 07/03/20  0548   WBC 9.6 8.2   RBC 4.30 4.55   HGB 14.6 15.8   HCT 44.4 46.2   .3* 101.5*   RDW 12.6 13.5    268     BMP:   Recent Labs     07/02/20  0441 07/03/20  0548    135   K 4.3 4.4   CL 91* 94*   CO2 36* 35*   BUN 13 17   CREATININE <0.40* <0.40*   GLUCOSE 112* 108*     BNP: No results for input(s): BNP in the last 72 hours. PT/INR:   Recent Labs     07/03/20  0548 07/04/20  0617   PROTIME 13.4 13.4   INR 1.0 1.0     APTT: No results for input(s): APTT in the last 72 hours. CARDIAC ENZYMES: No results for input(s): CKMB, CKMBINDEX, TROPONINT in the last 72 hours. Invalid input(s): CKTOTAL;3  FASTING LIPID PANEL:  Lab Results   Component Value Date    CHOL 166 07/10/2019    HDL 40 07/10/2019    TRIG 62 07/10/2019     LIVER PROFILE: No results for input(s): AST, ALT, ALB, BILIDIR, BILITOT, ALKPHOS in the last 72 hours.      Current Medications:   Current Facility-Administered Medications: warfarin (COUMADIN) daily dosing (placeholder), , Other, RX Placeholder  senna (SENOKOT) tablet 17.2 mg, 2 tablet, Oral, Daily PRN  magnesium hydroxide (MILK OF MAGNESIA) 400 MG/5ML suspension 15 mL, 15 mL, Oral, Daily PRN  sodium chloride flush 0.9 % injection 10 mL, 10 mL, Intravenous, 2 times per day  sodium chloride flush 0.9 % injection 10 mL, 10 mL, Intravenous, PRN  acetaminophen (TYLENOL) tablet 650 mg, 650 mg, Oral, Q6H PRN **OR** acetaminophen (TYLENOL) suppository 650 mg, 650 mg, Rectal, Q6H PRN  aspirin EC tablet 81 mg, 81 mg, Oral, Daily  docusate sodium (COLACE) capsule 100 mg, 100 mg, Oral, Daily  HYDROcodone-acetaminophen (NORCO) 5-325 MG per tablet 1 tablet, 1 tablet, Oral, Q4H PRN **OR** HYDROcodone-acetaminophen (NORCO) 5-325 MG per tablet 2 tablet, 2 tablet, Oral, Q4H PRN  naloxone (NARCAN) injection 0.4 mg, 0.4 mg, Intravenous, PRN  pantoprazole (PROTONIX) tablet 20 mg, 20 mg, Oral, Daily  sotalol (BETAPACE) tablet 40 mg, 40 mg, Oral, BID  therapeutic multivitamin-minerals 1 tablet, 1 tablet, Oral, Daily  Vitamin D (CHOLECALCIFEROL) tablet 1,000 Units, 1,000 Units, Oral, Daily  bisacodyl (DULCOLAX) suppository 10 mg, 10 mg, Rectal, Daily PRN  polyethylene glycol (GLYCOLAX) packet 17 g, 17 g, Oral, Daily  ondansetron (ZOFRAN) tablet 4 mg, 4 mg, Oral, Q6H PRN      Impression/Plan:   Impaired ADLs, gait, and mobility due to:      1. Debility secondary to atrial flutter and bradycardia:  PT/OT for gait, mobility, strengthening, endurance, ADLs, and self care. S/p cardioversion and pacemaker implantation. On ASA, sotalol. Has Tylenol or Norco prn pain. Patient sent with orders for prn IV hydralazine - asked nursing to clarify with cardiology. No elevation of L arm above shoulder for 30 days post-op. 2. Atrial flutter: Eliquis discontinued per records but patient sent with admission orders for both Eliquis and coumadin - asked nursing to clarify with cardiology. Started warfarin 7/3 with pharmacy to dose. Nursing to clarify with internal medicine whether there should be a bridge with Lovenox. 3. ALS: weakness and weight loss  4. Chronic hypercapneic respiratory failure: Pulmonology consult reviewed and appreciated - recommending BIPAP and continuous pulse ox. 5. Bowel management: Recent impaction: miralax daily, dolace daily, has dulcolax prn.  Will add senokot and milk of magnesia prn.   6. DVT Prophylaxis:  Coumadin with INR goal 2-3, SCD's while in bed and DEMARIO's while in bed. INR 1.   7. Internal medicine for medical management    Electronically signed by Clayton Kumar MD on 7/4/2020 at 10:00 AM      This note is created with the assistance of a speech recognition program.  While intending to generate a document that actually reflects the content of the visit, the document can still have some errors including those of syntax and sound a like substitutions which may escape proof reading. In such instances, actual meaning can be extrapolated by contextual diversion.

## 2020-07-04 NOTE — PROGRESS NOTES
Pharmacy Note  Warfarin Consult follow-up      Recent Labs     07/03/20  0548 07/04/20  0617   INR 1.0 1.0     Recent Labs     07/02/20  0441 07/03/20  0548   HGB 14.6 15.8   HCT 44.4 46.2    268       Significant Drug-Drug Interactions:  New warfarin drug-drug interactions: aspirin  Discontinued drug-drug interactions: none  Current warfarin drug-drug interactions: none      Date             INR        Dose given previous day  Dose scheduled for today  7/4/2020            1.0       5 mg           5 mg    Notes:     Transitioning from apixaban, last dose  6/29/20               Day 2 warfarin, give 5 mg again today  Daily PT/INR while inpatient. Debi Torres [facility].  [address]  7/4/2020 12:36 PM

## 2020-07-04 NOTE — PLAN OF CARE
Individualized Plan of [facility]    Rehabilitation physician: Dr Sugar Hunter Date: 7/2/2020     Rehabilitation Diagnosis: Cardiac abnormality [Q24.9]     Rehabilitation impairments: self care, mobility and motor dysfunction    Factors facilitating achievement of predicted outcomes: Family support, Motivated, Cooperative, Pleasant, Good insight into deficits and Has homemaker services  Barriers to the achievement of predicted outcomes: Decreased endurance, Upper extremity weakness, Lower extremity weakness, Long standing deficits, Medical complications and Medication managment    Patient Goals: Improve independence with mobility, Increase overall strength and endurance, Increase balance, Increase independence with activities of daily living, Assure adequate nutritional option for discharge and Provide appropriate patient and family education      NURSING:  Nursing goals for Merleen Ahumada while on the rehabilitation unit will include:  Adequate number of bowel movements, Urinate with no urinary retention >300ml in bladder, Maintain O2 SATs at an acceptable level during stay, Absence of skin breakdown while on the rehabilitation unit, Improved skin integrity via assessments including wound measurements, Avoidance of any hospital acquired infections, No signs/symptoms of infection at the wound site, Freedom from injury during hospitalization and Complete education with patient/family with understanding demonstrated regarding disease process and resultant impairment     In order to achieve these goals, nursing interventions may include bowel/bladder training, education for medical assistive devices, medication education, O2 saturation management, energy conservation, stress management techniques, fall prevention, alarms protocol, seating and positioning, skin/wound care, pressure relief instruction, dressing changes, infection protection, DVT prophylaxis, assistance with safe transfers , and/or assistance with bathroom activities and hygiene. PHYSICAL THERAPY:  Goals:        Short term goals  Time Frame for Short term goals: 5 days  Short term goal 1: Pt able to perfrom supine<>sit independently  Short term goal 2: Pt able to ambulate 50 to 70 ft with Rollator, SBA, mod exertion. Short term goal 3: Pt able to go up/down 3 steps with 2 rail min A  Short term goal 4: Pt able to tolerate 30-40 min of activity with pacing skills to improve endurance. Short term goal 5: [facility]  Long term goals  Time Frame for Long term goals : By LOS  Long term goal 1: Pt able to perform transfers independently. Long term goal 2:  Pt able to ambulate with a rollator dist of 80 to 100 ft, independently on level surfaces. Long term goal 3: Pt able to  go up and down 5 steps with 1 HR, SBA  Long term goal 4: Improve 2MWT dist to 80 ft to improve endurance    Plan of Care: Pt to be seen by physical therapy services Plan Of Care:  Due to impaired functional endurance and/or medical issues, the patient is to be seen for a combined total of at least a  900 minutes over 7 days of Physical, Occupational and/or Speech Therapy. per day at least 5 out of 7 days per week     Anticipated interventions may include therapeutic exercises, gait training, neuromuscular re-ed, transfer training, community reintegration, bed mobility, w/c mobility and training. OCCUPATIONAL THERAPY:  Goals:             Short term goals  Time Frame for Short term goals: 1 week   Short term goal 1: Perform UB self care after setup utilizing modified techniques and enlarged  for tools   Short term goal 2: Min Assist for Shaving; cuing for UB dressing;   Mod Assist for LB bathing and dressing   Short term goal 3: D/V understanding of Respiratory Rehab strategies to increase tolerance to care tasks - Pacing/phasing, supportive postures,  air exchange    Short term goal 4: Tolerate 10-25 minutes of generalized strengthening within precautions to support care needs    Long term goals  Time Frame for Long term goals : By Discharge   Long term goal 1: Mod I for UB feeding, grooming, bathing and dressing tasks using modified tehcniques and devices   Long term goal 2: SBA for LB bathing and dressing tasks   Long term goal 3: Apply energy conservation and breathing strategies to ADL tasks to support saf independence    Long term goal 4: D/V understanding of Home Program for Home safety, Hand strengthening (intrinsic), device availalbility      Plan of Care: Patient to be seen by occupational therapy services Plan Of Care:  Due to impaired functional endurance and/or medical issues, the patient is to be seen for a combined total of at least a  900 minutes over 7 days of Physical, Occupational and/or Speech Therapy. per day at least 5 out of 7 days per week     Anticipated interventions may include ADL and IADL retraining, strengthening, safety education and training, patient/caregiver education and training, equipment evaluation/ training/procurement, neuromuscular reeducation, wheelchair mobility training. SPEECH THERAPY:   Goals:      Dysphagia Goals: The patient will tolerate recommended diet without observed clinical signs of aspiration               Plan of Care: Pt to be seen by speech therapy services Plan Of Care:  Due to impaired functional endurance and/or medical issues, the patient is to be seen for a combined total of at least a  900 minutes over 7 days of Physical, Occupational and/or Speech Therapy. per day at least 5 out of 7 days per week    Anticipated interventions may include speech/language/communication therapy, cognitive training, group therapy, education, and/or dysphagia therapy based on the above goals.       CASE MANAGEMENT:  Goals:   Assist patient/family with discharge planning, patient/family counseling,  and coordination with insurance during the inpatient rehabilitation stay. Other members of the multidisciplinary rehabilitation team that will be involved in the patient's plan of care include recreational therapy, dietary, respiratory therapy, and neuropsychology. Medical issues being managed closely and that require 24 hour availability of a physician:  Swallowing precautions, Wound care, Pain management, Infection protection, DVT prophylaxis, Fall precautions, Fluid/Electrolyte balance, Nutritional status, Respiratory needs and History of heart disease                                           Physician anticipated functional outcomes: Improved independence with functional measures   Estimated length of stay for this admission 2 weeks  Medical Prognosis: Fair  Anticipated disposition: Home. The potential to achieve the above medical and rehabilitative goals is fair. This plan of care has been developed with the assistance and input of the multidisciplinary rehabilitation team.  The plan was reviewed with the patient on 7/4/2020. The patient has had the opportunity to provide input to the therapy team.    I have reviewed this Individualized Plan of Care and agree with its contents. Above documentation has been expanded, modified, adjusted to reflect the findings of my evaluations and goals for the patient.     Physician:  Electronically signed by Socorro Ni MD on 7/4/20 at 10:05 AM EDT

## 2020-07-04 NOTE — PROGRESS NOTES
Precautions: L side, AVOID PUSHING/PULLING AND OVERHEAD ACTIVITY TO L UE. Vital Signs  Pulse: 72(seated after amb)  Heart Rate Source: Monitor  Patient Currently in Pain: Yes  Pain Assessment: 0-10  Pain Level: 4  Pain Type: Acute pain  Pain Location: Neck(Writer educated pt to have wife bring in neck pillow )  Pain Orientation: Posterior  Pain Descriptors: Aching(\"I think I slept on my neck wrong. \")  Pain Frequency: Intermittent(Worsens with turning head)  Functional Pain Assessment: Activities are not prevented  Non-Pharmaceutical Pain Intervention(s): Ambulation/Increased Activity; Distraction; Emotional support; Rest  Response to Pain Intervention: Patient Satisfied  Multiple Pain Sites: No     Oxygen Therapy  SpO2: 94 %(increased to 97% ~10-15 sec with supine therex)  Pulse Oximeter Device Mode: Intermittent  Pulse Oximeter Device Location: Finger  O2 Device: Nasal cannula  O2 Flow Rate (L/min): 2 L/min  Patient Observation  Observations: Pt has 2 L of O2 via NC. Pt reports he's unable to improve hunched over posture d/t \"muscular loss of my back muscles from my ALS. \" Pt requires multiple rest breaks and easily fatigues with exertion but is continuously motivated throughout. While pt amb with 2L of O2, SpO2 sats 98-97% and HR 80. Pt's L LE is cold/purple in color compared to R LE; pt reports his Dr informed him that he has no circulation issues to L LE a while ago and that it's been like that for a while. Bed Mobility  Supine to Sit: Stand by assistance  Sit to Supine: Stand by assistance  Scooting: Stand by assistance(scooting along EOM)  Comment: Pt performs slowly and carefully-pt has some difficulty lifting L LE onto mat.  Pt performed with wedge and 4 pillows      Transfers:  Sit to Stand: Contact guard assistance  Stand to sit: Contact guard assistance  Bed to Chair: Contact guard assistance  Comments: Pt utilizes Rollator for transfers with fair/goodcarryover with hand placement and making sure brakes are locked for STS              Ambulation 1  Surface: level tile  Device: Rollator  Other Apparatus: Left;Right;AFO;Wheelchair follow;O2(2L O2 via NC)  Assistance: Minimal assistance;Contact guard assistance(Min A d/t increased fatigue with exertion)  Quality of Gait: Narrow FAUSTINO (pt almost crosses midline), slow pace, decreased step length and height  Gait Deviations: Slow Agueda;Decreased step length;Decreased step height  Distance: 110ft seated w/c rest break after completed    Ambulation 2  Surface - 2: level tile  Device 2: Rollator  Other Apparatus 2: AFO; Left;Right;O2;Wheelchair follow(O2 2 L via NC)  Assistance 2: Contact guard assistance;Minimal assistance(Min A d/t R knee buckle, pt able to self correct)  Quality of Gait 2: hunched trunk posture, narrow FAUSTINO, almost crosses midline, B knees hyperextend L>R  Gait Deviations: Slow Agueda;Decreased step length;Decreased step height  Distance: 60ftx1 45ftx1 ~50ftx1(3 seated rest breaks-pt's SpO2 98% and HR 79)     Stairs/Curb  Stairs?: No                                                     Posture: Fair  Sitting - Static: Good  Sitting - Dynamic: Good  Standing - Static: Good;-  Standing - Dynamic: Fair  Comments: Seated EOM and standing with Rollator     Other exercises?: Yes  Other exercises 1: Seated B LE therex with 1# ankle weight x10-12 reps to R LE and 2x5 to L LE with AAROM and orange tb with tactile cues  Other exercises 2: // bar mini squats and marches x8-10 reps with rest breaks between  Other exercises 3: Removal of B AFO @ end of AM and PM tx to maintain skin integrity-educated pt to removed ~2 hours to reduce sore formation  Other exercises 4: Monitor SpO2 and HR throughout d/t pt being easily SOB/fatigue  Other exercises 5: Supine B LE therex w 1# ankle weights 2x10 reps SAQ and ball squeezes, and orange tb resistance  Other exercises 6: Educated pt over pursed lip breathing technique with therex d/t SpO2 sats @ 94% with exertion; good days  Short term goal 1: Pt able to perfrom supine<>sit independently  Short term goal 2: Pt able to ambulate 50 to 70 ft with Rollator, SBA, mod exertion. Short term goal 3: Pt able to go up/down 3 steps with 2 rail min A  Short term goal 4: Pt able to tolerate 30-40 min of activity with pacing skills to improve endurance. Short term goal 5: Pt able to transfer wit Rollator at [facility] 54  Long term goals  Time Frame for Long term goals : By LOS  Long term goal 1: Pt able to perform transfers independently. Long term goal 2:  Pt able to ambulate with a rollator dist of 80 to 100 ft, independently on level surfaces.    Long term goal 3: Pt able to  go up and down 5 steps with 1 HR, SBA  Long term goal 4: Improve 2MWT dist to 80 ft to improve endurance        Electronically signed by Jonas Clemons PTA on 7/4/20 at 3:17 PM EDT         07/04/20 0838 07/04/20 1356   PT Individual Minutes   Time In 0660 1356   Time Out 0946 1455   Minutes 48 00

## 2020-07-04 NOTE — PLAN OF CARE
Problem: Falls - Risk of:  Goal: Will remain free from falls  Description: Will remain free from falls  Outcome: Met This Shift  Note: The patient remained free from falls this shift, call light within reach, bed in locked and lowest position. Side rails up x2. Continue to monitor closely. Problem: PAIN  Goal: Patient's pain/discomfort is manageable  Outcome: Met This Shift  Note: Patient's pain has been well controlled throughout the entire shift, please see MAR. Problem: SKIN INTEGRITY  Goal: Skin integrity is maintained or improved  Outcome: Met This Shift  Note: Pt surgery site clean. No oozing.  Dressing inforced     Problem: Skin Integrity:  Goal: Absence of new skin breakdown  Description: Absence of new skin breakdown  Outcome: Met This Shift  Note: Pt skin dry and intact

## 2020-07-05 LAB
INR BLD: 1
PROTHROMBIN TIME: 13.2 SEC (ref 11.8–14.6)

## 2020-07-05 PROCEDURE — 97110 THERAPEUTIC EXERCISES: CPT

## 2020-07-05 PROCEDURE — 1180000000 HC REHAB R&B

## 2020-07-05 PROCEDURE — 97530 THERAPEUTIC ACTIVITIES: CPT

## 2020-07-05 PROCEDURE — 6370000000 HC RX 637 (ALT 250 FOR IP): Performed by: INTERNAL MEDICINE

## 2020-07-05 PROCEDURE — 97535 SELF CARE MNGMENT TRAINING: CPT

## 2020-07-05 PROCEDURE — 36415 COLL VENOUS BLD VENIPUNCTURE: CPT

## 2020-07-05 PROCEDURE — 94660 CPAP INITIATION&MGMT: CPT

## 2020-07-05 PROCEDURE — 2700000000 HC OXYGEN THERAPY PER DAY

## 2020-07-05 PROCEDURE — 99231 SBSQ HOSP IP/OBS SF/LOW 25: CPT | Performed by: INTERNAL MEDICINE

## 2020-07-05 PROCEDURE — 94761 N-INVAS EAR/PLS OXIMETRY MLT: CPT

## 2020-07-05 PROCEDURE — 85610 PROTHROMBIN TIME: CPT

## 2020-07-05 PROCEDURE — 97116 GAIT TRAINING THERAPY: CPT

## 2020-07-05 RX ORDER — WARFARIN SODIUM 5 MG/1
5 TABLET ORAL
Status: COMPLETED | OUTPATIENT
Start: 2020-07-05 | End: 2020-07-05

## 2020-07-05 RX ADMIN — ASPIRIN 81 MG: 81 TABLET, COATED ORAL at 07:02

## 2020-07-05 RX ADMIN — PANTOPRAZOLE SODIUM 20 MG: 20 TABLET, DELAYED RELEASE ORAL at 07:02

## 2020-07-05 RX ADMIN — MULTIPLE VITAMINS W/ MINERALS TAB 1 TABLET: TAB at 07:02

## 2020-07-05 RX ADMIN — SOTALOL HYDROCHLORIDE 40 MG: 80 TABLET ORAL at 07:20

## 2020-07-05 RX ADMIN — MELATONIN 1000 UNITS: at 07:02

## 2020-07-05 RX ADMIN — DOCUSATE SODIUM 100 MG: 100 CAPSULE, LIQUID FILLED ORAL at 07:02

## 2020-07-05 RX ADMIN — WARFARIN SODIUM 5 MG: 5 TABLET ORAL at 16:36

## 2020-07-05 RX ADMIN — SOTALOL HYDROCHLORIDE 40 MG: 80 TABLET ORAL at 21:51

## 2020-07-05 ASSESSMENT — PAIN SCALES - GENERAL
PAINLEVEL_OUTOF10: 0

## 2020-07-05 ASSESSMENT — PAIN SCALES - WONG BAKER: WONGBAKER_NUMERICALRESPONSE: 0

## 2020-07-05 NOTE — PLAN OF CARE
Problem: Falls - Risk of:  Goal: Will remain free from falls  Description: Will remain free from falls  7/5/2020 0408 by Martin Jones RN  Outcome: Ongoing  Note: Pt remained absent from falls. Call light within reach. Bed locked and in lowest position. Problem: Falls - Risk of:  Goal: Absence of physical injury  Description: Absence of physical injury  7/5/2020 0408 by Martin Jones RN  Outcome: Ongoing  Note: Patient remains free of injury. safe environment maintained       Problem: SAFETY  Goal: Free from accidental physical injury  7/5/2020 0408 by Martin Jones RN  Outcome: Ongoing  Note: Patient remains free of injury. safe environment maintained       Problem: SAFETY  Goal: Free from intentional harm  7/5/2020 0408 by Martin Jones RN  Outcome: Ongoing  Note: Remains free of intentional harm     Problem: PAIN  Goal: Patient's pain/discomfort is manageable  7/5/2020 0408 by Martin Jones RN  Outcome: Ongoing  Note: Patient denies pain at this time. Will continue to monitor      Problem: SKIN INTEGRITY  Goal: Skin integrity is maintained or improved  7/5/2020 0408 by Martin Jones RN  Outcome: Ongoing  Note: Pt skin integrity remained intact, no new alterations noted. Head to toe completed, see chart assessment. Problem: Pain:  Goal: Pain level will decrease  Description: Pain level will decrease  7/5/2020 0408 by Martin Jones RN  Outcome: Ongoing  Note: Patient denies pain at this time. Will continue to monitor      Problem: Pain:  Goal: Control of acute pain  Description: Control of acute pain  7/5/2020 0408 by Martin Jones RN  Outcome: Ongoing  Note: Patient denies pain at this time. Will continue to monitor      Problem: Pain:  Goal: Control of chronic pain  Description: Control of chronic pain  7/5/2020 0408 by Martin Jones RN  Outcome: Ongoing  Note: Patient denies pain at this time.  Will continue to monitor      Problem: Nutrition  Goal: Optimal nutrition therapy  7/5/2020 0408 by Kateryna Sol RN  Outcome: Ongoing  Note: Adequate nutrition maintained      Problem: Skin Integrity:  Goal: Will show no infection signs and symptoms  Description: Will show no infection signs and symptoms  7/5/2020 0408 by Kateryna Sol RN  Outcome: Ongoing  Note: No new s/s of infection. Will continue to monitor        Problem: Skin Integrity:  Goal: Absence of new skin breakdown  Description: Absence of new skin breakdown  7/5/2020 0408 by Kateryna Sol RN  Outcome: Ongoing  Note: Pt skin integrity remained intact, no new alterations noted. Head to toe completed, see chart assessment.

## 2020-07-05 NOTE — PROGRESS NOTES
Physical Therapy  Facility/Department: [facility] ACUTE REHAB  Daily Treatment Note  NAME: Merleen Ahumada  : 1950  MRN: [id number]    Date of Service: 2020    Discharge Recommendations:  Home with Home health PT        Assessment   Body structures, Functions, Activity limitations: Decreased functional mobility ; Decreased strength;Decreased safe awareness;Decreased endurance;Decreased balance  Specific instructions for Next Treatment: Monitor sao2 level, increase ambulation as able. Avoid pushing/pulling and overhaed activity with L UE due to new pacemaker insertion. Prognosis: Good  Barriers to Learning: none known  REQUIRES PT FOLLOW UP: Yes  Activity Tolerance  Activity Tolerance: Patient Tolerated treatment well;Patient limited by fatigue;Patient limited by endurance     Patient Diagnosis(es): There were no encounter diagnoses. has a past medical history of ALS (amyotrophic lateral sclerosis) ([facility] [address] Aortic root aneurysm ([facility] [address] Aortic valve replaced, and Hypertension. has a past surgical history that includes Parathyroid gland surgery (); hernia repair; and Cardioversion (2020). Restrictions  Restrictions/Precautions  Restrictions/Precautions: General Precautions, Fall Risk, Surgical Protocols, Cardiac( Pacemaker Implant; ALS; Bilateral AFOs  )  Required Braces or Orthoses?: Yes  Required Braces or Orthoses  Right Lower Extremity Brace: Ankle Foot Orthotics  Left Lower Extremity Brace: Letty Foot Orthotics  Position Activity Restriction  Other position/activity restrictions: New Pacemaker precautions; Subjective   General  Chart Reviewed: (P) Yes  Additional Pertinent Hx: (P)  History of Amyotrphic Lateral Sclerosis,  Pacemaker placement 2020; Aortic Root Aneursim; Aortic Valve Replacement   Response To Previous Treatment: (P) Patient with no complaints from previous session.   Family / Caregiver Present: (P) No  Referring Practitioner: (P) Dr Hurtado   Subjective  Subjective: Pt brought to P.T gym, report sleeping poorly during the night d/t Bipap being uncomfortable. Agreeable to therapy. General Comment  Comments: Pacemaker Precautions: L side, AVOID PUSHING/PULLING AND OVERHEAD ACTIVITY TO L UE. Pain Screening  Patient Currently in Pain: (P) Yes  Pain Assessment  Pain Assessment: 0-10  Pain Level: 0  Response to Pain Intervention: Patient Satisfied  Vital Signs  Patient Currently in Pain: (P) Yes  Oxygen Therapy  SpO2: 96 %  Pulse Oximeter Device Mode: Intermittent  Pulse Oximeter Device Location: Finger  O2 Device: Nasal cannula(Nasal cannula brought to dept, crystal treatment w/o O2)       Orientation     Cognition      Objective   Bed mobility  Scooting: Stand by assistance(scooting along EOM)  Transfers  Sit to Stand: Contact guard assistance  Stand to sit: Contact guard assistance  Bed to Chair: Contact guard assistance  Stand Pivot Transfers: Minimal Assistance  Comment: Pt utilizes Rollator for transfers with good carryover with hand placement and making sure brakes are locked for Sit<>Stand. Ambulation  Ambulation?: Yes  More Ambulation?: Yes  Ambulation 1  Surface: level tile  Device: Rollator  Other Apparatus: AFO; Left;Right  Assistance: Minimal assistance;Contact guard assistance(Min A d/t increased fatigue with exertion)  Quality of Gait: Narrow FAUSTINO, slow pace  Gait Deviations: Slow Agueda;Decreased step length;Decreased step height  Distance: 125ft  w/ 2 standing rest break   Comments: I/S pt to take break PRN, pt has awareness of postural correction. Ambulation 2  Surface - 2: level tile  Device 2: Rollator  Other Apparatus 2: AFO; Left;Right  Assistance 2: Contact guard assistance  Quality of Gait 2: hunched trunk posture, narrow FAUSTINO, almost crosses midline, B knees hyperextend L>R  Gait Deviations: Slow Agueda;Decreased step length;Decreased step height  Distance: 75 ft x 2(2 standing rest break rest -pt's SpO2 94-96% and HR 70)  Stairs/Curb  Stairs?: No

## 2020-07-05 NOTE — PROGRESS NOTES
Formerly [facility], [facility] Internal Medicine    CONSULTATION / HISTORY AND PHYSICAL EXAMINATION            Date:   7/5/2020  Patient name:  Tracey Alonso  Date of admission:  7/2/2020  9:58 PM  MRN:   [id number]  Account:  [de-identified]  YOB: 1950  PCP:    Wilian Barcenas MD  Room:   [de-identified]  Code Status:    Full Code    Physician Requesting Consult: Marylee Rival, MD    Reason for Consult: Medical management    Chief Complaint:     No chief complaint on file. ALS    History Obtained From:     patient, electronic medical record    History of Present Illness/ Interval History:   49-year-old with known ALS,  diagnosed 2 years ago, paroxysmal A. Fib on Coumadin, status post pacemaker placement for management of tachybradycardia syndrome. Engaging with PT OT, overall improving. Past Medical History:     Past Medical History:   Diagnosis Date    ALS (amyotrophic lateral sclerosis) ([facility] [address]     Aortic root aneurysm ([facility] [address] 11/08/2016    Aortic valve replaced 11/08/2016    Hypertension         Past Surgical History:     Past Surgical History:   Procedure Laterality Date    CARDIOVERSION  06/27/2020    w/    HERNIA REPAIR      PARATHYROID GLAND SURGERY  1980        Medications Prior to Admission:     Prior to Admission medications    Medication Sig Start Date End Date Taking?  Authorizing Provider   fluticasone (FLONASE) 50 MCG/ACT nasal spray 1 spray by Nasal route daily  Patient taking differently: 1 spray by Nasal route daily as needed for Rhinitis or Allergies  3/31/20   Wilian Barcenas MD   Handicap Placard MISC by Does not apply route Dx: spinal stenosis, ALS    Duration: 5 years till 2/25/2020 2/25/20   Wilian Barcenas MD   SPIRULINA PO Take by mouth    Historical Provider, MD   vitamin D (CHOLECALCIFEROL) 1000 UNIT TABS tablet Take 1,000 Units by mouth daily    Historical Provider, MD   Vitamin E 100 UNITS TABS Take 60 Units by mouth daily    Historical Provider, MD   SELENIUM PO Take 75 mcg by mouth daily    Historical Provider, MD   SOYA LECITHIN PO Take 1,000 mg by mouth daily    Historical Provider, MD   aspirin 81 MG tablet Take 81 mg by mouth three times a week     Historical Provider, MD   acetaminophen (TYLENOL) 325 MG tablet Take 650 mg by mouth every 6 hours as needed for Pain    Historical Provider, MD   Multiple Vitamins-Minerals (THERAPEUTIC MULTIVITAMIN-MINERALS) tablet Take 1 tablet by mouth daily    Historical Provider, MD   OMEGA-3 FATTY ACIDS-VITAMIN E PO Take 1 tablet by mouth daily    Historical Provider, MD   Probiotic Product (PROBIOTIC PO) Take 1 tablet by mouth daily    Historical Provider, MD   Ascorbic Acid (VITAMIN C) 500 MG tablet Take 120 mg by mouth daily    Historical Provider, MD        Allergies:     Ciprofloxacin; Lasix [furosemide]; Penicillins; Sulfa antibiotics; and Duloxetine    Social History:     Tobacco:    reports that he has never smoked. He has never used smokeless tobacco.  Alcohol:      reports current alcohol use of about 1.0 standard drinks of alcohol per week. Drug Use:  reports no history of drug use. Family History:     Family History   Problem Relation Age of Onset    Lung Cancer Mother     Heart Disease Father        Review of Systems:     Positive and Negative as described in HPI. Constitutional: Negative for chills, fatigue, fever and unexpected weight change. HENT: Negative for ear discharge, facial swelling, nosebleeds, sore throat, trouble swallowing and voice change. Eyes: Negative for redness and visual disturbance. Respiratory: Negative for cough, shortness of breath and wheezing. Cardiovascular: Negative for chest pain, palpitations and leg swelling. Gastrointestinal: Negative for abdominal pain, blood in stool, diarrhea and vomiting. Genitourinary: Negative for difficulty urinating, dysuria and flank pain. Musculoskeletal: Negative for joint swelling.    Skin: Negative for color 05/02/2019    Rheumatic aortic valve insufficiency [I06.1] 05/02/2019    [facility] [G12.21] 10/02/2018    S/P CABG (coronary artery bypass graft) [Z95.1] 11/11/2016       Plan: Active Problems:    ALS (amyotrophic lateral sclerosis) (HCC)    Paroxysmal atrial fibrillation (HCC)    Rheumatic aortic valve insufficiency    Weakness of both lower extremities    S/P CABG (coronary artery bypass graft)    Tachy-ralf syndrome (HCC)    Anticoagulated on Coumadin    History of permanent cardiac pacemaker placement 7/1/2020:      Cardiac abnormality    Severe malnutrition ([facility] [address]  Resolved Problems:    * No resolved hospital problems. *        1. Paroxysmal atrial flutter-currently in sinus rhythm, on  sotalol, Coumadin was started yesterday, INR 1, patient does not for bridging due to recent bleed around the time of pacemaker placement. 2. Tachybradycardia syndrome-currently heart rate controlled. 3. ALS-stable. 4. History of coronary artery disease status post CABG and aortic valve replacement-lately asymptomatic  5. Chronic respiratory failure with moderate hypercapnia secondary to ALS-continue with BiPAP    7/5- doing well, improving with PT/OT. Continue current management. Consultations:   IP CONSULT TO RESPIRATORY CARE  IP CONSULT TO INTERNAL MEDICINE  IP CONSULT TO DIETITIAN  IP CONSULT TO SOCIAL WORK  IP CONSULT TO RECREATION THERAPY  IP CONSULT TO CARDIOLOGY  PHARMACY TO Baltazar Rain MD  7/5/2020  12:11 PM    Copy sent to Dr. Anika Mcclure MD    Please note that this chart was generated using voice recognition Dragon dictation software. Although every effort was made to ensure the accuracy of this automated transcription, some errors in transcription may have occurred.

## 2020-07-05 NOTE — PROGRESS NOTES
NUTRITION NOTE    Pt requesting gluten-free diet. Diet order revised to 3-4 gm Na, Gluten-Free, Dysphagia Soft and Bite-sized. Brittani Jaime R.D., L.LUPILLO.   Phone: [phone]

## 2020-07-05 NOTE — PROGRESS NOTES
[address]   ACUTE REHABILITATION OCCUPATIONAL THERAPY  DAILY NOTE    Date: 20  Patient Name: Dana Hearn      Room: 6201/1062-12    MRN: [id number]   : 1950  (65 y.o.)  Gender: male   Referring Practitioner: Dr Petar Mendez   Diagnosis: Cardiac abnormality;  history of Amyotrophic Lateral Sclerosis   Additional Pertinent Hx: History of Amyotrphic Lateral Sclerosis,  Pacemaker placement 2020; Aortic Root Aneursim; Aortic Valve Replacement     Assessment  Performance deficits / Impairments: Decreased functional mobility ; Decreased safe awareness;Decreased endurance;Decreased coordination;Decreased ADL status; Decreased strength  Assessment: Pt progressing well towards OT goals. Improved performance noted with ADLs, functional transfers, and functional mobility   Prognosis: Fair  Activity Tolerance: Patient Tolerated treatment well;Patient limited by fatigue  Activity Tolerance: Pt SPO2 94 seated EOB with 2 L nasal cannula. Able to maintain 94 without nasal cannula. Pt dropped to 92 after completing functional mobility to bathroom. Able to recover to 95 with seated rest break. Pt also became SOB after completing sink-side ADLs. Nasal cannula donned and O2 sats measured at 94. RN notified  Safety Devices in place: Yes  Type of devices: Call light within reach;Nurse notified; Patient at risk for falls; Left in chair  Restraints  Initially in place: No      Restrictions  Restrictions/Precautions: General Precautions, Fall Risk, Surgical Protocols, Cardiac( Pacemaker Implant; ALS; Bilateral AFOs  )  Other position/activity restrictions: New Pacemaker precautions; Required Braces or Orthoses  Right Lower Extremity Brace: Ankle Foot Orthotics  Left Lower Extremity Brace: Letty Foot Orthotics  Required Braces or Orthoses?: Yes  Equipment Used: Bed, Wheelchair    Subjective  Subjective: Pt reported difficulty sleeping last night due to reported poor BIPAP mask fit and beeping noises. Respiratory and RN notified. Comments: Pt supine in bed upon arrival, agreeable to participate. Pt pleasant and cooperative throughout. Patient Currently in Pain: Denies  Pain Level: 0    Restrictions/Precautions: General Precautions; Fall Risk;Surgical Protocols;Cardiac( Pacemaker Implant; ALS; Bilateral AFOs  )  Overall Orientation Status: Within Functional Limits  Orientation Level: Oriented X4       Objective  Cognition  Overall Cognitive Status: Exceptions  Arousal/Alertness: Appropriate responses to stimuli  Following Commands: Follows multistep commands with increased time; Follows one step commands consistently  Attention Span: Appears intact  Memory: Appears intact  Safety Judgement: Good awareness of safety precautions;Decreased awareness of need for assistance  Problem Solving: Assistance required to generate solutions;Assistance required to implement solutions  Insights: Decreased awareness of deficits  Initiation: Does not require cues  Sequencing: Does not require cues    Balance  Sitting Balance: Supervision(intermittent 1 UE support on bed while seated EOB without posterior support)  Standing Balance: Contact guard assistance     Bed mobility  Rolling to Right: Stand by assistance  Supine to Sit: Stand by assistance(HOB elevated)  Scooting: Stand by assistance  Comment: AM: Pt requires increased time. Demo'd appropriate technique and use of bed rail without verbal cueing. Transfers  Sit to stand: Contact guard assistance  Stand to sit: Contact guard assistance  Transfer Comments: AM: Pt demo'd appropriate hand/foot placement. Pt required 1 v/c to initiate transfer from bed with BUE. Pt with mild unsteadiness while transitioning BUE from bed to Rolator. No LOB.      Functional Mobility  Functional - Mobility Device: 4-Wheeled Walker  Activity: To/from bathroom  Assist Level: Contact guard assistance  Functional Mobility Comments: AM: Pt with anteriorly shifted posture while completing functional mobility. No verbal cueing required to attend to floor obstacles. 1 near posterior LOB, writer provided physical assist to correct. Pt also completed functional mobility down hallway with emphasis on fatigue management and taking rest breaks as necessary. Pt demo'd appropriate utilization of Rolator while turning to sit in seated. Mildly unsteady while turning 360 to sit on seated, no LOB. Toilet Transfers  Toilet - Technique: Stand pivot  Equipment Used: Grab bars  Toilet Transfer: Contact guard assistance  Toilet Transfers Comments: AM: Pt educated on use of grab bar to complete ADL transfers, good return. No verbal cueing required to lock Rolator brakes before transitioning to grab bar. Transition from stand > sit was slow and controlled. No LOB         ADL  Feeding: Increased time to complete;Verbal cueing;Setup; Adaptive utensils (comment)(built up spoon handle. Pt requires requiring increased time but is able to manage.)  Grooming: Stand by assistance;Supervision; Increased time to complete(pt completed oral care, hair grooming, and face hygiene. Writer provided environmental set-up to promote independence and safety. )  UE Bathing: Stand by assistance; Increased time to complete(BUE AROM and coordination sufficient to reach all parts. SBA due to mild instability while reaching across midline for flank and axillary regions.)  LE Bathing: Minimal assistance(assist for pericare while standing sink-side. Pt limited by decreased dynamic standing balance)  UE Dressing: Stand by assistance(Pt donned T-shirt seated in w/c with posterior support. BUE AROM and coordination sufficient to thread BUE, pull overhead, and adjust around trunk. No LOB or changes in seated balance throughout.)  LE Dressing: Moderate assistance(Pt donned pants, socks, shoes, and AFO. Pt required assist to tie shoes bilaterally. Pt also required assist to thread LLE.  CGA while standing sink-side to pull pants around waist. )  Toileting: Minimal assistance(minimal assist for standing balance while pt adjusted clothing before/after.)  Additional Comments: ADLs completed AM seated sink-side in w/c. Pt declined shower this date. Patient Education:  Patient Goals   Patient goals : Return home - able to care for self and not fall   Learner:patient  Method: demonstration and explanation       Outcome: demonstrated understanding and asked questions   OT Education  OT Education: OT Role;Energy Conservation;Transfer Training  Barriers to Learning: none    Plan  Plan  Times per week: 900/7   Times per day: Twice a day  Current Treatment Recommendations: Strengthening, Functional Mobility Training, Endurance Training, Pain Management, Safety Education & Training, Patient/Caregiver Education & Training, Positioning, Self-Care / ADL  Patient Goals   Patient goals : Return home - able to care for self and not fall   Short term goals  Time Frame for Short term goals: 1 week   Short term goal 1: Perform UB self care after setup utilizing modified techniques and enlarged  for tools   Short term goal 2: Min Assist for Shaving; cuing for UB dressing;   Mod Assist for LB bathing and dressing   Short term goal 3: D/V understanding of Respiratory Rehab strategies to increase tolerance to care tasks - Pacing/phasing, supportive postures,  air exchange    Short term goal 4: Tolerate 10-25 minutes of generalized strengthening within precautions to support care needs    Long term goals  Time Frame for Long term goals : By Discharge   Long term goal 1: Mod I for UB feeding, grooming, bathing and dressing tasks using modified tehcniques and devices   Long term goal 2: SBA for LB bathing and dressing tasks   Long term goal 3: Apply energy conservation and breathing strategies to ADL tasks to support saf independence    Long term goal 4: D/V understanding of Home Program for Home safety, Hand strengthening (intrinsic), device availalbility         07/05/20 7392   OT Individual Minutes   Time In 0732   Time Out 0830   Minutes 58   Time Code Minutes    Timed Code Treatment Minutes 58 Minutes     Electronically signed by Murphy Palacios OT on 7/5/20 at 12:18 PM EDT

## 2020-07-06 LAB
INR BLD: 1
PROTHROMBIN TIME: 13.5 SEC (ref 11.8–14.6)

## 2020-07-06 PROCEDURE — 85610 PROTHROMBIN TIME: CPT

## 2020-07-06 PROCEDURE — 6370000000 HC RX 637 (ALT 250 FOR IP): Performed by: INTERNAL MEDICINE

## 2020-07-06 PROCEDURE — 97116 GAIT TRAINING THERAPY: CPT

## 2020-07-06 PROCEDURE — 1180000000 HC REHAB R&B

## 2020-07-06 PROCEDURE — 94761 N-INVAS EAR/PLS OXIMETRY MLT: CPT

## 2020-07-06 PROCEDURE — 99232 SBSQ HOSP IP/OBS MODERATE 35: CPT | Performed by: PHYSICAL MEDICINE & REHABILITATION

## 2020-07-06 PROCEDURE — 99232 SBSQ HOSP IP/OBS MODERATE 35: CPT | Performed by: INTERNAL MEDICINE

## 2020-07-06 PROCEDURE — 36415 COLL VENOUS BLD VENIPUNCTURE: CPT

## 2020-07-06 PROCEDURE — 94660 CPAP INITIATION&MGMT: CPT

## 2020-07-06 PROCEDURE — 97110 THERAPEUTIC EXERCISES: CPT

## 2020-07-06 PROCEDURE — 97530 THERAPEUTIC ACTIVITIES: CPT

## 2020-07-06 RX ORDER — WARFARIN SODIUM 5 MG/1
5 TABLET ORAL
Status: COMPLETED | OUTPATIENT
Start: 2020-07-06 | End: 2020-07-06

## 2020-07-06 RX ORDER — POLYETHYLENE GLYCOL 3350 17 G/17G
17 POWDER, FOR SOLUTION ORAL DAILY PRN
Status: DISCONTINUED | OUTPATIENT
Start: 2020-07-06 | End: 2020-07-13 | Stop reason: HOSPADM

## 2020-07-06 RX ORDER — DOCUSATE SODIUM 100 MG/1
100 CAPSULE, LIQUID FILLED ORAL PRN
Status: DISCONTINUED | OUTPATIENT
Start: 2020-07-06 | End: 2020-07-13 | Stop reason: HOSPADM

## 2020-07-06 RX ADMIN — MELATONIN 1000 UNITS: at 07:22

## 2020-07-06 RX ADMIN — PANTOPRAZOLE SODIUM 20 MG: 20 TABLET, DELAYED RELEASE ORAL at 07:22

## 2020-07-06 RX ADMIN — SOTALOL HYDROCHLORIDE 40 MG: 80 TABLET ORAL at 09:30

## 2020-07-06 RX ADMIN — MULTIPLE VITAMINS W/ MINERALS TAB 1 TABLET: TAB at 07:22

## 2020-07-06 RX ADMIN — WARFARIN SODIUM 5 MG: 5 TABLET ORAL at 17:36

## 2020-07-06 RX ADMIN — ASPIRIN 81 MG: 81 TABLET, COATED ORAL at 07:22

## 2020-07-06 RX ADMIN — SOTALOL HYDROCHLORIDE 40 MG: 80 TABLET ORAL at 21:45

## 2020-07-06 RX ADMIN — DOCUSATE SODIUM 100 MG: 100 CAPSULE, LIQUID FILLED ORAL at 07:22

## 2020-07-06 ASSESSMENT — PAIN SCALES - WONG BAKER
WONGBAKER_NUMERICALRESPONSE: 0

## 2020-07-06 ASSESSMENT — PAIN SCALES - GENERAL
PAINLEVEL_OUTOF10: 5
PAINLEVEL_OUTOF10: 0

## 2020-07-06 NOTE — PROGRESS NOTES
[address] [facility]    ACUTE REHABILITATION OCCUPATIONAL THERAPY  DAILY NOTE    Date: 20  Patient Name: Gaurang Rene      Room: 4995/2934-36    MRN: [id number]   : 1950  (65 y.o.)  Gender: male   Referring Practitioner: Dr Jorge Doe   Diagnosis: Cardiac abnormality;  history of Amyotrophic Lateral Sclerosis        Restrictions  Restrictions/Precautions: General Precautions, Fall Risk, Surgical Protocols, Cardiac( Pacemaker Implant; ALS; Bilateral AFOs  )  Other position/activity restrictions: New Pacemaker precautions; Required Braces or Orthoses  Right Lower Extremity Brace: Ankle Foot Orthotics  Left Lower Extremity Brace: Letty Foot Orthotics  Required Braces or Orthoses?: Yes  Equipment Used: Bed, Wheelchair    Subjective              Pain Assessment  Pain Assessment: 0-10  Pain Level: 5  Patient's Stated Pain Goal: No pain  Pain Type: Chronic pain  Pain Location: Neck  Pain Descriptors: Aching    Objective  Cognition  Safety Judgement: Decreased awareness of need for assistance  Problem Solving: Assistance required to generate solutions;Assistance required to implement solutions  Insights: Decreased awareness of deficits  Cognition Comment: Parquetry tanagram task for problem sovling and plaanning, error recognition   Balance  Sitting Balance: Supervision  Standing Balance: Contact guard assistance  Bed mobility  Rolling to Right: Stand by assistance  Supine to Sit: Supervision  Scooting: Stand by assistance  Transfers  Stand Pivot Transfers: Moderate assistance     Toilet Transfers  Toilet - Technique: Stand pivot  Equipment Used: Raised toilet seat with rails  Toilet Transfer: Minimal assistance  Shower Transfers  Shower - Transfer From: Wheelchair  Shower - Transfer Type: To and From  Shower - Transfer To:  Transfer tub bench  Shower - Technique: Stand pivot  Shower Transfers: Minimal assistance  Shower Transfers Comments: Used firm, wall mounted grab bars during transfer   Steffi Johnson Motor: Juxacisor for motor control, flexibility and motor planning   Fine Motor: Large Beaded Pags for , manipulation and strengthening   Type of ROM/Therapeutic Exercise  Comment: hand coordination, strengthening and functional skills                         ADL  Feeding: Increased time to complete;Verbal cueing;Setup; Adaptive utensils (comment)(Uses alternative  and motor patterns )  Grooming: Stand by assistance;Supervision; Increased time to complete(shaved at sink, oral care , hair care )  UE Bathing: Stand by assistance; Increased time to complete  LE Bathing: Stand by assistance(seated shower  with hand held shower and long handled sponge )  UE Dressing: Contact guard assistance  LE Dressing: Moderate assistance  Toileting: Minimal assistance  Additional Comments: Showered today           Assessment     Activity Tolerance: Patient limited by fatigue  Activity Tolerance: Needs some cues for therapuetic rests, maintaining O2 in 90's; dicussed techniques to blow off CO2 with positive results              Patient Education:  Patient Goals   Patient goals : Return home - able to care for self and not fall   Learner:patient  Method: demonstration and explanation       Outcome: verbalized concerns and asked questions   OT Education  OT Education: Energy Conservation; ADL Adaptive Strategies;Precautions;Transfer Training    Plan  Plan  Times per week: 900/7   Times per day: Twice a day  Current Treatment Recommendations: Strengthening, Functional Mobility Training, Endurance Training, Pain Management, Safety Education & Training, Patient/Caregiver Education & Training, Positioning, Self-Care / ADL  Patient Goals   Patient goals : Return home - able to care for self and not fall   Short term goals  Time Frame for Short term goals: 1 week   Short term goal 1: Perform UB self care after setup utilizing modified techniques and enlarged  for tools   Short term goal 2: Min Assist for Shaving; cuing for UB dressing;   Mod Assist for LB bathing and dressing   Short term goal 3: D/V understanding of Respiratory Rehab strategies to increase tolerance to care tasks - Pacing/phasing, supportive postures,  air exchange    Short term goal 4: Tolerate 10-25 minutes of generalized strengthening within precautions to support care needs    Long term goals  Time Frame for Long term goals : By Discharge   Long term goal 1: Mod I for UB feeding, grooming, bathing and dressing tasks using modified tehcniques and devices   Long term goal 2: SBA for LB bathing and dressing tasks   Long term goal 3: Apply energy conservation and breathing strategies to ADL tasks to support saf independence    Long term goal 4: D/V understanding of Home Program for Home safety, Hand strengthening (intrinsic), device availalbility          07/06/20 0930 07/06/20 1400   OT Individual Minutes   Time In 0930 1400   Time Out [id number]   Minutes 60 45     Electronically signed by Alex Adame OT on 7/6/20 at 5:08 PM EDT

## 2020-07-06 NOTE — PROGRESS NOTES
Physical Medicine & Rehabilitation  Progress Note      Subjective:      65year-old gentleman with debility secondary to atrial flutter with RVR and comorbid ALS. Patient is well, and has had no acute complaints or problems    ROS:  Denies fevers, chills, sweats. No chest pain, palpitations, lightheadedness. Denies coughing, wheezing or shortness of breath. Denies abdominal pain, nausea, diarrhea or constipation. No new areas of joint pain. Denies new areas of numbness or weakness. Denies new anxiety or depression issues. No new skin problems. Rehabilitation:   Progressing in therapies. PT:  Restrictions/Precautions: General Precautions, Fall Risk, Surgical Protocols, Cardiac( Pacemaker Implant; ALS; Bilateral AFOs  )  Other position/activity restrictions: New Pacemaker precautions; Required Braces or Orthoses  Right Lower Extremity Brace: Ankle Foot Orthotics  Left Lower Extremity Brace: Letty Foot Orthotics   Transfers  Sit to Stand: Contact guard assistance  Stand to sit: Contact guard assistance  Bed to Chair: Contact guard assistance  Stand Pivot Transfers: Minimal Assistance  Lateral Transfers: Minimal Assistance  Comment: Pt utilizes Rollator for transfers with good carryover with hand placement and making sure brakes are locked for Sit<>Stand. Ambulation 1  Surface: level tile  Device: Rollator  Other Apparatus: AFO, Left, Right  Assistance: Contact guard assistance  Quality of Gait: Narrow FAUSTINO, slow pace, flexed posture   Gait Deviations: Slow Agueda, Decreased step length, Decreased step height  Distance: 100 ft, 120 ft (with small ascending ramp)   Comments: Seated rest break between ambulation d/t increased endurance required for ramp.      Transfers  Sit to Stand: Contact guard assistance  Stand to sit: Contact guard assistance  Bed to Chair: Contact guard assistance  Stand Pivot Transfers: Minimal Assistance  Lateral Transfers: Minimal Assistance  Comment: Pt utilizes Rollator for transfers with good carryover with hand placement and making sure brakes are locked for Sit<>Stand. Ambulation  Ambulation?: Yes  More Ambulation?: Yes  Ambulation 1  Surface: level tile  Device: Rollator  Other Apparatus: AFO, Left, Right  Assistance: Contact guard assistance  Quality of Gait: Narrow FAUSTINO, slow pace, flexed posture   Gait Deviations: Slow Agueda, Decreased step length, Decreased step height  Distance: 100 ft, 120 ft (with small ascending ramp)   Comments: Seated rest break between ambulation d/t increased endurance required for ramp. Surface: level tile  Ambulation 1  Surface: level tile  Device: Rollator  Other Apparatus: AFO, Left, Right  Assistance: Contact guard assistance  Quality of Gait: Narrow FAUSTINO, slow pace, flexed posture   Gait Deviations: Slow Agueda, Decreased step length, Decreased step height  Distance: 100 ft, 120 ft (with small ascending ramp)   Comments: Seated rest break between ambulation d/t increased endurance required for ramp. OT:  ADL  Feeding: Increased time to complete, Verbal cueing, Setup, Adaptive utensils (comment)(built up spoon handle. Pt requires requiring increased time but is able to manage.)  Grooming: Stand by assistance, Supervision, Increased time to complete(pt completed oral care, hair grooming, and face hygiene. Writer provided environmental set-up to promote independence and safety. )  UE Bathing: Stand by assistance, Increased time to complete(BUE AROM and coordination sufficient to reach all parts. SBA due to mild instability while reaching across midline for flank and axillary regions.)  LE Bathing: Minimal assistance(assist for pericare while standing sink-side. Pt limited by decreased dynamic standing balance)  UE Dressing: Stand by assistance(Pt donned T-shirt seated in w/c with posterior support. BUE AROM and coordination sufficient to thread BUE, pull overhead, and adjust around trunk.  No LOB or changes in seated balance throughout.)  LE Dressing: Moderate assistance(Pt donned pants, socks, shoes, and AFO. Pt required assist to tie shoes bilaterally. Pt also required assist to thread LLE. CGA while standing sink-side to pull pants around waist. )  Toileting: Minimal assistance(minimal assist for standing balance while pt adjusted clothing before/after.)  Additional Comments: ADLs completed AM seated sink-side in w/c. Pt declined shower this date. Balance  Sitting Balance: Supervision(intermittent 1 UE support on bed while seated EOB without posterior support)  Standing Balance: Contact guard assistance   Standing Balance  Time: ~5 minutes  Activity: self care tasks  Comment: Improved standing balance with hand-hold support   Functional Mobility  Functional - Mobility Device: 4-Wheeled Walker  Activity: To/from bathroom  Assist Level: Contact guard assistance  Functional Mobility Comments: AM: Pt with anteriorly shifted posture while completing functional mobility. No verbal cueing required to attent to floor obstacles. 1 near posterior LOB, writer provided physical assist to correct. Pt also completed functional mobility down hallway with emphasis on fatigue management and taking rest breaks as necessary. Pt demo'd appropriate utilization of rollator while turning to sit in seatd. Mildly unsteady while turning 360 to sit on seated, no LOB. Apparatus Needs  Apparatus Needs: AFO  Bed mobility  Rolling to Left: Unable to assess(Deferred- pt has new pace maker on Left side.)  Rolling to Right: Stand by assistance  Supine to Sit: Stand by assistance(HOB elevated)  Sit to Supine: Contact guard assistance  Scooting: Stand by assistance(scooting along EOM)  Comment: AM: Pt requires increased time. Demo'd appropriate technique and use of bed rail without verbal cueing. Transfers  Stand Pivot Transfers:  Moderate assistance  Sit to stand: Contact guard assistance  Stand to sit: Contact guard assistance  Transfer Comments: AM: Pt demo'd appropriate hand/foot placement. Pt required 1 v/c to initiate transfer from bed with BUE. Pt with mild unsteadiness while transitioning BUE from bed to rollator. No LOB. Toilet Transfers  Toilet - Technique: Stand pivot  Equipment Used: Grab bars  Toilet Transfer: Contact guard assistance  Toilet Transfers Comments: AM: Pt educated on use of grab bar to complete ADL transfers, good return. No verbal cueing required to lock rollator brakes before transitioning to grab bar. Transition from stand > sit was slow and controlled. No LOB         Wheelchair Bed Transfers  Wheelchair/Bed - Technique: Stand pivot  Equipment Used: Bed, Wheelchair  Level of Asssistance: Moderate assistance  Wheelchair Transfers Comments: For initial transfer had hospital footies on feet, but not his AFOs     SPEECH:  Pt. Reports he is tolerating his diet of soft and bite sized diet well and feels it is helping to conserve energy while eating. ST discussed c pt. Remaining on this diet when he goes home as well. Pt. Reports he continues diaphragmatic breathing exercises introduced by him to ST last week as well. Pt. Has no complaints or concerns at this time. No further ST recommended.       Objective:  /68   Pulse 77   Temp 97.7 °F (36.5 °C) (Axillary)   Resp 22   Ht 5' 10\" (1.778 m)   Wt 136 lb 11 oz (62 kg)   SpO2 99%   BMI 19.61 kg/m²       GEN: Well developed, thin gentleman, in NAD  HEENT:  NCAT.  PERRL.  EOMI.  Mucous membranes pink and moist.   PULM:  Clear to ausculation. No rales or rhonchi. Respirations WNL and unlabored. CV:  Regular rate rhythm.  No murmurs or gallops. Short shallow breaths improved today. GI:  Abdomen soft. Nontender. Non-distended.  BS + and equal.    NEUROLOGICAL: A&O x3. Sensation intact to light touch. MSK:  Functional ROM all extremities. Motor testing 4/5 key muscles all extremities.    SKIN: Warm dry and intact. Good turgor. Mild tenderness over L upper chest pacemaker site.   EXTREMITIES:  No calf tenderness to palpation. No edema BLEs. .    PSYCH: Mood WNL. Appropriately interactive. Affect WNL. Diagnostics:     CBC: No results for input(s): WBC, RBC, HGB, HCT, MCV, RDW, PLT in the last 72 hours. BMP: No results for input(s): NA, K, CL, CO2, PHOS, BUN, CREATININE, GLUCOSE in the last 72 hours. Invalid input(s): CA  BNP: No results for input(s): BNP in the last 72 hours. PT/INR:   Recent Labs     07/04/20  0617 07/05/20  0613 07/06/20  0634   PROTIME 13.4 13.2 13.5   INR 1.0 1.0 1.0     APTT: No results for input(s): APTT in the last 72 hours. CARDIAC ENZYMES: No results for input(s): CKMB, CKMBINDEX, TROPONINT in the last 72 hours. Invalid input(s): CKTOTAL;3  FASTING LIPID PANEL:  Lab Results   Component Value Date    CHOL 166 07/10/2019    HDL 40 07/10/2019    TRIG 62 07/10/2019     LIVER PROFILE: No results for input(s): AST, ALT, ALB, BILIDIR, BILITOT, ALKPHOS in the last 72 hours.      Current Medications:   Current Facility-Administered Medications: warfarin (COUMADIN) tablet 5 mg, 5 mg, Oral, Once  warfarin (COUMADIN) daily dosing (placeholder), , Other, RX Placeholder  senna (SENOKOT) tablet 17.2 mg, 2 tablet, Oral, Daily PRN  magnesium hydroxide (MILK OF MAGNESIA) 400 MG/5ML suspension 15 mL, 15 mL, Oral, Daily PRN  sodium chloride flush 0.9 % injection 10 mL, 10 mL, Intravenous, 2 times per day  sodium chloride flush 0.9 % injection 10 mL, 10 mL, Intravenous, PRN  acetaminophen (TYLENOL) tablet 650 mg, 650 mg, Oral, Q6H PRN **OR** acetaminophen (TYLENOL) suppository 650 mg, 650 mg, Rectal, Q6H PRN  aspirin EC tablet 81 mg, 81 mg, Oral, Daily  docusate sodium (COLACE) capsule 100 mg, 100 mg, Oral, Daily  HYDROcodone-acetaminophen (NORCO) 5-325 MG per tablet 1 tablet, 1 tablet, Oral, Q4H PRN **OR** HYDROcodone-acetaminophen (NORCO) 5-325 MG per tablet 2 tablet, 2 tablet, Oral, Q4H PRN  naloxone (NARCAN) injection 0.4 mg, 0.4 mg, Intravenous, PRN  pantoprazole (PROTONIX) tablet 20 mg, 20 mg, Oral, Daily  sotalol (BETAPACE) tablet 40 mg, 40 mg, Oral, BID  therapeutic multivitamin-minerals 1 tablet, 1 tablet, Oral, Daily  Vitamin D (CHOLECALCIFEROL) tablet 1,000 Units, 1,000 Units, Oral, Daily  bisacodyl (DULCOLAX) suppository 10 mg, 10 mg, Rectal, Daily PRN  polyethylene glycol (GLYCOLAX) packet 17 g, 17 g, Oral, Daily  ondansetron (ZOFRAN) tablet 4 mg, 4 mg, Oral, Q6H PRN      Impression/Plan:   Impaired ADLs, gait, and mobility due to:      1. Debility secondary to atrial flutter and bradycardia:  PT/OT for gait, mobility, strengthening, endurance, ADLs, and self care. S/p cardioversion and pacemaker implantation. On ASA, sotalol. Has Tylenol or Norco prn pain.  No elevation of L arm above shoulder for 30 days post-op.   2. Atrial flutter: On warfarin starting 7/3 with pharmacy to dose. Nursing confirmed with cardiology - no bridging due to bleeding from pacemaker site at [facility]. 3. ALS: weakness and weight loss  4. Chronic hypercapneic respiratory failure: Pulmonology consult reviewed and appreciated - recommending BIPAP and continuous pulse ox. 5. Bowel management: Recent impaction: miralax daily, dolace daily, has dulcolax prn. Will add senokot and milk of magnesia prn.   6. DVT Prophylaxis:  Coumadin with INR goal 2-3, SCD's while in bed and DEMAIRO's while in bed. INR 1 again today. Pharmacy continuing 5 mg.  7. Internal medicine for medical management    Electronically signed by Karen Velasquez MD on 7/6/2020 at 10:27 AM      This note is created with the assistance of a speech recognition program.  While intending to generate a document that actually reflects the content of the visit, the document can still have some errors including those of syntax and sound a like substitutions which may escape proof reading. In such instances, actual meaning can be extrapolated by contextual diversion.

## 2020-07-06 NOTE — PROGRESS NOTES
Nutrition Assessment    Type and Reason for Visit: Reassess    Nutrition Recommendations: Discontinue gluten-free restriction. Provide 3-4 gm Na, Dysphagia Soft and Bite-Sized diet. Discontiinue [employer] supplements- provide if requested. Nutrition Assessment: Pt states he would like gluten-free restriction cancelled from the diet order as he feels he can choose appropriately without the limits in place and diet was too restrictive. Pt does try to avoid added sugar, dairy, and gluten, but will try to select as best as possible from menu options. Malnutrition Assessment:  · Malnutrition Status: Meets the criteria for severe malnutrition  · Context: Acute illness or injury  · Findings of the 6 clinical characteristics of malnutrition (Minimum of 2 out of 6 clinical characteristics is required to make the diagnosis of moderate or severe Protein Calorie Malnutrition based on AND/ASPEN Guidelines):  1. Energy Intake-Less than or equal to 75% of estimated energy requirement, Greater than or equal to 3 months    2. Weight Loss-10% loss or greater, in 6 months  3. Fat Loss-Severe subcutaneous fat loss, Orbital, Fat overlying the ribs  4. Muscle Loss-Severe muscle mass loss, Temples (temporalis muscle), Clavicles (pectoralis and deltoids)  5. Fluid Accumulation-No significant fluid accumulation,    6.   Strength-Not measured    Nutrition Risk Level: High    Nutrient Needs:  · Estimated Daily Total Kcal: 8064-5948 kcal based on 33-35 kcal/kg of admission weight  · Estimated Daily Protein (g): 81-93 gm based on 1.3-1.5 gm/kg of admission weight    Nutrition Diagnosis:   · Problem: Severe malnutrition  · Etiology: related to Cognitive or neurological impairment, Insufficient energy/nutrient consumption     Signs and symptoms:  as evidenced by Diet history of poor intake, Weight loss greater than or equal to 10% in 6 months, Severe loss of subcutaneous fat, Severe muscle loss    Objective Information:  · Nutrition-Focused Physical Findings: No edema. · Wound Type: Surgical Wound  · Current Nutrition Therapies:  · Oral Diet Orders: Cardiac, Dysphagia  Soft and Bite-Sized (Dysphagia 3)   · Oral Diet intake: 51-75%, %  · Anthropometric Measures:  · Ht: 5' 10\" (177.8 cm)   · Current Body Wt: 136 lb 4.8 oz (61.8 kg)  · Admission Body Wt: 136 lb 4.8 oz (61.8 kg)  · Usual Body Wt: (170 lbs 1 yr. ago, 163 lbs 6 months ago)  · % Weight Change:  ,  17% in 6 months  · Ideal Body Wt: 166 lb (75.3 kg), % Ideal Body 82% based on admission weight  · BMI Classification: BMI 18.5 - 24.9 Normal Weight    Nutrition Interventions:   Continue current diet, Discontinue ONS(Provide oral nutrition supplements if requested)  Continued Inpatient Monitoring    Nutrition Evaluation:   · Evaluation: Progressing toward goals   · Goals: PO intake to meet % of estimated nutrition needs    · Monitoring: Meal Intake, Supplement Intake, Diet Tolerance, Skin Integrity, Weight, Pertinent Labs, Chewing/Swallowing, Monitor Hemodynamic Status, Monitor Bowel Function    Some areas of assessment may be incomplete due to standard COVID-19 Precautions. Rojelio Mares R.D., L.D.   Phone: [phone]

## 2020-07-06 NOTE — PLAN OF CARE
Problem: Falls - Risk of:  Goal: Will remain free from falls  7/6/2020 0424 by Curtistine Olszewski, RN  Outcome: Ongoing  Note: Pt remains free from falls this shift. Bed in lowest position with 2/4 siderails up and wheels locked. Call light and bedside table within reach. Nonskid socks on. Assistive devices within reach. Hourly rounding performed to ensure pt safety. Pt calls out appropriately for assistance. Will continue to monitor. 7/6/2020 0329 by Curtistine Olszewski, RN          7/5/2020 1507 by Bianca Quarles RN  Outcome: Ongoing  Goal: Absence of physical injury  7/6/2020 0424 by Curtistine Olszewski, RN  Outcome: Ongoing  7/6/2020 0329 by Curtistine Olszewski, RN  Outcome: Ongoing  7/5/2020 1507 by Bianca Quarles RN  Outcome: Ongoing     Problem: SAFETY  Goal: Free from accidental physical injury  7/6/2020 0424 by Curtistine Olszewski, RN  Outcome: Ongoing  7/6/2020 0329 by Curtistine Olszewski, RN  Outcome: Ongoing  7/5/2020 1507 by Bianca Quarles RN  Outcome: Ongoing  Goal: Free from intentional harm  7/6/2020 0424 by Curtistine Olszewski, RN  Outcome: Ongoing  7/6/2020 0329 by Curtistine Olszewski, RN  Outcome: Ongoing  7/5/2020 1507 by Bianca Quarles RN  Outcome: Ongoing     Problem: PAIN  Goal: Patient's pain/discomfort is manageable  7/6/2020 0424 by Curtistine Olszewski, RN  Outcome: Ongoing  7/6/2020 0329 by Curtistine Olszewski, RN  Outcome: Ongoing  Note: Full pain assessment completed this shift. Pt not complaining of pain at this time. Will continue to monitor.    7/5/2020 1507 by Bianca Quarles RN  Outcome: Ongoing     Problem: SKIN INTEGRITY  Goal: Skin integrity is maintained or improved  7/6/2020 0424 by Curtistine Olszewski, RN  Outcome: Ongoing  7/6/2020 0329 by Curtistine Olszewski, RN  Outcome: Ongoing  7/5/2020 1507 by Bianca Quarles RN  Outcome: Ongoing     Problem: Pain:  Goal: Pain level will decrease  7/6/2020 0424 by Curtistine Olszewski, RN  Outcome: Ongoing  7/6/2020 0329 by Curtistine Olszewski, RN  Outcome: Ongoing  7/5/2020 1507 by Bianca Quarles RN  Outcome:

## 2020-07-06 NOTE — PROGRESS NOTES
RN notified writer that pt is continuing to complain the pressure is too much. Pt reported the same issue when the machine was set to 12/6. I titrated the AVAPS volume to 450mL. Pt is tolerating fairly well.

## 2020-07-06 NOTE — PROGRESS NOTES
[facility]    CONSULTATION / HISTORY AND PHYSICAL EXAMINATION            Date:   7/6/2020  Patient name:  Judie Devries  Date of admission:  7/2/2020  9:58 PM  MRN:   [id number]  Account:  [de-identified]  YOB: 1950  PCP:    Suhas Isabel MD  Room:   [de-identified]  Code Status:    Full Code    Physician Requesting Consult: Ismael Ramos MD    Reason for Consult: Medical management    Chief Complaint:     No chief complaint on file. ALS    History Obtained From:     patient, electronic medical record    History of Present Illness/ Interval History:   55-year-old with known ALS,  diagnosed 2 years ago, paroxysmal A. Fib on Coumadin, status post pacemaker placement for management of tachybradycardia syndrome. Engaging with PT OT, overall improving. Using BiPAP overnight    Past Medical History:     Past Medical History:   Diagnosis Date    [facility]     Aortic root aneurysm (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  Authorizing Provider   fluticasone (FLONASE) 50 MCG/ACT nasal spray 1 spray by Nasal route daily  Patient taking differently: 1 spray by Nasal route daily as needed for Rhinitis or Allergies  3/31/20   Suhas Isabel MD   Handicap Placard MISC by Does not apply route Dx: spinal stenosis, ALS    Duration: 5 years till 2/25/2020 2/25/20   Suhas Isabel MD   SPIRULINA PO Take by mouth    Historical Provider, MD   vitamin D (CHOLECALCIFEROL) 1000 UNIT TABS tablet Take 1,000 Units by mouth daily    Historical Provider, MD   Vitamin E 100 UNITS TABS Take 60 Units by mouth daily    Historical Provider, MD   SELENIUM PO Take 75 mcg by mouth daily    Historical Provider, MD   SOYA LECITHIN PO Take 1,000 mg by mouth daily    Historical Provider, MD   aspirin 81 MG tablet Take 81 mg by mouth three times a week     Historical Provider, MD   acetaminophen (TYLENOL) 325 MG tablet Take 650 mg by mouth every 6 hours as needed for Pain    Historical Provider, MD   Multiple Vitamins-Minerals (THERAPEUTIC MULTIVITAMIN-MINERALS) tablet Take 1 tablet by mouth daily    Historical Provider, MD   OMEGA-3 FATTY ACIDS-VITAMIN E PO Take 1 tablet by mouth daily    Historical Provider, MD   Probiotic Product (PROBIOTIC PO) Take 1 tablet by mouth daily    Historical Provider, MD   Ascorbic Acid (VITAMIN C) 500 MG tablet Take 120 mg by mouth daily    Historical Provider, MD        Allergies:     Ciprofloxacin; Lasix [furosemide]; Penicillins; Sulfa antibiotics; and Duloxetine    Social History:     Tobacco:    reports that he has never smoked. He has never used smokeless tobacco.  Alcohol:      reports current alcohol use of about 1.0 standard drinks of alcohol per week. Drug Use:  reports no history of drug use. Family History:     Family History   Problem Relation Age of Onset    Lung Cancer Mother     Heart Disease Father        Review of Systems:     Positive and Negative as described in HPI. Constitutional: Negative for chills, fatigue, fever and unexpected weight change. HENT: Negative for ear discharge, facial swelling, nosebleeds, sore throat, trouble swallowing and voice change. Eyes: Negative for redness and visual disturbance. Respiratory: Positive for shortness of breath  Cardiovascular: Negative for chest pain, palpitations and leg swelling. Gastrointestinal: Negative for abdominal pain, blood in stool, diarrhea and vomiting. Genitourinary: Negative for difficulty urinating, dysuria and flank pain. Musculoskeletal: Negative for joint swelling.    Skin: Negative for color change and rash. Neurological: Weakness secondary to ALS, negative for dizziness, seizures, syncope and headaches. Hematological: Negative for adenopathy. Does not bruise/bleed easily. Physical Exam:     /68   Pulse 77   Temp 97.7 °F (36.5 °C) (Axillary)   Resp 22   Ht 5' 10\" (1.778 m)   Wt 136 lb 11 oz (62 kg)   SpO2 99%   BMI 19.61 kg/m²   Temp (24hrs), Av °F (36.7 °C), Min:97.7 °F (36.5 °C), Max:98.2 °F (36.8 °C)      General appearance:  alert, cooperative and no distress, thin build person  Eyes: Anicteric sclera. Pupils are equally round and reactive to light. Extraocular movements are intact.   Lungs:  clear to auscultation bilaterally, normal effort  Heart:  regular rate and rhythm, no murmur, pacemaker site insertion dry, clean  Abdomen:  soft, nontender, nondistended, normal bowel sounds, no masses, hepatomegaly, splenomegaly  Extremities:  no edema, redness, tenderness in the calves  Skin:  no gross lesions, rashes, induration  Neuro:  Alert, oriented X 3, chronic foot drop    Investigations:      Laboratory Testing:  Lab Results   Component Value Date    WBC 8.2 2020    HGB 15.8 2020    HCT 46.2 2020    .5 (H) 2020     2020     Lab Results   Component Value Date     2020    K 4.4 2020    CL 94 2020    CO2 35 2020    BUN 17 2020    CREATININE <0.40 2020    GLUCOSE 108 2020    CALCIUM 9.4 2020         Assessment :      Primary Problem  <principal problem not specified>    Active Hospital Problems    Diagnosis Date Noted    Severe malnutrition ([facility] [E43] 2020    Cardiac abnormality [Q24.9] 2020    History of permanent cardiac pacemaker placement 2020:   [Z95.0] 2020    Anticoagulated on Coumadin [Z79.01] 2020    Tachy-ralf syndrome ([facility] [I49.5] 2020    Weakness of both lower extremities [R29.898] 2019    Paroxysmal atrial fibrillation ([facility] [I48.0] 05/02/2019    Rheumatic aortic valve insufficiency [I06.1] 05/02/2019    ALS (amyotrophic lateral sclerosis) ([facility] [G12.21] 10/02/2018    S/P CABG (coronary artery bypass graft) [Z95.1] 11/11/2016       Plan: Active Problems:    ALS (amyotrophic lateral sclerosis) (HCC)    Paroxysmal atrial fibrillation (HCC)    Rheumatic aortic valve insufficiency    Weakness of both lower extremities    S/P CABG (coronary artery bypass graft)    Tachy-ralf syndrome (HCC)    Anticoagulated on Coumadin    History of permanent cardiac pacemaker placement 7/1/2020:      Cardiac abnormality    Severe malnutrition ([facility]  Resolved Problems:    * No resolved hospital problems. *        1. Paroxysmal atrial flutter-currently in sinus rhythm, on  sotalol, on Coumadin, INR 1, patient does not for bridging due to recent bleed around the time of pacemaker placement. Pharmacy consulted to dose Coumadin  2. Tachybradycardia syndrome-currently heart rate controlled, on Betapace status post pacemaker  3. ALS-stable. 4. History of coronary artery disease status post CABG and aortic valve replacement-lately asymptomatic  5. Chronic respiratory failure with moderate hypercapnia secondary to ALS-continue with BiPAP, pulmonologist following, will need outpatient sleep study    Consultations:   2021 Harvey [facility]. CONSULT TO INTERNAL MEDICINE  IP CONSULT TO DIETITIAN  IP CONSULT TO SOCIAL WORK  IP CONSULT TO RECREATION THERAPY  IP CONSULT TO CARDIOLOGY  PHARMACY TO DOSE WARFARIN  IP CONSULT TO PULMONOLOGY      Tino Rey MD  7/6/2020  3:56 PM    Copy sent to Dr. Stone Chandra MD    Please note that this chart was generated using voice recognition Dragon dictation software. Although every effort was made to ensure the accuracy of this automated transcription, some errors in transcription may have occurred.

## 2020-07-06 NOTE — PROGRESS NOTES
[facility]  Speech Language Pathology    Date: 7/6/2020  Patient Name: Sidney Barraza  YOB: 1950   AGE: 65 y.o. MRN: [id number]      Pt. Reports he is tolerating his diet of soft and bite sized diet well and feels it is helping to conserve energy while eating. ST discussed c pt. Remaining on this diet when he goes home as well. Pt. Reports he continues diaphragmatic breathing exercises introduced by him to ST last week as well. Pt. Has no complaints or concerns at this time. No further ST recommended. Completed by: Jose L Mead A.CCC/SLP

## 2020-07-06 NOTE — PROGRESS NOTES
Physical Therapy  Facility/Department: [facility]  Daily Treatment Note  NAME: Shanta Bey  : 1950  MRN: [id number]    Date of Service: 2020    Discharge Recommendations:  Home with Home health PT   PT Equipment Recommendations  Equipment Needed: No  Other: Patient reports he has rollator and wheelchair at home. Assessment   Body structures, Functions, Activity limitations: Decreased functional mobility ; Decreased strength;Decreased safe awareness;Decreased endurance;Decreased balance  Treatment Diagnosis: Difficulty walking, impaired tolerance to activity. History: Pt admitted due to tachycardia, abd porgressive shortness of breath. He has known comorbid ALS and has lost significant weight, including muscle mass, more so on his L side. He was cardioverted and had dual-chamber pacemaker implanted on 2020. Pt admitted t rehab unit on 20. PT Education: Energy Conservation; Injury Prevention;General Safety; Adaptive Device Training;Gait Training;Functional Mobility Training;Transfer Training;Pressure Relief  Barriers to Learning: none known  REQUIRES PT FOLLOW UP: Yes  Activity Tolerance  Activity Tolerance: Patient Tolerated treatment well;Patient limited by endurance     Patient Diagnosis(es): There were no encounter diagnoses. has a past medical history of ALS (amyotrophic lateral sclerosis) ([facility] Aortic root aneurysm ([facility] Aortic valve replaced, and Hypertension. has a past surgical history that includes Parathyroid gland surgery (); hernia repair; and Cardioversion (2020). Restrictions  Restrictions/Precautions  Restrictions/Precautions: General Precautions, Fall Risk, Surgical Protocols, Cardiac( Pacemaker Implant; ALS; Bilateral AFOs  )  Required Braces or Orthoses?: Yes  Required Braces or Orthoses  Right Lower Extremity Brace: Ankle Foot Orthotics  Left Lower Extremity Brace:  Letty Foot Orthotics  Position Activity Restriction  Other position/activity restrictions: New Pacemaker precautions; Subjective   General  Chart Reviewed: Yes  Additional Pertinent Hx:  History of Amyotrphic Lateral Sclerosis,  Pacemaker placement July 1st, 2020; Aortic Root Aneursim; Aortic Valve Replacement   Response To Previous Treatment: Patient with no complaints from previous session. Family / Caregiver Present: No  Referring Practitioner: Dr Osman Lyons: Patient pleasant and cooperative with therapy. Patient reports he had fall last night while ambulating to bathroom independently with rollator. He was able to get himself up and back to bed. Patient reports he \"self assessed and is fine. \" Patient did not notify clinical staff of fall until he reports to writer during PM session. RN notified of fall last night. General Comment  Comments: Pacemaker Precautions: L side, AVOID PUSHING/PULLING AND OVERHEAD ACTIVITY TO L UE. Patient on room air with O2 PRN per chart. SpO2 remains >92% with all activity on room air, HR 70-82 bpm.   Pain Screening  Patient Currently in Pain: Denies  Vital Signs  Patient Currently in Pain: Denies       Objective   Bed mobility  Rolling to Left: Unable to assess(Deferred- pt has new pace maker on Left side)  Rolling to Right: Stand by assistance  Supine to Sit: Supervision  Sit to Supine: Supervision  Scooting: Stand by assistance  Transfers  Sit to Stand: Contact guard assistance  Stand to sit: Contact guard assistance  Bed to Chair: Contact guard assistance  Comment: Patient occassionally out of sequence with rollator breaks and hand placement, self corrects and then re-demos safely. Ambulation  Ambulation?: Yes  Ambulation 1  Surface: level tile  Device: Rollator  Other Apparatus: AFO; Left;Right  Assistance: Contact guard assistance  Quality of Gait: Narrow FAUSTINO, slow pace, flexed posture   Gait Deviations: Slow Agueda;Decreased step length;Decreased step height  Distance: 100 ft, 120 ft (with small ascending ramp)   Comments: Seated rest break between ambulation d/t increased endurance required for ramp. Stairs/Curb  Stairs?: Yes  Stairs  # Steps : 6(3 steps x2, seated rest break between negotiations)  Stairs Height: 4\"  Rails: Bilateral  Assistance: Contact guard assistance  Comment: Patient tends to circumduct LLE to advance, steady with step to pattern, cues to place entire L/R foot onto step. SpO2 93-94% post steps, HR 80 bpm.      Other exercises  Other exercises?: Yes  Other exercises 1: (B) LE exerises x10-15 (RLE 1.5#, LLE A/AAROM)   Other exercises 2: Bed mobility x2  Other exercises 3: Patient performs steps forward with (B) handrails in AM, 3 steps x2 with seated rest break between negotiations  Other exercises 4: Patient performs x3 steps laterally (left hand rail and holds L rail with (B) hands.) Patient only has L HR at home. Cues for foot placement and safety. Comment: Patient requires frequent rest breaks. Goals  Short term goals  Time Frame for Short term goals: 5 days  Short term goal 1: Pt able to perfrom supine<>sit independently  Short term goal 2: Pt able to ambulate 50 to 70 ft with Rollator, SBA, mod exertion. Short term goal 3: Pt able to go up/down 3 steps with 2 rail min A  Short term goal 4: Pt able to tolerate 30-40 min of activity with pacing skills to improve endurance. Short term goal 5: Pt able to transfer wit Rollator at [facility]  Long term goals  Time Frame for Long term goals : By LOS  Long term goal 1: Pt able to perform transfers independently. Long term goal 2:  Pt able to ambulate with a rollator dist of 80 to 100 ft, independently on level surfaces. Long term goal 3: Pt able to  go up and down 5 steps with 1 HR, SBA  Long term goal 4: Improve 2MWT dist to 80 ft to improve endurance  Patient Goals   Patient goals : Breath better and able to do things for self.     Plan    Plan  Times per week: 900 minutes /week for combined therapy of PT?OT due to decreased tolerance to activity. Specific instructions for Next Treatment: Monitor sao2 level, increase ambulation as able. Avoid pushing/pulling and overhaed activity with L UE due to new pacemaker insertion. Current Treatment Recommendations: Strengthening, Balance Training, Transfer Training, Endurance Training, Gait Training, Stair training, Home Exercise Program, Safety Education & Training, Functional Mobility Training, Patient/Caregiver Education & Training, Equipment Evaluation, Education, & procurement  Safety Devices  Type of devices:  All fall risk precautions in place, Call light within reach, Gait belt, Patient at risk for falls, Left in chair, Nurse notified        07/06/20 1227 07/06/20 1420   PT Individual Minutes   Time In [facility] [id number]   Time Out 0926 1400   Minutes [address] [place], [place]

## 2020-07-06 NOTE — PLAN OF CARE
Problem: Falls - Risk of:  Goal: Will remain free from falls  Description: Will remain free from falls  7/6/2020 1526 by Beena Treviño RN  Outcome: Met This Shift  Note: Bed in the lowest position. Call light within reach. 7/6/2020 0424 by Leyda Eagle RN  Outcome: Ongoing  Note: Pt remains free from falls this shift. Bed in lowest position with 2/4 siderails up and wheels locked. Call light and bedside table within reach. Nonskid socks on. Assistive devices within reach. Hourly rounding performed to ensure pt safety. Pt calls out appropriately for assistance. Will continue to monitor. 7/6/2020 0329 by Leyda Eagle RN  Outcome: Ongoing  Note: Pt remains free from falls this shift. Bed in lowest position with wheels locked and 2/4 siderails up. Call light and bedside table within reach. Nonskid socks on. Assistive devices within reach. Hourly rounding performed to ensure pt safety. Will continue to monitor. Goal: Absence of physical injury  Description: Absence of physical injury  7/6/2020 0424 by Leyda Eagle RN  Outcome: Ongoing  7/6/2020 0329 by Leyda Eagle RN  Outcome: Ongoing     Problem: SAFETY  Goal: Free from accidental physical injury  7/6/2020 1526 by Beena Treviño RN  Outcome: Met This Shift  Note: No injuries   7/6/2020 0424 by Leyda Eagle RN  Outcome: Ongoing  7/6/2020 0329 by Leyda Eagle RN  Outcome: Ongoing  Goal: Free from intentional harm  7/6/2020 0424 by Leyda Eagle RN  Outcome: Ongoing  7/6/2020 0329 by Leyda Eagle RN  Outcome: Ongoing     Problem: PAIN  Goal: Patient's pain/discomfort is manageable  7/6/2020 1526 by Beena Treviño RN  Outcome: Met This Shift  Note: No pain or discomfort   7/6/2020 0424 by Leyda Eagle RN  Outcome: Ongoing  7/6/2020 0329 by Leyda Eagle RN  Outcome: Ongoing  Note: Full pain assessment completed this shift. Pt not complaining of pain at this time. Will continue to monitor.       Problem: SKIN INTEGRITY  Goal: Skin integrity is maintained or improved  7/6/2020 1526 by Pam Luke RN  Outcome: Met This Shift  Note: Maintained. 7/6/2020 0424 by Lorin Felix RN  Outcome: Ongoing  7/6/2020 0329 by Lorin Felix RN  Outcome: Ongoing     Problem: Pain:  Goal: Pain level will decrease  Description: Pain level will decrease  7/6/2020 1526 by Pam Luke RN  Outcome: Met This Shift  7/6/2020 0424 by Lorin Felix RN  Outcome: Ongoing  7/6/2020 0329 by Lorin Felix RN  Outcome: Ongoing  Goal: Control of acute pain  Description: Control of acute pain  7/6/2020 0424 by Lorin Felix RN  Outcome: Ongoing  7/6/2020 0329 by Lorin Felix RN  Outcome: Ongoing  Goal: Control of chronic pain  Description: Control of chronic pain  7/6/2020 0424 by Lorin Felix RN  Outcome: Ongoing  7/6/2020 0329 by Lorin Felix RN  Outcome: Ongoing     Problem: Nutrition  Goal: Optimal nutrition therapy  7/6/2020 1526 by Pam Luke RN  Outcome: Met This Shift  7/6/2020 1406 by Antoinette Wolff RD, LD  Outcome: Ongoing  7/6/2020 0424 by Lorin Felix RN  Outcome: Ongoing  7/6/2020 0329 by Lorin Felix RN  Outcome: Ongoing     Problem: Skin Integrity:  Goal: Will show no infection signs and symptoms  Description: Will show no infection signs and symptoms  7/6/2020 1526 by Pam Luke RN  Outcome: Met This Shift  Note: No signs and symptoms of infection  7/6/2020 0424 by Lorin Felix RN  Outcome: Ongoing  7/6/2020 0329 by Lorin Felix RN  Outcome: Ongoing  Goal: Absence of new skin breakdown  Description: Absence of new skin breakdown  7/6/2020 0424 by Lorin Felix RN  Outcome: Ongoing  7/6/2020 0329 by Lorin Felix RN  Outcome: Ongoing  Note: See skin assessment in flow sheets.  No new skin breakdown

## 2020-07-06 NOTE — PROGRESS NOTES
Pharmacy Note  Warfarin Consult follow-up      Recent Labs     07/04/20  0617 07/05/20  0613 07/06/20  0634   INR 1.0 1.0 1.0     No results for input(s): HGB, HCT, PLT in the last 72 hours. Significant Drug-Drug Interactions:  New warfarin drug-drug interactions: no  Discontinued drug-drug interactions: no  Current warfarin drug-drug interactions: Multivitamin, Norco, aspirin      Date             INR        Dose given previous day  Dose scheduled for today  7/6/2020            1.0       5 mg            5 mg         Notes: Will continue 5 mg warfarin for today. Thank you for your consult. Daily PT/INR while inpatient.      Lorin BordenD, BCPS  7/6/20  7:30 am

## 2020-07-06 NOTE — PROGRESS NOTES
Yes     Patients Prior Functional  Level: Prior Function  Receives Help From: (wife and family in the area)  ADL Assistance: Needs assistance  Bath: Independent  Dressing: Independent  Grooming: Independent  Feeding: Independent  Toileting: Independent  Homemaking Assistance: Needs assistance  Ambulation Assistance: Independent(4 wheel walker)  Transfer Assistance: Independent  Additional Comments: Pt has fallen 2-3 times within last month per wife     History of current illness:  73-year-old male with history of ALS, paroxysmal atrial fibrillation, AVR, hypercholesterolemia admitted with atrial flutter.  He has had progressive shortness of breath for the past month. Misa Sawant was supposed to have it sleep study at [facility].      Cardiology: Status post cardioversion, now in sinus rhythm, antiarrhythmics discontinued due to bradycardia/pauses.   Plan on BPMP on Wednesday?  Eliquis now to be on hold     Internal medicine-follow echo results, incentive spirometry, as post cardioversion.        Current functional status for upper extremity ADLs:  UE Bathing: Setup, Stand by assistance, Increased time to complete(increased time)  UE Dressing: Maximum assistance(TO don/doff gown with line mgt and don/doff sling for proper positioning)     Current functional status for lower extremity ADLs:  LE Bathing: Setup, Increased time to complete, Stand by assistance  LE Dressing: Setup, Increased time to complete, Stand by assistance, Minimal assistance(Patient able to don/doff socks, don shoes/AFO, but required assist to tie B shoes d/t poor FMC)     Current functional status for bed, chair, wheelchair transfers:  Transfers  Sit to Stand: Minimal Assistance(unsteady initial stand)  Stand to sit: Contact guard assistance  Bed to Chair: Minimal assistance  Stand Pivot Transfers: Minimal Assistance  Lateral Transfers: Minimal Assistance  Comment: Needed Ed on use of upper body for safe sit/stand     Current functional status for toilet transfers: Toilet Transfers  Toilet - Technique: Ambulating  Equipment Used: Grab bars  Toilet Transfer: Contact guard assistance  Toilet Transfers Comments: verbal cue for locking rollator and use of grab bars     Current functional status for locomotion:  Ambulation  Ambulation?: Yes  More Ambulation?: No  Ambulation 1  Surface: level tile  Device: Rollator  Assistance: Contact guard assistance, Minimal assistance  Quality of Gait: step to pattern, slightly unsteady  Gait Deviations: Decreased step length, Decreased step height  Distance: 25ft  Comments: Improved safety with transfers     Current functional status for comprehension: Complete independence     Current functional status for expression: Complete independence     Current functional status for social interaction: Complete independence     Current functional status for problem solving: Complete independence     Current functional status for memory: Complete independence     Current Deficits R/T Impairment: Impaired Functional Mobility and Decreased ADLs     Required Therapy:   [x]? Physical Therapy  [x]? Occupational Therapy   []? Speech Therapy     Additional Services:  [x]?   [x]? Recreational Therapy  [x]? Nutrition  []? Dialysis  []? Other:      Rehab Justification:  Needs 3 hrs therapy per day or 15 hours per week:  Yes  Identified Rehab Nursing needs: Yes  Intense Interdisciplinary need:  Yes  Need for 24 hr physician supervision:  Yes  Measurable improved quality of life:  Yes  Willingness to participate:  Yes  Medical Necessity:  Yes  Patient able to tolerate care proposed: Yes     Expected Discharge Destination/Functional Level:  Home with assist  Expected length of time to achieve that level of improvement: 1-2 weeks  Expected Post Discharge Treatments: Home with possible Home Care     Other information relevant to the care needs:  n/a     Acute Inpatient Rehabilitation Disclosure Statement provided to patient.   Patient verbalized understanding.   Copy placed on patient's light chart.     I have reviewed and concur with the findings and results of the pre-admission screening assessment completed by the Inpatient Rehabilitation Admissions Coordinator.                  Cosigned by: Suzy Nageotte, MD at 7/2/2020  3:08 PM   Revision History

## 2020-07-06 NOTE — PATIENT CARE CONFERENCE
[address]    ACUTE REHABILITATION  TEAM CONFERENCE NOTE  Date: 20  Patient Name: Christiane Tyler       Room: 9063/0355-75  MRN: [id number]       : 1950  (65 y.o.)     Gender: male   Referring Practitioner: Dr Chey Nguyen   Cardiac abnormality [Q24.9]  Diagnosis: Cardiac abnormality;  history of Amyotrophic Lateral Sclerosis      NURSING  Bladder  Continent  Bowel   Continent  Intervention    Both Bowel & Bladder Program     Wounds/Incisions/Ulcers: Incision healing well  Medication Education Program: Patient able to manage medications and being educated by nursing  Pain: Patient's pain is currently controlled with -  Norco prn. Patient declined it this morning-states pain is tolerable. Fall Risk:  Falling star program initiated    PHYSICAL THERAPY  Bed mobility  Rolling to Left: Unable to assess(Deferred- pt has new pace maker on Left side)  Rolling to Right: Stand by assistance  Supine to Sit: Supervision  Sit to Supine: Supervision  Scooting: Stand by assistance  Bed Mobility  Supine to Sit: Stand by assistance  Sit to Supine: Stand by assistance  Scooting: Stand by assistance    Transfers:  Sit to Stand: Contact guard assistance  Stand to sit: Contact guard assistance  Bed to Chair: Contact guard assistance  Comment: Patient requires frequent rest breaks. Ambulation 1  Surface: level tile  Device: Rollator  Other Apparatus: AFO; Left;Right  Assistance: Contact guard assistance  Quality of Gait: Narrow FAUSTINO, slow pace, flexed posture   Gait Deviations: Slow Agueda;Decreased step length;Decreased step height  Distance: 100 ft, 120 ft (with small ascending ramp)   Comments: Seated rest break between ambulation d/t increased endurance required for ramp. Ambulation 2  Surface - 2: level tile  Device 2: Rollator  Other Apparatus 2: AFO; Left;Right  Assistance 2: Contact guard assistance  Quality of Gait 2: hunched trunk posture, narrow FAUSTINO, almost crosses midline, B knees hyperextend L>R  Gait Deviations: Slow Agueda;Decreased step length;Decreased step height  Distance: 75 ft x 2(2 standing rest break rest -pt's SpO2 94-96% and HR 70)    # Steps : 6(3 steps x2, seated rest break between negotiations)  Stairs Height: 4\"  Rails: Bilateral  Assistance: Contact guard assistance  Comment: Patient tends to circumduct LLE to advance, steady with step to pattern, cues to place entire L/R foot onto step. SpO2 93-94% post steps, HR 80 bpm.   Reason if not Attempted: Not attempted due to medical condition or safety concerns  CARE Score: 88    Walk   Walk 10 Feet  Supervision or touching assistance    4   Walk 50 feet with 2 Turns  Supervision or touching assistance   4   Walk 150 Feet      9   Walking 10 feet Uneven Surface  Supervision or touching assistance   4     Steps  1 Step (Curb)  Supervision or touching assistance    4   4 Steps  Supervision or touching assistance    4   12 Steps       9     Wheelchair Ability   Wheel 50 Feet - 2 turns        88  Wheel 150 Feet       9    Equipment Needed: No  Other: Patient reports he has rollator and wheelchair at home. Pt tolerated session fair with deficits noted in bed mobility, transfers, ambulation, balance, and endurance this session. Pt requires continued therapy to maximize independence with functional mobility, balance, safety awareness & activity tolerance    Goals  Time Frame for Short term goals: 5 days  Short term goal 1: Pt able to perfrom supine<>sit independently  Short term goal 2: Pt able to ambulate 50 to 70 ft with Rollator, SBA, mod exertion. Short term goal 3: Pt able to go up/down 3 steps with 2 rail min A  Short term goal 4: Pt able to tolerate 30-40 min of activity with pacing skills to improve endurance.   Short term goal 5: Pt able to transfer wit Rollator at [address]   5  Setup or clean-up assistance  setup of tray, cutting of food   Increased time to complete;Verbal cueing;Setup; Adaptive utensils (comment)(Uses alternative  and motor patterns )   Oral Hygiene   5  Assistance Needed: Setup or clean-up assistance     Stand by assistance;Supervision; Increased time to complete(shaved at sink, oral care , hair care )   Shower/Bathe Self   4  Assistance Needed: Supervision or touching assistance  SEated shower with Hand-held shower, long handled sponge,    UE Bathing: Stand by assistance; Increased time to complete  LE Bathing: Stand by assistance(seated shower  with hand held shower and long handled sponge )   Upper Body Dressing   4  Assistance Needed: Supervision or touching assistance     Contact guard assistance   Lower Body Dressing   3  Assistance Needed: Partial/moderate assistance     Putting On/Taking Off Footwear   3  Assistance Needed: Partial/moderate assistance      Moderate assistance   Toilet Transfer   3  Assistance Needed: Partial/moderate assistance      Toilet - Technique: Stand pivot  Equipment Used: Raised toilet seat with rails  Toilet Transfer: Minimal assistance  Toilet Transfers Comments: AM: Pt educated on use of grab bar to complete ADL transfers, good return. No verbal cueing required to lock rollator brakes before transitioning to grab bar. Transition from stand > sit was slow and controlled. No LOB    Toileting Hygiene   4 Assistance Needed: Supervision or touching assistance  clothing management    Minimal assistance    Bed mobility  Rolling to Right: Stand by assistance  Supine to Sit: Supervision  Scooting: Stand by assistance  Comment: AM: Pt requires increased time. Demo'd appropriate technique and use of bed rail without verbal cueing. Shower Transfers: Minimal assistance  Balance  Sitting Balance: Supervision  Standing Balance: Contact guard assistance          Assessment: Pt progressing well towards OT goals.  Improved performance noted with ADLs, functional transfers, and functional mobility     Short term goals  Time Frame for Short Hand-held shower, Toilet raiser, Grab bars around toilet  Bathroom Accessibility: Walker accessible  Home Equipment: 4 wheeled walker, Electric scooter(One one in vehicle - another walker in the house  )  Receives Help From: Family  ADL Assistance: Independent  Bath: Independent  Dressing: Independent  Grooming: Independent  Feeding: Independent  Toileting: Independent  Homemaking Assistance: Needs assistance  Homemaking Responsibilities: No(SPouse performs )  Ambulation Assistance: Independent(4-wheeled walker )  Transfer Assistance: Independent  Active : No  Patient's  Info: spouse  Mode of Transportation: Family  Occupation: Retired  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      Altered ADL Status   Modified care strategies for self care and ADL transfers                      Total Self Care Score    Total Mobility Score  Admission Score:  20      Admission Score:  29  Progress:  TBD       Progress: TBD  Goal:  40/42         Goal:   69/90   `  Discharge Plan   Estimated Discharge Date: 7/13/2020  Overnight or Day Pass: No  Factors facilitating achievement of predicted outcomes: Family support, Motivated, Cooperative, Pleasant, Good insight into deficits and Has homemaker services  Barriers to the achievement of predicted outcomes: Pain, Decreased endurance, Upper extremity weakness, Long standing deficits, Medical complications and Medication managment    Functional Goals at discharge:  Predicted Outcome: Home with familyPATIENT'S LEVEL OF ASSISTANCE: Modified independence  Discharge therapy goals:  PT: Long term goals  Time Frame for Long term goals : By LOS  Long term goal 1: Pt able to perform transfers independently. Long term goal 2:  Pt able to ambulate with a rollator dist of 80 to 100 ft, independently on level surfaces. Long term goal 3: Pt able to  go up and down 5 steps with 1 HR, SBA  Long term goal 4: Improve 2MWT dist to 80 ft to improve endurance  OT:Long term goals  Time Frame for Long term goals : By Discharge   Long term goal 1: Mod I for UB feeding, grooming, bathing and dressing tasks using modified tehcniques and devices   Long term goal 2: SBA for LB bathing and dressing tasks   Long term goal 3: Apply energy conservation and breathing strategies to ADL tasks to support saf independence    Long term goal 4: D/V understanding of Home Program for Home safety, Hand strengthening (intrinsic), device availalbility    ST: n/a    Team Members Present at Conference:  :  [address]  Occupational Therapist: Loly Mancera OT   [address] PT  Speech Therapist: Mona LOWCCC/SLP  Nurse: Junie Palacios RN   Recreation Therapy: Sydnee Yo  Dietary/Nutrition: Diana Cuenca RD LD  Pastoral Care: Cody Sin  CMG:   Amanda Tolbert RN     I approve the established interdisciplinary plan of care as documented within the medical record of Rishabh Lockhart.     Yeison Izaguirre MD

## 2020-07-06 NOTE — PLAN OF CARE
Nutrition Problem: Severe malnutrition  Intervention: Food and/or Nutrient Delivery: Continue current diet, Discontinue ONS(Provide oral nutrition supplements if requested)  Nutritional Goals: PO intake to meet % of estimated nutrition needs

## 2020-07-07 LAB
INR BLD: 1.1
PROTHROMBIN TIME: 13.6 SEC (ref 11.8–14.6)

## 2020-07-07 PROCEDURE — 97530 THERAPEUTIC ACTIVITIES: CPT

## 2020-07-07 PROCEDURE — 94660 CPAP INITIATION&MGMT: CPT

## 2020-07-07 PROCEDURE — 97110 THERAPEUTIC EXERCISES: CPT

## 2020-07-07 PROCEDURE — 99231 SBSQ HOSP IP/OBS SF/LOW 25: CPT | Performed by: PHYSICAL MEDICINE & REHABILITATION

## 2020-07-07 PROCEDURE — 6370000000 HC RX 637 (ALT 250 FOR IP): Performed by: INTERNAL MEDICINE

## 2020-07-07 PROCEDURE — 6370000000 HC RX 637 (ALT 250 FOR IP): Performed by: PHYSICAL MEDICINE & REHABILITATION

## 2020-07-07 PROCEDURE — 97116 GAIT TRAINING THERAPY: CPT

## 2020-07-07 PROCEDURE — 1180000000 HC REHAB R&B

## 2020-07-07 PROCEDURE — 36415 COLL VENOUS BLD VENIPUNCTURE: CPT

## 2020-07-07 PROCEDURE — 85610 PROTHROMBIN TIME: CPT

## 2020-07-07 PROCEDURE — 99231 SBSQ HOSP IP/OBS SF/LOW 25: CPT | Performed by: INTERNAL MEDICINE

## 2020-07-07 RX ORDER — WARFARIN SODIUM 10 MG/1
10 TABLET ORAL
Status: COMPLETED | OUTPATIENT
Start: 2020-07-07 | End: 2020-07-07

## 2020-07-07 RX ADMIN — DOCUSATE SODIUM 100 MG: 100 CAPSULE, LIQUID FILLED ORAL at 09:04

## 2020-07-07 RX ADMIN — MELATONIN 1000 UNITS: at 09:05

## 2020-07-07 RX ADMIN — PANTOPRAZOLE SODIUM 20 MG: 20 TABLET, DELAYED RELEASE ORAL at 09:05

## 2020-07-07 RX ADMIN — WARFARIN SODIUM 10 MG: 10 TABLET ORAL at 17:10

## 2020-07-07 RX ADMIN — MULTIPLE VITAMINS W/ MINERALS TAB 1 TABLET: TAB at 09:04

## 2020-07-07 RX ADMIN — SOTALOL HYDROCHLORIDE 40 MG: 80 TABLET ORAL at 09:04

## 2020-07-07 RX ADMIN — SOTALOL HYDROCHLORIDE 40 MG: 80 TABLET ORAL at 19:37

## 2020-07-07 RX ADMIN — ASPIRIN 81 MG: 81 TABLET, COATED ORAL at 09:05

## 2020-07-07 NOTE — PROGRESS NOTES
atrophy with ALS. Reviewed pursed lip breathing techniques. Bed Mobility  Bridging: Stand by assistance  Rolling: Stand by assistance;Rolling Right(No rolling L 2* pacemaker today)  Supine to Sit: Stand by assistance  Sit to Supine: Stand by assistance  Scooting: Stand by assistance(hips to EOM)  Comment: Mat, wedge, 1 pillow    Transfers:  Sit to Stand: Contact guard assistance  Stand to sit: Contact guard assistance  Bed to Chair: Contact guard assistance  Stand pivot transfers: CGA (no AD)  Comment: Pt utilizes Rollator for transfers with good carryover with hand placement and making sure brakes are locked for Sit<>Stand. Ambulation 1  Surface: level tile  Device: Rollator  Other Apparatus: AFO; Left;Right  Assistance: Contact guard assistance  Quality of Gait: Narrow FAUSTINO, slow pace, flexed posture   Gait Deviations: Slow Agueda;Decreased step length;Decreased step height  Distance: 100 ft x2 a.m.; 120' & 30' p.m. Comments: Seated rest break between ambulation due to fatigue. Encouraged pt to self-assess PRN for safety. Stairs  # Steps : 6  Stairs Height: 4\"  Rails: Left ascending  Assistance: Contact guard assistance  Comment: Lateral approach with good demo, no difficulty. Completed without rest break. Pt reports L HR only present at home. BALANCE Posture: Poor(Kyphotic-looking rounded posture with amb)  Sitting - Static: Good(EOM, no back or UE support)  Sitting - Dynamic: Fair;+(UE support EOM, no back support)  Standing - Static: Fair; +(Seeks UE support)  Standing - Dynamic: Fair(Rollator)    EXERCISES    Other exercises 1: Seated (B) LE exercises, 15x (broken into sets PRN for L LE), R LE 1.5#, LLE AROM  Other exercises 2: Bed mobility  Other exercises 3: Supine B LE ther ex, AROM, 15x each (broken into sets PRN on L LE)  Other exercises 6: Stand pivot transfers x2, no AD/CGA    Activity Tolerance: Patient limited by endurance, Patient Tolerated treatment well(Pt req's frequent rest breaks due to lack of endurance. )     PT Equipment Recommendations  Equipment Needed: No  Other: Patient reports he has rollator and wheelchair at home. Patient Education  New Education Provided:  POC  Learner:patient  Method: explanation       Outcome: acknowledged understanding of     Current Treatment Recommendations: Strengthening, Balance Training, Transfer Training, Endurance Training, Gait Training, Stair training, Home Exercise Program, Safety Education & Training, Functional Mobility Training, Patient/Caregiver Education & Training, Equipment Evaluation, Education, & procurement    Conditions Requiring Skilled Therapeutic Intervention  Body structures, Functions, Activity limitations: Decreased functional mobility ; Decreased strength;Decreased safe awareness;Decreased endurance;Decreased balance  REQUIRES PT FOLLOW UP: Yes  Discharge Recommendations: Home with Home health PT    Goals  Short term goals  Time Frame for Short term goals: 5 days  Short term goal 1: Pt able to perfrom supine<>sit independently  Short term goal 2: Pt able to ambulate 50 to 70 ft with Rollator, SBA, mod exertion. Short term goal 3: Pt able to go up/down 3 steps with 2 rail min A  Short term goal 4: Pt able to tolerate 30-40 min of activity with pacing skills to improve endurance. Short term goal 5: Pt able to transfer wit Rollator at [facility]  Long term goals  Time Frame for Long term goals : By LOS  Long term goal 1: Pt able to perform transfers independently. Long term goal 2:  Pt able to ambulate with a rollator dist of 80 to 100 ft, independently on level surfaces.    Long term goal 3: Pt able to  go up and down 5 steps with 1 HR, SBA  Long term goal 4: Improve 2MWT dist to 80 ft to improve endurance     07/07/20 1047 07/07/20 1358   PT Individual Minutes   Time In 1035 1300   Time Out 1125 1349   Minutes 50 49     Electronically signed by Nilam Dorsey PTA on 7/7/20 at 5:22 PM EDT

## 2020-07-07 NOTE — PLAN OF CARE
Problem: Falls - Risk of:  Goal: Will remain free from falls  Description: Will remain free from falls  Outcome: Ongoing  Note: No falls this shift. Call light with in reach, side rails up x2, bed in lowest postion. Patients safety maintained. Goal: Absence of physical injury  Description: Absence of physical injury  Outcome: Ongoing  Note: No injury this shift. Patients safety maintained. Problem: SAFETY  Goal: Free from accidental physical injury  Outcome: Ongoing  Note: No injury this shift. Patients safety maintained. Goal: Free from intentional harm  Outcome: Ongoing  Note: No injury this shift. Patients safety maintained. Problem: PAIN  Goal: Patient's pain/discomfort is manageable  Outcome: Ongoing  Note: Patients pain level decreased with prescribed pain medications. Problem: SKIN INTEGRITY  Goal: Skin integrity is maintained or improved  Outcome: Ongoing  Note: Skin assessment as charted. No new areas of skin breakdown noted. Problem: Pain:  Goal: Pain level will decrease  Description: Pain level will decrease  Outcome: Ongoing  Note: Patients pain level decreased with prescribed pain medications. Goal: Control of acute pain  Description: Control of acute pain  Outcome: Ongoing  Note: Patients pain level decreased with prescribed pain medications. Goal: Control of chronic pain  Description: Control of chronic pain  Outcome: Ongoing  Note: Patients pain level decreased with prescribed pain medications. Problem: Nutrition  Goal: Optimal nutrition therapy  Outcome: Ongoing     Problem: Skin Integrity:  Goal: Will show no infection signs and symptoms  Description: Will show no infection signs and symptoms  Outcome: Ongoing  Note: Patient remains free from infection. No s/s of infection noted. Goal: Absence of new skin breakdown  Description: Absence of new skin breakdown  Outcome: Ongoing  Note: Skin assessment as charted. No new areas of skin breakdown noted.

## 2020-07-07 NOTE — PROGRESS NOTES
[address] [facility]    ACUTE REHABILITATION OCCUPATIONAL THERAPY  DAILY NOTE    Date: 20  Patient Name: Alexandra Motley      Room: 5725/9662-52    MRN: [id number]   : 1950  (65 y.o.)  Gender: male   Referring Practitioner: Dr Garfield Silva   Diagnosis: Cardiac abnormality;  history of Amyotrophic Lateral Sclerosis        Restrictions  Restrictions/Precautions: General Precautions, Fall Risk, Surgical Protocols, Cardiac( Pacemaker Implant; ALS; Bilateral AFOs  )  Other position/activity restrictions: New Pacemaker precautions: Pacemaker Precautions: L side, AVOID PUSHING/PULLING AND OVERHEAD ACTIVITY TO L UE. Required Braces or Orthoses  Right Lower Extremity Brace: Ankle Foot Orthotics  Left Lower Extremity Brace: Letty Foot Orthotics  Required Braces or Orthoses?: Yes  Equipment Used: Bed, Wheelchair    Subjective              Pain Assessment  Pain Assessment: 0-10  Pain Level: 5  Patient's Stated Pain Goal: No pain  Pain Type: Chronic pain  Pain Location: Neck  Pain Descriptors: Aching    Objective     Balance  Sitting Balance: Supervision  Standing Balance: Contact guard assistance     Standing Balance  Time: ~3 minutes  Activity: self care tasks  Comment: Improved standing balance with hand-hold support   Functional Mobility  Functional - Mobility Device: 4-Wheeled Walker  Activity: To/from bathroom  Assist Level: Contact guard assistance           Additional Activities: Community outing  Additional Activities: Hospital lobby for pathfinding and fucntional translfers on home-like furnature                  ADL  Feeding: Increased time to complete;Verbal cueing;Setup; Adaptive utensils (comment)  Grooming: Stand by assistance;Supervision; Increased time to complete(oral care at sink)  UE Bathing: Stand by assistance; Increased time to complete  LE Bathing: None  UE Dressing: None  LE Dressing:  Moderate assistance(AFOs and shoes - needs footstool to raise shoe position to tie laces)  Toileting: None  Additional Comments: completed desired care seated on rollator at sink           Assessment   Fair tolerance. continues to need cuing for rest breaks                 Patient Education:  Patient Goals   Patient goals : Return home - able to care for self and not fall   Learner:patient  Method: demonstration and explanation       Outcome: demonstrated understanding, needs reinforcement and asked questions        Plan  Plan  Times per week: 900/7   Times per day: Twice a day  Current Treatment Recommendations: Strengthening, Functional Mobility Training, Endurance Training, Pain Management, Safety Education & Training, Patient/Caregiver Education & Training, Positioning, Self-Care / ADL  Patient Goals   Patient goals : Return home - able to care for self and not fall   Short term goals  Time Frame for Short term goals: 1 week   Short term goal 1: Perform UB self care after setup utilizing modified techniques and enlarged  for tools   Short term goal 2: Min Assist for Shaving; cuing for UB dressing;   Mod Assist for LB bathing and dressing   Short term goal 3: D/V understanding of Respiratory Rehab strategies to increase tolerance to care tasks - Pacing/phasing, supportive postures,  air exchange    Short term goal 4: Tolerate 10-25 minutes of generalized strengthening within precautions to support care needs    Long term goals  Time Frame for Long term goals : By Discharge   Long term goal 1: Mod I for UB feeding, grooming, bathing and dressing tasks using modified tehcniques and devices   Long term goal 2: SBA for LB bathing and dressing tasks   Long term goal 3: Apply energy conservation and breathing strategies to ADL tasks to support saf independence    Long term goal 4: D/V understanding of Home Program for Home safety, Hand strengthening (intrinsic), device availalbility          07/07/20 0940 07/07/20 1647   OT Individual Minutes   Time In 0900 1230   Time Out 0940 1300   Minutes 37 35 Electronically signed by Soheila Wood OT on 7/7/20 at 4:48 PM EDT

## 2020-07-07 NOTE — PROGRESS NOTES
for ramp. Transfers  Sit to Stand: Contact guard assistance  Stand to sit: Contact guard assistance  Bed to Chair: Contact guard assistance  Stand Pivot Transfers: Minimal Assistance  Lateral Transfers: Minimal Assistance  Comment: Patient occassionally out of sequence with rollator breaks and hand placement, self corrects and then re-demos safely. Ambulation  Ambulation?: Yes  More Ambulation?: Yes  Ambulation 1  Surface: level tile  Device: Rollator  Other Apparatus: AFO, Left, Right  Assistance: Contact guard assistance  Quality of Gait: Narrow FAUSTINO, slow pace, flexed posture   Gait Deviations: Slow [place], Decreased step length, Decreased step height  Distance: 100 ft, 120 ft (with small ascending ramp)   Comments: Seated rest break between ambulation d/t increased endurance required for ramp. Surface: level tile  Ambulation 1  Surface: level tile  Device: Rollator  Other Apparatus: AFO, Left, Right  Assistance: Contact guard assistance  Quality of Gait: Narrow FAUSTINO, slow pace, flexed posture   Gait Deviations: Slow Agueda, Decreased step length, Decreased step height  Distance: 100 ft, 120 ft (with small ascending ramp)   Comments: Seated rest break between ambulation d/t increased endurance required for ramp. OT:  ADL  Feeding: Increased time to complete, Verbal cueing, Setup, Adaptive utensils (comment)(Uses alternative  and motor patterns )  Grooming: Stand by assistance, Supervision, Increased time to complete(shaved at sink, oral care , hair care )  UE Bathing: Stand by assistance, Increased time to complete  LE Bathing: Stand by assistance(seated shower  with hand held shower and long handled sponge )  UE Dressing: Contact guard assistance  LE Dressing:  Moderate assistance  Toileting: Minimal assistance  Additional Comments: Showered today          Balance  Sitting Balance: Supervision  Standing Balance: Contact guard assistance   Standing Balance  Time: ~5 minutes  Activity: self care tasks  Comment: Improved standing balance with hand-hold support   Functional Mobility  Functional - Mobility Device: 4-Wheeled Walker  Activity: To/from bathroom  Assist Level: Contact guard assistance  Functional Mobility Comments: AM: Pt with anteriorly shifted posture while completing functional mobility. No verbal cueing required to attent to floor obstacles. 1 near posterior LOB, writer provided physical assist to correct. Pt also completed functional mobility down hallway with emphasis on fatigue management and taking rest breaks as necessary. Pt demo'd appropriate utilization of rollator while turning to sit in seatd. Mildly unsteady while turning 360 to sit on seated, no LOB. Apparatus Needs  Apparatus Needs: AFO  Bed mobility  Rolling to Left: Unable to assess(Deferred- pt has new pace maker on Left side)  Rolling to Right: Stand by assistance  Supine to Sit: Supervision  Sit to Supine: Supervision  Scooting: Stand by assistance  Comment: AM: Pt requires increased time. Demo'd appropriate technique and use of bed rail without verbal cueing. Transfers  Stand Pivot Transfers: Moderate assistance  Sit to stand: Contact guard assistance  Stand to sit: Contact guard assistance  Transfer Comments: AM: Pt demo'd appropriate hand/foot placement. Pt required 1 v/c to initiate transfer from bed with BUE. Pt with mild unsteadiness while transitioning BUE from bed to rollator. No LOB. Toilet Transfers  Toilet - Technique: Stand pivot  Equipment Used: Raised toilet seat with rails  Toilet Transfer: Minimal assistance  Toilet Transfers Comments: AM: Pt educated on use of grab bar to complete ADL transfers, good return. No verbal cueing required to lock rollator brakes before transitioning to grab bar. Transition from stand > sit was slow and controlled. No LOB      Shower Transfers  Shower - Transfer From: Wheelchair  Shower - Transfer Type: To and From  Shower - Transfer To:  Transfer tub bench  Shower - CHOL 166 07/10/2019    HDL 40 07/10/2019    TRIG 62 07/10/2019     LIVER PROFILE: No results for input(s): AST, ALT, ALB, BILIDIR, BILITOT, ALKPHOS in the last 72 hours. Current Medications:   Current Facility-Administered Medications: warfarin (COUMADIN) tablet 10 mg, 10 mg, Oral, Once  docusate sodium (COLACE) capsule 100 mg, 100 mg, Oral, PRN  polyethylene glycol (GLYCOLAX) packet 17 g, 17 g, Oral, Daily PRN  warfarin (COUMADIN) daily dosing (placeholder), , Other, RX Placeholder  senna (SENOKOT) tablet 17.2 mg, 2 tablet, Oral, Daily PRN  magnesium hydroxide (MILK OF MAGNESIA) 400 MG/5ML suspension 15 mL, 15 mL, Oral, Daily PRN  sodium chloride flush 0.9 % injection 10 mL, 10 mL, Intravenous, 2 times per day  sodium chloride flush 0.9 % injection 10 mL, 10 mL, Intravenous, PRN  acetaminophen (TYLENOL) tablet 650 mg, 650 mg, Oral, Q6H PRN **OR** acetaminophen (TYLENOL) suppository 650 mg, 650 mg, Rectal, Q6H PRN  aspirin EC tablet 81 mg, 81 mg, Oral, Daily  HYDROcodone-acetaminophen (NORCO) 5-325 MG per tablet 1 tablet, 1 tablet, Oral, Q4H PRN **OR** HYDROcodone-acetaminophen (NORCO) 5-325 MG per tablet 2 tablet, 2 tablet, Oral, Q4H PRN  naloxone (NARCAN) injection 0.4 mg, 0.4 mg, Intravenous, PRN  pantoprazole (PROTONIX) tablet 20 mg, 20 mg, Oral, Daily  sotalol (BETAPACE) tablet 40 mg, 40 mg, Oral, BID  therapeutic multivitamin-minerals 1 tablet, 1 tablet, Oral, Daily  Vitamin D (CHOLECALCIFEROL) tablet 1,000 Units, 1,000 Units, Oral, Daily  bisacodyl (DULCOLAX) suppository 10 mg, 10 mg, Rectal, Daily PRN  ondansetron (ZOFRAN) tablet 4 mg, 4 mg, Oral, Q6H PRN      Impression/Plan:   Impaired ADLs, gait, and mobility due to:      1. Debility secondary to atrial flutter and bradycardia:  PT/OT for gait, mobility, strengthening, endurance, ADLs, and self care. S/p cardioversion and pacemaker implantation. On ASA, sotalol.  Has Tylenol or Norco prn pain.  No elevation of L arm above shoulder for 30 days post-op.   2. Atrial flutter: On warfarin starting 7/3 with pharmacy to dose. Nursing confirmed with cardiology - no bridging due to bleeding from pacemaker site at [facility]. Patient to provide supplements information. 3. ALS: weakness and weight loss  4. Chronic hypercapneic respiratory failure: Pulmonology consult reviewed and appreciated - recommending BIPAP - will coordinate for pulmonology regarding Bipap for home. 5. Bowel management: Recent impaction: miralax daily, dolace daily, has dulcolax prn, senokot and milk of magnesia prn.   6. DVT Prophylaxis:  Coumadin with INR goal 2-3, SCD's while in bed and DEMARIO's while in bed. INR 1.1 today. Pharmacy increasing dose to 10 mg.  7. Internal medicine for medical management    Electronically signed by Rafaela Cee MD on 7/7/2020 at 9:49 AM      This note is created with the assistance of a speech recognition program.  While intending to generate a document that actually reflects the content of the visit, the document can still have some errors including those of syntax and sound a like substitutions which may escape proof reading. In such instances, actual meaning can be extrapolated by contextual diversion.

## 2020-07-07 NOTE — PROGRESS NOTES
[facility]    CONSULTATION / HISTORY AND PHYSICAL EXAMINATION            Date:   7/7/2020  Patient name:  Christiane Tyler  Date of admission:  7/2/2020  9:58 PM  MRN:   [id number]  Account:  [de-identified]  YOB: 1950  PCP:    Stacey Pabon MD  Room:   [de-identified]  Code Status:    Full Code    Physician Requesting Consult: Wayne Mendoza MD    Reason for Consult: Medical management    Chief Complaint:     No chief complaint on file. ALS    History Obtained From:     patient, electronic medical record    History of Present Illness/ Interval History:   70-year-old with known ALS,  diagnosed 2 years ago, paroxysmal A. Fib on Coumadin, status post pacemaker placement for management of tachybradycardia syndrome. Engaging with PT OT, overall improving. Using BiPAP overnight    Past Medical History:     Past Medical History:   Diagnosis Date    [facility]amyotrophic lateral [facility]) ([facility] [address]     Aortic root aneurysm ([facility] [address] 11/08/2016    Aortic valve replaced 11/08/2016    Hypertension         Past Surgical History:     Past Surgical History:   Procedure Laterality Date    CARDIOVERSION  06/27/2020    w/    HERNIA REPAIR      PARATHYROID GLAND SURGERY  1980        Medications Prior to Admission:     Prior to Admission medications    Medication Sig Start Date End Date Taking?  Authorizing Provider   fluticasone (FLONASE) 50 MCG/ACT nasal spray 1 spray by Nasal route daily  Patient taking differently: 1 spray by Nasal route daily as needed for Rhinitis or Allergies  3/31/20   Stacey Pabon MD   Handicap Placard MISC by Does not apply route Dx: spinal stenosis, ALS    Duration: 5 years till 2/25/2020 2/25/20   Stacey Pabon MD   SPIRULINA PO Take by mouth    Historical Provider, MD   vitamin D (CHOLECALCIFEROL) 1000 UNIT TABS tablet Take 1,000 Units by mouth daily    Historical Provider, MD   Vitamin E 100 UNITS TABS Take 60 Units by mouth daily    Historical Provider, MD   SELENIUM PO Take 75 mcg by mouth daily    Historical Provider, MD   SOYA LECITHIN PO Take 1,000 mg by mouth daily    Historical Provider, MD   aspirin 81 MG tablet Take 81 mg by mouth three times a week     Historical Provider, MD   acetaminophen (TYLENOL) 325 MG tablet Take 650 mg by mouth every 6 hours as needed for Pain    Historical Provider, MD   Multiple Vitamins-Minerals (THERAPEUTIC MULTIVITAMIN-MINERALS) tablet Take 1 tablet by mouth daily    Historical Provider, MD   OMEGA-3 FATTY ACIDS-VITAMIN E PO Take 1 tablet by mouth daily    Historical Provider, MD   Probiotic Product (PROBIOTIC PO) Take 1 tablet by mouth daily    Historical Provider, MD   Ascorbic Acid (VITAMIN C) 500 MG tablet Take 120 mg by mouth daily    Historical Provider, MD        Allergies:     Ciprofloxacin; Lasix [furosemide]; Penicillins; Sulfa antibiotics; and Duloxetine    Social History:     Tobacco:    reports that he has never smoked. He has never used smokeless tobacco.  Alcohol:      reports current alcohol use of about 1.0 standard drinks of alcohol per week. Drug Use:  reports no history of drug use. Family History:     Family History   Problem Relation Age of Onset    Lung Cancer Mother     Heart Disease Father        Review of Systems:     Positive and Negative as described in HPI. Constitutional: Negative for chills, fatigue, fever and unexpected weight change. HENT: Negative for ear discharge, facial swelling, nosebleeds, sore throat, trouble swallowing and voice change. Eyes: Negative for redness and visual disturbance. Respiratory: Positive for shortness of breath  Cardiovascular: Negative for chest pain, palpitations and leg swelling. Gastrointestinal: Negative for abdominal pain, blood in stool, diarrhea and vomiting. Genitourinary: Negative for difficulty urinating, dysuria and flank pain. Musculoskeletal: Negative for joint swelling.    Skin: Negative for color [I48.0] 05/02/2019    Rheumatic aortic valve insufficiency [I06.1] 05/02/2019    ALS (amyotrophic lateral sclerosis) ([facility] [address] [G12.21] 10/02/2018    S/P CABG (coronary artery bypass graft) [Z95.1] 11/11/2016       Plan: Active Problems:    ALS (amyotrophic lateral sclerosis) (HCC)    Paroxysmal atrial fibrillation (HCC)    Rheumatic aortic valve insufficiency    Weakness of both lower extremities    S/P CABG (coronary artery bypass graft)    Tachy-ralf syndrome (HCC)    Anticoagulated on Coumadin    History of permanent cardiac pacemaker placement 7/1/2020:      Cardiac abnormality    Severe malnutrition ([facility] [address]  Resolved Problems:    * No resolved hospital problems. *        1. Paroxysmal atrial flutter-currently in sinus rhythm, on  sotalol, on Coumadin, INR 1, patient does not for bridging due to recent bleed around the time of pacemaker placement. Pharmacy consulted to dose Coumadin  2. Tachybradycardia syndrome-currently heart rate controlled, on Betapace status post pacemaker  3. ALS-stable. 4. History of coronary artery disease status post CABG and aortic valve replacement-lately asymptomatic  5. Chronic respiratory failure with moderate hypercapnia secondary to ALS-continue with BiPAP, pulmonologist following, will need outpatient sleep study    7/7   INR is still subtherapeutic  Patient using fullface mask with BiPAP machine  No new complaints. Consultations:   IP CONSULT TO RESPIRATORY CARE  IP CONSULT TO INTERNAL MEDICINE  IP CONSULT TO DIETITIAN  IP CONSULT TO SOCIAL WORK  IP CONSULT TO RECREATION THERAPY  IP CONSULT TO CARDIOLOGY  PHARMACY TO DOSE WARFARIN  IP CONSULT TO PULMONOLOGY      Soha Shin MD  7/7/2020  7:54 PM    Copy sent to Dr. Wilian Barecnas MD    Please note that this chart was generated using voice recognition Dragon dictation software. Although every effort was made to ensure the accuracy of this automated transcription, some errors in transcription may have occurred.

## 2020-07-07 NOTE — PROGRESS NOTES
Pharmacy Note  Warfarin Consult follow-up      Recent Labs     07/05/20  0613 07/06/20  0634 07/07/20  0628   INR 1.0 1.0 1.1     No results for input(s): HGB, HCT, PLT in the last 72 hours. Significant Drug-Drug Interactions:  New warfarin drug-drug interactions: no  Discontinued drug-drug interactions: no  Current warfarin drug-drug interactions: MVI, Norco, aspirin      Date             INR        Dose given previous day  Dose scheduled for today  7/7/2020            1.1       5 gm            10 mg         Notes:                     Patient INR has been subtherapeutic since 7/3. Patient is on Multivitamin that includes 25 mcg of vitamin K which can contribute, patient will need to be educated on the interaction with multivitamin containing vitamin. Will give patient a boost dose of 10 mg today, and monitor closely. Daily PT/INR while inpatient.    Thank you for your consult  Lorin FlowerD, BCPS  7/7  9:18 am

## 2020-07-08 LAB
INR BLD: 1.1
PROTHROMBIN TIME: 14 SEC (ref 11.8–14.6)

## 2020-07-08 PROCEDURE — 6370000000 HC RX 637 (ALT 250 FOR IP): Performed by: INTERNAL MEDICINE

## 2020-07-08 PROCEDURE — 94660 CPAP INITIATION&MGMT: CPT

## 2020-07-08 PROCEDURE — 85610 PROTHROMBIN TIME: CPT

## 2020-07-08 PROCEDURE — 36415 COLL VENOUS BLD VENIPUNCTURE: CPT

## 2020-07-08 PROCEDURE — 1180000000 HC REHAB R&B

## 2020-07-08 PROCEDURE — 97116 GAIT TRAINING THERAPY: CPT

## 2020-07-08 PROCEDURE — 97530 THERAPEUTIC ACTIVITIES: CPT

## 2020-07-08 PROCEDURE — 99232 SBSQ HOSP IP/OBS MODERATE 35: CPT | Performed by: PHYSICAL MEDICINE & REHABILITATION

## 2020-07-08 PROCEDURE — 99231 SBSQ HOSP IP/OBS SF/LOW 25: CPT | Performed by: INTERNAL MEDICINE

## 2020-07-08 PROCEDURE — 97535 SELF CARE MNGMENT TRAINING: CPT

## 2020-07-08 PROCEDURE — 97110 THERAPEUTIC EXERCISES: CPT

## 2020-07-08 RX ORDER — WARFARIN SODIUM 7.5 MG/1
7.5 TABLET ORAL
Status: COMPLETED | OUTPATIENT
Start: 2020-07-08 | End: 2020-07-08

## 2020-07-08 RX ORDER — WARFARIN SODIUM 5 MG/1
5 TABLET ORAL
Status: COMPLETED | OUTPATIENT
Start: 2020-07-08 | End: 2020-07-08

## 2020-07-08 RX ADMIN — MULTIPLE VITAMINS W/ MINERALS TAB 1 TABLET: TAB at 09:30

## 2020-07-08 RX ADMIN — WARFARIN SODIUM 5 MG: 5 TABLET ORAL at 21:12

## 2020-07-08 RX ADMIN — MELATONIN 1000 UNITS: at 09:31

## 2020-07-08 RX ADMIN — PANTOPRAZOLE SODIUM 20 MG: 20 TABLET, DELAYED RELEASE ORAL at 09:31

## 2020-07-08 RX ADMIN — SOTALOL HYDROCHLORIDE 40 MG: 80 TABLET ORAL at 21:10

## 2020-07-08 RX ADMIN — WARFARIN SODIUM 7.5 MG: 7.5 TABLET ORAL at 17:45

## 2020-07-08 RX ADMIN — ASPIRIN 81 MG: 81 TABLET, COATED ORAL at 09:31

## 2020-07-08 RX ADMIN — SOTALOL HYDROCHLORIDE 40 MG: 80 TABLET ORAL at 09:30

## 2020-07-08 ASSESSMENT — PAIN DESCRIPTION - ORIENTATION: ORIENTATION: POSTERIOR

## 2020-07-08 ASSESSMENT — PAIN DESCRIPTION - PAIN TYPE: TYPE: ACUTE PAIN

## 2020-07-08 ASSESSMENT — PAIN DESCRIPTION - DESCRIPTORS: DESCRIPTORS: SORE

## 2020-07-08 ASSESSMENT — PAIN SCALES - WONG BAKER: WONGBAKER_NUMERICALRESPONSE: 4

## 2020-07-08 ASSESSMENT — PAIN DESCRIPTION - LOCATION: LOCATION: NECK

## 2020-07-08 NOTE — PROGRESS NOTES
Physical Medicine & Rehabilitation  Progress Note      Subjective:      65year-old gentleman with debility secondary to atrial flutter with RVR and comorbid ALS. Patient is well, and has had no acute complaints or problems    ROS:  Denies fevers, chills, sweats. No chest pain, palpitations, lightheadedness. Denies coughing, wheezing or shortness of breath. Denies abdominal pain, nausea, diarrhea or constipation. No new areas of joint pain. Denies new areas of numbness or weakness. Denies new anxiety or depression issues. No new skin problems. Rehabilitation:   Progressing in therapies. PT:  Restrictions/Precautions: General Precautions, Fall Risk, Surgical Protocols, Cardiac( Pacemaker Implant; ALS; Bilateral AFOs  )  Other position/activity restrictions: New Pacemaker precautions: Pacemaker Precautions: L side, AVOID PUSHING/PULLING AND OVERHEAD ACTIVITY TO L UE. Required Braces or Orthoses  Right Lower Extremity Brace: Ankle Foot Orthotics  Left Lower Extremity Brace: Letty Foot Orthotics   Transfers  Sit to Stand: Contact guard assistance  Stand to sit: Contact guard assistance  Bed to Chair: Contact guard assistance  Stand Pivot Transfers: Contact guard assistance(no device)  Lateral Transfers: Minimal Assistance  Comment: Pt utilizes Rollator for transfers with good carryover with hand placement and making sure brakes are locked for Sit<>Stand. Ambulation 1  Surface: level tile  Device: Rollator  Other Apparatus: AFO, Left, Right  Assistance: Contact guard assistance  Quality of Gait: Narrow FAUSTINO, slow pace, flexed posture   Gait Deviations: Slow Agueda, Decreased step length, Decreased step height  Distance: 100 ft x2 a.m.; 120' & 30' p.m. Comments: Seated rest break between ambulation due to fatigue. Encouraged pt to self-assess PRN for safety.      Transfers  Sit to Stand: Contact guard assistance  Stand to sit: Contact guard assistance  Bed to Chair: Contact guard assistance  Stand Pivot Transfers: Contact guard assistance(no device)  Lateral Transfers: Minimal Assistance  Comment: Pt utilizes Rollator for transfers with good carryover with hand placement and making sure brakes are locked for Sit<>Stand. Ambulation  Ambulation?: Yes  More Ambulation?: Yes  Ambulation 1  Surface: level tile  Device: Rollator  Other Apparatus: AFO, Left, Right  Assistance: Contact guard assistance  Quality of Gait: Narrow FAUSTINO, slow pace, flexed posture   Gait Deviations: Slow Agueda, Decreased step length, Decreased step height  Distance: 100 ft x2 a.m.; 120' & 30' p.m. Comments: Seated rest break between ambulation due to fatigue. Encouraged pt to self-assess PRN for safety. Surface: level tile  Ambulation 1  Surface: level tile  Device: Rollator  Other Apparatus: AFO, Left, Right  Assistance: Contact guard assistance  Quality of Gait: Narrow FAUSTINO, slow pace, flexed posture   Gait Deviations: Slow Agueda, Decreased step length, Decreased step height  Distance: 100 ft x2 a.m.; 120' & 30' p.m. Comments: Seated rest break between ambulation due to fatigue. Encouraged pt to self-assess PRN for safety. OT:  ADL  Feeding: Increased time to complete, Verbal cueing, Setup, Adaptive utensils (comment)  Grooming: Stand by assistance, Supervision, Increased time to complete(oral care at sink)  UE Bathing: Stand by assistance, Increased time to complete  LE Bathing: None  UE Dressing: None  LE Dressing:  Moderate assistance(AFOs and shoes - needs footstool to raise shoe position to tie laces)  Toileting: None  Additional Comments: completed desired care seated on rollator at sink          Balance  Sitting Balance: Supervision  Standing Balance: Contact guard assistance   Standing Balance  Time: ~3 minutes  Activity: self care tasks  Comment: Improved standing balance with hand-hold support   Functional Mobility  Functional - Mobility Device: 4-Wheeled Walker  Activity: To/from bathroom  Assist Level: Contact guard assistance  Functional Mobility Comments: AM: Pt with anteriorly shifted posture while completing functional mobility. No verbal cueing required to attent to floor obstacles. 1 near posterior LOB, writer provided physical assist to correct. Pt also completed functional mobility down hallway with emphasis on fatigue management and taking rest breaks as necessary. Pt demo'd appropriate utilization of rollator while turning to sit in seatd. Mildly unsteady while turning 360 to sit on seated, no LOB. Apparatus Needs  Apparatus Needs: AFO  Bed mobility  Bridging: Stand by assistance  Rolling to Left: Unable to assess(Deferred- pt has new pace maker on Left side)  Rolling to Right: Stand by assistance  Supine to Sit: Supervision  Sit to Supine: Supervision  Scooting: Stand by assistance(hips to EOM)  Comment: AM: Pt requires increased time. Demo'd appropriate technique and use of bed rail without verbal cueing. Transfers  Stand Pivot Transfers: Moderate assistance  Sit to stand: Contact guard assistance  Stand to sit: Contact guard assistance  Transfer Comments: AM: Pt demo'd appropriate hand/foot placement. Pt required 1 v/c to initiate transfer from bed with BUE. Pt with mild unsteadiness while transitioning BUE from bed to rollator. No LOB. Toilet Transfers  Toilet - Technique: Stand pivot  Equipment Used: Raised toilet seat with rails  Toilet Transfer: Minimal assistance  Toilet Transfers Comments: AM: Pt educated on use of grab bar to complete ADL transfers, good return. No verbal cueing required to lock rollator brakes before transitioning to grab bar. Transition from stand > sit was slow and controlled. No LOB      Shower Transfers  Shower - Transfer From: Wheelchair  Shower - Transfer Type: To and From  Shower - Transfer To:  Transfer tub bench  Shower - Technique: Stand pivot  Shower Transfers: Minimal assistance  Shower Transfers Comments: Used firm, wall mounted grab bars during transfer   Wheelchair Bed Transfers  Wheelchair/Bed - Technique: Stand pivot  Equipment Used: Bed, Wheelchair  Level of Asssistance: Moderate assistance  Wheelchair Transfers Comments: For initial transfer had hospital footies on feet, but not his AFOs     SPEECH:      Objective:  /65   Pulse 79   Temp 97.3 °F (36.3 °C) (Oral)   Resp 18   Ht 5' 10\" (1.778 m)   Wt 134 lb 6 oz (61 kg)   SpO2 94%   BMI 19.28 kg/m²       GEN: Well developed, thin gentleman, in NAD  HEENT:  NCAT.  PERRL.  EOMI.  Mucous membranes pink and moist.   PULM:  Clear to ausculation. No rales or rhonchi. Respirations WNL and unlabored. CV:  Regular rate rhythm.  No murmurs or gallops. Short shallow breaths intermittent but continuing to improve. GI:  Abdomen soft. Nontender. Non-distended.  BS + and equal.    NEUROLOGICAL: A&O x3. Sensation intact to light touch.   MSK:  Functional ROM all extremities. Motor testing 4/5 key muscles all extremities.    SKIN: Warm dry and intact. Good turgor. Mild tenderness over L upper chest pacemaker site. EXTREMITIES:  No calf tenderness to palpation. No edema BLEs. .    PSYCH: Mood WNL. Appropriately interactive. Affect WNL.     Diagnostics:     CBC: No results for input(s): WBC, RBC, HGB, HCT, MCV, RDW, PLT in the last 72 hours. BMP: No results for input(s): NA, K, CL, CO2, PHOS, BUN, CREATININE, GLUCOSE in the last 72 hours. Invalid input(s): CA  BNP: No results for input(s): BNP in the last 72 hours. PT/INR:   Recent Labs     07/06/20  0634 07/07/20  0628 07/08/20  0627   PROTIME 13.5 13.6 14.0   INR 1.0 1.1 1.1     APTT: No results for input(s): APTT in the last 72 hours. CARDIAC ENZYMES: No results for input(s): CKMB, CKMBINDEX, TROPONINT in the last 72 hours.     Invalid input(s): CKTOTAL;3  FASTING LIPID PANEL:  Lab Results   Component Value Date    CHOL 166 07/10/2019    HDL 40 07/10/2019    TRIG 62 07/10/2019     LIVER PROFILE: No results for input(s): AST, ALT, ALB, BILIDIR, BILITOT, ALKPHOS in the last 72 hours. Current Medications:   Current Facility-Administered Medications: warfarin (COUMADIN) tablet 7.5 mg, 7.5 mg, Oral, Once  docusate sodium (COLACE) capsule 100 mg, 100 mg, Oral, PRN  polyethylene glycol (GLYCOLAX) packet 17 g, 17 g, Oral, Daily PRN  warfarin (COUMADIN) daily dosing (placeholder), , Other, RX Placeholder  senna (SENOKOT) tablet 17.2 mg, 2 tablet, Oral, Daily PRN  magnesium hydroxide (MILK OF MAGNESIA) 400 MG/5ML suspension 15 mL, 15 mL, Oral, Daily PRN  sodium chloride flush 0.9 % injection 10 mL, 10 mL, Intravenous, 2 times per day  sodium chloride flush 0.9 % injection 10 mL, 10 mL, Intravenous, PRN  acetaminophen (TYLENOL) tablet 650 mg, 650 mg, Oral, Q6H PRN **OR** acetaminophen (TYLENOL) suppository 650 mg, 650 mg, Rectal, Q6H PRN  aspirin EC tablet 81 mg, 81 mg, Oral, Daily  HYDROcodone-acetaminophen (NORCO) 5-325 MG per tablet 1 tablet, 1 tablet, Oral, Q4H PRN **OR** HYDROcodone-acetaminophen (NORCO) 5-325 MG per tablet 2 tablet, 2 tablet, Oral, Q4H PRN  naloxone (NARCAN) injection 0.4 mg, 0.4 mg, Intravenous, PRN  pantoprazole (PROTONIX) tablet 20 mg, 20 mg, Oral, Daily  sotalol (BETAPACE) tablet 40 mg, 40 mg, Oral, BID  therapeutic multivitamin-minerals 1 tablet, 1 tablet, Oral, Daily  Vitamin D (CHOLECALCIFEROL) tablet 1,000 Units, 1,000 Units, Oral, Daily  bisacodyl (DULCOLAX) suppository 10 mg, 10 mg, Rectal, Daily PRN  ondansetron (ZOFRAN) tablet 4 mg, 4 mg, Oral, Q6H PRN      Impression/Plan:   Impaired ADLs, gait, and mobility due to:      1. Debility secondary to atrial flutter and bradycardia:  PT/OT for gait, mobility, strengthening, endurance, ADLs, and self care. S/p cardioversion and pacemaker implantation. On ASA, sotalol.  Has Tylenol or Norco prn pain.  No elevation of L arm above shoulder for 30 days post-op.   2. Atrial flutter: On warfarin starting 7/3 with pharmacy to dose. Nursing confirmed with cardiology - no bridging due to bleeding from pacemaker site at [place]. Patient to provide supplements information. 3. ALS: weakness and weight loss  4. Chronic hypercapneic respiratory failure: Pulmonology consult reviewed and appreciated - recommending BIPAP - will coordinate for pulmonology regarding Bipap for home. 5. Bowel management: Recent impaction: miralax daily, dolace daily, has dulcolax prn, senokot and milk of magnesia prn.   6. DVT Prophylaxis:  Coumadin with INR goal 2-3, SCD's while in bed and DEMARIO's while in bed. INR 1.1 today. Updated pharmacist Leroy Ni on patient's ALS supplements which may be interfering with getting INR therapeutic. 7. Internal medicine for medical management    Electronically signed by Ruben Stanley MD on 7/8/2020 at 9:43 AM      This note is created with the assistance of a speech recognition program.  While intending to generate a document that actually reflects the content of the visit, the document can still have some errors including those of syntax and sound a like substitutions which may escape proof reading. In such instances, actual meaning can be extrapolated by contextual diversion.

## 2020-07-08 NOTE — PROGRESS NOTES
[facility]    CONSULTATION / HISTORY AND PHYSICAL EXAMINATION            Date:   7/8/2020  Patient name:  Hayden Nieto  Date of admission:  7/2/2020  9:58 PM  MRN:   [id number]  Account:  [de-identified]  YOB: 1950  PCP:    Jayna Kim MD  Room:   [de-identified]  Code Status:    Full Code    Physician Requesting Consult: Bernard Moser MD    Reason for Consult: Medical management    Chief Complaint:     No chief complaint on file. ALS    History Obtained From:     patient, electronic medical record    History of Present Illness/ Interval History:   64-year-old with known ALS,  diagnosed 2 years ago, paroxysmal A. Fib on Coumadin, status post pacemaker placement for management of tachybradycardia syndrome. Engaging with PT OT, overall improving. 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  Authorizing Provider   fluticasone (FLONASE) 50 MCG/ACT nasal spray 1 spray by Nasal route daily  Patient taking differently: 1 spray by Nasal route daily as needed for Rhinitis or Allergies  3/31/20   Jayna Kim MD   Handicap Placard MISC by Does not apply route Dx: spinal stenosis, ALS    Duration: 5 years till 2/25/2020 2/25/20   Jayna Kim MD   SPIRULINA PO Take by mouth    Historical Provider, MD   vitamin D (CHOLECALCIFEROL) 1000 UNIT TABS tablet Take 1,000 Units by mouth daily    Historical Provider, MD   Vitamin E 100 UNITS TABS Take 60 Units by mouth daily    Historical Provider, MD   SELENIUM PO Take 75 mcg by mouth daily    Historical Provider, MD   SOYA LECITHIN PO Take 1,000 mg by mouth daily    Historical Provider, MD   aspirin 81 MG tablet Take 81 mg by mouth three times a week     Historical Provider, MD   acetaminophen (TYLENOL) 325 MG tablet Take 650 mg by mouth every 6 hours as needed for Pain    Historical Provider, MD   Multiple Vitamins-Minerals (THERAPEUTIC MULTIVITAMIN-MINERALS) tablet Take 1 tablet by mouth daily    Historical Provider, MD   OMEGA-3 FATTY ACIDS-VITAMIN E PO Take 1 tablet by mouth daily    Historical Provider, MD   Probiotic Product (PROBIOTIC PO) Take 1 tablet by mouth daily    Historical Provider, MD   Ascorbic Acid (VITAMIN C) 500 MG tablet Take 120 mg by mouth daily    Historical Provider, MD        Allergies:     Ciprofloxacin; Lasix [furosemide]; Penicillins; Sulfa antibiotics; and Duloxetine    Social History:     Tobacco:    reports that he has never smoked. He has never used smokeless tobacco.  Alcohol:      reports current alcohol use of about 1.0 standard drinks of alcohol per week. Drug Use:  reports no history of drug use. Family History:     Family History   Problem Relation Age of Onset    Lung Cancer Mother     Heart Disease Father        Review of Systems:     Positive and Negative as described in HPI. Constitutional: Negative for chills, fatigue, fever and unexpected weight change. HENT: Negative for ear discharge, facial swelling, nosebleeds, sore throat, trouble swallowing and voice change. Eyes: Negative for redness and visual disturbance. Respiratory: Positive for shortness of breath  Cardiovascular: Negative for chest pain, palpitations and leg swelling. Gastrointestinal: Negative for abdominal pain, blood in stool, diarrhea and vomiting. Genitourinary: Negative for difficulty urinating, dysuria and flank pain. Musculoskeletal: Negative for joint swelling.    Skin: Negative for color change and rash. Neurological: Weakness secondary to ALS, negative for dizziness, seizures, syncope and headaches. Hematological: Negative for adenopathy. Does not bruise/bleed easily. Physical Exam:     /65   Pulse 79   Temp 97.3 °F (36.3 °C) (Oral)   Resp 18   Ht 5' 10\" (1.778 m)   Wt 134 lb 6 oz (61 kg)   SpO2 94%   BMI 19.28 kg/m²   Temp (24hrs), Av °F (36.7 °C), Min:97.3 °F (36.3 °C), Max:98.6 °F (37 °C)      General appearance:  alert, cooperative and no distress, thin build person  Eyes: Anicteric sclera. Pupils are equally round and reactive to light. Extraocular movements are intact.   Lungs:  clear to auscultation bilaterally, normal effort  Heart:  regular rate and rhythm, no murmur, pacemaker site insertion dry, clean  Abdomen:  soft, nontender, nondistended, normal bowel sounds, no masses, hepatomegaly, splenomegaly  Extremities:  no edema, redness, tenderness in the calves  Skin:  no gross lesions, rashes, induration  Neuro:  Alert, oriented X 3, chronic foot drop    Investigations:      Laboratory Testing:  Lab Results   Component Value Date    WBC 8.2 2020    HGB 15.8 2020    HCT 46.2 2020    .5 (H) 2020     2020     Lab Results   Component Value Date     2020    K 4.4 2020    CL 94 2020    CO2 35 2020    BUN 17 2020    CREATININE <0.40 2020    GLUCOSE 108 2020    CALCIUM 9.4 2020         Assessment :      Primary Problem  <principal problem not specified>    Active Hospital Problems    Diagnosis Date Noted    Severe malnutrition ([facility] [address] [E43] 2020    Cardiac abnormality [Q24.9] 2020    History of permanent cardiac pacemaker placement 2020:   [Z95.0] 2020    Anticoagulated on Coumadin [Z79.01] 2020    Tachy-ralf syndrome ([facility] [address] [I49.5] 2020    Weakness of both lower extremities [R29.898] 2019    Paroxysmal atrial fibrillation (HCC) [I48.0] 05/02/2019    Rheumatic aortic valve insufficiency [I06.1] 05/02/2019    ALS (amyotrophic lateral sclerosis) ([facility] [address] [G12.21] 10/02/2018    S/P CABG (coronary artery bypass graft) [Z95.1] 11/11/2016       Plan: Active Problems:    ALS (amyotrophic lateral sclerosis) (HCC)    Paroxysmal atrial fibrillation (HCC)    Rheumatic aortic valve insufficiency    Weakness of both lower extremities    S/P CABG (coronary artery bypass graft)    Tachy-ralf syndrome (HCC)    Anticoagulated on Coumadin    History of permanent cardiac pacemaker placement 7/1/2020:      Cardiac abnormality    Severe malnutrition ([facility] [address]  Resolved Problems:    * No resolved hospital problems. *        1. Paroxysmal atrial flutter-currently in sinus rhythm, on  sotalol, on Coumadin, INR 1, patient does not for bridging due to recent bleed around the time of pacemaker placement. Pharmacy consulted to dose Coumadin  2. Tachybradycardia syndrome-currently heart rate controlled, on Betapace status post pacemaker  3. ALS-stable. 4. History of coronary artery disease status post CABG and aortic valve replacement-lately asymptomatic  5. Chronic respiratory failure with moderate hypercapnia secondary to ALS-continue with BiPAP, pulmonologist following, will need outpatient sleep study    7/8  INR is still subtherapeutic  Patient using fullface mask with BiPAP machine  No new complaints. Consultations:   IP CONSULT TO RESPIRATORY CARE  IP CONSULT TO INTERNAL MEDICINE  IP CONSULT TO DIETITIAN  IP CONSULT TO SOCIAL WORK  IP CONSULT TO RECREATION THERAPY  IP CONSULT TO CARDIOLOGY  PHARMACY TO DOSE WARFARIN  IP CONSULT TO PULMONOLOGY      Zhanna Melara MD  7/8/2020  7:03 PM    Copy sent to Dr. Yobani Smith MD    Please note that this chart was generated using voice recognition Dragon dictation software. Although every effort was made to ensure the accuracy of this automated transcription, some errors in transcription may have occurred.

## 2020-07-08 NOTE — PROGRESS NOTES
Telephone call to Pharmacist Penelope Bartlett in [facility] with list of ingredients of pt's homemade supplemental shakes so she can dose Coumadin accordingly in addition to pt's daily PT/INR levels. List put into pt's chart.

## 2020-07-08 NOTE — PLAN OF CARE
Problem: Falls - Risk of:  Goal: Will remain free from falls  Description: Will remain free from falls  7/8/2020 0419 by Neha Marroquin RN  Outcome: Ongoing     Problem: Falls - Risk of:  Goal: Absence of physical injury  Description: Absence of physical injury  7/8/2020 0419 by Neha Marroquin RN  Outcome: Ongoing     Problem: SAFETY  Goal: Free from accidental physical injury  7/8/2020 0419 by Neha Marroquin RN  Outcome: Ongoing     Problem: SAFETY  Goal: Free from intentional harm  7/8/2020 0419 by Neha Marroquin RN  Outcome: Ongoing     Problem: PAIN  Goal: Patient's pain/discomfort is manageable  7/8/2020 0419 by Neha Marroquin RN  Outcome: Ongoing     Problem: SKIN INTEGRITY  Goal: Skin integrity is maintained or improved  7/8/2020 0419 by Neha Marroquin RN  Outcome: Ongoing     Problem: Pain:  Goal: Pain level will decrease  Description: Pain level will decrease  7/8/2020 0419 by Neha Marroquin RN  Outcome: Ongoing     Problem: Pain:  Goal: Control of acute pain  Description: Control of acute pain  7/8/2020 0419 by Neha Marroquin RN  Outcome: Ongoing     Problem: Pain:  Goal: Control of chronic pain  Description: Control of chronic pain  7/8/2020 0419 by Neha Marroquin RN  Outcome: Ongoing     Problem: Nutrition  Goal: Optimal nutrition therapy  7/8/2020 0419 by Neha Marroquin RN  Outcome: Ongoing     Problem: Skin Integrity:  Goal: Will show no infection signs and symptoms  Description: Will show no infection signs and symptoms  7/8/2020 0419 by Neha Marroquin RN  Outcome: Ongoing     Problem: Skin Integrity:  Goal: Absence of new skin breakdown  Description: Absence of new skin breakdown  7/8/2020 0419 by Neha Marroquin RN  Outcome: Ongoing

## 2020-07-08 NOTE — PROGRESS NOTES
Telephone call to dtr, Bouchra Avila, to ask for list of ingredients in pt's supplements. Bouchra Avila states she is traveling so will call her mother, who can bring a list in today when she visits.

## 2020-07-08 NOTE — PLAN OF CARE
Problem: Falls - Risk of:  Goal: Will remain free from falls  Description: Will remain free from falls  7/8/2020 1629 by Pb Macario RN  Outcome: Ongoing  7/8/2020 0419 by Gabriela Wallace RN  Outcome: Ongoing  Goal: Absence of physical injury  Description: Absence of physical injury  7/8/2020 1629 by Pb Macario RN  Outcome: Ongoing  7/8/2020 0419 by Gabriela Wallace RN  Outcome: Ongoing     Problem: SAFETY  Goal: Free from accidental physical injury  7/8/2020 1629 by Pb Macario RN  Outcome: Ongoing  7/8/2020 0419 by Gabriela Wallace RN  Outcome: Ongoing  Goal: Free from intentional harm  7/8/2020 1629 by Pb Macario RN  Outcome: Ongoing  7/8/2020 0419 by Gabriela Wallace RN  Outcome: Ongoing     Problem: PAIN  Goal: Patient's pain/discomfort is manageable  7/8/2020 1629 by Pb Macario RN  Outcome: Ongoing  7/8/2020 0419 by Gabriela Wallace RN  Outcome: Ongoing     Problem: SKIN INTEGRITY  Goal: Skin integrity is maintained or improved  7/8/2020 1629 by Pb Macario RN  Outcome: Ongoing  7/8/2020 0419 by Gabriela Wallace RN  Outcome: Ongoing     Problem: Pain:  Goal: Pain level will decrease  Description: Pain level will decrease  7/8/2020 1629 by Pb Macario RN  Outcome: Ongoing  7/8/2020 0419 by Gabriela Wallace RN  Outcome: Ongoing  Goal: Control of acute pain  Description: Control of acute pain  7/8/2020 1629 by Pb Macario RN  Outcome: Ongoing  7/8/2020 0419 by Gabriela Wallace RN  Outcome: Ongoing  Goal: Control of chronic pain  Description: Control of chronic pain  7/8/2020 1629 by Pb Macario RN  Outcome: Ongoing  7/8/2020 0419 by Gabriela Wallace RN  Outcome: Ongoing     Problem: Nutrition  Goal: Optimal nutrition therapy  7/8/2020 1629 by Pb Macario RN  Outcome: Ongoing  7/8/2020 0419 by Gabriela Wallace RN  Outcome: Ongoing     Problem: Skin Integrity:  Goal: Will show no infection signs and symptoms  Description: Will show no infection signs and symptoms  7/8/2020 1629 by Gavino Ghotra RN  Outcome: Ongoing  7/8/2020 0419 by Andreas Costello RN  Outcome: Ongoing  Goal: Absence of new skin breakdown  Description: Absence of new skin breakdown  7/8/2020 1629 by Gavino Ghotra RN  Outcome: Ongoing  7/8/2020 0419 by Andreas Costello RN  Outcome: Ongoing

## 2020-07-08 NOTE — PROGRESS NOTES
Pharmacy Note  Warfarin Consult follow-up      Recent Labs     07/06/20  0634 07/07/20  0628 07/08/20  0627   INR 1.0 1.1 1.1     No results for input(s): HGB, HCT, PLT in the last 72 hours. Significant Drug-Drug Interactions:  Current warfarin drug-drug interactions: MVI, Norco, aspirin      Date             INR        Dose given previous day  Dose scheduled for today  7/8/2020            1.1        10  mg         7.5 mg        Notes:  Give 7.5 mg dose today. Daily PT/INR while inpatient.   Rigo Mcdonald RPh  7/8/2020  7:47 AM

## 2020-07-08 NOTE — PROGRESS NOTES
Pharmacy Note  Warfarin Consult follow-up      Recent Labs     07/06/20  0634 07/07/20  0628 07/08/20  0627   INR 1.0 1.1 1.1       Notes:                   Patient's wife has been making and brining in shake supplements for him to drink. GREEN SHAKES  3 handfuls of spinach, 3 handfuls of kale, and 1/2 - 2/3 cup brocolli, 1/4 cup parsley and fruit: blueberries always, and second fruit varies from banana, strawberry or pineapple  Also adds HEAL  in a Meal contains iron, no extra Vitamin K, and LEAP in a fix    Patient drinks 1-2 shakes per day  His wife has been bringing in 2 per day, RN states patient has been drinking 1-2 daily since admission to [facility]ors why INR is not rising  Give extra 5 mg dose tonight in addition to 7.5 mg already given     Brandon Cunningham, [facility].  [address] 7/8/2020 7:44 PM

## 2020-07-08 NOTE — PROGRESS NOTES
Physical Therapy  [address] [facility]   Acute Rehabilitation Physical Therapy Progress Note    Date: 20  Patient Name: Gaurang Rene       Room: 8374/4563-81  MRN: [id number]   Account: [de-identified]   : 1950  (71 y.o.) Gender: male     Referring Practitioner: Dr Jorge Doe   Diagnosis: Cardiac abnormality;  history of Amyotrophic Lateral Sclerosis   Past Medical History:  has a past medical history of ALS (amyotrophic lateral sclerosis) ([address] Aortic root aneurysm ([address] Aortic valve replaced, and Hypertension. Past Surgical History:   has a past surgical history that includes Parathyroid gland surgery (); hernia repair; and Cardioversion (2020). Additional Pertinent Hx: Hx Amyotrophic Lateral Sclerosis; Pacemaker placement 2020; Aortic Root Aneurysm; Aortic Valve Replacement     Restrictions/Precautions  Restrictions/Precautions: General Precautions; Fall Risk;Surgical Protocols;Cardiac( Pacemaker Implant; ALS; Bilateral AFOs  )  Required Braces or Orthoses?: Yes  Required Braces or Orthoses  Right Lower Extremity Brace: Ankle Foot Orthotics  Left Lower Extremity Brace: Letty Foot Orthotics  Position Activity Restriction  Other position/activity restrictions: New Pacemaker precautions: Pacemaker Precautions: L side, AVOID PUSHING/PULLING AND OVERHEAD ACTIVITY TO L UE. Subjective: Pt continues to report issues with bipap, but also states he was able to sleep well, after adjustment, for at least 4 hours; discussed use of supplements to forestall progression of ALS, hopes to find way to reverse its effects. Comments: SpO2 WNL & HR consistently in 70s during PT sessions, though pt demo's frequent WATERS, req's frequent rest breaks/VCs to pace himself.      Vital Signs  Patient Currently in Pain: Yes  Pain Assessment: Faces  Gould-Baker Pain Rating: Hurts little more  Pain Type: Acute pain  Pain Location: Neck(Lingering pain from \"sleeping wrong\")  Pain Orientation: Posterior  Pain Descriptors: Sore  Non-Pharmaceutical Pain Intervention(s): Ambulation/Increased Activity  Response to Pain Intervention: Patient Satisfied    Patient Observation  Observations: Pt routinely using accessory muscles for respiration, particularly with exertion. Transfers:  Sit to Stand: Stand by assistance  Stand to sit: Stand by assistance  Bed to Chair: Contact guard assistance(chair to chair transfer without AD)   Stand pivot transfers: CGA without AD    Ambulation 1  Surface: level tile  Device: Rollator  Other Apparatus: AFO; Left;Right  Assistance: Stand by assistance  Quality of Gait: Narrow FAUSTINO, slow pace, kyphotic-looking/rounded posture. Pt reports atrophy of back muscles makes upright posture too strenuous to tolerate for long. Gait Deviations: Slow Agueda;Decreased step length;Decreased step height  Distance: 100 ft x2 & 70' a.m. Comments: Seated rest break between ambulation due to fatigue, WATERS. Encouraged pt to self-assess PRN for safety. Stairs/Curb  Stairs?: Yes  Stairs  # Steps : 6  Stairs Height: 4\"  Rails: Left ascending  Assistance: Stand by assistance  Comment: Lateral approach with good demo, no difficulty. Completed without rest break. Pt reports L HR only present at home. BALANCE Posture: Poor(Kyphotic-looking rounded posture with amb)  Sitting - Static: Good(EOM, no back or UE support)  Sitting - Dynamic: Fair;+(UE support EOM, no back support)  Standing - Static: Fair; +(Seeks UE support)  Standing - Dynamic: Fair(Rollator)    EXERCISES    Other exercises 1: Seated (B) LE exercises, 15x (broken into sets PRN for L LE), AROM  Other exercises 6: Stand pivot transfers x4, no AD/CGA  Other exercises 7: Stretching hamstrings, quads, biceps, triceps, with education about timing, maintaining flexibility/muscle length    Activity Tolerance: Patient limited by endurance, Patient Tolerated treatment well(Pt req's frequent rest breaks due to lack of endurance.  )     PT Electronically signed by Janak Gibson PTA on 7/8/20 at 5:09 PM EDT

## 2020-07-08 NOTE — PROGRESS NOTES
[address] [facility]    ACUTE REHABILITATION OCCUPATIONAL THERAPY  DAILY NOTE    Date: 20  Patient Name: Puja Ingram      Room: 9208/8386-01    MRN: [id number]   : 1950  (65 y.o.)  Gender: male   Referring Practitioner: Dr Susie Loomis   Diagnosis: Cardiac abnormality;  history of Amyotrophic Lateral Sclerosis        Restrictions  Restrictions/Precautions: General Precautions, Fall Risk, Surgical Protocols, Cardiac( Pacemaker Implant; ALS; Bilateral AFOs  )  Other position/activity restrictions: New Pacemaker precautions: Pacemaker Precautions: L side, AVOID PUSHING/PULLING AND OVERHEAD ACTIVITY TO L UE. Required Braces or Orthoses  Right Lower Extremity Brace: Ankle Foot Orthotics  Left Lower Extremity Brace: Letty Foot Orthotics  Required Braces or Orthoses?: Yes  Equipment Used: Bed, Wheelchair    Subjective              Pain Assessment  Pain Assessment: 0-10  Pain Level: 5  Patient's Stated Pain Goal: No pain  Pain Type: Chronic pain  Pain Location: Neck  Pain Descriptors: Aching    Objective  Cognition  Overall Cognitive Status: Exceptions  Following Commands: Follows one step commands with increased time; Follows one step commands consistently  Attention Span: Appears intact  Memory: Decreased short term memory  Safety Judgement: Decreased awareness of need for assistance;Decreased awareness of need for safety  Problem Solving: Assistance required to generate solutions  Insights: Decreased awareness of deficits  Balance  Sitting Balance: Modified independent   Standing Balance: Stand by assistance  Bed mobility  Rolling to Right: Stand by assistance  Supine to Sit: Supervision  Scooting: Stand by assistance  Transfers  Stand Pivot Transfers: Stand by assistance  Sit to stand: Contact guard assistance  Standing Balance  Activity: self care tasks and transfers   Comment: Improved standing balance with hand-hold support   Functional Mobility  Functional - Mobility Device: Wheelchair  Activity: To/from bathroom  Assist Level: Contact guard assistance  Shower Transfers  Shower - Transfer From: Wheelchair  Shower - Transfer Type: To and From  Shower - Transfer To: Transfer tub bench  Shower - Technique: Stand pivot  Shower Transfers: Contact Guard  Shower Transfers Comments: Used firm, wall mounted grab bars during transfer   Fine Motor: [place] retrieval and placement   Type of ROM/Therapeutic Exercise  Comment: hand coordination, strengthening and functional skills    Exercises  Grasp/Release: Graded clothes pins - yellow, red, green - with better use/force noted in left Hand/arm than right UE    Other: coin retrieval from tabletop and placement through a coin slot into a container                         ADL  Feeding: Increased time to complete;Setup(cut food to bite sizd pieces )  Grooming: Stand by assistance;Supervision; Increased time to complete(shaved with safety razor and oral care  perfromaed while sitting at sink )  UE Bathing: Setup; Increased time to complete  LE Bathing: Modified independent ; Increased time to complete;Setup  UE Dressing: Setup  LE Dressing: Setup;Contact guard assistance;Verbal cueing(fastening shoe laces - needs footstool/raised surface to perform ( chair at home is lower than the wheelchair is here ) )  Toileting: None  Additional Comments: showered today with grooming tasks performed at sink           Assessment     Activity Tolerance: Patient limited by fatigue  Activity Tolerance: Needs some cues for therapuetic rests, maintaining O2 in 90's; dicussed techniques to blow off CO2 with positive results              Patient Education:  Patient Goals   Patient goals : Return home - able to care for self and not fall   Learner:patient  Method: demonstration and explanation       Outcome: needs reinforcement   OT Education  Patient Education: Safety, energy conservation strategies     Plan  Plan  Times per week: 900/7   Times per day: Twice a day  Current Treatment Recommendations: Strengthening, Functional Mobility Training, Endurance Training, Pain Management, Safety Education & Training, Patient/Caregiver Education & Training, Positioning, Self-Care / ADL  Patient Goals   Patient goals : Return home - able to care for self and not fall   Short term goals  Time Frame for Short term goals: 1 week   Short term goal 1: Perform UB self care after setup utilizing modified techniques and enlarged  for tools   Short term goal 2: Min Assist for Shaving; cuing for UB dressing;   Mod Assist for LB bathing and dressing   Short term goal 3: D/V understanding of Respiratory Rehab strategies to increase tolerance to care tasks - Pacing/phasing, supportive postures,  air exchange    Short term goal 4: Tolerate 10-25 minutes of generalized strengthening within precautions to support care needs    Long term goals  Time Frame for Long term goals : By Discharge   Long term goal 1: Mod I for UB feeding, grooming, bathing and dressing tasks using modified tehcniques and devices   Long term goal 2: SBA for LB bathing and dressing tasks   Long term goal 3: Apply energy conservation and breathing strategies to ADL tasks to support saf independence    Long term goal 4: D/V understanding of Home Program for Home safety, Hand strengthening (intrinsic), device availalbility          07/08/20 0730 07/08/20 1345   OT Individual Minutes   Time In [id number]   Time Out [id number]   Minutes 60 38     Electronically signed by Alex Adame OT on 7/8/20 at 3:13 PM EDT

## 2020-07-09 LAB
INR BLD: 1.1
PROTHROMBIN TIME: 14.6 SEC (ref 11.8–14.6)

## 2020-07-09 PROCEDURE — 99232 SBSQ HOSP IP/OBS MODERATE 35: CPT | Performed by: PHYSICAL MEDICINE & REHABILITATION

## 2020-07-09 PROCEDURE — 97530 THERAPEUTIC ACTIVITIES: CPT

## 2020-07-09 PROCEDURE — 6370000000 HC RX 637 (ALT 250 FOR IP): Performed by: INTERNAL MEDICINE

## 2020-07-09 PROCEDURE — 85610 PROTHROMBIN TIME: CPT

## 2020-07-09 PROCEDURE — 97535 SELF CARE MNGMENT TRAINING: CPT

## 2020-07-09 PROCEDURE — 1180000000 HC REHAB R&B

## 2020-07-09 PROCEDURE — 6370000000 HC RX 637 (ALT 250 FOR IP): Performed by: PHYSICAL MEDICINE & REHABILITATION

## 2020-07-09 PROCEDURE — 97116 GAIT TRAINING THERAPY: CPT

## 2020-07-09 PROCEDURE — 94660 CPAP INITIATION&MGMT: CPT

## 2020-07-09 PROCEDURE — 36415 COLL VENOUS BLD VENIPUNCTURE: CPT

## 2020-07-09 PROCEDURE — 97110 THERAPEUTIC EXERCISES: CPT

## 2020-07-09 PROCEDURE — 99232 SBSQ HOSP IP/OBS MODERATE 35: CPT | Performed by: INTERNAL MEDICINE

## 2020-07-09 RX ORDER — WARFARIN SODIUM 7.5 MG/1
15 TABLET ORAL
Status: COMPLETED | OUTPATIENT
Start: 2020-07-09 | End: 2020-07-09

## 2020-07-09 RX ADMIN — Medication 3 MG: at 19:47

## 2020-07-09 RX ADMIN — POLYETHYLENE GLYCOL 3350 17 G: 17 POWDER, FOR SOLUTION ORAL at 09:12

## 2020-07-09 RX ADMIN — SOTALOL HYDROCHLORIDE 40 MG: 80 TABLET ORAL at 19:46

## 2020-07-09 RX ADMIN — SOTALOL HYDROCHLORIDE 40 MG: 80 TABLET ORAL at 09:12

## 2020-07-09 RX ADMIN — MELATONIN 1000 UNITS: at 09:12

## 2020-07-09 RX ADMIN — MULTIPLE VITAMINS W/ MINERALS TAB 1 TABLET: TAB at 09:11

## 2020-07-09 RX ADMIN — PANTOPRAZOLE SODIUM 20 MG: 20 TABLET, DELAYED RELEASE ORAL at 09:11

## 2020-07-09 RX ADMIN — ASPIRIN 81 MG: 81 TABLET, COATED ORAL at 09:11

## 2020-07-09 RX ADMIN — WARFARIN SODIUM 15 MG: 7.5 TABLET ORAL at 17:27

## 2020-07-09 ASSESSMENT — PAIN SCALES - GENERAL: PAINLEVEL_OUTOF10: 0

## 2020-07-09 NOTE — PROGRESS NOTES
Physical Medicine & Rehabilitation  Progress Note      Subjective:      65year-old gentleman with debility secondary to atrial flutter with RVR and comorbid ALS.  Patient is well, but has had some minor complaints of insomnia/difficulty sleeping last night related to BiPAP mask. Otherwise, he has no new complaints. ROS:  Denies fevers, chills, sweats. No chest pain, palpitations, lightheadedness. Denies coughing, wheezing or shortness of breath. Denies abdominal pain, nausea, diarrhea or constipation. No new areas of joint pain. Denies new areas of numbness or weakness. Denies new anxiety or depression issues. No new skin problems. Rehabilitation:   Progressing in therapies. PT:  Restrictions/Precautions: General Precautions, Fall Risk, Surgical Protocols, Cardiac(pacemaker implant; ALS; bilateral AFOs)  Other position/activity restrictions: New Pacemaker precautions: Pacemaker Precautions: L side, AVOID PUSHING/PULLING AND OVERHEAD ACTIVITY TO L UE. Required Braces or Orthoses  Right Lower Extremity Brace: Ankle Foot Orthotics  Left Lower Extremity Brace: Letty Foot Orthotics   Transfers  Sit to Stand: Stand by assistance  Stand to sit: Stand by assistance  Bed to Chair: Contact guard assistance(chair to chair transfer without AD)  Stand Pivot Transfers: Contact guard assistance(no device)  Lateral Transfers: Minimal Assistance  Comment: Pt utilizes Rollator for transfers with good technique  Ambulation 1  Surface: level tile  Device: Rollator  Other Apparatus: AFO, Left, Right  Assistance: Stand by assistance  Quality of Gait: Narrow FAUSTINO, slow pace, kyphotic-looking/rounded posture. Pt reports atrophy of back muscles makes upright posture too strenuous to tolerate for long. Gait Deviations: Slow Agueda, Decreased step length, Decreased step height  Distance: 100 ft x2 & 70' a.m. Comments: Seated rest break between ambulation due to fatigue, WATERS. Encouraged pt to self-assess PRN for safety. Transfers  Sit to Stand: Stand by assistance  Stand to sit: Stand by assistance  Bed to Chair: Contact guard assistance(chair to chair transfer without AD)  Stand Pivot Transfers: Contact guard assistance(no device)  Lateral Transfers: Minimal Assistance  Comment: Pt utilizes Rollator for transfers with good technique  Ambulation  Ambulation?: Yes  More Ambulation?: Yes  Ambulation 1  Surface: level tile  Device: Rollator  Other Apparatus: AFO, Left, Right  Assistance: Stand by assistance  Quality of Gait: Narrow FAUSTINO, slow pace, kyphotic-looking/rounded posture. Pt reports atrophy of back muscles makes upright posture too strenuous to tolerate for long. Gait Deviations: Slow Agueda, Decreased step length, Decreased step height  Distance: 100 ft x2 & 70' a.m. Comments: Seated rest break between ambulation due to fatigue, WATERS. Encouraged pt to self-assess PRN for safety. Surface: level tile  Ambulation 1  Surface: level tile  Device: Rollator  Other Apparatus: AFO, Left, Right  Assistance: Stand by assistance  Quality of Gait: Narrow FAUSTINO, slow pace, kyphotic-looking/rounded posture. Pt reports atrophy of back muscles makes upright posture too strenuous to tolerate for long. Gait Deviations: Slow Agueda, Decreased step length, Decreased step height  Distance: 100 ft x2 & 70' a.m. Comments: Seated rest break between ambulation due to fatigue, WATERS. Encouraged pt to self-assess PRN for safety.      OT:  ADL  Feeding: Increased time to complete, Setup  Grooming: Supervision, Increased time to complete(seated on 4WW sinkside. )  UE Bathing: Setup, Increased time to complete  LE Bathing: Stand by assistance, Setup, Increased time to complete(bathing buttocks/geovany area only )  UE Dressing: Setup  LE Dressing: Stand by assistance, Setup  Toileting: Stand by assistance  Additional Comments: showered today with grooming tasks performed at sink          Balance  Sitting Balance: Modified independent   Standing Balance: Stand by assistance   Standing Balance  Time: ~1 min trials. Activity: self care tasks, set up   Comment: x1 LOB (knee buckle d/t fatigue) requiring CGA  Functional Mobility  Functional - Mobility Device: 4-Wheeled Walker  Activity: [employer], Transport items, To/from bathroom  Assist Level: Stand by assistance  Functional Mobility Comments: c 4WW, retrieves clothing items from closet and transports using table of walker. Apparatus Needs  Apparatus Needs: AFO  Bed mobility  Bridging: Stand by assistance  Rolling to Left: Unable to assess(Deferred- pt has new pace maker on Left side)  Rolling to Right: Stand by assistance  Supine to Sit: Supervision  Sit to Supine: Supervision  Scooting: Stand by assistance  Comment: AM: Pt requires increased time. Demo'd appropriate technique and use of bed rail without verbal cueing. Transfers  Stand Pivot Transfers: Stand by assistance  Sit to stand: Stand by assistance  Stand to sit: Stand by assistance  Transfer Comments: min cues for proper brake technique with 4WW to ensure safety    Toilet Transfers  Toilet - Technique: Ambulating  Equipment Used: Raised toilet seat with rails  Toilet Transfer: Stand by assistance  Toilet Transfers Comments: pt observed getting off toilet only; already seated on toilet upon entry. Shower Transfers  Shower - Transfer From: Wheelchair  Shower - Transfer Type: To and From  Shower - Transfer To: Transfer tub bench  Shower - Technique: Stand pivot  Shower Transfers: Contact Guard  Shower Transfers Comments: Used firm, wall mounted grab bars during transfer   Wheelchair [employer] Transfers  Wheelchair/Bed - Technique: Stand pivot  Equipment Used: Bed, Wheelchair  Level of Asssistance: Moderate assistance  Wheelchair Transfers Comments:  For initial transfer had hospital footies on feet, but not his AFOs     SPEECH:      Objective:  BP (!) 144/76   Pulse 72   Temp 97.3 °F (36.3 °C) (Oral)   Resp 16   Ht 5' 10\" (1.778 m)   Wt 134 lb 6 oz (61 kg)   SpO2 97%   BMI 19.28 kg/m²       GEN: Well developed, thin gentleman, in NAD  HEENT:  NCAT.  PERRL.  EOMI.  Mucous membranes pink and moist.   PULM:  Clear to ausculation. No rales or rhonchi. Respirations WNL and unlabored. CV:  Regular rate rhythm.  No murmurs or gallops. Short shallow breaths intermittently   GI:  Abdomen soft. Nontender. Non-distended.  BS + and equal.    NEUROLOGICAL: A&O x3. Sensation intact to light touch.   MSK:  Functional ROM all extremities. Motor testing 4/5 key muscles all extremities.    SKIN: Warm dry and intact. Good turgor. Mild tenderness over L upper chest pacemaker site - healing, no edema. EXTREMITIES:  No calf tenderness to palpation. No edema BLEs. .  B AFOs on. PSYCH: Mood WNL. Appropriately interactive. Affect WNL.     Diagnostics:     CBC: No results for input(s): WBC, RBC, HGB, HCT, MCV, RDW, PLT in the last 72 hours. BMP: No results for input(s): NA, K, CL, CO2, PHOS, BUN, CREATININE, GLUCOSE in the last 72 hours. Invalid input(s): CA  BNP: No results for input(s): BNP in the last 72 hours. PT/INR:   Recent Labs     07/07/20  0628 07/08/20  0627 07/09/20  0643   PROTIME 13.6 14.0 14.6   INR 1.1 1.1 1.1     APTT: No results for input(s): APTT in the last 72 hours. CARDIAC ENZYMES: No results for input(s): CKMB, CKMBINDEX, TROPONINT in the last 72 hours. Invalid input(s): CKTOTAL;3  FASTING LIPID PANEL:  Lab Results   Component Value Date    CHOL 166 07/10/2019    HDL 40 07/10/2019    TRIG 62 07/10/2019     LIVER PROFILE: No results for input(s): AST, ALT, ALB, BILIDIR, BILITOT, ALKPHOS in the last 72 hours.      Current Medications:   Current Facility-Administered Medications: docusate sodium (COLACE) capsule 100 mg, 100 mg, Oral, PRN  polyethylene glycol (GLYCOLAX) packet 17 g, 17 g, Oral, Daily PRN  warfarin (COUMADIN) daily dosing (placeholder), , Other, RX Placeholder  senna (SENOKOT) tablet 17.2 mg, 2 tablet, Oral, Daily PRN  magnesium hydroxide (MILK OF MAGNESIA) 400 MG/5ML suspension 15 mL, 15 mL, Oral, Daily PRN  acetaminophen (TYLENOL) tablet 650 mg, 650 mg, Oral, Q6H PRN **OR** acetaminophen (TYLENOL) suppository 650 mg, 650 mg, Rectal, Q6H PRN  aspirin EC tablet 81 mg, 81 mg, Oral, Daily  HYDROcodone-acetaminophen (NORCO) 5-325 MG per tablet 1 tablet, 1 tablet, Oral, Q4H PRN **OR** HYDROcodone-acetaminophen (NORCO) 5-325 MG per tablet 2 tablet, 2 tablet, Oral, Q4H PRN  naloxone (NARCAN) injection 0.4 mg, 0.4 mg, Intravenous, PRN  pantoprazole (PROTONIX) tablet 20 mg, 20 mg, Oral, Daily  sotalol (BETAPACE) tablet 40 mg, 40 mg, Oral, BID  therapeutic multivitamin-minerals 1 tablet, 1 tablet, Oral, Daily  Vitamin D (CHOLECALCIFEROL) tablet 1,000 Units, 1,000 Units, Oral, Daily  bisacodyl (DULCOLAX) suppository 10 mg, 10 mg, Rectal, Daily PRN  ondansetron (ZOFRAN) tablet 4 mg, 4 mg, Oral, Q6H PRN      Impression/Plan:   Impaired ADLs, gait, and mobility due to:      1. Debility secondary to atrial flutter and bradycardia[de-identified] PT/OT  for gait, mobility, strengthening, endurance, ADLs, and self care. S/p cardioversion and pacemaker implantation. On ASA, sotalol. Has Tylenol or Norco prn pain.  No elevation of L arm above shoulder for 30 days post-op.   2. Atrial flutter: On warfarin starting 7/3 with pharmacy to dose. Nursing confirmed with cardiology - no bridging due to bleeding from pacemaker site at [place] [address] to provide supplements information. 3. ALS: associated weakness and weight loss. Drinking multiple daily supplement shakes high in vitamins, including Vitamin K.  4. Chronic hypercapneic respiratory failure: Pulmonology following - on BIPAP - will coordinate for pulmonology regarding Bipap for home - respiratory therapy reportedly recommended Trilogy to patient. 5. Bowel management: Recent impaction.  miralax daily, dolace daily, has dulcolax prn, senokot and milk of magnesia prn.   6. Internal medicine for medical management  7. DVT prophylaxis: Coumadin with INR goal 2-3, SCD's while in bed and DEMARIO's while in bed. INR 1.1 today. Pharmacist adjusting dosing for high vitamin K intake daily. Electronically signed by Valerie Bates MD on 7/9/2020 at 9:10 AM      This note is created with the assistance of a speech recognition program.  While intending to generate a document that actually reflects the content of the visit, the document can still have some errors including those of syntax and sound a like substitutions which may escape proof reading. In such instances, actual meaning can be extrapolated by contextual diversion.

## 2020-07-09 NOTE — PROGRESS NOTES
Nutrition Assessment    Type and Reason for Visit: Reassess    Nutrition Recommendations: Continue current diet. Nutrition Assessment: Wife reported yesterday that at home pt eats multiple meals throughout the day but has difficulty avoiding wt loss due to increased energy needs. Pt uses special drinks that are high in vitamin K, but he is consistent with the intake. Pt now on coumadin, and attempts underway by pharmacist and physician to adjust medication appopriately. Encouraged pt to try [employer] supplement (in [place]). Spoke with pt and wife again today; attempting to honor food preferences and tolerances. Malnutrition Assessment:  · Malnutrition Status: Meets the criteria for severe malnutrition  · Context: Acute illness or injury  · Findings of the 6 clinical characteristics of malnutrition (Minimum of 2 out of 6 clinical characteristics is required to make the diagnosis of moderate or severe Protein Calorie Malnutrition based on AND/ASPEN Guidelines):  1. Energy Intake-Less than or equal to 75% of estimated energy requirement, Greater than or equal to 3 months    2. Weight Loss-10% loss or greater, in 6 months  3. Fat Loss-Severe subcutaneous fat loss, Orbital, Fat overlying the ribs  4. Muscle Loss-Severe muscle mass loss, Temples (temporalis muscle), Clavicles (pectoralis and deltoids)  5. Fluid Accumulation-No significant fluid accumulation,    6.   Strength-Not measured    Nutrition Risk Level: High    Nutrient Needs:  · Estimated Daily Total Kcal: 3317-2668 kcal based on 33-35 kcal/kg of admission weight  · Estimated Daily Protein (g): 81-93 gm based on 1.3-1.5 gm/kg of admission weight    Nutrition Diagnosis:   · Problem: Severe malnutrition  · Etiology: related to Cognitive or neurological impairment, Increased demand for energy/nutrients, Insufficient energy/nutrient consumption     Signs and symptoms:  as evidenced by Diet history of poor intake, Weight loss greater than or equal to 10% in 6 months, Severe loss of subcutaneous fat, Severe muscle loss    Objective Information:  · Nutrition-Focused Physical Findings: No edema. · Wound Type: Surgical Wound  · Current Nutrition Therapies:  · Oral Diet Orders: No Added Salt (3-4gm), Dysphagia  Soft and Bite-Sized (Dysphagia 3)   · Oral Diet intake: %  · Anthropometric Measures:  · Ht: 5' 10\" (177.8 cm)   · Current Body Wt: 134 lb 7.7 oz (61 kg)  · Admission Body Wt: 136 lb 4.8 oz (61.8 kg)  · Usual Body Wt: (170 lbs 1 yr. ago, 163 lbs 6 months ago)  · % Weight Change:  ,  17% in 6 months  · Ideal Body Wt: 166 lb (75.3 kg), % Ideal Body 81% based on wt 7/7  · BMI Classification: BMI 18.5 - 24.9 Normal Weight    Nutrition Interventions:   Continue current diet(Provide oral nutrition supplements if requested.)  Continued Inpatient Monitoring    Nutrition Evaluation:   · Evaluation: Progressing toward goals   · Goals: PO intake to meet % of estimated nutrition needs    · Monitoring: Meal Intake, Supplement Intake, Diet Tolerance, Skin Integrity, Weight, Pertinent Labs, Chewing/Swallowing, Monitor Hemodynamic Status, Monitor Bowel Function    Some areas of assessment may be incomplete due to standard COVID-19 Precautions. Coleen Shepard R.D., L.D.   Phone: [phone]

## 2020-07-09 NOTE — PLAN OF CARE
Nutrition Problem: Severe malnutrition  Intervention: Food and/or Nutrient Delivery: Continue current diet(Provide oral nutrition supplements if requested.)  Nutritional Goals: PO intake to meet % of estimated nutrition needs

## 2020-07-09 NOTE — FLOWSHEET NOTE
Patient report feeling exhausted again, d/t difficulty getting comfortable w/ his cpap during the night. We talked about things that he look forward to that is relaxing to him. Patient stated that he and his wife get regular massages. Writer asked patient to describe how that makes him feel. We talked about doing imagery, and deep breathing activity to see if that would help. Patient shared with writer the value of touch for relaxation. Chaplains will remain available to offer spiritual and emotional support as needed. This was a very good and positive visit. 07/08/20 6325   Encounter Summary   Services provided to: Patient   Referral/Consult From: [facility] Spouse; Children;Family members   Continue Visiting   (7/8/20)   Complexity of Encounter Low   Length of Encounter 15 minutes   Spiritual Assessment Completed Yes   Routine   Type Follow up   Spiritual/Buddhist   Type Spiritual support   Assessment Calm; Approachable   Intervention Active listening;Explored feelings, thoughts, concerns;Nurtured hope;Prayer;Sustaining presence/ Ministry of presence; Discussed illness/injury and it's impact   Outcome Expressed gratitude;Engaged in conversation;Expressed feelings/needs/concerns; Hopeful;Receptive   Visited by Kiki Herndon

## 2020-07-09 NOTE — PROGRESS NOTES
Pharmacy Note  Warfarin Consult follow-up      Recent Labs     07/07/20  0628 07/08/20  0627 07/09/20  0643   INR 1.1 1.1 1.1     No results for input(s): HGB, HCT, PLT in the last 72 hours. Significant Drug-Drug Interactions:  New warfarin drug-drug interactions: no  Discontinued drug-drug interactions: no  Current warfarin drug-drug interactions: MVI, Norco, aspirin      Date             INR        Dose given previous day  Dose scheduled for today  7/9/2020            1.1         12.5 mg         15 mg        Notes:                     Daily PT/INR while inpatient.

## 2020-07-09 NOTE — PLAN OF CARE
Problem: Falls - Risk of:  Goal: Will remain free from falls  Description: Will remain free from falls  7/9/2020 0401 by Gus Randhawa RN  Outcome: Met This Shift   Pt. Free of falls and injuries this shift. Problem: Falls - Risk of:  Goal: Absence of physical injury  Description: Absence of physical injury  7/9/2020 0401 by Gus Randhawa RN  Outcome: Met This Shift   No injuries this shift. Patient's safety maintained. Problem: SAFETY  Goal: Free from accidental physical injury  7/9/2020 0401 by Gus Randhawa RN  Outcome: Met This Shift   No injuries this shift. Patient's safety maintained. Problem: SAFETY  Goal: Free from intentional harm  7/9/2020 0401 by Gus Randhawa RN  Outcome: Met This Shift   No injuries this shift. Patient's safety maintained. Problem: PAIN  Goal: Patient's pain/discomfort is manageable  7/9/2020 0401 by Gus Randhawa RN  Outcome: Ongoing     Problem: SKIN INTEGRITY  Goal: Skin integrity is maintained or improved  7/9/2020 0401 by Gus Randhawa RN  Outcome: Ongoing     Problem: Pain:  Goal: Pain level will decrease  Description: Pain level will decrease  7/9/2020 0401 by Gus Randhawa RN  Outcome: Ongoing     Problem: Nutrition  Goal: Optimal nutrition therapy  7/9/2020 0401 by Gus Randhawa RN  Outcome: Ongoing     Problem: Skin Integrity:  Goal: Will show no infection signs and symptoms  Description: Will show no infection signs and symptoms  7/9/2020 0401 by Gus Randhawa RN  Outcome: Ongoing     Problem: Skin Integrity:  Goal: Absence of new skin breakdown  Description: Absence of new skin breakdown  7/9/2020 0401 by Gus Randhawa RN  Outcome: Met This Shift   Skin assessment as charted. No evidence of new skin breakdown.

## 2020-07-09 NOTE — PROGRESS NOTES
[facility]    CONSULTATION / HISTORY AND PHYSICAL EXAMINATION            Date:   7/9/2020  Patient name:  Nabeel Ferrer  Date of admission:  7/2/2020  9:58 PM  MRN:   [id number]  Account:  [de-identified]  YOB: 1950  PCP:    Mandeep Centeno MD  Room:   [de-identified]  Code Status:    Full Code    Physician Requesting Consult: Felton Skiff, MD    Reason for Consult: Medical management    Chief Complaint:     No chief complaint on file. ALS    History Obtained From:     patient, electronic medical record    History of Present Illness/ Interval History:   78-year-old with known ALS,  diagnosed 2 years ago, paroxysmal A. Fib on Coumadin, status post pacemaker placement for management of tachybradycardia syndrome. Engaging with PT OT, overall improving. Using BiPAP overnight    Past Medical History:     Past Medical History:   Diagnosis Date    [facility]) [facility] [address]     Aortic root aneurysm ([facility] [address] 11/08/2016    Aortic valve replaced 11/08/2016    Hypertension         Past Surgical History:     Past Surgical History:   Procedure Laterality Date    CARDIOVERSION  06/27/2020    w/    HERNIA REPAIR      PARATHYROID GLAND SURGERY  1980        Medications Prior to Admission:     Prior to Admission medications    Medication Sig Start Date End Date Taking?  Authorizing Provider   fluticasone (FLONASE) 50 MCG/ACT nasal spray 1 spray by Nasal route daily  Patient taking differently: 1 spray by Nasal route daily as needed for Rhinitis or Allergies  3/31/20   Mandeep Centeno MD   Handicap Placard MISC by Does not apply route Dx: spinal stenosis, ALS    Duration: 5 years till 2/25/2020 2/25/20   Mandeep Centeno MD   SPIRULINA PO Take by mouth    Historical Provider, MD   vitamin D (CHOLECALCIFEROL) 1000 UNIT TABS tablet Take 1,000 Units by mouth daily    Historical Provider, MD   Vitamin E 100 UNITS TABS Take 60 Units by mouth daily    Historical Provider, MD   SELENIUM PO Take 75 mcg by mouth daily    Historical Provider, MD   SOYA LECITHIN PO Take 1,000 mg by mouth daily    Historical Provider, MD   aspirin 81 MG tablet Take 81 mg by mouth three times a week     Historical Provider, MD   acetaminophen (TYLENOL) 325 MG tablet Take 650 mg by mouth every 6 hours as needed for Pain    Historical Provider, MD   Multiple Vitamins-Minerals (THERAPEUTIC MULTIVITAMIN-MINERALS) tablet Take 1 tablet by mouth daily    Historical Provider, MD   OMEGA-3 FATTY ACIDS-VITAMIN E PO Take 1 tablet by mouth daily    Historical Provider, MD   Probiotic Product (PROBIOTIC PO) Take 1 tablet by mouth daily    Historical Provider, MD   Ascorbic Acid (VITAMIN C) 500 MG tablet Take 120 mg by mouth daily    Historical Provider, MD        Allergies:     Ciprofloxacin; Lasix [furosemide]; Penicillins; Sulfa antibiotics; and Duloxetine    Social History:     Tobacco:    reports that he has never smoked. He has never used smokeless tobacco.  Alcohol:      reports current alcohol use of about 1.0 standard drinks of alcohol per week. Drug Use:  reports no history of drug use. Family History:     Family History   Problem Relation Age of Onset    Lung Cancer Mother     Heart Disease Father        Review of Systems:     Positive and Negative as described in HPI. Constitutional: Negative for chills, fatigue, fever and unexpected weight change. HENT: Negative for ear discharge, facial swelling, nosebleeds, sore throat, trouble swallowing and voice change. Eyes: Negative for redness and visual disturbance. Respiratory: Positive for shortness of breath  Cardiovascular: Negative for chest pain, palpitations and leg swelling. Gastrointestinal: Negative for abdominal pain, blood in stool, diarrhea and vomiting. Genitourinary: Negative for difficulty urinating, dysuria and flank pain. Musculoskeletal: Negative for joint swelling.    Skin: Negative for color change and rash. Neurological: Weakness secondary to ALS, negative for dizziness, seizures, syncope and headaches. Hematological: Negative for adenopathy. Does not bruise/bleed easily. Physical Exam:     /72   Pulse 70   Temp 97.9 °F (36.6 °C) (Oral)   Resp 16   Ht 5' 10\" (1.778 m)   Wt 134 lb 6 oz (61 kg)   SpO2 97%   BMI 19.28 kg/m²   Temp (24hrs), Av.8 °F (36.6 °C), Min:97.3 °F (36.3 °C), Max:98.1 °F (36.7 °C)      General appearance:  alert, cooperative and no distress, thin build person  Eyes: Anicteric sclera. Pupils are equally round and reactive to light. Extraocular movements are intact.   Lungs:  clear to auscultation bilaterally, normal effort  Heart:  regular rate and rhythm, no murmur, pacemaker site insertion dry, clean  Abdomen:  soft, nontender, nondistended, normal bowel sounds, no masses, hepatomegaly, splenomegaly  Extremities:  no edema, redness, tenderness in the calves  Skin:  no gross lesions, rashes, induration  Neuro:  Alert, oriented X 3, chronic foot drop    Investigations:      Laboratory Testing:  Lab Results   Component Value Date    WBC 8.2 2020    HGB 15.8 2020    HCT 46.2 2020    .5 (H) 2020     2020     Lab Results   Component Value Date     2020    K 4.4 2020    CL 94 2020    CO2 35 2020    BUN 17 2020    CREATININE <0.40 2020    GLUCOSE 108 2020    CALCIUM 9.4 2020         Assessment :      Primary Problem  <principal problem not specified>    Active Hospital Problems    Diagnosis Date Noted    Severe malnutrition ([facility] [address] [E43] 2020    Cardiac abnormality [Q24.9] 2020    History of permanent cardiac pacemaker placement 2020:   [Z95.0] 2020    Anticoagulated on Coumadin [Z79.01] 2020    Tachy-ralf syndrome ([facility] [address] [I49.5] 2020    Weakness of both lower extremities [R29.898] 2019    Paroxysmal atrial fibrillation (HCC) [I48.0] 05/02/2019    Rheumatic aortic valve insufficiency [I06.1] 05/02/2019    [facility] ([facility] [G12.21] 10/02/2018    S/P CABG (coronary artery bypass graft) [Z95.1] 11/11/2016       Plan: Active Problems:    ALS (amyotrophic lateral sclerosis) (HCC)    Paroxysmal atrial fibrillation (HCC)    Rheumatic aortic valve insufficiency    Weakness of both lower extremities    S/P CABG (coronary artery bypass graft)    Tachy-ralf syndrome (HCC)    Anticoagulated on Coumadin    History of permanent cardiac pacemaker placement 7/1/2020:      Cardiac abnormality    Severe malnutrition ([facility] [address]  Resolved Problems:    * No resolved hospital problems. *        1. Paroxysmal atrial flutter-currently in sinus rhythm, on  sotalol, on Coumadin, INR 1, patient does not for bridging due to recent bleed around the time of pacemaker placement. Pharmacy consulted to dose Coumadin  2. Tachybradycardia syndrome-currently heart rate controlled, on Betapace status post pacemaker  3. ALS-stable. 4. History of coronary artery disease status post CABG and aortic valve replacement-lately asymptomatic  5. Chronic respiratory failure with moderate hypercapnia secondary to ALS-continue with BiPAP, pulmonologist following, will need outpatient sleep study    7/8  INR is still subtherapeutic  Patient using fullface mask with BiPAP machine  No new complaints. 7/9   INR is still subtherapeutic  Patient has issues of insomnia, melatonin nightly as needed      Consultations:   2021 [facility]. CONSULT TO INTERNAL MEDICINE  IP CONSULT TO DIETITIAN  IP CONSULT TO SOCIAL WORK  IP CONSULT TO RECREATION THERAPY  IP CONSULT TO CARDIOLOGY  PHARMACY TO DOSE WARFARIN  IP CONSULT TO PULMONOLOGY      Crystal Cruz MD  7/9/2020  7:06 PM    Copy sent to Dr. Lorri Marquez MD    Please note that this chart was generated using voice recognition Dragon dictation software.   Although every effort was made to ensure the accuracy of this automated transcription, some errors in transcription may have occurred.

## 2020-07-09 NOTE — PROGRESS NOTES
Physical Therapy  [address]  Acute Rehabilitation Physical Therapy Progress Note    Date: 20  Patient Name: Minerva Galindo       Room: 9730/1655-92  MRN: [id number]   Account: [de-identified]   : 1950  (71 y.o.) Gender: male     Referring Practitioner: Dr Gwen Hubbard   Diagnosis: Cardiac abnormality;  history of Amyotrophic Lateral Sclerosis   Past Medical History:  has a past medical history of ALS (amyotrophic lateral sclerosis) ([address] Aortic root aneurysm ([address] Aortic valve replaced, and Hypertension. Past Surgical History:   has a past surgical history that includes Parathyroid gland surgery (); hernia repair; and Cardioversion (2020). Additional Pertinent Hx: Hx Amyotrophic Lateral Sclerosis; Pacemaker placement 2020; Aortic Root Aneurysm; Aortic Valve Replacement     Restrictions/Precautions  Restrictions/Precautions: General Precautions; Fall Risk;Surgical Protocols;Cardiac(Pacemaker Implant; ALS; Bilateral AFOs  )  Required Braces or Orthoses?: Yes  Required Braces or Orthoses  Right Lower Extremity Brace: Ankle Foot Orthotics  Left Lower Extremity Brace: Letty Foot Orthotics  Position Activity Restriction  Other position/activity restrictions: New Pacemaker precautions: L side, AVOID PUSHING/PULLING AND OVERHEAD ACTIVITY TO L UE. Subjective: Pt continues to report issues with bipap, but feels it's beneficial; ongoing dietary issues. Denies pain, reports neck stiffness is much improved. Vital Signs  Patient Currently in Pain: Denies    Patient Observation  Observations: Pt routinely using accessory muscles for respiration, particularly with exertion; WATERS quickly. Bed Mobility  Supine to Sit: Supervision  Scooting: Supervision(hips to EOB)  Comment: Bed nearly flat, 2 pillows, rail not used.      Transfers:  Sit to Stand: Stand by assistance  Stand to sit: Stand by assistance  Bed to Chair: Stand by assistance(chair to chair transfer without AD)  Stand pivot transfers: Stand by assistance(chair to chair transfer without AD)    Ambulation 1  Surface: level tile  Device: Rollator  Other Apparatus: AFO; Left;Right  Assistance: Stand by assistance  Quality of Gait: Narrow FAUSTINO, slow pace, kyphotic-looking/rounded posture. Gait Deviations: Slow Agueda;Decreased step length;Decreased step height  Distance: 100 ft x2  Comments: Seated rest breaks PRN; WATERS. Pt asked to pace activity to manage/minimize fatigue. Stairs/Curb  Stairs?: Yes  Stairs  # Steps : 6(+ 3 additional)  Stairs Height: 4\"  Rails: Left ascending  Assistance: Stand by assistance  Comment: Lateral approach with good demo, no difficulty. Completed without rest break. Pt reports L HR only present at home. BALANCE Posture: Poor(Kyphotic-looking rounded posture with amb)  Sitting - Static: Good(EOB, no back or UE support)  Sitting - Dynamic: Fair;+(EOB, no back support; UE support)  Standing - Static: Fair; +(Seeks UE support)  Standing - Dynamic: Fair(Rollator)    EXERCISES    Other exercises 4: Isometric & stretching HEP issued with education & demo  Other exercises 6: Stand pivot transfers x4, no AD/CGA  Other exercises 7: Stretching hamstrings, quads, biceps, triceps, upper traps  Other exercises 9: NuStep, L2, 7 min. (R UE & B LEs only, rest breaks every 2 min. for 1 min. to manage WATERS. )    Activity Tolerance: Patient limited by endurance, Patient Tolerated treatment well(Pt req's frequent rest breaks)  PT Equipment Recommendations  Equipment Needed: No  Other: Patient reports he has rollator and wheelchair at home. Patient Education  New Education Provided:  HEP (see below)  Learner:patient  Method: demonstration, explanation and handout       Outcome: acknowledged understanding of, demonstrated understanding and asked questions     Access Code: [email or web address]   URL: [email or web address]   Date: 07/09/2020   Prepared by:  Crystal Cox     Exercises   Supine Quad Set - 10 reps - 3 sets - 1x daily - 7x weekly   Supine Gluteal Sets - 10 reps - 3 sets - 1x daily - 7x weekly   Supine Isometric Hamstring Set - 10 reps - 3 sets - 1x daily - 7x weekly   Hooklying Isometric Hip Abduction with Belt - 10 reps - 3 sets - 1x daily - 7x weekly   Supine Figure 4 Supported - 10 reps - 3 sets - 1x daily - 7x weekly   Seated Transversus Abdominis Bracing - 10 reps - 3 sets - 1x daily - 7x weekly   Seated Isometric Elbow Extension - 10 reps - 3 sets - 1x daily - 7x weekly   Seated Isometric Elbow Flexion - 10 reps - 3 sets - 1x daily - 7x weekly   Seated Isometric Knee Extension - 10 reps - 3 sets - 1x daily - 7x weekly   Seated Scapular Retraction - 10 reps - 3 sets - 1x daily - 7x weekly   Supine Lower Trunk Rotation - 10 reps - 3 sets - 1x daily - 7x weekly   Supine Hamstring Stretch with Strap - 10 reps - 3 sets - 1x daily - 7x weekly   Seated Upper Trapezius Stretch - 10 reps - 3 sets - 1x daily - 7x weekly     Current Treatment Recommendations: Strengthening, Balance Training, Transfer Training, Endurance Training, Gait Training, Stair training, Home Exercise Program, Safety Education & Training, Functional Mobility Training, Patient/Caregiver Education & Training, Equipment Evaluation, Education, & procurement    Conditions Requiring Skilled Therapeutic Intervention  Body structures, Functions, Activity limitations: Decreased functional mobility ; Decreased strength;Decreased safe awareness;Decreased endurance;Decreased balance  Assessment: Pt fatigues easily, requires frequest rest breaks as muscles fatigue with activity & pt becomes more WATERS. REQUIRES PT FOLLOW UP: Yes  Discharge Recommendations: Home with Home health PT    Goals  Short term goals  Time Frame for Short term goals: 5 days  Short term goal 1: Pt able to perfrom supine<>sit independently  Short term goal 2: Pt able to ambulate 50 to 70 ft with Rollator, SBA, mod exertion.   Short term goal 3: Pt able to go up/down 3 steps with 2 rail min A  Short term goal 4: Pt able to tolerate 30-40 min of activity with pacing skills to improve endurance. Short term goal 5: Pt able to transfer wit Rollator at [address]  Long term goals  Time Frame for Long term goals : By LOS  Long term goal 1: Pt able to perform transfers independently. Long term goal 2:  Pt able to ambulate with a rollator dist of 80 to 100 ft, independently on level surfaces.    Long term goal 3: Pt able to  go up and down 5 steps with 1 HR, SBA  Long term goal 4: Improve 2MWT dist to 80 ft to improve endurance     07/09/20 1049 07/09/20 1509   PT Individual Minutes   Time In 1030 1430   Time Out 1125 1508   Minutes 55 38     Electronically signed by Marcelo Linton PTA on 7/9/20 at 5:40 PM EDT

## 2020-07-09 NOTE — PROGRESS NOTES
Called Timoteo Mccoy RT to question him regarding patient's respiration rate of 31 that Timoteo Mccoy charted. Timoteo Mccoy stated that the patient hyperventilates at times, but his oxygen saturation remains within normal limits on room air. Timoteo Mccoy stated the doctor does not need to be notified since this is the patient's normal behavior. Writer checked on patient. Patient is resting comfortably at this time.

## 2020-07-10 LAB
ANION GAP SERPL CALCULATED.3IONS-SCNC: 4 MMOL/L (ref 9–17)
BUN BLDV-MCNC: 18 MG/DL (ref 8–23)
BUN/CREAT BLD: ABNORMAL (ref 9–20)
CALCIUM SERPL-MCNC: 9.1 MG/DL (ref 8.6–10.4)
CHLORIDE BLD-SCNC: 98 MMOL/L (ref 98–107)
CO2: 34 MMOL/L (ref 20–31)
CREAT SERPL-MCNC: 0.42 MG/DL (ref 0.7–1.2)
GFR AFRICAN AMERICAN: >60 ML/MIN
GFR NON-AFRICAN AMERICAN: >60 ML/MIN
GFR SERPL CREATININE-BSD FRML MDRD: ABNORMAL ML/MIN/{1.73_M2}
GFR SERPL CREATININE-BSD FRML MDRD: ABNORMAL ML/MIN/{1.73_M2}
GLUCOSE BLD-MCNC: 108 MG/DL (ref 70–99)
HCT VFR BLD CALC: 41 % (ref 41–53)
HEMOGLOBIN: 13.7 G/DL (ref 13.5–17.5)
INR BLD: 1.4
MCH RBC QN AUTO: 34.2 PG (ref 26–34)
MCHC RBC AUTO-ENTMCNC: 33.4 G/DL (ref 31–37)
MCV RBC AUTO: 102.2 FL (ref 80–100)
NRBC AUTOMATED: ABNORMAL PER 100 WBC
PDW BLD-RTO: 13.5 % (ref 11.5–14.9)
PLATELET # BLD: 301 K/UL (ref 150–450)
PMV BLD AUTO: 7.7 FL (ref 6–12)
POTASSIUM SERPL-SCNC: 4.3 MMOL/L (ref 3.7–5.3)
PROTHROMBIN TIME: 17.3 SEC (ref 11.8–14.6)
RBC # BLD: 4.01 M/UL (ref 4.5–5.9)
SODIUM BLD-SCNC: 136 MMOL/L (ref 135–144)
WBC # BLD: 4.7 K/UL (ref 3.5–11)

## 2020-07-10 PROCEDURE — 1180000000 HC REHAB R&B

## 2020-07-10 PROCEDURE — 6370000000 HC RX 637 (ALT 250 FOR IP): Performed by: INTERNAL MEDICINE

## 2020-07-10 PROCEDURE — 99232 SBSQ HOSP IP/OBS MODERATE 35: CPT | Performed by: PHYSICAL MEDICINE & REHABILITATION

## 2020-07-10 PROCEDURE — 97530 THERAPEUTIC ACTIVITIES: CPT

## 2020-07-10 PROCEDURE — 94660 CPAP INITIATION&MGMT: CPT

## 2020-07-10 PROCEDURE — 97535 SELF CARE MNGMENT TRAINING: CPT

## 2020-07-10 PROCEDURE — 85610 PROTHROMBIN TIME: CPT

## 2020-07-10 PROCEDURE — 97116 GAIT TRAINING THERAPY: CPT

## 2020-07-10 PROCEDURE — 85027 COMPLETE CBC AUTOMATED: CPT

## 2020-07-10 PROCEDURE — 97110 THERAPEUTIC EXERCISES: CPT

## 2020-07-10 PROCEDURE — 80048 BASIC METABOLIC PNL TOTAL CA: CPT

## 2020-07-10 PROCEDURE — 99231 SBSQ HOSP IP/OBS SF/LOW 25: CPT | Performed by: INTERNAL MEDICINE

## 2020-07-10 PROCEDURE — 36415 COLL VENOUS BLD VENIPUNCTURE: CPT

## 2020-07-10 RX ORDER — WARFARIN SODIUM 7.5 MG/1
15 TABLET ORAL
Status: COMPLETED | OUTPATIENT
Start: 2020-07-10 | End: 2020-07-10

## 2020-07-10 RX ADMIN — Medication 3 MG: at 21:44

## 2020-07-10 RX ADMIN — MULTIPLE VITAMINS W/ MINERALS TAB 1 TABLET: TAB at 09:13

## 2020-07-10 RX ADMIN — SOTALOL HYDROCHLORIDE 40 MG: 80 TABLET ORAL at 21:44

## 2020-07-10 RX ADMIN — MELATONIN 1000 UNITS: at 09:13

## 2020-07-10 RX ADMIN — PANTOPRAZOLE SODIUM 20 MG: 20 TABLET, DELAYED RELEASE ORAL at 09:13

## 2020-07-10 RX ADMIN — WARFARIN SODIUM 15 MG: 7.5 TABLET ORAL at 17:39

## 2020-07-10 RX ADMIN — SOTALOL HYDROCHLORIDE 40 MG: 80 TABLET ORAL at 09:13

## 2020-07-10 RX ADMIN — ASPIRIN 81 MG: 81 TABLET, COATED ORAL at 09:13

## 2020-07-10 ASSESSMENT — PAIN SCALES - GENERAL: PAINLEVEL_OUTOF10: 0

## 2020-07-10 NOTE — PROGRESS NOTES
Physical Medicine & Rehabilitation  Progress Note      Subjective:      65year-old gentleman with debility secondary to atrial flutter with RVR and comorbid ALS. Patient is well, and has had no acute complaints or problems. ROS:  Denies fevers, chills, sweats. No chest pain, palpitations, lightheadedness. Denies coughing, wheezing or shortness of breath. Denies abdominal pain, nausea, diarrhea or constipation. No new areas of joint pain. Denies new areas of numbness or weakness. Denies new anxiety or depression issues. No new skin problems. Rehabilitation:   Progressing in therapies. PT:  Restrictions/Precautions: General Precautions, Fall Risk, Surgical Protocols, Cardiac(Pacemaker Implant; ALS; Bilateral AFOs  )  Other position/activity restrictions: New Pacemaker precautions: L side, AVOID PUSHING/PULLING AND OVERHEAD ACTIVITY TO L UE. Required Braces or Orthoses  Right Lower Extremity Brace: Ankle Foot Orthotics  Left Lower Extremity Brace: Letty Foot Orthotics   Transfers  Sit to Stand: Stand by assistance  Stand to sit: Stand by assistance  Bed to Chair: Stand by assistance(chair to chair transfer without AD)  Stand Pivot Transfers: Stand by assistance(no device)  Lateral Transfers: Minimal Assistance  Comment: Pt utilizes Rollator for transfers with good technique  Ambulation 1  Surface: level tile  Device: Rollator  Other Apparatus: AFO, Left, Right  Assistance: Stand by assistance  Quality of Gait: Narrow FAUSTINO, slow pace, kyphotic-looking/rounded posture. Gait Deviations: Slow Agueda, Decreased step length, Decreased step height  Distance: 100 ft x2  Comments: Seated rest breaks PRN; WATERS. Pt asked to pace activity to manage/minimize fatigue.      Transfers  Sit to Stand: Stand by assistance  Stand to sit: Stand by assistance  Bed to Chair: Stand by assistance(chair to chair transfer without AD)  Stand Pivot Transfers: Stand by assistance(no device)  Lateral Transfers: Minimal Assistance  Comment: Pt utilizes Rollator for transfers with good technique  Ambulation  Ambulation?: Yes  More Ambulation?: Yes  Ambulation 1  Surface: level tile  Device: Rollator  Other Apparatus: AFO, Left, Right  Assistance: Stand by assistance  Quality of Gait: Narrow FAUSTINO, slow pace, kyphotic-looking/rounded posture. Gait Deviations: Slow Agueda, Decreased step length, Decreased step height  Distance: 100 ft x2  Comments: Seated rest breaks PRN; WATERS. Pt asked to pace activity to manage/minimize fatigue. Surface: level tile  Ambulation 1  Surface: level tile  Device: Rollator  Other Apparatus: AFO, Left, Right  Assistance: Stand by assistance  Quality of Gait: Narrow FAUSTINO, slow pace, kyphotic-looking/rounded posture. Gait Deviations: Slow Agueda, Decreased step length, Decreased step height  Distance: 100 ft x2  Comments: Seated rest breaks PRN; WATERS. Pt asked to pace activity to manage/minimize fatigue. OT:  ADL  Feeding: Increased time to complete, Setup  Grooming: Supervision, Increased time to complete(seated on 4WW sinkside. )  UE Bathing: Setup, Increased time to complete  LE Bathing: Stand by assistance, Setup, Increased time to complete(bathing buttocks/geovany area only )  UE Dressing: Setup  LE Dressing: Stand by assistance, Setup(requires stool to tie shoes for increased height. )  Toileting: Stand by assistance  Additional Comments: showered today with grooming tasks performed at sink          Balance  Sitting Balance: Modified independent   Standing Balance: Stand by assistance   Standing Balance  Time: ~1 min trials. Activity: self care tasks, set up   Comment: x1 LOB (knee buckle d/t fatigue) requiring CGA  Functional Mobility  Functional - Mobility Device: 4-Wheeled Walker  Activity: [employer], Transport items, To/from bathroom  Assist Level: Stand by assistance  Functional Mobility Comments: c 4WW, retrieves clothing items from closet and transports using table of walker. 5-325 MG per tablet 1 tablet, 1 tablet, Oral, Q4H PRN **OR** HYDROcodone-acetaminophen (NORCO) 5-325 MG per tablet 2 tablet, 2 tablet, Oral, Q4H PRN  naloxone (NARCAN) injection 0.4 mg, 0.4 mg, Intravenous, PRN  pantoprazole (PROTONIX) tablet 20 mg, 20 mg, Oral, Daily  sotalol (BETAPACE) tablet 40 mg, 40 mg, Oral, BID  therapeutic multivitamin-minerals 1 tablet, 1 tablet, Oral, Daily  Vitamin D (CHOLECALCIFEROL) tablet 1,000 Units, 1,000 Units, Oral, Daily  bisacodyl (DULCOLAX) suppository 10 mg, 10 mg, Rectal, Daily PRN  ondansetron (ZOFRAN) tablet 4 mg, 4 mg, Oral, Q6H PRN      Impression/Plan:   Impaired ADLs, gait, and mobility due to:      1. Debility secondary to atrial flutter and bradycardia: PT/OT  for gait, mobility, strengthening, endurance, ADLs, and self care. S/p cardioversion and pacemaker implantation. On ASA, sotalol. Has Tylenol or Norco prn pain.  No elevation of L arm above shoulder for 30 days post-op.   2. Atrial flutter: On warfarin starting 7/3 with pharmacy to dose. Nursing confirmed with cardiology - no bridging due to bleeding from pacemaker site at [place] [address] on supplements high in vitamin K.   3. ALS: associated weakness and weight loss. Drinking multiple daily supplement shakes high in vitamins  4. Chronic hypercapneic respiratory failure: Pulmonology following - on BIPAP - Discussed with Dr. Nori Pennington today. He will have [employer] assess today for Trilogy Bipap at home. 5. Bowel management: Recent impaction. miralax daily, dolace daily, has dulcolax prn, senokot and milk of magnesia prn.   6. Internal medicine for medical management  7. DVT prophylaxis: Coumadin with INR goal 2-3, SCD's while in bed and DEMARIO's while in bed. INR 1.4 today. Pharmacist adjusting dosing for high vitamin K intake daily.     Electronically signed by Zay Lord MD on 7/10/2020 at 9:05 AM      This note is created with the assistance of a speech recognition program.  While intending to generate a document that actually reflects the content of the visit, the document can still have some errors including those of syntax and sound a like substitutions which may escape proof reading. In such instances, actual meaning can be extrapolated by contextual diversion.

## 2020-07-10 NOTE — PROGRESS NOTES
Physical Therapy  [address]  Acute Rehabilitation Physical Therapy Progress Note    Date: 7/10/20  Patient Name: Nabeel Ferrer       Room: [facility]7/3532-84  MRN: [id number]   Account: [de-identified]   : 1950  (71 y.o.) Gender: male     Referring Practitioner: Dr Kadeem Henao   Diagnosis: Cardiac abnormality;  history of Amyotrophic Lateral Sclerosis   Past Medical History:  has a past medical history of ALS (amyotrophic lateral sclerosis) ([address] Aortic root aneurysm ([address] Aortic valve replaced, and Hypertension. Past Surgical History:   has a past surgical history that includes Parathyroid gland surgery (); hernia repair; and Cardioversion (2020). Additional Pertinent Hx: Hx Amyotrophic Lateral Sclerosis; Pacemaker placement 2020; Aortic Root Aneurysm; Aortic Valve Replacement     Restrictions/Precautions  Restrictions/Precautions: General Precautions; Fall Risk;Surgical Protocols;Cardiac(Pacemaker Implant; ALS; Bilateral AFOs  )  Required Braces or Orthoses?: Yes  Required Braces or Orthoses  Right Lower Extremity Brace: Ankle Foot Orthotics  Left Lower Extremity Brace: Letty Foot Orthotics  Position Activity Restriction  Other position/activity restrictions: New Pacemaker precautions: L side, AVOID PUSHING/PULLING AND OVERHEAD ACTIVITY TO L UE. Subjective: Pt states he slept well after receiving melatonin supplement with bipap use. c/o fatigue, \"rubbery\" LEs in PM  Comments: Continues to demo dyspnea with all exertion; SpO2 >95%, HR in 70s. Vital Signs  Patient Currently in Pain: Denies    Patient Observation  Observations: Pt routinely using accessory muscles for respiration, particularly with exertion.     Transfers:  Sit to Stand: Stand by assistance  Stand to sit: Stand by assistance  Bed to Chair: Stand by assistance(chair to chair transfer without device)  Stand pivot transfers: Stand by assistance (no device)  Comment: Rollator for most transfers with good technique    Ambulation 1  Surface: level tile  Device: Rollator  Other Apparatus: AFO; Left;Right  Assistance: Stand by assistance  Quality of Gait: Narrow base of support, slow pace, kyphotic-looking/rounded posture. L knee buckled 1x without loss of balance, Pt self-correcting. SOB. Gait Deviations: Slow Agueda;Decreased step length;Decreased step height  Distance: 100 ft  Comments: Seated rest break. Stairs/Curb  Stairs?: Yes  Stairs  # Steps : 6(+ 3 additional)  Stairs Height: 4\"  Rails: Left ascending  Assistance: Stand by assistance  Comment: Lateral approach with good demo, no difficulty but SOB with seated rest to recover. BALANCE Posture: Poor(Kyphotic-looking rounded posture with ambulation)  Sitting - Static: Good(Edge of chair, no back or UE support)  Sitting - Dynamic: Fair;+(Edge of chair, no back support; UE support)  Standing - Static: Fair; +(Seeks external support)  Standing - Dynamic: Fair(Seeks external support)    EXERCISES    Other exercises 1: Seated bilat. LE exercises, repetitions to Pt tolerance (more on R than L), active or self-assisted ROM. Other exercises 2: Seated B foot passive ROM/stretching (DF, inversion/eversion, clockwise/counter-clockwise ankle circles) x10 & heel cord stretches 3x20 sec. (L toes 0/5 active flexion/extension)  Other exercises 6: Stand pivot transfers x4, no AD/CGA  Other exercises 9: NuStep, L2, 10 min. (R UE & B LEs only, rest breaks every 2 min. for 1 min. to manage SOB & fatigue)  Other exercises 10:  Toilet transfers x2(SBA )    Activity Tolerance: Patient limited by endurance, Patient Tolerated treatment well(Pt req's frequent rest breaks)     Current Treatment Recommendations: Strengthening, Balance Training, Transfer Training, Endurance Training, Gait Training, Stair training, Home Exercise Program, Safety Education & Training, Functional Mobility Training, Patient/Caregiver Education & Training, Equipment Evaluation, Education, &

## 2020-07-10 NOTE — PROGRESS NOTES
[facility] Internal Medicine    CONSULTATION / HISTORY AND PHYSICAL EXAMINATION            Date:   7/10/2020  Patient name:  Robin Zaidi  Date of admission:  7/2/2020  9:58 PM  MRN:   [id number]  Account:  [de-identified]  YOB: 1950  PCP:    Penelope Tipton MD  Room:   [de-identified]  Code Status:    Full Code    Physician Requesting Consult: Kevyn Carranza MD    Reason for Consult: Medical management    Chief Complaint:     No chief complaint on file. ALS    History Obtained From:     patient, electronic medical record    History of Present Illness/ Interval History:   38-year-old with known ALS,  diagnosed 2 years ago, paroxysmal A. Fib on Coumadin, status post pacemaker placement for management of tachybradycardia syndrome. Engaging with PT OT, overall improving. Using BiPAP overnight    Past Medical History:     Past Medical History:   Diagnosis Date    [facility]) [facility]     Aortic root aneurysm ([facility] [facility] 11/08/2016    Aortic valve replaced 11/08/2016    Hypertension         Past Surgical History:     Past Surgical History:   Procedure Laterality Date    CARDIOVERSION  06/27/2020    w/    HERNIA REPAIR      PARATHYROID GLAND SURGERY  1980        Medications Prior to Admission:     Prior to Admission medications    Medication Sig Start Date End Date Taking?  Authorizing Provider   fluticasone (FLONASE) 50 MCG/ACT nasal spray 1 spray by Nasal route daily  Patient taking differently: 1 spray by Nasal route daily as needed for Rhinitis or Allergies  3/31/20   Penelope Tipton MD   [facility] by Does not apply route Dx: spinal stenosis, ALS    Duration: 5 years till 2/25/2020 2/25/20   Penelope Tipton MD   SPIRULINA PO Take by mouth    Historical Provider, MD   vitamin D (CHOLECALCIFEROL) 1000 UNIT TABS tablet Take 1,000 Units by mouth daily    Historical Provider, MD   Vitamin E 100 UNITS TABS Take 60 Units by color change and rash. Neurological: Weakness secondary to ALS, negative for dizziness, seizures, syncope and headaches. Hematological: Negative for adenopathy. Does not bruise/bleed easily. Physical Exam:     /74   Pulse 68   Temp 97.9 °F (36.6 °C) (Axillary)   Resp 18   Ht 5' 10\" (1.778 m)   Wt 134 lb 6 oz (61 kg)   SpO2 97%   BMI 19.28 kg/m²   Temp (24hrs), Av.9 °F (36.6 °C), Min:97.9 °F (36.6 °C), Max:97.9 °F (36.6 °C)      General appearance:  alert, cooperative and no distress, thin build person  Eyes: Anicteric sclera. Pupils are equally round and reactive to light. Extraocular movements are intact.   Lungs:  clear to auscultation bilaterally, normal effort  Heart:  regular rate and rhythm, no murmur, pacemaker site insertion dry, clean  Abdomen:  soft, nontender, nondistended, normal bowel sounds, no masses, hepatomegaly, splenomegaly  Extremities:  no edema, redness, tenderness in the calves  Skin:  no gross lesions, rashes, induration  Neuro:  Alert, oriented X 3, chronic foot drop    Investigations:      Laboratory Testing:  Lab Results   Component Value Date    WBC 4.7 07/10/2020    HGB 13.7 07/10/2020    HCT 41.0 07/10/2020    .2 (H) 07/10/2020     07/10/2020     Lab Results   Component Value Date     07/10/2020    K 4.3 07/10/2020    CL 98 07/10/2020    CO2 34 07/10/2020    BUN 18 07/10/2020    CREATININE 0.42 07/10/2020    GLUCOSE 108 07/10/2020    CALCIUM 9.1 07/10/2020         Assessment :      Primary Problem  <principal problem not specified>    Active Hospital Problems    Diagnosis Date Noted    Severe malnutrition ([facility] [E43] 2020    Cardiac abnormality [Q24.9] 2020    History of permanent cardiac pacemaker placement 2020:   [Z95.0] 2020    Anticoagulated on Coumadin [Z79.01] 2020    Tachy-ralf syndrome ([facility] [I49.5] 2020    Weakness of both lower extremities [R29.898] 2019    Paroxysmal atrial fibrillation ([facility] [I48.0] 05/02/2019    Rheumatic aortic valve insufficiency [I06.1] 05/02/2019    ALS (amyotrophic lateral sclerosis) ([facility] [G12.21] 10/02/2018    S/P CABG (coronary artery bypass graft) [Z95.1] 11/11/2016       Plan: Active Problems:    ALS (amyotrophic lateral sclerosis) (HCC)    Paroxysmal atrial fibrillation (HCC)    Rheumatic aortic valve insufficiency    Weakness of both lower extremities    S/P CABG (coronary artery bypass graft)    Tachy-ralf syndrome (HCC)    Anticoagulated on Coumadin    History of permanent cardiac pacemaker placement 7/1/2020:      Cardiac abnormality    Severe malnutrition ([facility]  Resolved Problems:    * No resolved hospital problems. *        1. Paroxysmal atrial flutter-currently in sinus rhythm, on  sotalol, on Coumadin, INR 1, patient does not for bridging due to recent bleed around the time of pacemaker placement. Pharmacy consulted to dose Coumadin  2. Tachybradycardia syndrome-currently heart rate controlled, on Betapace status post pacemaker  3. ALS-stable. 4. History of coronary artery disease status post CABG and aortic valve replacement-lately asymptomatic  5. Chronic respiratory failure with moderate hypercapnia secondary to ALS-continue with BiPAP, pulmonologist following, will need outpatient sleep study    7/8  INR is still subtherapeutic  Patient using fullface mask with BiPAP machine  No new complaints.   7/9   INR is still subtherapeutic  Patient has issues of insomnia, melatonin nightly as needed  7/10   Patient very happy after started on melatonin, had good night sleep  INR is still subtherapeutic  Using BiPAP    Consultations:   IP CONSULT TO RESPIRATORY CARE  IP CONSULT TO INTERNAL MEDICINE  IP CONSULT TO DIETITIAN  IP CONSULT TO SOCIAL WORK  IP CONSULT TO RECREATION THERAPY  IP CONSULT TO CARDIOLOGY  PHARMACY TO DOSE WARFARIN  IP CONSULT TO PULMONOLOGY      Karyn Pradhan MD  7/10/2020  1:56 PM    Copy sent to Dr. Teofilo Fonseca, MD    Please note that this chart was generated using voice recognition Dragon dictation software. Although every effort was made to ensure the accuracy of this automated transcription, some errors in transcription may have occurred.

## 2020-07-10 NOTE — PROGRESS NOTES
Pharmacy Note  Warfarin Consult follow-up      Recent Labs     07/08/20  0627 07/09/20  0643 07/10/20  0601   INR 1.1 1.1 1.4     Recent Labs     07/10/20  0601   HGB 13.7   HCT 41.0          Significant Drug-Drug Interactions:  New warfarin drug-drug interactions: no  Discontinued drug-drug interactions: no  Current warfarin drug-drug interactions: MVI, Aspirin, Aspirin      Date             INR        Dose given previous day  Dose scheduled for today  7/10/2020            1.4       12.5 mg          15 mg         Notes: Will continue 15 mg for today, and continue to monitor   Daily PT/INR while inpatient.      Lorin RawlsD, BCPS  7/10/20   8:58 am

## 2020-07-10 NOTE — PROGRESS NOTES
[address] [facility]    ACUTE REHABILITATION OCCUPATIONAL THERAPY  DAILY NOTE    Date: 7/10/20  Patient Name: Shanta Bey      Room: 0503/9701-44    MRN: [id number]   : 1950  (65 y.o.)  Gender: male   Referring Practitioner: Dr Izabel Bates   Diagnosis: Cardiac abnormality;  history of Amyotrophic Lateral Sclerosis        Restrictions  Restrictions/Precautions: General Precautions, Fall Risk, Surgical Protocols, Cardiac(Pacemaker Implant; ALS; Bilateral AFOs  )  Other position/activity restrictions: New Pacemaker precautions: L side, AVOID PUSHING/PULLING AND OVERHEAD ACTIVITY TO L UE. Required Braces or Orthoses  Right Lower Extremity Brace: Ankle Foot Orthotics  Left Lower Extremity Brace: Letty Foot Orthotics  Required Braces or Orthoses?: Yes  Equipment Used: Bed, Other    Subjective   Alert, reports he slept well with the BiPAP and a couple of melatonin. Excited to be closing on his houses today. Pain Assessment  Pain Assessment: 0-10  Pain Level: 5  Patient's Stated Pain Goal: No pain  Pain Type: Chronic pain  Pain Location: Neck  Pain Descriptors: Aching    Objective     Balance  Sitting Balance: Modified independent (does use arm/forearm support for Respiratory Assist )  Bed mobility  Rolling to Right: Stand by assistance  Supine to Sit: Supervision  Scooting: Supervision  Transfers  Sit to stand: Stand by assistance  Standing Balance  Time: ~1-3 mins  Activity: self care tasks, set up   Shower Transfers  Shower - Transfer From: Virgil Grooms - Transfer Type: To and From  Shower - Transfer To: Transfer tub bench  Shower - Technique: Stand pivot  Shower Transfers: Contact Guard  Shower Transfers Comments: Used firm, wall mounted grab bars during transfer      Type of ROM/Therapeutic Exercise  Comment: red flexbar ex's to address B hand/wrist strength, Pronation/Supination motions while also addressing overall endurance. \"U\", upside \"U\", \"twisting\" x20 reps c rest breaks. Juxacisor with either hand ; Theraweb with both hands;  'Red cup' stacking                         ADL  Feeding: Increased time to complete;Setup  Grooming: Supervision; Increased time to complete(seated in wheelchair )  UE Bathing: Setup; Increased time to complete  LE Bathing: Stand by assistance;Setup; Increased time to complete  UE Dressing: Setup  LE Dressing: Stand by assistance;Setup  Toileting: Stand by assistance  Additional Comments: showered today with grooming tasks performed at sink           Assessment   Improved overall functional performance, but continued difficutly breathing using mainly accessory muscles. Needs cuing to take a break before being exhausted                 Patient Education:  Patient Goals   Patient goals : Return home - able to care for self and not fall   Learner:patient  Method: demonstration and explanation       Outcome: needs reinforcement and asked questions        Plan  Plan  Times per week: 900/7   Times per day: Twice a day  Current Treatment Recommendations: Strengthening, Functional Mobility Training, Endurance Training, Pain Management, Safety Education & Training, Patient/Caregiver Education & Training, Positioning, Self-Care / ADL  Patient Goals   Patient goals : Return home - able to care for self and not fall   Short term goals  Time Frame for Short term goals: 1 week   Short term goal 1: Perform UB self care after setup utilizing modified techniques and enlarged  for tools   Short term goal 2: Min Assist for Shaving; cuing for UB dressing;   Mod Assist for LB bathing and dressing   Short term goal 3: D/V understanding of Respiratory Rehab strategies to increase tolerance to care tasks - Pacing/phasing, supportive postures,  air exchange    Short term goal 4: Tolerate 10-25 minutes of generalized strengthening within precautions to support care needs    Long term goals  Time Frame for Long term goals : By Discharge   Long term goal 1: Mod I for UB feeding, grooming, bathing and dressing tasks using modified tehcniques and devices   Long term goal 2: SBA for LB bathing and dressing tasks   Long term goal 3: Apply energy conservation and breathing strategies to ADL tasks to support saf independence    Long term goal 4: D/V understanding of Home Program for Home safety, Hand strengthening (intrinsic), device availalbility          07/10/20 0730 07/10/20 1330   OT Individual Minutes   Time In 0730 1330   Time Out 0820 1410   Minutes 50 40     Electronically signed by Renay Camarena OT on 7/10/20 at 3:19 PM EDT

## 2020-07-11 LAB
INR BLD: 1.7
PROTHROMBIN TIME: 20.1 SEC (ref 11.8–14.6)

## 2020-07-11 PROCEDURE — 6370000000 HC RX 637 (ALT 250 FOR IP): Performed by: INTERNAL MEDICINE

## 2020-07-11 PROCEDURE — 97530 THERAPEUTIC ACTIVITIES: CPT

## 2020-07-11 PROCEDURE — 36415 COLL VENOUS BLD VENIPUNCTURE: CPT

## 2020-07-11 PROCEDURE — 97116 GAIT TRAINING THERAPY: CPT

## 2020-07-11 PROCEDURE — 1180000000 HC REHAB R&B

## 2020-07-11 PROCEDURE — 94660 CPAP INITIATION&MGMT: CPT

## 2020-07-11 PROCEDURE — 97110 THERAPEUTIC EXERCISES: CPT

## 2020-07-11 PROCEDURE — 99231 SBSQ HOSP IP/OBS SF/LOW 25: CPT | Performed by: INTERNAL MEDICINE

## 2020-07-11 PROCEDURE — 85610 PROTHROMBIN TIME: CPT

## 2020-07-11 PROCEDURE — 97535 SELF CARE MNGMENT TRAINING: CPT

## 2020-07-11 RX ADMIN — PANTOPRAZOLE SODIUM 20 MG: 20 TABLET, DELAYED RELEASE ORAL at 08:36

## 2020-07-11 RX ADMIN — MULTIPLE VITAMINS W/ MINERALS TAB 1 TABLET: TAB at 08:37

## 2020-07-11 RX ADMIN — ASPIRIN 81 MG: 81 TABLET, COATED ORAL at 08:37

## 2020-07-11 RX ADMIN — Medication 3 MG: at 21:33

## 2020-07-11 RX ADMIN — MELATONIN 1000 UNITS: at 08:36

## 2020-07-11 RX ADMIN — SOTALOL HYDROCHLORIDE 40 MG: 80 TABLET ORAL at 21:33

## 2020-07-11 RX ADMIN — WARFARIN SODIUM 12.5 MG: 2.5 TABLET ORAL at 17:56

## 2020-07-11 RX ADMIN — SOTALOL HYDROCHLORIDE 40 MG: 80 TABLET ORAL at 08:37

## 2020-07-11 ASSESSMENT — PAIN SCALES - GENERAL: PAINLEVEL_OUTOF10: 0

## 2020-07-11 NOTE — PLAN OF CARE
Problem: Falls - Risk of:  Goal: Will remain free from falls  Description: Will remain free from falls  Outcome: Ongoing  Note: No falls or injuries sustained at this time. No attempts to get out of bed without nursing assistance. Call light within reach and pt. uses appropriately for assistance. Siderails up x 2. Nonskid footwear remains on. Bed in low and locked position. Hourly nursing rounds made. Pt. Alert and oriented, aware of limitations, and exhibits good safety judgement. Pt. uses assistive devices appropriately. Pt. understands individual fall risk factors. Pt. reoriented to surroundings and reminded to use call light with each nurse/patient interaction. Pt. room located close to nurse's station. Bed alarm remains engaged throughout the shift as a precaution. Problem: PAIN  Goal: Patient's pain/discomfort is manageable  Outcome: Ongoing  Note: Pain assessment completed. Pt. able to rest.  Pt. denies pain this shift. Pt. denies need for oral analgesic. Verbalizes understanding of nonpharmacologic strategies that provide comfort. Pt. repositioned for comfort. Nonverbal cues indicate absence of pain. Problem: SKIN INTEGRITY  Goal: Skin integrity is maintained or improved  Outcome: Ongoing  Note: Skin assessment completed this shift. Nutrition and Hydration status assessed with adequate intake. Candelario Score as charted. Pressure Relief Overlay remains intact and inflated to patient's bed throughout the shift. Bilateral heels remain elevated on pillows throughout the shift. Patient able to reposition self for comfort and to prevent breakdown. Patient verbalizes understanding of pressure ulcer prevention measures. Skin integrity maintained. No new skin breakdown noted. Skin to high risk pressure areas including coccyx and heels are clear.

## 2020-07-11 NOTE — PROGRESS NOTES
(does use arm/forearm support for Respiratory Assist )  Bed mobility  Rolling to Right: Stand by assistance  Supine to Sit: Supervision  Scooting: Supervision  Transfers  Sit to stand: Stand by assistance  Standing Balance  Time: ~1-3 mins  Activity: self care tasks, set up   Shower Transfers  Shower - Transfer From: [employer] - Transfer Type: To and From  Shower - Transfer To: Transfer tub bench  Shower - Technique: Stand pivot  Shower Transfers: Contact Guard  Shower Transfers Comments: Used firm, wall mounted grab bars during transfer      Type of ROM/Therapeutic Exercise  Comment: red flexbar ex's to address B hand/wrist strength, Pronation/Supination motions while also addressing overall endurance. \"U\", upside \"U\", \"twisting\" x20 reps c rest breaks. Juxacisor with either hand ; Theraweb with both hands;  'Red cup' stacking                         ADL  Feeding: Increased time to complete;Setup  Grooming: Supervision; Increased time to complete(seated in wheelchair )  UE Bathing: Setup; Increased time to complete  LE Bathing: Stand by assistance;Setup; Increased time to complete  UE Dressing: Setup  LE Dressing: Stand by assistance;Setup  Toileting: Stand by assistance  Additional Comments: showered today with grooming tasks performed at sink                Objective:  BP (!) 153/88   Pulse 77   Temp 98.4 °F (36.9 °C) (Oral)   Resp 18   Ht 5' 10\" (1.778 m)   Wt 134 lb 6 oz (61 kg)   SpO2 96%   BMI 19.28 kg/m²       GEN: Well developed, thin gentleman, in NAD  HEENT:  NCAT.  PERRL.  EOMI.  Mucous membranes pink and moist.   PULM:  Clear to ausculation. No rales or rhonchi. Respirations WNL and unlabored. CV:  Regular rate rhythm.  No murmurs or gallops. Short shallow breaths intermittently   GI:  Abdomen soft. Nontender. Non-distended.  BS + and equal.    NEUROLOGICAL: A&O x3. Sensation intact to light touch.   MSK:  Functional ROM all extremities. Motor testing 4/5 key muscles all extremities.    SKIN: Warm dry and intact. Good turgor. Mild tenderness over L upper chest pacemaker site - healing, no edema. EXTREMITIES:  No calf tenderness to palpation. No edema BLEs. .  B AFOs on. PSYCH: Mood WNL. Appropriately interactive. Affect WNL.     Diagnostics:     CBC:   Recent Labs     07/10/20  0601   WBC 4.7   RBC 4.01*   HGB 13.7   HCT 41.0   .2*   RDW 13.5        BMP:   Recent Labs     07/10/20  0601      K 4.3   CL 98   CO2 34*   BUN 18   CREATININE 0.42*   GLUCOSE 108*     BNP: No results for input(s): BNP in the last 72 hours. PT/INR:   Recent Labs     07/09/20  0643 07/10/20  0601 07/11/20  0523   PROTIME 14.6 17.3* 20.1*   INR 1.1 1.4 1.7     APTT: No results for input(s): APTT in the last 72 hours. CARDIAC ENZYMES: No results for input(s): CKMB, CKMBINDEX, TROPONINT in the last 72 hours. Invalid input(s): CKTOTAL;3  FASTING LIPID PANEL:  Lab Results   Component Value Date    CHOL 166 07/10/2019    HDL 40 07/10/2019    TRIG 62 07/10/2019     LIVER PROFILE: No results for input(s): AST, ALT, ALB, BILIDIR, BILITOT, ALKPHOS in the last 72 hours.      Current Medications:   Current Facility-Administered Medications: warfarin (COUMADIN) tablet 12.5 mg, 12.5 mg, Oral, Once  melatonin ER tablet 3 mg, 3 mg, Oral, Nightly PRN  docusate sodium (COLACE) capsule 100 mg, 100 mg, Oral, PRN  polyethylene glycol (GLYCOLAX) packet 17 g, 17 g, Oral, Daily PRN  warfarin (COUMADIN) daily dosing (placeholder), , Other, RX Placeholder  senna (SENOKOT) tablet 17.2 mg, 2 tablet, Oral, Daily PRN  magnesium hydroxide (MILK OF MAGNESIA) 400 MG/5ML suspension 15 mL, 15 mL, Oral, Daily PRN  acetaminophen (TYLENOL) tablet 650 mg, 650 mg, Oral, Q6H PRN **OR** acetaminophen (TYLENOL) suppository 650 mg, 650 mg, Rectal, Q6H PRN  aspirin EC tablet 81 mg, 81 mg, Oral, Daily  HYDROcodone-acetaminophen (NORCO) 5-325 MG per tablet 1 tablet, 1 tablet, Oral, Q4H PRN **OR** HYDROcodone-acetaminophen (NORCO) 5-325 MG per including those of syntax and sound a like substitutions which may escape proof reading. In such instances, actual meaning can be extrapolated by contextual diversion.

## 2020-07-11 NOTE — PROGRESS NOTES
Pharmacy Note  Warfarin Consult follow-up      Recent Labs     07/09/20  0643 07/10/20  0601 07/11/20  0523   INR 1.1 1.4 1.7     Recent Labs     07/10/20  0601   HGB 13.7   HCT 41.0          Significant Drug-Drug Interactions:  New warfarin drug-drug interactions: NO  Discontinued drug-drug interactions: NO  Current warfarin drug-drug interactions: MVI, Aspirin, Tylenol, Norco,       Date             INR        Dose given previous day  Dose scheduled for today  7/11/2020            1.7       15           12.5        Notes:                     INR is steadily increasing, monitor closely, will reduce dose to 12.5 mg today. The previous dose from 7/10 and 7/9 are not fully active based on the PK effect of warfarin. Daily PT/INR while inpatient.      Lorin MariaD, BCPS  7/11/20  6:49 am

## 2020-07-11 NOTE — PLAN OF CARE
Problem: Falls - Risk of:  Goal: Will remain free from falls  Description: Will remain free from falls  7/11/2020 1103 by Dave Palma RN  Outcome: Ongoing  7/11/2020 0737 by Jesu James RN  Outcome: Ongoing  Note: No falls or injuries sustained at this time. No attempts to get out of bed without nursing assistance. Call light within reach and pt. uses appropriately for assistance. Siderails up x 2. Nonskid footwear remains on. Bed in low and locked position. Hourly nursing rounds made. Pt. Alert and oriented, aware of limitations, and exhibits good safety judgement. Pt. uses assistive devices appropriately. Pt. understands individual fall risk factors. Pt. reoriented to surroundings and reminded to use call light with each nurse/patient interaction. Pt. room located close to nurse's station. Bed alarm remains engaged throughout the shift as a precaution. Goal: Absence of physical injury  Description: Absence of physical injury  Outcome: Ongoing     Problem: SAFETY  Goal: Free from accidental physical injury  Outcome: Ongoing  Goal: Free from intentional harm  Outcome: Ongoing     Problem: PAIN  Goal: Patient's pain/discomfort is manageable  7/11/2020 1103 by Dave Palma RN  Outcome: Ongoing  7/11/2020 0737 by Jesu James RN  Outcome: Ongoing  Note: Pain assessment completed. Pt. able to rest.  Pt. denies pain this shift. Pt. denies need for oral analgesic. Verbalizes understanding of nonpharmacologic strategies that provide comfort. Pt. repositioned for comfort. Nonverbal cues indicate absence of pain. Problem: SKIN INTEGRITY  Goal: Skin integrity is maintained or improved  7/11/2020 1103 by Dave Palma RN  Outcome: Ongoing  7/11/2020 0737 by Jesu James RN  Outcome: Ongoing  Note: Skin assessment completed this shift. Nutrition and Hydration status assessed with adequate intake. Candelario Score as charted.   Pressure Relief Overlay remains intact and inflated to patient's bed throughout the shift. Bilateral heels remain elevated on pillows throughout the shift. Patient able to reposition self for comfort and to prevent breakdown. Patient verbalizes understanding of pressure ulcer prevention measures. Skin integrity maintained. No new skin breakdown noted. Skin to high risk pressure areas including coccyx and heels are clear.                Problem: Pain:  Goal: Pain level will decrease  Description: Pain level will decrease  Outcome: Ongoing  Goal: Control of acute pain  Description: Control of acute pain  Outcome: Ongoing  Goal: Control of chronic pain  Description: Control of chronic pain  Outcome: Ongoing     Problem: Nutrition  Goal: Optimal nutrition therapy  Outcome: Ongoing     Problem: Skin Integrity:  Goal: Will show no infection signs and symptoms  Description: Will show no infection signs and symptoms  Outcome: Ongoing  Goal: Absence of new skin breakdown  Description: Absence of new skin breakdown  Outcome: Ongoing

## 2020-07-11 NOTE — PROGRESS NOTES
Physical Therapy  Facility/Department: [facility]  Daily Treatment Note  NAME: Lucinda Choe  : 1950  MRN: [id number]    Date of Service: 2020    Discharge Recommendations:  Home with Home health PT   PT Equipment Recommendations  Equipment Needed: No  Other: Patient reports he has rollator and wheelchair at home. Assessment   Treatment Diagnosis: Difficulty walking, impaired tolerance to activity. History: Pt admitted due to tachycardia, abd porgressive shortness of breath. He has known comorbid ALS and has lost significant weight, including muscle mass, more so on his L side. He was cardioverted and had dual-chamber pacemaker implanted on 2020. Pt admitted t rehab unit on 20. Clinical Presentation: evolving  Barriers to Learning: none known  REQUIRES PT FOLLOW UP: Yes  Activity Tolerance  Activity Tolerance: Patient limited by endurance; Patient Tolerated treatment well(Pt req's frequent rest breaks)     Patient Diagnosis(es): There were no encounter diagnoses. has a past medical history of ALS (amyotrophic lateral sclerosis) ([address] Aortic root aneurysm ([address] Aortic valve replaced, and Hypertension. has a past surgical history that includes Parathyroid gland surgery (); hernia repair; and Cardioversion (2020). Restrictions  Restrictions/Precautions  Restrictions/Precautions: General Precautions, Fall Risk, Surgical Protocols, Cardiac(Pacemaker Implant; ALS; Bilateral AFOs)  Required Braces or Orthoses?: Yes  Required Braces or Orthoses  Right Lower Extremity Brace: Ankle Foot Orthotics  Left Lower Extremity Brace: Letty Foot Orthotics  Position Activity Restriction  Other position/activity restrictions: New Pacemaker precautions: L side, AVOID PUSHING/PULLING AND OVERHEAD ACTIVITY TO L UE. Subjective   General  Chart Reviewed: Yes  Additional Pertinent Hx: Hx Amyotrophic Lateral Sclerosis; Pacemaker placement 2020; Aortic Root Aneurysm;  Aortic Valve Replacement   Response To Previous Treatment: Patient with no complaints from previous session. Family / Caregiver Present: No  Referring Practitioner: Dr Knight Royalty: Pt in restroom with rehab aid Geraldine Felty present. Reports sleeping well the night prior. Pt with SOB throughout tx  Pain Screening  Patient Currently in Pain: Denies  Vital Signs  Patient Currently in Pain: Denies  Oxygen Therapy  SpO2: 93 %(With Ambulation )  Patient Observation  Observations: Pt routinely using accessory muscles for respiration. O2 sats remaining >93% throughout tx. L LE swelling present in ankle and foot. Orientation  Orientation  Overall Orientation Status: Within Functional Limits  Cognition      Objective   Bed mobility  Comment: Pt OOB for AM/PM tx. pt left in recliner at the  end of tx with BLE elevated. Transfers  Sit to Stand: Stand by assistance  Stand to sit: Stand by assistance  Bed to Chair: Stand by assistance(chair to chair transfer without device)  Stand Pivot Transfers: Stand by assistance(no device)  Comment: Pt completes transfers with rollator, Safe demo of technique and use of brakes. Ambulation  Ambulation?: Yes  More Ambulation?: Yes  Ambulation 1  Surface: level tile  Device: Rollator  Other Apparatus: AFO; Left;Right  Assistance: Stand by assistance  Quality of Gait: Narrow base of support, slow pace, kyphotic-looking/rounded posture. L knee buckled 1x without loss of balance, Pt self-correcting. SOB. Gait Deviations: Slow Agueda;Decreased step length;Decreased step height  Distance: 207ft  Comments: Pt requires prolonged seated rest break mid distance.    Stairs/Curb  Stairs?: Yes  Stairs  # Steps : 6  Stairs Height: 4\"  Rails: Left ascending  Assistance: Stand by assistance  Comment: Lateral approach,  step to pattern, safe demo      Balance  Posture: Poor  Sitting - Static: Good  Sitting - Dynamic: Fair;+  Standing - Static: Fair;+  Standing - Dynamic: Fair  Comments: Seated in WC and standing with Rollator  Other exercises  Other exercises?: Yes  Other exercises 1: Nustep L1, 10mins- 1 min rested break every 2 mins  Other exercises 2: Stand pivot transfers x9  Other exercises 3: toilet transfer x1   Other exercises 4: Seated ex's R LE: 5reps x 3set with #1.5 and Orange Tband  Other exercises 5: Seated ex's L LE x5-10 per pt tolerance with orange Tband. Other exercises 6: Ankle PROM/HS stretch ~5mins B LE          Comment: Patient requires frequent rest breaks. Goals  Short term goals  Time Frame for Short term goals: 5 days  Short term goal 1: Pt able to perfrom supine<>sit independently  Short term goal 2: Pt able to ambulate 50 to 70 ft with Rollator, SBA, mod exertion. Short term goal 3: Pt able to go up/down 3 steps with 2 rail min A  Short term goal 4: Pt able to tolerate 30-40 min of activity with pacing skills to improve endurance. Short term goal 5: Pt able to transfer wit Rollator at [facility]  Long term goals  Time Frame for Long term goals : By LOS  Long term goal 1: Pt able to perform transfers independently. Long term goal 2:  Pt able to ambulate with a rollator dist of 80 to 100 ft, independently on level surfaces. Long term goal 3: Pt able to  go up and down 5 steps with 1 HR, SBA  Long term goal 4: Improve 2MWT dist to 80 ft to improve endurance  Patient Goals   Patient goals : Breathe better and able to do things for self. Plan    Plan  Times per week: 900 minutes /week for combined therapy of PT/OT due to decreased tolerance to activity. Safety Devices  Type of devices:  All fall risk precautions in place, Call light within reach, Gait belt, Patient at risk for falls, Left in chair     Therapy Time     07/11/20 1328 07/11/20 1329   PT Individual Minutes   Time In 1030 1220   Time Out [id number]   Minutes 45 45   Total Minutes: [address] [place] [address]

## 2020-07-11 NOTE — PROGRESS NOTES
Pt reported that he slept much better last night after the changes that he requested. Pt tolerating room air at this time with SpO2 of 94%. Pt appears to be in no resp distress. Pt would like to be placed on bipap at 2200.

## 2020-07-11 NOTE — PROGRESS NOTES
[address] [facility]    ACUTE REHABILITATION OCCUPATIONAL THERAPY  DAILY NOTE    Date: 20  Patient Name: Betito Brady      Room: 8514/6474-56    MRN: [id number]   : 1950  (65 y.o.)  Gender: male   Referring Practitioner: Dr Jovanny Story   Diagnosis: Cardiac abnormality;  history of Amyotrophic Lateral Sclerosis   Additional Pertinent Hx: History of Amyotrphic Lateral Sclerosis,  Pacemaker placement 2020; Aortic Root Aneursim; Aortic Valve Replacement     Restrictions  Restrictions/Precautions: General Precautions, Fall Risk, Surgical Protocols, Cardiac(Pacemaker Implant; ALS; Bilateral AFOs)  Other position/activity restrictions: New Pacemaker precautions: L side, AVOID PUSHING/PULLING AND OVERHEAD ACTIVITY TO L UE. Required Braces or Orthoses  Right Lower Extremity Brace: Ankle Foot Orthotics  Left Lower Extremity Brace: Letty Foot Orthotics  Required Braces or Orthoses?: Yes  Equipment Used: Bed, Other    Subjective  Subjective: \"It's amazing how much work this is- you really take it for granted\" - re ADL routine AM  Comments: Patient seated in recliner chair upon arrival, agreeable to ADL session  Patient Currently in Pain: Denies  Restrictions/Precautions: General Precautions; Fall Risk;Surgical Protocols;Cardiac(Pacemaker Implant; ALS;  Bilateral AFOs)  Overall Orientation Status: Within Normal Limits          Objective  Cognition  Overall Cognitive Status: WFL  Balance  Sitting Balance: Modified independent   Standing Balance: Contact guard assistance(SBA initially, requiring CGA as fatigue level increases)  Bed mobility  Comment: Patient seated in recliner chair at start/end of AM/PM session  Transfers  Stand Step Transfers: Stand by assistance  Sit to stand: Stand by assistance  Stand to sit: Stand by assistance  Transfer Comments: Good safety and management of rollator during transfers, footies only on during transfers- AFOs donned at end of session (AM); performed transfer from recliner <> w/c PM session with both AFOs and rollator     Functional Mobility  Functional - Mobility Device: 4-Wheeled Walker  Activity: To/from bathroom;Transport items(AM: recliner <> bathroom)  Assist Level: Stand by assistance  Functional Mobility Comments: uses rollator for functional mobility, transports clothing using rollator, Good management of rollator for sitting tasks and transitions within bathroom  Toilet Transfers  Toilet - Technique: Ambulating(few steps from sink <> toilet)  Equipment Used: Grab bars(rollator)  Toilet Transfer: Stand by assistance  Toilet Transfers Comments: Good safety and management of rollator during toileting    Fine Motor: functional ADL boards: zipper, buckle, buttons- patient demonstrates decreased [facility] and has difficulty with ADL boards. Education provided on compensatory techniques/modified strategies for increased ease and independence. Demonstrated use of AE- button hook for management of buttons (pt reports he likes to wear button up shirts). Education provided on [employer] for obtaining button/zipper hook. Patient verbalizes understanding. Type of ROM/Therapeutic Exercise  Comment: 1x20 black spring resistance- performed for overall increased hand/ strength                       ADL  Equipment Provided: Long-handled shoe horn([facility]ided pt with [facility] for increased ease of donning L shoe/AFO, reports difficulty due to drop foot/decreased control of ankle movements)  Feeding: Increased time to complete;Setup  Grooming: Increased time to complete;Setup(seated at sink- performed facial shaving, washing face, oral care, hair care)  UE Bathing: Setup; Increased time to complete(seated at sink - UBB, able to reach all areas w/o assistance, adheres to precautions throughout)  LE Bathing: Stand by assistance;Setup; Increased time to complete(seated/standing at sink- SBA; brings LEs upward for ease of reach to wash)  UE Dressing: Verbal cueing;Contact guard assistance; Increased time to complete(requested slight assist doffing zip up shirt while seated, again requesting slight assistance with L side of t-shirt (VC and then didnt need assist); adheres to precautions throughout)  LE Dressing: Stand by assistance;Setup(seated to thread BLEs through underwear and shorts (uses UEs to bring LEs upward for ease of threading), stands with SBA to pull over hips. seated in recliner chair to don socks and tie shoes/AFOs (use of foot stool to prop LEs for increased ease)  Toileting: Stand by assistance; Increased time to complete;Setup(able to perform hygiene seated/standing, able to manage clothing. use of urinal while seated at sink (on rollator) with set up)  Additional Comments: ADLs performed at sink this date, use of rollator seat for energy conservation/increased independence throughout. Increased time and effort for ADLs, reports fatigue following activity. Assessment  Performance deficits / Impairments: Decreased functional mobility ; Decreased safe awareness;Decreased endurance;Decreased coordination;Decreased ADL status; Decreased strength  Prognosis: Fair;Good  Discharge Recommendations: Patient would benefit from continued therapy after discharge  Activity Tolerance: Patient limited by fatigue  Activity Tolerance: Needs some cues for therapuetic rests, maintaining O2 in 90's; dicussed techniques to blow off CO2 with positive results   Type of devices: Call light within reach; Patient at risk for falls; Left in chair  Equipment Recommendations  Equipment Needed: Yes(button/zipper hook)       Patient Education:  Patient Goals   Patient goals : Return home - able to care for self and not fall   Learner:patient  Method: demonstration and explanation       Outcome: needs reinforcement   OT Education  OT Education: Energy Conservation; ADL Adaptive Strategies;Transfer Training  Patient Education: Safety, energy conservation strategies     Plan  Plan  Times per week: 900/7 Times per day: Twice a day  Current Treatment Recommendations: Strengthening, Functional Mobility Training, Endurance Training, Pain Management, Safety Education & Training, Patient/Caregiver Education & Training, Positioning, Self-Care / ADL  Patient Goals   Patient goals : Return home - able to care for self and not fall   Short term goals  Time Frame for Short term goals: 1 week   Short term goal 1: Perform UB self care after setup utilizing modified techniques and enlarged  for tools   Short term goal 2: Min Assist for Shaving; cuing for UB dressing;   Mod Assist for LB bathing and dressing   Short term goal 3: D/V understanding of Respiratory Rehab strategies to increase tolerance to care tasks - Pacing/phasing, supportive postures,  air exchange    Short term goal 4: Tolerate 10-25 minutes of generalized strengthening within precautions to support care needs    Long term goals  Time Frame for Long term goals : By Discharge   Long term goal 1: Mod I for UB feeding, grooming, bathing and dressing tasks using modified tehcniques and devices   Long term goal 2: SBA for LB bathing and dressing tasks   Long term goal 3: Apply energy conservation and breathing strategies to ADL tasks to support saf independence    Long term goal 4: D/V understanding of Home Program for Home safety, Hand strengthening (intrinsic), device availalbility    Timed Code Treatment Minutes: 55 Minutes     OT Individual Minutes  Time in [phone]  Time out [id number]  Minutes 55    [address]  Time in 1330  Time out 1403  Minutes 33    Electronically signed by Lori Brock OT on 7/11/20 at 12:31 PM EDT

## 2020-07-12 LAB
INR BLD: 1.9
PROTHROMBIN TIME: 21.6 SEC (ref 11.8–14.6)

## 2020-07-12 PROCEDURE — 85610 PROTHROMBIN TIME: CPT

## 2020-07-12 PROCEDURE — 6370000000 HC RX 637 (ALT 250 FOR IP): Performed by: INTERNAL MEDICINE

## 2020-07-12 PROCEDURE — 1180000000 HC REHAB R&B

## 2020-07-12 PROCEDURE — 97530 THERAPEUTIC ACTIVITIES: CPT

## 2020-07-12 PROCEDURE — 36415 COLL VENOUS BLD VENIPUNCTURE: CPT

## 2020-07-12 PROCEDURE — 97116 GAIT TRAINING THERAPY: CPT

## 2020-07-12 PROCEDURE — 94660 CPAP INITIATION&MGMT: CPT

## 2020-07-12 PROCEDURE — 97110 THERAPEUTIC EXERCISES: CPT

## 2020-07-12 PROCEDURE — 97535 SELF CARE MNGMENT TRAINING: CPT

## 2020-07-12 RX ADMIN — SOTALOL HYDROCHLORIDE 40 MG: 80 TABLET ORAL at 21:25

## 2020-07-12 RX ADMIN — WARFARIN SODIUM 12.5 MG: 2.5 TABLET ORAL at 17:47

## 2020-07-12 RX ADMIN — MULTIPLE VITAMINS W/ MINERALS TAB 1 TABLET: TAB at 07:32

## 2020-07-12 RX ADMIN — Medication 3 MG: at 21:25

## 2020-07-12 RX ADMIN — MELATONIN 1000 UNITS: at 07:32

## 2020-07-12 RX ADMIN — SOTALOL HYDROCHLORIDE 40 MG: 80 TABLET ORAL at 07:33

## 2020-07-12 RX ADMIN — ASPIRIN 81 MG: 81 TABLET, COATED ORAL at 07:32

## 2020-07-12 RX ADMIN — PANTOPRAZOLE SODIUM 20 MG: 20 TABLET, DELAYED RELEASE ORAL at 07:32

## 2020-07-12 ASSESSMENT — PAIN SCALES - GENERAL: PAINLEVEL_OUTOF10: 0

## 2020-07-12 NOTE — PROGRESS NOTES
RN notified writer that the pt felt like there was no pressure in the mask. I found the pt breathing in the high 30s and low 40s with appropriate volumes. Mask was appropriate and setting were unchanged also. I titrated the IPAP up to 14 to see if that helped the pt feel the pressure. Pt confirmed the pressure was better. Pt continues to maintain appropriate volumes with the new setting.

## 2020-07-12 NOTE — PROGRESS NOTES
Pt continues to tolerate 14/6 with room air and back up rate of 20bpm with the hospital's BiPAP. Pt does fluctuate his RR from low 20s to mid 30s. Pt volumes also fluctuating at this time from <200mL to >600mL which is causing frequent alarming due to the low ventilation/volume episodes. Pt is sleeping though and appears to be in no distress at this time. Pt SpO2 is 95%.

## 2020-07-12 NOTE — PROGRESS NOTES
[facility]    CONSULTATION / HISTORY AND PHYSICAL EXAMINATION            Date:   7/11/2020  Patient name:  Judie Devries  Date of admission:  7/2/2020  9:58 PM  MRN:   [id number]  Account:  [de-identified]  YOB: 1950  PCP:    Suhas Isabel MD  Room:   [de-identified]  Code Status:    Full Code    Physician Requesting Consult: Ismael Ramos MD    Reason for Consult: Medical management    Chief Complaint:     No chief complaint on file. ALS    History Obtained From:     patient, electronic medical record    History of Present Illness/ Interval History:   75-year-old with known ALS,  diagnosed 2 years ago, paroxysmal A. Fib on Coumadin, status post pacemaker placement for management of tachybradycardia syndrome. Engaging with PT OT, overall improving. 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  Authorizing Provider   fluticasone (FLONASE) 50 MCG/ACT nasal spray 1 spray by Nasal route daily  Patient taking differently: 1 spray by Nasal route daily as needed for Rhinitis or Allergies  3/31/20   Suhas Isabel MD   Handicap Placard MISC by Does not apply route Dx: spinal stenosis, ALS    Duration: 5 years till 2/25/2020 2/25/20   Suhas Isabel MD   SPIRULINA PO Take by mouth    Historical Provider, MD   vitamin D (CHOLECALCIFEROL) 1000 UNIT TABS tablet Take 1,000 Units by mouth daily    Historical Provider, MD   Vitamin E 100 UNITS TABS Take 60 Units by mouth daily    Historical Provider, MD   SELENIUM PO Take 75 mcg by mouth daily    Historical Provider, MD   SOYA LECITHIN PO Take 1,000 mg by mouth daily    Historical Provider, MD   aspirin 81 MG tablet Take 81 mg by mouth three times a week     Historical Provider, MD   acetaminophen (TYLENOL) 325 MG tablet Take 650 mg by mouth every 6 hours as needed for Pain    Historical Provider, MD   Multiple Vitamins-Minerals (THERAPEUTIC MULTIVITAMIN-MINERALS) tablet Take 1 tablet by mouth daily    Historical Provider, MD   OMEGA-3 FATTY ACIDS-VITAMIN E PO Take 1 tablet by mouth daily    Historical Provider, MD   Probiotic Product (PROBIOTIC PO) Take 1 tablet by mouth daily    Historical Provider, MD   Ascorbic Acid (VITAMIN C) 500 MG tablet Take 120 mg by mouth daily    Historical Provider, MD        Allergies:     Ciprofloxacin; Lasix [furosemide]; Penicillins; Sulfa antibiotics; and Duloxetine    Social History:     Tobacco:    reports that he has never smoked. He has never used smokeless tobacco.  Alcohol:      reports current alcohol use of about 1.0 standard drinks of alcohol per week. Drug Use:  reports no history of drug use. Family History:     Family History   Problem Relation Age of Onset    Lung Cancer Mother     Heart Disease Father        Review of Systems:     Positive and Negative as described in HPI. Constitutional: Negative for chills, fatigue, fever and unexpected weight change. HENT: Negative for ear discharge, facial swelling, nosebleeds, sore throat, trouble swallowing and voice change. Eyes: Negative for redness and visual disturbance. Respiratory: Positive for shortness of breath  Cardiovascular: Negative for chest pain, palpitations and leg swelling. Gastrointestinal: Negative for abdominal pain, blood in stool, diarrhea and vomiting. Genitourinary: Negative for difficulty urinating, dysuria and flank pain. Musculoskeletal: Negative for joint swelling.    Skin: Negative for ([facility] [I48.0] 05/02/2019    Rheumatic aortic valve insufficiency [I06.1] 05/02/2019    ALS (amyotrophic lateral sclerosis) ([facility] [G12.21] 10/02/2018    S/P CABG (coronary artery bypass graft) [Z95.1] 11/11/2016       Plan: Active Problems:    ALS (amyotrophic lateral sclerosis) (HCC)    Paroxysmal atrial fibrillation (HCC)    Rheumatic aortic valve insufficiency    Weakness of both lower extremities    S/P CABG (coronary artery bypass graft)    Tachy-ralf syndrome (HCC)    Anticoagulated on Coumadin    History of permanent cardiac pacemaker placement 7/1/2020:      Cardiac abnormality    Severe malnutrition ([facility]  Resolved Problems:    * No resolved hospital problems. *        1. Paroxysmal atrial flutter-currently in sinus rhythm, on  sotalol, on Coumadin, INR 1, patient does not for bridging due to recent bleed around the time of pacemaker placement. Pharmacy consulted to dose Coumadin  2. Tachybradycardia syndrome-currently heart rate controlled, on Betapace status post pacemaker  3. ALS-stable. 4. History of coronary artery disease status post CABG and aortic valve replacement-lately asymptomatic  5. Chronic respiratory failure with moderate hypercapnia secondary to ALS-continue with BiPAP, pulmonologist following, will need outpatient sleep study    7/8  INR is still subtherapeutic  Patient using fullface mask with BiPAP machine  No new complaints.   7/9   INR is still subtherapeutic  Patient has issues of insomnia, melatonin nightly as needed  7/10   Patient very happy after started on melatonin, had good night sleep  INR is still subtherapeutic  Using BiPAP  7/11  No new complains   Working with PT   Consultations:   20[address] [facility] CONSULT TO INTERNAL MEDICINE  IP CONSULT TO DIETITIAN  IP CONSULT TO SOCIAL WORK  IP CONSULT TO RECREATION THERAPY  IP CONSULT TO CARDIOLOGY  PHARMACY TO DOSE WARFARIN  IP CONSULT TO PULMONOLOGY      Toma Cutler MD  7/11/2020  10:53 PM    Copy sent to Dr. Jennifer Correa MD    Please note that this chart was generated using voice recognition Dragon dictation software. Although every effort was made to ensure the accuracy of this automated transcription, some errors in transcription may have occurred.

## 2020-07-12 NOTE — PLAN OF CARE
Problem: Falls - Risk of:  Goal: Will remain free from falls  Description: Will remain free from falls  Outcome: Ongoing  Note: No falls or injuries sustained at this time. No attempts to get out of bed without nursing assistance. Call light within reach and pt. uses appropriately for assistance. Siderails up x 2. Nonskid footwear remains on. Bed in low and locked position. Hourly nursing rounds made. Pt. Alert and oriented, aware of limitations, and exhibits good safety judgement. Pt. uses assistive devices appropriately. Pt. understands individual fall risk factors. Pt. reoriented to surroundings and reminded to use call light with each nurse/patient interaction. Pt. room located close to nurse's station. Bed alarm remains engaged throughout the shift as a precaution.         Problem: PAIN  Goal: Patient's pain/discomfort is manageable  Outcome: Ongoing  Note: Pain assessment completed. Pt. able to rest without the use of pain medication. Pt. denies pain so far this shift. Will continue to monitor. Problem: SKIN INTEGRITY  Goal: Skin integrity is maintained or improved  Outcome: Ongoing  Note: Skin assessment completed this shift. Nutrition and Hydration status assessed with adequate intake. Candelario Score as charted. Pressure Relief Overlay remains intact and inflated to patient's bed throughout the shift. Bilateral heels remain elevated on pillows throughout the shift. Patient able to reposition self for comfort and to prevent breakdown. Patient verbalizes understanding of pressure ulcer prevention measures. Skin integrity maintained. No new skin breakdown noted.  Skin to high risk pressure areas including coccyx and heels are clear.

## 2020-07-12 NOTE — PROGRESS NOTES
Pt placed on hospital BiPAP machine for the night. Pt would like to continue using the total mask at this time and reports that the full face one hurts his nose even with the gel pad. Mask and alarms appropriate. RN notified to call if pt has any issues or concerns with the machine or masks.

## 2020-07-12 NOTE — PROGRESS NOTES
Pharmacy Note  Warfarin Consult follow-up      Recent Labs     07/10/20  0601 07/11/20  0523 07/12/20  0657   INR 1.4 1.7 1.9     Recent Labs     07/10/20  0601   HGB 13.7   HCT 41.0          Significant Drug-Drug Interactions:  New warfarin drug-drug interactions: none  Discontinued drug-drug interactions: none  Current warfarin drug-drug interactions: MVI, Aspirin, Tylenol, Norco,     Date             INR        Dose given previous day  Dose scheduled for today  7/12/2020        1.9        12.5 mg                     12.5 mg        Notes:       Almost therapeutic, will continue with the 12.5 mg dose today. Daily PT/INR while inpatient. Finesse Rivera. Ph.  7/12/2020  11:42 AM

## 2020-07-12 NOTE — PROGRESS NOTES
[address] [facility]   ACUTE REHABILITATION OCCUPATIONAL THERAPY  DAILY NOTE    Date: 20  Patient Name: Maki Gallegos      Room: 0030/8161-52    MRN: [id number]   : 1950  (65 y.o.)  Gender: male   Referring Practitioner: Dr Sha Gallardo   Diagnosis: Cardiac abnormality;  history of Amyotrophic Lateral Sclerosis   Additional Pertinent Hx: History of Amytrophic Lateral Sclerosis,  Pacemaker placement 2020; Aortic Root Aneursim; Aortic Valve Replacement    Restrictions  Restrictions/Precautions: General Precautions, Fall Risk, Surgical Protocols, Cardiac(Pacemaker Implant; ALS; Bilateral AFOs)  Other position/activity restrictions: New Pacemaker precautions: L side, AVOID PUSHING/PULLING AND OVERHEAD ACTIVITY TO L UE. Required Braces or Orthoses  Right Lower Extremity Brace: Ankle Foot Orthotics  Left Lower Extremity Brace: Letty Foot Orthotics  Required Braces or Orthoses?: Yes  Equipment Used: Bed, Other    Subjective  Subjective: \"I just can't get over how much work this is\" - re ADL routine  Comments: Patient seated in recliner chair upon arrival, agreeable to ADL session, requests full shower this date. Reports d/c home possibly tomorrow. Patient concerned about getting Bipap for home and asking questions re continued therapies at discharge (home vs outpatient)  Patient Currently in Pain: Denies  Restrictions/Precautions: General Precautions; Fall Risk;Surgical Protocols;Cardiac(Pacemaker Implant; ALS;  Bilateral AFOs)  Overall Orientation Status: Within Normal Limits          Objective  Cognition  Overall Cognitive Status: WFL  Balance  Sitting Balance: Modified independent   Standing Balance: Stand by assistance    Bed mobility  Comment: Patient seated in recliner chair at start/end of AM/PM session    Transfers  Stand Step Transfers: Stand by assistance  Stand Pivot Transfers: Stand by assistance  Sit to stand: Stand by assistance  Stand to sit: Stand by assistance  Transfer Comments: Good safety and management of rollator during transfers, footies only on during transfers- AFOs donned at end of session (AM); performed transfer from recliner <> w/c PM session with both AFOs and rollator      Functional Mobility  Functional - Mobility Device: 4-Wheeled Walker  Activity: To/from bathroom;Transport items  Assist Level: Stand by assistance  Functional Mobility Comments: uses rollator for functional mobility, transports clothing using rollator, Good management of rollator during mobility  Shower Transfers  Shower - Transfer From: Walker(rollator)  Shower - Transfer Type: To and From  Shower - Transfer To: Transfer tub bench  Shower - Technique: Ambulating(from recliner chair)  Shower Transfers: Stand by assistance;Contact Guard  Shower Transfers Comments: use of GB, Good management of rollator/GB for transferring    Fine Motor: Discussed compensatory techniques for writing techniques  Type of ROM/Therapeutic Exercise  Comment: Provided patient with medium and high resistance foam sponges along with education/exercise program handout     Additional Activities: Other  Additional Activities: Discussed home set up (bedroom/bathroom/kitchen) re safety and DME for optimal independence/safety. Provided education and handout for Energy Conservation/Work simplification tech for self care and general activity. Patient V Good understanding        Light Housekeeping  Light Housekeeping Level: Wheelchair  Light Housekeeping: Patient sat in seat using reacher to retrieve clothing from front load dryer and place on table top. Patient sat while folding clothing using BUEs- increased energy and effort observed throughout task, Discussed EC/WS, pacing, task importance for IADL related activities. Required several seated rest breaks, needed to use restroom 1/2 way through task and able to complete after. ADL  Equipment Provided: Long-handled shoe horn; Other (comment)(elastic shoe strings)  Feeding: Increased time Management, Safety Education & Training, Patient/Caregiver Education & Training, Positioning, Self-Care / ADL  Patient Goals   Patient goals : Return home - able to care for self and not fall   Short term goals  Time Frame for Short term goals: 1 week   Short term goal 1: Perform UB self care after setup utilizing modified techniques and enlarged  for tools   Short term goal 2: Min Assist for Shaving; cuing for UB dressing;   Mod Assist for LB bathing and dressing   Short term goal 3: D/V understanding of Respiratory Rehab strategies to increase tolerance to care tasks - Pacing/phasing, supportive postures,  air exchange    Short term goal 4: Tolerate 10-25 minutes of generalized strengthening within precautions to support care needs    Long term goals  Time Frame for Long term goals : By Discharge   Long term goal 1: Mod I for UB feeding, grooming, bathing and dressing tasks using modified tehcniques and devices   Long term goal 2: SBA for LB bathing and dressing tasks   Long term goal 3: Apply energy conservation and breathing strategies to ADL tasks to support saf independence    Long term goal 4: D/V understanding of Home Program for Home safety, Hand strengthening (intrinsic), device availalbility    Timed Code Treatment Minutes: 58 Minutes     OT Individual Minutes   Time in [phone]  Time out [phone]  Minutes 58    OT Individual Minutes  Time in 1330  Time out 1405  Minutes 35      Electronically signed by Jonathan Mina OT on 7/12/20 at 9:02 AM EDT

## 2020-07-12 NOTE — PROGRESS NOTES
Physical Medicine & Rehabilitation  Progress Note      Subjective:      65year-old gentleman with debility secondary to atrial flutter with RVR and comorbid ALS. Patient is well, and has had no acute complaints or problems. Has few questions regarding his discharge     ROS:  Denies fevers, chills, sweats. No chest pain, palpitations, lightheadedness. Denies coughing, wheezing or shortness of breath. Denies abdominal pain, nausea, diarrhea or constipation. No new areas of joint pain. Denies new areas of numbness or weakness. Denies new anxiety or depression issues. No new skin problems. Rehabilitation:   Progressing in therapies. PT:     Bed mobility  Comment: Pt OOB for AM/PM tx. pt left in recliner at the  end of tx with BLE elevated. Transfers  Sit to Stand: Stand by assistance  Stand to sit: Stand by assistance  Bed to Chair: Stand by assistance(chair to chair transfer without device)  Stand Pivot Transfers: Stand by assistance(no device)  Comment: Pt completes transfers with rollator, Safe demo of technique and use of brakes. Ambulation  Ambulation?: Yes  More Ambulation?: Yes  Ambulation 1  Surface: level tile  Device: Rollator  Other Apparatus: AFO; Left;Right  Assistance: Stand by assistance  Quality of Gait: Narrow base of support, slow pace, kyphotic-looking/rounded posture. L knee buckled 1x without loss of balance, Pt self-correcting. SOB. Gait Deviations: Slow Agueda;Decreased step length;Decreased step height  Distance: 207ft  Comments: Pt requires prolonged seated rest break mid distance.    Stairs/Curb  Stairs?: Yes  Stairs  # Steps : 6  Stairs Height: 4\"  Rails: Left ascending  Assistance: Stand by assistance  Comment: Lateral approach,  step to pattern, safe demo      Balance  Posture: Poor  Sitting - Static: Good  Sitting - Dynamic: Fair;+  Standing - Static: Fair;+  Standing - Dynamic: Fair  Comments: Seated in WC and standing with Rollator    OT       Cognition  Overall Cognitive Status: WFL  Balance  Sitting Balance: Modified independent   Standing Balance: Contact guard assistance(SBA initially, requiring CGA as fatigue level increases)  Bed mobility  Comment: Patient seated in recliner chair at start/end of AM/PM session  Transfers  Stand Step Transfers: Stand by assistance  Sit to stand: Stand by assistance  Stand to sit: Stand by assistance  Transfer Comments: Good safety and management of rollator during transfers, footies only on during transfers- AFOs donned at end of session (AM); performed transfer from recliner <> w/c PM session with both AFOs and rollator     Functional Mobility  Functional - Mobility Device: 4-Wheeled Walker  Activity: To/from bathroom;Transport items(AM: recliner <> bathroom)  Assist Level: Stand by assistance  Functional Mobility Comments: uses rollator for functional mobility, transports clothing using rollator, Good management of rollator for sitting tasks and transitions within bathroom  Toilet Transfers  Toilet - Technique: Ambulating(few steps from sink <> toilet)  Equipment Used: Grab bars(rollator)  Toilet Transfer: Stand by assistance  Toilet Transfers Comments: Good safety and management of rollator during toileting     Fine Motor: functional ADL boards: zipper, buckle, buttons- patient demonstrates decreased [facility] and has difficulty with ADL boards. Education provided on compensatory techniques/modified strategies for increased ease and independence. Demonstrated use of AE- button hook for management of buttons (pt reports he likes to wear button up shirts). Education provided on [employer] for obtaining button/zipper hook.  Patient verbalizes understanding.      Type of ROM/Therapeutic Exercise  Comment: 1x20 black spring resistance- performed for overall increased hand/ strength                       ADL  Equipment Provided: Long-handled shoe horn(Provided pt with [facility] for increased ease of donning L shoe/AFO, reports difficulty due to drop foot/decreased control of ankle movements)  Feeding: Increased time to complete;Setup  Grooming: Increased time to complete;Setup(seated at sink- performed facial shaving, washing face, oral care, hair care)  UE Bathing: Setup; Increased time to complete(seated at sink - UBB, able to reach all areas w/o assistance, adheres to precautions throughout)  LE Bathing: Stand by assistance;Setup; Increased time to complete(seated/standing at sink- SBA; brings LEs upward for ease of reach to wash)  UE Dressing: Verbal cueing;Contact guard assistance; Increased time to complete(requested slight assist doffing zip up shirt while seated, again requesting slight assistance with L side of t-shirt (VC and then didnt need assist); adheres to precautions throughout)  LE Dressing: Stand by assistance;Setup(seated to thread BLEs through underwear and shorts (uses UEs to bring LEs upward for ease of threading), stands with SBA to pull over hips. seated in recliner chair to don socks and tie shoes/AFOs (use of foot stool to prop LEs for increased ease)  Toileting: Stand by assistance; Increased time to complete;Setup(able to perform hygiene seated/standing, able to manage clothing. use of urinal while seated at sink (on rollator) with set up)  Additional Comments: ADLs performed at sink this date, use of rollator seat for energy conservation/increased independence throughout. Increased time and effort for ADLs, reports fatigue following activity.           Assessment       Objective:  BP (!) 145/86   Pulse 61   Temp 98.2 °F (36.8 °C) (Oral)   Resp 18   Ht 5' 10\" (1.778 m)   Wt 134 lb 6 oz (61 kg)   SpO2 94%   BMI 19.28 kg/m²       GEN: Well developed, thin gentleman, in NAD  HEENT:  NCAT.  PERRL.  EOMI.  Mucous membranes pink and moist.   PULM:  Clear to ausculation. No rales or rhonchi. Respirations WNL and unlabored. CV:  Regular rate rhythm.  No murmurs or gallops.  Short shallow breaths intermittently   GI: Tina Ann soft. Nontender. Non-distended.  BS + and equal.    NEUROLOGICAL: A&O x3. Sensation intact to light touch.   MSK:  Functional ROM all extremities. Motor testing 4/5 key muscles all extremities.    SKIN: Warm dry and intact. Good turgor. Mild tenderness over L upper chest pacemaker site - healing, no edema. EXTREMITIES:  No calf tenderness to palpation. No edema BLEs. .  B AFOs on. PSYCH: Mood WNL. Appropriately interactive. Affect WNL.     Diagnostics:     CBC:   Recent Labs     07/10/20  0601   WBC 4.7   RBC 4.01*   HGB 13.7   HCT 41.0   .2*   RDW 13.5        BMP:   Recent Labs     07/10/20  0601      K 4.3   CL 98   CO2 34*   BUN 18   CREATININE 0.42*   GLUCOSE 108*     BNP: No results for input(s): BNP in the last 72 hours. PT/INR:   Recent Labs     07/10/20  0601 07/11/20  0523 07/12/20  0657   PROTIME 17.3* 20.1* 21.6*   INR 1.4 1.7 1.9     APTT: No results for input(s): APTT in the last 72 hours. CARDIAC ENZYMES: No results for input(s): CKMB, CKMBINDEX, TROPONINT in the last 72 hours. Invalid input(s): CKTOTAL;3  FASTING LIPID PANEL:  Lab Results   Component Value Date    CHOL 166 07/10/2019    HDL 40 07/10/2019    TRIG 62 07/10/2019     LIVER PROFILE: No results for input(s): AST, ALT, ALB, BILIDIR, BILITOT, ALKPHOS in the last 72 hours.      Current Medications:   Current Facility-Administered Medications: melatonin ER tablet 3 mg, 3 mg, Oral, Nightly PRN  docusate sodium (COLACE) capsule 100 mg, 100 mg, Oral, PRN  polyethylene glycol (GLYCOLAX) packet 17 g, 17 g, Oral, Daily PRN  warfarin (COUMADIN) daily dosing (placeholder), , Other, RX Placeholder  senna (SENOKOT) tablet 17.2 mg, 2 tablet, Oral, Daily PRN  magnesium hydroxide (MILK OF MAGNESIA) 400 MG/5ML suspension 15 mL, 15 mL, Oral, Daily PRN  acetaminophen (TYLENOL) tablet 650 mg, 650 mg, Oral, Q6H PRN **OR** acetaminophen (TYLENOL) suppository 650 mg, 650 mg, Rectal, Q6H PRN  aspirin EC tablet 81 mg, 81 mg, Oral, Daily  HYDROcodone-acetaminophen (NORCO) 5-325 MG per tablet 1 tablet, 1 tablet, Oral, Q4H PRN **OR** HYDROcodone-acetaminophen (NORCO) 5-325 MG per tablet 2 tablet, 2 tablet, Oral, Q4H PRN  naloxone (NARCAN) injection 0.4 mg, 0.4 mg, Intravenous, PRN  pantoprazole (PROTONIX) tablet 20 mg, 20 mg, Oral, Daily  sotalol (BETAPACE) tablet 40 mg, 40 mg, Oral, BID  therapeutic multivitamin-minerals 1 tablet, 1 tablet, Oral, Daily  Vitamin D (CHOLECALCIFEROL) tablet 1,000 Units, 1,000 Units, Oral, Daily  bisacodyl (DULCOLAX) suppository 10 mg, 10 mg, Rectal, Daily PRN  ondansetron (ZOFRAN) tablet 4 mg, 4 mg, Oral, Q6H PRN      Impression/Plan:   Impaired ADLs, gait, and mobility due to:      1. Debility secondary to atrial flutter and bradycardia: PT/OT  for gait, mobility, strengthening, endurance, ADLs, and self care. S/p cardioversion and pacemaker implantation. On ASA, sotalol. Has Tylenol or Norco prn pain.  No elevation of L arm above shoulder for 30 days post-op.   2. Atrial flutter: On warfarin starting 7/3 with pharmacy to dose. Nursing confirmed with cardiology - no bridging due to bleeding from pacemaker site at [place] [address] on supplements high in vitamin K.   3. ALS: associated weakness and weight loss. Drinking multiple daily supplement shakes high in vitamins  4. Chronic hypercapneic respiratory failure: Pulmonology following - on BIPAP - Discussed with Dr. Courtney Cabrera today. [employer] assessement for [facility] Bipap at home. 5. Bowel management: Recent impaction. miralax daily, dolace daily, has dulcolax prn, senokot and milk of magnesia prn.   6. Internal medicine for medical management  7. DVT prophylaxis: Coumadin with INR goal 2-3, SCD's while in bed and DEMARIO's while in bed. INR 1.9 today. Pharmacist adjusting dosing for high vitamin K intake daily.     Electronically signed by Kisha Sullivan MD on 7/12/2020 at 9:14 AM      This note is created with the assistance of a speech recognition program.  While intending to generate a document that actually reflects the content of the visit, the document can still have some errors including those of syntax and sound a like substitutions which may escape proof reading. In such instances, actual meaning can be extrapolated by contextual diversion.

## 2020-07-12 NOTE — PROGRESS NOTES
Physical Therapy    [facility], [place], operated by [facility] Physical Therapy Progress Note    Date: 20  Patient Name: Keely Crowley       Room: 6454/5826-59  MRN: [id number]   Account: [de-identified]   : 1950  (71 y.o.) Gender: male     Referring Practitioner: Dr Katarzyna Rosa   Diagnosis: Cardiac abnormality;  history of Amyotrophic Lateral Sclerosis   Past Medical History:  has a past medical history of ALS (amyotrophic lateral sclerosis) ([address] Aortic root aneurysm ([address] Aortic valve replaced, and Hypertension. Past Surgical History:   has a past surgical history that includes Parathyroid gland surgery (); hernia repair; and Cardioversion (2020). Additional Pertinent Hx: Hx Amyotrophic Lateral Sclerosis; Pacemaker placement 2020; Aortic Root Aneurysm; Aortic Valve Replacement     Overall Orientation Status: Within Functional Limits  Restrictions/Precautions  Restrictions/Precautions: General Precautions; Fall Risk;Surgical Protocols;Cardiac(Pacemaker Implant; ALS; Bilateral AFOs)  Required Braces or Orthoses?: Yes  Required Braces or Orthoses  Right Lower Extremity Brace: Ankle Foot Orthotics  Left Lower Extremity Brace: Letty Foot Orthotics  Position Activity Restriction  Other position/activity restrictions: New Pacemaker precautions: L side, AVOID PUSHING/PULLING AND OVERHEAD ACTIVITY TO L UE. Subjective: Pt reports without complaints night prior. Pt is pleasant and cooperative with therapy. Pt with SOB throughout tx. Comments: Continues to demo dyspnea with all exertion; SpO2 >94%, HR in 70s. Pt requests to go outside during PM session. Vital Signs  Patient Currently in Pain: Denies     Patient Observation  Observations: Pt routinely using accessory muscles for respiration. O2 sats remaining >94% throughout tx. Bed Mobility:   Bed mobility  Comment: Unable to assess, patient seated in recliner chair ar start/end of AM/PM session.     Transfers:  Sit to Stand: Independent  Stand to sit: Stand by assistance  Bed to Chair: Stand by assistance(chair to chair transfer without device)   Comment: Pt completes transfers with/without rollator. Safe demo of technique and use of brakes. Ambulation 1  Surface: level tile  Device: Rollator  Other Apparatus: AFO; Left;Right  Assistance: Stand by assistance  Quality of Gait: Narrow base of support, slow pace, kyphotic-looking/rounded posture. L knee buckled 2x without loss of balance, Pt self-correcting. SOB. Distance: 180 ft  Comments: Pt requires prolonged seated rest break mid distance. Ambulation 2  Surface - 2: level tile  Device 2: Rollator  Other Apparatus 2: AFO; Left;Right  Assistance 2: Stand by assistance  Quality of Gait 2: kyphotic/rounded posture, narrow FAUSTINO, slow olivier  Gait Deviations: Slow Olivier;Decreased step length;Decreased step height  Distance: 100 ft     Stairs/Curb  Stairs?: Yes  Stairs  # Steps : 6  Stairs Height: 4\"  Rails: Left ascending  Assistance: Stand by assistance  Comment: Lateral approach, step-to pattern, safe demo. BALANCE Posture: Poor  Sitting - Static: Good  Sitting - Dynamic: Fair;+  Standing - Static: Fair;+  Standing - Dynamic: Fair  Comments: Seated in WC and standing with Rollator    EXERCISES    Other exercises 1: Nustep L1, 10mins- 1 min rested break every 2 mins  Other exercises 3: toilet transfer x1 SBA  Other exercises 4: Seated ex's R LE: 5reps x 3set with #1.5 and Orange Tband  Other exercises 5: Seated ex's L LE x5-10 per pt tolerance with orange Tband. Other Activities  Comment: Patient requires frequent rest breaks. Activity Tolerance: Patient Tolerated treatment well  Activity Tolerance: Pt req's frequent rest breaks  PT Equipment Recommendations  Equipment Needed: No  Other: Patient reports he has rollator and wheelchair at home.      Current Treatment Recommendations: Strengthening, Balance Training, Transfer Training, Endurance Training, Gait Time Out [phone]   Minutes 56 32     Electronically signed by Na Payne PTA on 7/12/20 at 3:49 PM EDT

## 2020-07-13 ENCOUNTER — TELEPHONE (OUTPATIENT)
Dept: PHARMACY | Age: 70
End: 2020-07-13

## 2020-07-13 VITALS
OXYGEN SATURATION: 94 % | WEIGHT: 134.38 LBS | SYSTOLIC BLOOD PRESSURE: 141 MMHG | TEMPERATURE: 97.5 F | HEIGHT: 70 IN | HEART RATE: 73 BPM | RESPIRATION RATE: 16 BRPM | BODY MASS INDEX: 19.24 KG/M2 | DIASTOLIC BLOOD PRESSURE: 88 MMHG

## 2020-07-13 LAB
INR BLD: 1.9
PROTHROMBIN TIME: 21.5 SEC (ref 11.8–14.6)

## 2020-07-13 PROCEDURE — 85610 PROTHROMBIN TIME: CPT

## 2020-07-13 PROCEDURE — 97530 THERAPEUTIC ACTIVITIES: CPT

## 2020-07-13 PROCEDURE — 6370000000 HC RX 637 (ALT 250 FOR IP): Performed by: INTERNAL MEDICINE

## 2020-07-13 PROCEDURE — 99239 HOSP IP/OBS DSCHRG MGMT >30: CPT | Performed by: INTERNAL MEDICINE

## 2020-07-13 PROCEDURE — 97110 THERAPEUTIC EXERCISES: CPT

## 2020-07-13 PROCEDURE — 94660 CPAP INITIATION&MGMT: CPT

## 2020-07-13 PROCEDURE — 36415 COLL VENOUS BLD VENIPUNCTURE: CPT

## 2020-07-13 PROCEDURE — 97535 SELF CARE MNGMENT TRAINING: CPT

## 2020-07-13 PROCEDURE — 6370000000 HC RX 637 (ALT 250 FOR IP): Performed by: PHYSICAL MEDICINE & REHABILITATION

## 2020-07-13 PROCEDURE — 97116 GAIT TRAINING THERAPY: CPT

## 2020-07-13 RX ORDER — PANTOPRAZOLE SODIUM 20 MG/1
20 TABLET, DELAYED RELEASE ORAL DAILY
Qty: 30 TABLET | Refills: 3 | Status: SHIPPED | OUTPATIENT
Start: 2020-07-14 | End: 2021-06-08

## 2020-07-13 RX ORDER — WARFARIN SODIUM 5 MG/1
5 TABLET ORAL DAILY
Qty: 30 TABLET | Refills: 3 | Status: SHIPPED | OUTPATIENT
Start: 2020-07-13 | End: 2021-06-08

## 2020-07-13 RX ORDER — SOTALOL HYDROCHLORIDE 80 MG/1
40 TABLET ORAL 2 TIMES DAILY
Qty: 60 TABLET | Refills: 3 | Status: SHIPPED | OUTPATIENT
Start: 2020-07-13 | End: 2021-06-08

## 2020-07-13 RX ORDER — WARFARIN SODIUM 7.5 MG/1
15 TABLET ORAL
Status: DISCONTINUED | OUTPATIENT
Start: 2020-07-13 | End: 2020-07-13 | Stop reason: HOSPADM

## 2020-07-13 RX ADMIN — PANTOPRAZOLE SODIUM 20 MG: 20 TABLET, DELAYED RELEASE ORAL at 08:38

## 2020-07-13 RX ADMIN — MELATONIN 1000 UNITS: at 08:38

## 2020-07-13 RX ADMIN — ASPIRIN 81 MG: 81 TABLET, COATED ORAL at 08:38

## 2020-07-13 RX ADMIN — POLYETHYLENE GLYCOL 3350 17 G: 17 POWDER, FOR SOLUTION ORAL at 08:38

## 2020-07-13 RX ADMIN — MULTIPLE VITAMINS W/ MINERALS TAB 1 TABLET: TAB at 08:38

## 2020-07-13 RX ADMIN — SOTALOL HYDROCHLORIDE 40 MG: 80 TABLET ORAL at 08:38

## 2020-07-13 ASSESSMENT — PAIN SCALES - GENERAL
PAINLEVEL_OUTOF10: 0

## 2020-07-13 NOTE — DISCHARGE INSTR - DIET

## 2020-07-13 NOTE — PROGRESS NOTES
Pharmacy Note  Warfarin Consult follow-up      Recent Labs     07/11/20  0523 07/12/20  0657 07/13/20  0621   INR 1.7 1.9 1.9     No results for input(s): HGB, HCT, PLT in the last 72 hours. Significant Drug-Drug Interactions:  New warfarin drug-drug interactions: none  Discontinued drug-drug interactions: none  Current warfarin drug-drug interactions: ASA, tylenol, multivitamin      Date             INR        Dose given previous day  Dose scheduled for today  7/13/2020            1.9       12.5 mg       15 mg              Notes:                 INR still subtherapeutic. Will administer 15 mg dose today. Daily PT/INR while inpatient.      Vasiliy Corbin, PharmD, [facility]  7/13/2020 7:06 AM

## 2020-07-13 NOTE — DISCHARGE INSTR - COC
Continuity of Care Form    Patient Name: Jon Haney   :  1950  MRN:  [id number]    Admit date:  2020  Discharge date:  20    Code Status Order: Full Code   Advance Directives:   [facility] Documentation     Date/Time Healthcare Directive Type of Healthcare Directive Copy in [facility] [address] Agent's Name Healthcare Agent's Phone Number    20 1459  Yes, patient has an advance directive for healthcare treatment  Durable power of  for health care;Living will -- -- --  --          Admitting Physician:  Jean Chua MD  PCP: Yobani Smith MD    [facility]5348[facility]  Discharging Unit Phone Number: [phone]    Emergency Contact:   Extended Emergency Contact Information  Primary Emergency Contact: Mayco Mendoza  Address: [address] [place] [address],[facility]           [address] Phone: [phone]  Relation: Spouse  Secondary Emergency Contact: [address] Phone: [phone]  Relation: Child    Past Surgical History:  Past Surgical History:   Procedure Laterality Date    CARDIOVERSION  2020    w/    HERNIA REPAIR      PARATHYROID GLAND SURGERY         Immunization History: There is no immunization history on file for this patient.     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    Spinal stenosis of lumbar region M48.061    Calculus of kidney and ureter N20.2    Hypoglycemia J36.7    Diastolic dysfunction Z17.86    Bilateral enlargement of atria I51.7    Mitral valve regurgitation I34.0    Aneurysm of aortic arch ([facility]) I71.2    Paroxysmal atrial fibrillation ([facility]) I48.0    Aortic valve disorder I35.9    Bilateral hearing loss H91.93    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 31XD    Tinnitus of both ears H93.13    Concussion with loss of consciousness S06. 0X9A    Supraventricular beat, premature I49.1    Varicose veins of both lower extremities I83.93    Intervertebral disc prolapse with impingement M51.9    Obstructive sleep apnea G47.33    Prostatitis N41.9    Stress F43.9    Dermatitis L30.9    Weakness of both lower extremities R29.898    Insulin resistance E88.81    At high risk for falls Z91.81    Other specified diseases of blood and blood-forming organs D75.89    S/P CABG (coronary artery bypass graft) Z95.1    Cardiac segmental configuration with atrial configuration unknown, ventricular configuration unknown, and inverted normally aligned great arteries Q20.8    Vitamin A deficiency E50.9    Fatigue R53.83    Heavy metal poison.  screen Z13.88    Muscular atrophy M62.50    Cervical stenosis of spine M48.02    Diarrhea R19.7    Atrial flutter (HCC) I48.92    Tachy-ralf syndrome (HCC) I49.5    Anticoagulated on Coumadin Z79.01    History of permanent cardiac pacemaker placement 7/1/2020:   Z95.0    Cardiac abnormality Q24.9    Severe malnutrition (HCC) E43       Isolation/Infection:   Isolation          No Isolation        Patient Infection Status     Infection Onset Added Last Indicated Last Indicated By Review Planned Expiration Resolved Resolved By    None active    Resolved    C-diff Rule Out 06/25/20 06/25/20 06/15/20 Clostridium Difficile Toxin/Antigen (Ordered)   06/25/20 Rule-Out Test Resulted          Nurse Assessment:  Last Vital Signs: BP (!) 141/88   Pulse 73   Temp 97.5 °F (36.4 °C) (Oral)   Resp 16   Ht 5' 10\" (1.778 m)   Wt 134 lb 6 oz (61 kg)   SpO2 94%   BMI 19.28 kg/m²     Last documented pain score (0-10 scale): Pain Level: 0  Last Weight:   Wt Readings from Last 1 Encounters:   07/07/20 134 lb 6 oz (61 kg)     Mental Status:  oriented    IV Access:  - None    Nursing Mobility/ADLs:  Walking   Assisted  Transfer  Assisted  Bathing  Assisted  Dressing  Assisted  Toileting  Assisted  Feeding  Assisted  Med Admin  Assisted  Med Delivery   whole    Wound Care Documentation and Therapy:        Elimination:  Continence:   · Bowel: No  · Bladder: No  Urinary Catheter: None   Colostomy/Ileostomy/Ileal Conduit: No       Date of Last BM: 7/12/20    Intake/Output Summary (Last 24 hours) at 7/13/2020 1209  Last data filed at 7/13/2020 0631  Gross per 24 hour   Intake --   Output 900 ml   Net -900 ml     I/O last 3 completed shifts:  In: -   Out: 900 [Urine:900]    Safety Concerns: At Risk for Falls    Impairments/Disabilities:      Vision    Nutrition Therapy:  Current Nutrition Therapy:   - Oral Diet:  Dysphagia 3 advanced    Routes of Feeding: Oral  Liquids: Thin Liquids  Daily Fluid Restriction: no  Last Modified Barium Swallow with Video (Video Swallowing Test): {Done Not Done PFZU:[id number]}    Treatments at the Time of Hospital Discharge:   Respiratory Treatments: ***  Oxygen Therapy:  is not on home oxygen therapy.   Ventilator: Tripap   - ***    Rehab Therapies: {THERAPEUTIC INTERVENTION:[id number]}  Weight Bearing Status/Restrictions: No weight bearing restirctions  Other Medical Equipment (for information only, NOT a DME order):  walker  Other Treatments: ***    Patient's personal belongings (please select all that are sent with patient):  Heber    RN SIGNATURE:  Electronically signed by Dionna Carrion RN on 7/13/20 at 12:14 PM EDT    CASE MANAGEMENT/SOCIAL WORK SECTION    Inpatient Status Date: ***    Readmission Risk Assessment Score:  Readmission Risk              Risk of Unplanned Readmission:        13           Discharging to Facility/ [address] [facility], [address] [place] [address]  Phone: [phone]  Jaelyn Messina, [address]  [phone]  [facility]     Dialysis Facility (if applicable)   · Name:  · Address:  · Dialysis Schedule:  · Phone:  · Fax:    / signature: Electronically signed by Caty Nieto MSW, LSW on 7/13/20 at 12:43 PM EDT    PHYSICIAN SECTION    Prognosis: Fair    Condition at Discharge: Stable    Rehab Potential (if transferring to Rehab): Fair    Recommended Labs or Other Treatments After Discharge: Daily PT/INR, Dr Gayle Shah to follow, Home health PT/OT/Nursing    Physician Certification: I certify the above information and transfer of Marcos Vasquez  is necessary for the continuing treatment of the diagnosis listed and that he requires [address] for greater 30 days.      Update Admission H&P: No change in H&P    PHYSICIAN SIGNATURE:  T.YVETTE Thompson signed by Thierno Adams RN on 7/13/2020 at 3:19 PM

## 2020-07-13 NOTE — TELEPHONE ENCOUNTER
Spoke with the patient  He is a new referral to our clinic  His daughter is trying to arrange VNS home Draws for hi  He will notify our clinic if home draws are arranged and we will cancel the consult

## 2020-07-13 NOTE — PROGRESS NOTES
[address]   ACUTE REHABILITATION OCCUPATIONAL THERAPY  DAILY NOTE    Date: 20  Patient Name: Gaurang Rene      Room: 7058/0997-96    MRN: [id number]   : 1950  (65 y.o.)  Gender: male   Referring Practitioner: Dr Jorge Doe   Diagnosis: Cardiac abnormality;  history of Amyotrophic Lateral Sclerosis        Restrictions  Restrictions/Precautions: General Precautions, Fall Risk, Surgical Protocols, Cardiac(Pacemaker Implant; ALS; Bilateral AFOs)  Other position/activity restrictions: New Pacemaker precautions: L side, AVOID PUSHING/PULLING AND OVERHEAD ACTIVITY TO L UE. Required Braces or Orthoses  Right Lower Extremity Brace: Ankle Foot Orthotics  Left Lower Extremity Brace: Letty Foot Orthotics  Required Braces or Orthoses?: Yes  Equipment Used: Bed, Other    Subjective   Alert and comfortable with his processes for care    Slept well with the BiPAP last evening         Pain Assessment  Pain Assessment: 0-10  Pain Level: 5  Patient's Stated Pain Goal: No pain  Pain Type: Chronic pain  Pain Location: Neck  Pain Descriptors: Aching    Objective   Asked about access for trilogy system - this therapist spoke with  eulogio will discuss with patient and family                                                     Assessment   Tolerating tasks the best he can.   Continues to use supportive postures to assist breathing and is better at taking a break without cuing                 Patient Education:  Patient Goals   Patient goals : Return home - able to care for self and not fall   Learner:patient  Method: demonstration, explanation and handout       Outcome: verbalized concerns and demonstrated understanding        Plan  Plan  Times per week: 900/7   Times per day: Twice a day  Current Treatment Recommendations: Strengthening, Functional Mobility Training, Endurance Training, Pain Management, Safety Education & Training, Patient/Caregiver Education & Training, Positioning, Self-Care / ADL  Patient Goals   Patient goals : Return home - able to care for self and not fall   Short term goals  Time Frame for Short term goals: 1 week   Short term goal 1: Perform UB self care after setup utilizing modified techniques and enlarged  for tools   Short term goal 2: Min Assist for Shaving; cuing for UB dressing;   Mod Assist for LB bathing and dressing   Short term goal 3: D/V understanding of Respiratory Rehab strategies to increase tolerance to care tasks - Pacing/phasing, supportive postures,  air exchange    Short term goal 4: Tolerate 10-25 minutes of generalized strengthening within precautions to support care needs    Long term goals  Time Frame for Long term goals : By Discharge   Long term goal 1: Mod I for UB feeding, grooming, bathing and dressing tasks using modified tehcniques and devices   Long term goal 2: SBA for LB bathing and dressing tasks   Long term goal 3: Apply energy conservation and breathing strategies to ADL tasks to support saf independence    Long term goal 4: D/V understanding of Home Program for Home safety, Hand strengthening (intrinsic), device availalbility          07/13/20 1548   OT Individual Minutes   Time In 0730   Time Out 0830   Minutes 60     Electronically signed by Sarina Mederos OT on 7/13/20 at 3:49 PM EDT

## 2020-07-13 NOTE — PROGRESS NOTES
Discharge instructions given to patient and family. Verbalized understanding Juma Gonzales delivered equipment and meds to beds delivered. Home health set up for services. Patient will follow up with Dr Raciel Mejias, as he will monitor INR for coumadin treatment.

## 2020-07-13 NOTE — PROGRESS NOTES
AFO;Left;Right  Assistance: Supervision  Quality of Gait: Narrow base of support, slow stady pace, kyphotic posture. No LOB  Gait Deviations: Slow Agueda;Decreased step length;Decreased step height  Distance: 125ft  Comments: 2MWT Seated rest break taken post amb. HR 80bpm, SPO2 95%  Ambulation 2  Surface - 2: level tile  Device 2: Rollator  Other Apparatus 2: AFO; Left;Right  Assistance 2: Supervision  Gait Deviations: Slow Agueda;Decreased step height;Decreased step length  Distance: 63ft x 2  Comments: seated rest break taken between walks. Pt dyspenic post amb, however vitals WNL's     Stairs/Curb  Stairs?: Yes  Stairs  # Steps : 5  Stairs Height: (7 1/2inch)  Rails: Left ascending  Device: No Device  Assistance: Stand by assistance  Comment: Lateral approach, step-to pattern, safe demo. BALANCE Posture: Poor  Sitting - Static: Good  Sitting - Dynamic: Fair;+  Standing - Static: Fair;+(w/rollator)  Standing - Dynamic: Fair(with rollator)       Activity Tolerance: Patient Tolerated treatment well  PT Equipment Recommendations  Equipment Needed: No       Patient Education  New Education Provided:  gait training, transfer training, stair training, bed mobility  Learner:patient  Method: explanation       Outcome: demonstrated understanding     Current Treatment Recommendations: Strengthening, Balance Training, Transfer Training, Endurance Training, Gait Training, Stair training, Home Exercise Program, Safety Education & Training, Functional Mobility Training, Patient/Caregiver Education & Training, Equipment Evaluation, Education, & procurement    Conditions Requiring Skilled Therapeutic Intervention  Body structures, Functions, Activity limitations: Decreased functional mobility ; Decreased strength;Decreased safe awareness;Decreased endurance;Decreased balance  Assessment: Pt fatigues easily, requires frequent rest breaks as muscles fatigue with activity & pt becomes SOB.    Prognosis: Good  REQUIRES PT FOLLOW UP: Yes  Discharge Recommendations: Home with Home health PT    Goals  Short term goals  Time Frame for Short term goals: 5 days  Short term goal 1: Pt able to perfrom supine<>sit independently  Short term goal 2: Pt able to ambulate 50 to 70 ft with Rollator, SBA, mod exertion. Short term goal 3: Pt able to go up/down 3 steps with 2 rail min A  Short term goal 4: Pt able to tolerate 30-40 min of activity with pacing skills to improve endurance. Short term goal 5: Pt able to transfer wit Rollator at [facility]  Long term goals  Time Frame for Long term goals : By LOS  Long term goal 1: Pt able to perform transfers independently. Long term goal 2:  Pt able to ambulate with a rollator dist of 80 to 100 ft, independently on level surfaces.    Long term goal 3: Pt able to  go up and down 5 steps with 1 HR, SBA  Long term goal 4: Improve 2MWT dist to 80 ft to improve endurance       07/13/20 1507   PT Individual Minutes   Time In 1030   Time Out 1115   Minutes 45       Electronically signed by BERENICE Hernandez on 7/13/20 at 3:08 PM EDT

## 2020-07-13 NOTE — DISCHARGE SUMMARY
[place] [address] Internal Medicine    Discharge Summary     Patient ID: Trini Ford  :  1950   MRN: [id number]     ACCOUNT:  [de-identified]   Patient's PCP: Yomi Berger MD  Admit Date: 2020   Discharge Date:  20    Length of Stay: 11  Code Status:  Full Code  Admitting Physician: Ghassan Becker MD  Discharge Physician: Joantan Matta MD     Active Discharge Diagnoses:     Primary Problem  <principal problem not specified>      [place] Problems    Diagnosis Date Noted    Severe malnutrition ([address] [E43] 2020    Cardiac abnormality [Q24.9] 2020    History of permanent cardiac pacemaker placement 2020:   [Z95.0] 2020    Anticoagulated on Coumadin [Z79.01] 2020    Tachy-ralf syndrome ([address] [I49.5] 2020    Weakness of both lower extremities [R29.898] 2019    Paroxysmal atrial fibrillation ([address] [I48.0] 2019    Rheumatic aortic valve insufficiency [I06.1] 2019    ALS (amyotrophic lateral sclerosis) ([address] [G12.21] 10/02/2018    S/P CABG (coronary artery bypass graft) [Z95.1] 2016       Admission Condition:  poor     Discharged Condition: fair    Hospital Stay:     Hospital Course:  Trini Ford is a 65 y.o. male who was admitted for the management of   .     , presented with No chief complaint on file. ,                         <principal problem not specified>;                               Active Problems:    ALS (amyotrophic lateral sclerosis) (HCC)    Paroxysmal atrial fibrillation (HCC)    Rheumatic aortic valve insufficiency    Weakness of both lower extremities    S/P CABG (coronary artery bypass graft)    Tachy-ralf syndrome (HCC)    Anticoagulated on Coumadin    History of permanent cardiac pacemaker placement 2020:      Cardiac abnormality    Severe malnutrition ([address]  Resolved Problems:    * No resolved hospital problems.  *       Significant therapeutic interventions: The patient is a 65y.o. year old with ADL and Mobility deficits secondary to debility related to cardiac abnormality and comorbid ALS. He will require close medical monitoring for the comorbidities listed below. He will benefit from intensive interdisciplinary therapies and rehab nursing care and is appropriate for inpatient rehabilitation. The post admission physician evaluation (DOMINICK) is consistent with the pre-admission assessment. See above findings to reflect the elements required in the DOMINICK. Patient's admitting condition is consistent with the findings of the preadmission assessment by the rehabilitation admissions coordinator. Tracey Alonso  is a 65 y.o. right-handed     male admitted to the [address] [place] [address] on 7/2/2020. He was originally admitted to [facility] [address] on 6/26/2020 for tachycardia and SOB. He had been determined to be in Atrial flutter with RVR on a visit to his cardiologist and was admitted to the hospital for treatment. He has known comorbid ALS and has lost significant weight, including muscle mass, moreso on his L side. He was treated with adenocard, sotalol, and cardizem drip for rate control. He was cardioverted and had dual-chamber pacemaker implanted on 7/1/2020. He did develop fecal impaction during the hospital admission and ws disimpacted. 1. Debility secondary to atrial flutter and bradycardia: PT/OT  for gait, mobility, strengthening, endurance, ADLs, and self care. S/p cardioversion and pacemaker implantation. On ASA, sotalol. Has Tylenol or Norco prn pain. No elevation of L arm above shoulder for 30 days post-op. 2. Atrial flutter: On warfarin starting 7/3 with pharmacy to dose. Nursing confirmed with cardiology - no bridging due to bleeding from pacemaker site at [facility] [address]. Patient on supplements high in vitamin K.   3. ALS: associated weakness and weight loss.  Drinking multiple daily supplement shakes high in vitamins  4. Chronic hypercapneic respiratory failure: Pulmonology following - on BIPAP - Discussed with Dr. Antoinette Theodore today. [employer] assessement for Trilogy Bipap at home. 5. Bowel management: Recent impaction. miralax daily, dolace daily, has dulcolax prn, senokot and milk of magnesia prn.   6. Internal medicine for medical management  7. DVT prophylaxis: Coumadin with INR goal 2-3, SCD's while in bed and DEMARIO's while in bed. INR 1.9 today. Pharmacist adjusting dosing for high vitamin K intake daily. 65year-old gentleman with debility secondary to atrial flutter with RVR and comorbid ALS. Patient is well, and has had no acute complaints or problems. Has few questions regarding his discharge   Was notified by RN that patient had bleeding from the pacemaker site and his dressing was changed. I was never notified on this bleeding overnight or early this morning. On exam, dressing was soaked with blood. Dressing removed. Wound opened up. Steri-strips removed. Wound cleaned. Pressure applied. New steri-trips attached. Pressure dressing applied. If the wound has any more bleeding, he should be monitored. Otherwise can be discharged later today. Hold Eliquis for 24 hours. 1. Paroxysmal atrial flutter  -In SR. Eliquis and Sotalol remain on hold. Paper script written for cost analysis on Eliquis. 2. Hx AVR  -Stable. No evidence of CHF. 3. Tachy-ralf syndrome  -S/P PPM. All parameters WNL. Site developed oozing around 0400. Dr. Joan Aguilera at bedside to clean the site and apply new pressure dressing. Reviewed with Dr. Lencho Nettles. Will restart Sotalol 40mg BID. The patient is a 65y.o. year old with ADL and Mobility deficits secondary to debility related to cardiac abnormality and comorbid ALS. He will require close medical monitoring for the comorbidities listed below.       He will benefit from intensive interdisciplinary therapies and rehab nursing care and is appropriate for inpatient rehabilitation. The post admission physician evaluation (DOMINICK) is consistent with the pre-admission assessment. See above findings to reflect the elements required in the DOMINICK. Patient's admitting condition is consistent with the findings of the preadmission assessment by the rehabilitation admissions coordinator.           Significant Diagnostic Studies:   Labs / Micro:       Results for orders placed or performed during the hospital encounter of 07/02/20   CBC   Result Value Ref Range    WBC 8.2 3.5 - 11.0 k/uL    RBC 4.55 4.5 - 5.9 m/uL    Hemoglobin 15.8 13.5 - 17.5 g/dL    Hematocrit 46.2 41 - 53 %    .5 (H) 80 - 100 fL    MCH 34.7 (H) 26 - 34 pg    MCHC 34.2 31 - 37 g/dL    RDW 13.5 11.5 - 14.9 %    Platelets 876 559 - 024 k/uL    MPV 8.3 6.0 - 12.0 fL    NRBC Automated NOT REPORTED per 100 WBC   Basic Metabolic Panel w/ Reflex to MG   Result Value Ref Range    Glucose 108 (H) 70 - 99 mg/dL    BUN 17 8 - 23 mg/dL    CREATININE <0.40 (L) 0.70 - 1.20 mg/dL    Bun/Cre Ratio NOT REPORTED 9 - 20    Calcium 9.4 8.6 - 10.4 mg/dL    Sodium 135 135 - 144 mmol/L    Potassium 4.4 3.7 - 5.3 mmol/L    Chloride 94 (L) 98 - 107 mmol/L    CO2 35 (H) 20 - 31 mmol/L    Anion Gap 6 (L) 9 - 17 mmol/L    GFR Non- CANNOT BE CALCULATED >60 mL/min    GFR  CANNOT BE CALCULATED >60 mL/min    GFR Comment          GFR Staging NOT REPORTED    PROTIME-INR   Result Value Ref Range    Protime 13.4 11.8 - 14.6 sec    INR 1.0    Arterial Blood Gases   Result Value Ref Range    pH, Arterial 7.351 7.350 - 7.450    pCO2, Arterial 73.2 (HH) 35.0 - 45.0 mmHg    pO2, Arterial 81.2 80.0 - 100.0 mmHg    HCO3, Arterial 40.5 (H) 22.0 - 26.0 mmol/L    Positive Base Excess, Art 14.9 (H) 0.0 - 2.0 mmol/L    Negative Base Excess, Art NOT REPORTED 0.0 - 2.0 mmol/L    O2 Sat, Arterial 93.1 (L) 95 - 98 %    Total Hb NOT REPORTED 12.0 - 16.0 g/dl    Oxyhemoglobin NOT REPORTED 95.0 - 98.0 %  >60 >60 mL/min    GFR Comment          GFR Staging NOT REPORTED    PROTIME-INR   Result Value Ref Range    Protime 17.3 (H) 11.8 - 14.6 sec    INR 1.4    PROTIME-INR   Result Value Ref Range    Protime 20.1 (H) 11.8 - 14.6 sec    INR 1.7    Protime-INR   Result Value Ref Range    Protime 21.6 (H) 11.8 - 14.6 sec    INR 1.9    PROTIME-INR   Result Value Ref Range    Protime 21.5 (H) 11.8 - 14.6 sec    INR 1.9         {Radiology:    Xr Chest Portable    Result Date: 7/2/2020  EXAMINATION: ONE XRAY VIEW OF THE CHEST 7/2/2020 11:00 am COMPARISON: 07/01/2020 HISTORY: ORDERING SYSTEM PROVIDED HISTORY: pacemaker placement TECHNOLOGIST PROVIDED HISTORY: pacemaker placement Reason for Exam: Port upright AP CXR - pacemaker placement Acuity: Unknown Type of Exam: Unknown FINDINGS: Left chest pacemaker device and median sternotomy wires are again noted. Cardiomediastinal silhouette and pulmonary vasculature are within normal limits. No focal airspace consolidation, pneumothorax, or pleural effusion. No free air beneath the diaphragm. No acute osseous abnormality. No acute intrathoracic process. Xr Chest Portable    Result Date: 7/1/2020  EXAMINATION: ONE XRAY VIEW OF THE CHEST 7/1/2020 3:24 pm COMPARISON: Chest radiograph performed 06/26/2020. HISTORY: ORDERING SYSTEM PROVIDED HISTORY: Pacemaker placement and rule out pneumothorax TECHNOLOGIST PROVIDED HISTORY: Pacemaker placement and rule out pneumothorax Reason for Exam: s/p pacemaker Acuity: Acute Type of Exam: Initial FINDINGS: There is no acute consolidation or effusion. There is no pneumothorax. The mediastinal structures are stable. There is a pacemaker with leads presumably in the right atrium and right ventricle. The upper abdomen is unremarkable. The extrathoracic soft tissues are unremarkable. No acute cardiopulmonary process. New pacemaker with leads presumably in the right atrium and right ventricle.      Xr Chest Portable    Result Date: 6/26/2020  EXAMINATION: ONE XRAY VIEW OF THE CHEST 6/26/2020 2:53 pm COMPARISON: None. HISTORY: ORDERING SYSTEM PROVIDED HISTORY: Chest Pain TECHNOLOGIST PROVIDED HISTORY: Chest Pain Reason for Exam: Pt c/o SOB, tachy. AP UPRIGHT PORT Acuity: Acute Type of Exam: Initial FINDINGS: HEART/MEDIASTINUM: The cardiomediastinal silhouette is within normal limits. Median sternotomy wires are noted. Prosthetic cardiac valve is noted. PLEURA/LUNGS: There are no focal consolidations or pleural effusions. There is no appreciable pneumothorax. BONES/SOFT TISSUE: No acute abnormality. No radiographic evidence of acute pulmonary disease. Consultations:    Consults:     Final Specialist Recommendations/Findings:   IP CONSULT TO RESPIRATORY CARE  IP CONSULT TO INTERNAL MEDICINE  IP CONSULT TO DIETITIAN  IP CONSULT TO SOCIAL WORK  IP CONSULT TO RECREATION THERAPY  IP CONSULT TO CARDIOLOGY  PHARMACY TO DOSE WARFARIN  IP CONSULT TO PULMONOLOGY      The patient was seen and examined on day of discharge and this discharge summary is in conjunction with any daily progress note from day of discharge. Discharge plan:     Disposition: Home    Physician Follow Up:   With PCP and as specified     Requiring Further Evaluation/Follow Up POST HOSPITALIZATION/Incidental Findings:    Diet: regular     Activity: As tolerated    I  Discharge Medications:      Medication List      ASK your doctor about these medications    acetaminophen 325 MG tablet  Commonly known as:  TYLENOL     aspirin 81 MG tablet     fluticasone 50 MCG/ACT nasal spray  Commonly known as:  Flonase  1 spray by Nasal route daily     Handicap Placard Misc  by Does not apply route Dx: spinal stenosis, ALS    Duration: 5 years till 2/25/2020     OMEGA-3 FATTY ACIDS-VITAMIN E PO     PROBIOTIC PO     SELENIUM PO     SOYA LECITHIN PO     SPIRULINA PO     therapeutic multivitamin-minerals tablet     vitamin C 500 MG tablet  Commonly known as: ASCORBIC ACID     vitamin D 1000 UNIT Tabs tablet  Commonly known as:  CHOLECALCIFEROL     Vitamin E 100 units Tabs              Time spent on discharge planning ;          [] less than 30 minutes . [x]   more  than 30 minutes . Ellectronically signed by   Travis Washington MD      Thank you Dr. Nancy Pearce MD for the opportunity to be involved in this patient's care. Please note that this chart was generated using voice recognition Dragon dictation software. Although every effort was made to ensure the accuracy of this automated transcription, some errors in transcription may have occurred.

## 2020-07-13 NOTE — CARE COORDINATION
SPOKE WITH DR. Gamal Mirza. MEDS RECONCILED. PT. WILL NEED TO F/U WITH COUMADIN CLINIC. REFERRAL PLACED IN [facility].  Electronically signed by Gayatri Moyer RN on 7/13/2020 at 1:05 PM

## 2020-07-14 ENCOUNTER — CARE COORDINATION (OUTPATIENT)
Dept: CASE MANAGEMENT | Age: 70
End: 2020-07-14

## 2020-07-14 PROCEDURE — 1111F DSCHRG MED/CURRENT MED MERGE: CPT

## 2020-07-14 NOTE — CARE COORDINATION
[address] Transitions Initial Follow Up Call    Call within 2 business days of discharge: Yes    Patient: Christiane Tyler Patient : 1950   MRN: [id number]  Reason for Admission: A-fib  Discharge Date: 20 RARS: Readmission Risk Score: 13      Last Discharge [facility]       Complaint Diagnosis Description Type Department Provider    20  Paroxysmal atrial fibrillation ([address] Admission (Discharged) Annie Maxwell MD           Spoke with: [id number]: ARU    Non-face-to-face services provided:  Obtained and reviewed discharge summary and/or continuity of care documents  Education of patient/family/caregiver/guardian to support self-management-Reviewed discharge instructions and medications, 1111F order  reviewed BPCI-A Medicare Benefit   Writer spoke to patient, he is doing well, no new needs or concerns at this time, VNS coming out later today, reviewed discharge instructions and medications, 1111F order completed, reviewed BPCI-A medicare Benefit, patient v/u, informed patient that CTN would be following up by phone periodically over the next 90 days.  Will forward note on to CTN following, provided contact information//JU    Care Transitions 24 Hour Call    Do you have any ongoing symptoms?:  No  Do you have a copy of your discharge instructions?:  Yes  Do you have all of your prescriptions and are they filled?:  Yes  Have you been contacted by a [address]?:  No  Have you scheduled your follow up appointment?:  Yes (Comment: daughter is scheduling follow up appointments)  How are you going to get to your appointment?:  Car - family or friend to transport  Were you discharged with any Home Care or Post Acute Services:  Yes  Post Acute Services:  [facility] [address] you feel like you have everything you need to keep you well at home?:  Yes  Care Transitions Interventions         Follow Up  Future Appointments   Date Time Provider Sharon Schumacher   2020  1:00 PM Usman Chaney MD [facility] gj consu [facility]       Last Nye RN

## 2020-07-21 ENCOUNTER — CARE COORDINATION (OUTPATIENT)
Dept: CASE MANAGEMENT | Age: 70
End: 2020-07-21

## 2020-07-21 ENCOUNTER — TELEPHONE (OUTPATIENT)
Dept: PHARMACY | Age: 70
End: 2020-07-21

## 2020-07-21 NOTE — CARE COORDINATION
[address] Transitions Follow Up Call    2020    Patient: Meg Tovar  Patient : 1950   MRN: [id number]  Reason for Admission:   Discharge Date: 20 RARS: Readmission Risk Score: 13         Spoke with: Bonny Medrano, patient    Care Transitions Subsequent and Final Call    Subsequent and Final Calls  Have your medications changed?:  No  Do you have any questions related to your medications?:  No  Do you currently have any active services?:  Yes  Are you currently active with any services?:  Home Health  Identified Barriers:  None  Care Transitions Interventions  Other Interventions: Follow Up:  Contacted patient for BPCI-A follow up. Fredy Arango stated he is doing okay. Denies having any chest pain/discomfort. He stated he has not felt any palpitations that he is aware of.  HR fluctuates between 70-90 bpm.  He continues to have shortness of breath all the time which he stated has not worsened and is about the same as it has been. Denies having any dizziness or lightheadedness. Discussed signs/symptoms of when to contact MD with any new or worsening symptoms. He verbalized understanding. He does not have any questions or concerns at this time. Will continue to follow.       Future Appointments   Date Time Provider Sharon Schumacher   2020  1:00 PM Andrew Dixon MD [facility] consu [facility] [place] RN

## 2020-07-21 NOTE — TELEPHONE ENCOUNTER
Spoke with the patient and he states he has a home nurse at this time that comes and checks his INR weekly. He will not be seen in the clinic at this time. Will go ahead and cancel his consult and obtain another one in the future if needed.    Jade Bray, PharmD, [facility]  7/21/2020 12:00 PM

## 2020-07-23 ENCOUNTER — TELEPHONE (OUTPATIENT)
Dept: PHARMACY | Age: 70
End: 2020-07-23

## 2020-07-31 ENCOUNTER — CARE COORDINATION (OUTPATIENT)
Dept: CASE MANAGEMENT | Age: 70
End: 2020-07-31

## 2020-07-31 NOTE — CARE COORDINATION
[facility] Transitions Follow Up Call    2020    Patient: Pawel Mckeon  Patient : 1950   MRN: [id number]  Reason for Admission:   Discharge Date: 20 RARS: Readmission Risk Score: 15         Spoke with: Johan Jensen, patient    Care Transitions Subsequent and Final Call    Subsequent and Final Calls  Have your medications changed?:  No  Do you have any questions related to your medications?:  No  Do you currently have any active services?:  Yes  Are you currently active with any services?:  Home Health  Do you have any needs or concerns that I can assist you with?:  No  Identified Barriers:  None  Care Transitions Interventions  Other Interventions: Follow Up:  Contacted patient for BPCI-A follow up. Patient was a bit short with CTN and asked what CTN role is. Explained role, purpose for call, BPCI 90 day follow up. He verbalized understanding and agreed to follow up calls. Caren Raman reports that he is weaker and continues to work with PT. He stated OT services have stopped. Home health nurse continues to make visits to check INR. He denies having any chest pain/discomfort, palpitations, rapid HR, dizziness/lightheadedness. He reports he is short of breath all the time. He stated he feels it has worsened a bit. He is using Trelegy at night. He denies being in distress. He is eating but not as much calories as recommended by his nutritionist.  He stated he is trying but has lost weight. He is drinking fluids to stay hydrated. Discussed signs/symptoms and when to contact MD with any new or worsening symptoms. He verbalized understanding. He does not have any questions or concerns at this time. Will continue to follow.       Future Appointments   Date Time Provider Sharon Schumacher   2020  1:00 PM MD donny Contreras gj consu [facility] Espinosa RN

## 2020-08-19 ENCOUNTER — CARE COORDINATION (OUTPATIENT)
Dept: CASE MANAGEMENT | Age: 70
End: 2020-08-19

## 2020-08-19 NOTE — CARE COORDINATION
[facility] Transitions Follow Up Call    2020    Patient: Howard Melo  Patient : 1950   MRN: [id number]  Reason for Admission:   Discharge Date: 20 RARS: Readmission Risk Score: 15         Spoke with: Fantasma Razo, patient    Care Transitions Subsequent and Final Call    Subsequent and Final Calls  Have your medications changed?:  No  Do you have any questions related to your medications?:  No  Do you currently have any active services?:  Yes  Are you currently active with any services?:  Home Health  Do you have any needs or concerns that I can assist you with?:  No  Care Transitions Interventions  Other Interventions: Follow Up:  Contacted patient for BPCI-A follow up. Patient reports he is doing okay. Reports breathing is about the same. Denies distress. Continues to use Trilogy BIPAP at night. He stated he has not had a good night sleep. He has tried 3 different masks and they all eventually do not fit properly. He stated a 4th full mask was ordered for him. He continues to work with PT twice a week. Patient would like to know if he needs to follow up with Dr. Venecia Hernandez, pulmonologist who he saw in the hospital and ordered the BIPAP. CTN will follow up. He does not have any other questions or concerns at this time. CTN contacted Dr. Stefanie Snow office. Spoke with Ramonita Grimaldo. Informed her of reason for call. Ramonita Grimaldo will have to check with the doctor to see if he would like patient to follow up with him. Ramonita Grimaldo will contact patient directly. CTN called patient back. Informed him that Ramonita Grimaldo with Dr. Stefanie Snow office will contact him as soon as she receives a response back in regards to whether or not he needs to follow up with Dr. Polo Sheffield. Patient verbalized understanding. No future appointments.     Master Valdes RN

## 2020-08-28 ENCOUNTER — TELEPHONE (OUTPATIENT)
Dept: PHARMACY | Age: 70
End: 2020-08-28

## 2020-08-31 ENCOUNTER — CARE COORDINATION (OUTPATIENT)
Dept: CASE MANAGEMENT | Age: 70
End: 2020-08-31

## 2020-09-01 PROBLEM — Z13.220 SCREENING, LIPID: Status: ACTIVE | Noted: 2019-12-17

## 2020-09-15 ENCOUNTER — CARE COORDINATION (OUTPATIENT)
Dept: CASE MANAGEMENT | Age: 70
End: 2020-09-15

## 2020-09-20 ENCOUNTER — NURSE ONLY (OUTPATIENT)
Dept: PRIMARY CARE CLINIC | Age: 70
End: 2020-09-20

## 2020-09-20 ENCOUNTER — HOSPITAL ENCOUNTER (OUTPATIENT)
Age: 70
Setting detail: SPECIMEN
Discharge: HOME | End: 2020-09-20
Payer: MEDICARE

## 2020-09-23 LAB — SARS-COV-2, NAA: NOT DETECTED

## 2020-09-24 ENCOUNTER — TELEPHONE (OUTPATIENT)
Dept: PRIMARY CARE CLINIC | Age: 70
End: 2020-09-24

## 2020-09-24 PROBLEM — G83.14: Status: ACTIVE | Noted: 2020-09-24

## 2020-09-24 PROBLEM — R06.02 SOB (SHORTNESS OF BREATH): Status: ACTIVE | Noted: 2020-09-24

## 2020-09-28 ENCOUNTER — CARE COORDINATION (OUTPATIENT)
Dept: CASE MANAGEMENT | Age: 70
End: 2020-09-28

## 2020-09-28 NOTE — CARE COORDINATION
[address] [facility] for Care Improvement ([facility]) Follow Up Call  Qualifying Diagnosis of Congestive Heart Failure/AMI/Cardiac Arrhythmia. 9/28/2020  Patient Name:  Sidney Barraza   YOB: 1950  Discharge Date:  7/13/20  RARS:  Readmission Risk Score: 13    PCP:  Kimberly Vaz MD  Message left in compliance with HIPPA. Stated who I was, representing the [facility], Care Transitions Team. Requested to place a call to their Physician with any immediate health needs/questions/concerns.      Future Appointments   Date Time Provider Sharon Schumacher   9/30/2020  2:15 PM Kimberly Vaz MD afl gj consu [facility]       Care Transitions will continue to follow per [facility] , RN, CTN

## 2020-10-01 PROBLEM — K94.23 MALFUNCTION OF PERCUTANEOUS ENDOSCOPIC GASTROSTOMY (PEG) TUBE (HCC): Status: ACTIVE | Noted: 2020-10-01

## 2020-10-01 PROBLEM — Z13.220 SCREENING, LIPID: Status: RESOLVED | Noted: 2019-12-17 | Resolved: 2020-10-01

## 2020-10-07 ENCOUNTER — HOSPITAL ENCOUNTER (OUTPATIENT)
Age: 70
Discharge: HOME | End: 2020-10-07
Payer: MEDICARE

## 2020-10-07 LAB
ALLEN TEST: ABNORMAL
CARBOXYHEMOGLOBIN: 1.4 % (ref 0–5)
FIO2: ABNORMAL
HCO3 ARTERIAL: 31.1 MMOL/L (ref 22–26)
METHEMOGLOBIN: 0.3 % (ref 0–1.9)
MODE: ABNORMAL
NEGATIVE BASE EXCESS, ART: ABNORMAL MMOL/L (ref 0–2)
NOTIFICATION TIME: ABNORMAL
NOTIFICATION: ABNORMAL
O2 DEVICE/FLOW/%: ABNORMAL
O2 SAT, ARTERIAL: 93.2 % (ref 95–98)
OXYHEMOGLOBIN: ABNORMAL % (ref 95–98)
PATIENT TEMP: 37
PCO2 ARTERIAL: 47 MMHG (ref 35–45)
PCO2, ART, TEMP ADJ: ABNORMAL (ref 35–45)
PEEP/CPAP: ABNORMAL
PH ARTERIAL: 7.43 (ref 7.35–7.45)
PH, ART, TEMP ADJ: ABNORMAL (ref 7.35–7.45)
PO2 ARTERIAL: 73 MMHG (ref 80–100)
PO2, ART, TEMP ADJ: ABNORMAL MMHG (ref 80–100)
POSITIVE BASE EXCESS, ART: 6.7 MMOL/L (ref 0–2)
PSV: ABNORMAL
PT. POSITION: ABNORMAL
RESPIRATORY RATE: 16
SAMPLE SITE: ABNORMAL
SET RATE: ABNORMAL
TEXT FOR RESPIRATORY: ABNORMAL
TOTAL HB: ABNORMAL G/DL (ref 12–16)
TOTAL RATE: ABNORMAL
VT: ABNORMAL

## 2020-10-07 PROCEDURE — 82805 BLOOD GASES W/O2 SATURATION: CPT

## 2020-10-07 PROCEDURE — 36600 WITHDRAWAL OF ARTERIAL BLOOD: CPT

## 2020-10-09 ENCOUNTER — CARE COORDINATION (OUTPATIENT)
Dept: CASE MANAGEMENT | Age: 70
End: 2020-10-09

## 2020-10-09 NOTE — CARE COORDINATION
[facility] Transitions Follow Up Call    10/9/2020    Patient: Yanira Villa  Patient : 1950   MRN: <[id number]>  Reason for Admission:   Discharge Date: 20 RARS: Readmission Risk Score: 15         Spoke with: Patient            Method of communication with provider : none    Care Transition Nurse (CTN) contacted the patient by telephone to follow up after admission on . Verified name and  with patient as identifiers. Addressed changes since last contact: symptom management-A-fib  Discharged needs reviewed: none  Follow up appointment completed? Yes    Advance Care Planning:   Does patient have an Advance Directive:  not on file. CTN reviewed discharge instructions, medical action plan and red flags with patient and discussed any barriers to care and/or understanding of plan of care after discharge. Discussed appropriate site of care based on symptoms and resources available to patient including: When to call 911. The patient agrees to contact the PCP office for questions related to their healthcare. Patients top risk factors for readmission: medical condition  Interventions to address risk factors: Obtained and reviewed discharge summary and/or continuity of care documents    Discussed follow-up appointments. If no appointment was previously scheduled, appointment scheduling offered: Yes     Is follow up appointment scheduled within 7 days of discharge? Yes  Non-[facility] follow up appointment(s):     Final Call based on severity of symptoms and risk factors. Plan for next call:   CTN provided contact information for future needs. Patient states he is doing fine. No episodes of rapid heart rate or palpitations, dizziness, fatigue, SOB or chest pain at this time. Patient is taking medications as directed. Patient has all medications is taking medications as directed and has no questions concerning medications at this time.    Patient knows when to contact physician or report to ED with worsening or severe symptoms, changes, or concerns. Instructed patient that this is the Final BPCI Call (Patient has been followed for 90 days) and to call PCP/Specialist for any problems or issues that occur. Patient Expresses Understanding. Barrett Hummingbird, LPN Carmon Claude / Crystal W Macho [facility] 5 Transitions Subsequent and Final Call    Subsequent and Final Calls  Do you have any ongoing symptoms?:  No  Have your medications changed?:  No  Do you have any questions related to your medications?:  No  Do you currently have any active services?:  No  Are you currently active with any services?:  Home Health  Do you have any needs or concerns that I can assist you with?:  No  Identified Barriers:  None  Care Transitions Interventions  Other Interventions: Follow Up  No future appointments.     Vladimir Posada LPN

## 2020-12-04 ENCOUNTER — APPOINTMENT (OUTPATIENT)
Dept: CT IMAGING | Age: 70
End: 2020-12-04
Payer: MEDICARE

## 2020-12-04 ENCOUNTER — HOSPITAL ENCOUNTER (EMERGENCY)
Age: 70
Discharge: HOME | End: 2020-12-04
Attending: EMERGENCY MEDICINE
Payer: MEDICARE

## 2020-12-04 VITALS
OXYGEN SATURATION: 100 % | HEIGHT: 70 IN | DIASTOLIC BLOOD PRESSURE: 79 MMHG | HEART RATE: 92 BPM | BODY MASS INDEX: 17.9 KG/M2 | RESPIRATION RATE: 22 BRPM | TEMPERATURE: 98.2 F | WEIGHT: 125 LBS | SYSTOLIC BLOOD PRESSURE: 110 MMHG

## 2020-12-04 LAB
ABSOLUTE EOS #: 0.4 K/UL (ref 0–0.4)
ABSOLUTE IMMATURE GRANULOCYTE: ABNORMAL K/UL (ref 0–0.3)
ABSOLUTE LYMPH #: 1.5 K/UL (ref 1–4.8)
ABSOLUTE MONO #: 0.8 K/UL (ref 0.1–1.3)
ANION GAP SERPL CALCULATED.3IONS-SCNC: 8 MMOL/L (ref 9–17)
BASOPHILS # BLD: 1 % (ref 0–2)
BASOPHILS ABSOLUTE: 0 K/UL (ref 0–0.2)
BUN BLDV-MCNC: 24 MG/DL (ref 8–23)
BUN/CREAT BLD: ABNORMAL (ref 9–20)
CALCIUM SERPL-MCNC: 9.6 MG/DL (ref 8.6–10.4)
CHLORIDE BLD-SCNC: 98 MMOL/L (ref 98–107)
CO2: 31 MMOL/L (ref 20–31)
CREAT SERPL-MCNC: <0.4 MG/DL (ref 0.7–1.2)
DIFFERENTIAL TYPE: ABNORMAL
EOSINOPHILS RELATIVE PERCENT: 5 % (ref 0–4)
GFR AFRICAN AMERICAN: ABNORMAL ML/MIN
GFR NON-AFRICAN AMERICAN: ABNORMAL ML/MIN
GFR SERPL CREATININE-BSD FRML MDRD: ABNORMAL ML/MIN/{1.73_M2}
GFR SERPL CREATININE-BSD FRML MDRD: ABNORMAL ML/MIN/{1.73_M2}
GLUCOSE BLD-MCNC: 100 MG/DL (ref 70–99)
HCT VFR BLD CALC: 42.2 % (ref 41–53)
HEMOGLOBIN: 14.4 G/DL (ref 13.5–17.5)
IMMATURE GRANULOCYTES: ABNORMAL %
LYMPHOCYTES # BLD: 19 % (ref 24–44)
MCH RBC QN AUTO: 34 PG (ref 26–34)
MCHC RBC AUTO-ENTMCNC: 34.1 G/DL (ref 31–37)
MCV RBC AUTO: 99.5 FL (ref 80–100)
MONOCYTES # BLD: 11 % (ref 1–7)
NRBC AUTOMATED: ABNORMAL PER 100 WBC
PDW BLD-RTO: 13.3 % (ref 11.5–14.9)
PLATELET # BLD: 247 K/UL (ref 150–450)
PLATELET ESTIMATE: ABNORMAL
PMV BLD AUTO: 7.8 FL (ref 6–12)
POTASSIUM SERPL-SCNC: 4.1 MMOL/L (ref 3.7–5.3)
RBC # BLD: 4.24 M/UL (ref 4.5–5.9)
RBC # BLD: ABNORMAL 10*6/UL
SEG NEUTROPHILS: 64 % (ref 36–66)
SEGMENTED NEUTROPHILS ABSOLUTE COUNT: 5 K/UL (ref 1.3–9.1)
SODIUM BLD-SCNC: 137 MMOL/L (ref 135–144)
WBC # BLD: 7.8 K/UL (ref 3.5–11)
WBC # BLD: ABNORMAL 10*3/UL

## 2020-12-04 PROCEDURE — 36415 COLL VENOUS BLD VENIPUNCTURE: CPT

## 2020-12-04 PROCEDURE — 99285 EMERGENCY DEPT VISIT HI MDM: CPT

## 2020-12-04 PROCEDURE — 6370000000 HC RX 637 (ALT 250 FOR IP): Performed by: EMERGENCY MEDICINE

## 2020-12-04 PROCEDURE — 6360000002 HC RX W HCPCS: Performed by: EMERGENCY MEDICINE

## 2020-12-04 PROCEDURE — 96372 THER/PROPH/DIAG INJ SC/IM: CPT

## 2020-12-04 PROCEDURE — 96374 THER/PROPH/DIAG INJ IV PUSH: CPT

## 2020-12-04 PROCEDURE — 70450 CT HEAD/BRAIN W/O DYE: CPT

## 2020-12-04 PROCEDURE — 72125 CT NECK SPINE W/O DYE: CPT

## 2020-12-04 PROCEDURE — 85025 COMPLETE CBC W/AUTO DIFF WBC: CPT

## 2020-12-04 PROCEDURE — 96375 TX/PRO/DX INJ NEW DRUG ADDON: CPT

## 2020-12-04 PROCEDURE — 94660 CPAP INITIATION&MGMT: CPT

## 2020-12-04 PROCEDURE — 80048 BASIC METABOLIC PNL TOTAL CA: CPT

## 2020-12-04 RX ORDER — KETOROLAC TROMETHAMINE 30 MG/ML
15 INJECTION, SOLUTION INTRAMUSCULAR; INTRAVENOUS ONCE
Status: COMPLETED | OUTPATIENT
Start: 2020-12-04 | End: 2020-12-04

## 2020-12-04 RX ORDER — BACLOFEN 10 MG/1
10 TABLET ORAL ONCE
Status: COMPLETED | OUTPATIENT
Start: 2020-12-04 | End: 2020-12-04

## 2020-12-04 RX ORDER — MORPHINE SULFATE 4 MG/ML
4 INJECTION, SOLUTION INTRAMUSCULAR; INTRAVENOUS ONCE
Status: DISCONTINUED | OUTPATIENT
Start: 2020-12-04 | End: 2020-12-04

## 2020-12-04 RX ORDER — MORPHINE SULFATE 2 MG/ML
2 INJECTION, SOLUTION INTRAMUSCULAR; INTRAVENOUS ONCE
Status: COMPLETED | OUTPATIENT
Start: 2020-12-04 | End: 2020-12-04

## 2020-12-04 RX ORDER — BACLOFEN 10 MG/1
10 TABLET ORAL 3 TIMES DAILY
Qty: 30 TABLET | Refills: 0 | Status: SHIPPED | OUTPATIENT
Start: 2020-12-04 | End: 2021-06-08 | Stop reason: SDUPTHER

## 2020-12-04 RX ORDER — IBUPROFEN 400 MG/1
400 TABLET ORAL EVERY 6 HOURS PRN
Qty: 20 TABLET | Refills: 0 | Status: SHIPPED | OUTPATIENT
Start: 2020-12-04 | End: 2021-06-08

## 2020-12-04 RX ORDER — ORPHENADRINE CITRATE 30 MG/ML
60 INJECTION INTRAMUSCULAR; INTRAVENOUS ONCE
Status: COMPLETED | OUTPATIENT
Start: 2020-12-04 | End: 2020-12-04

## 2020-12-04 RX ADMIN — MORPHINE SULFATE 2 MG: 2 INJECTION, SOLUTION INTRAMUSCULAR; INTRAVENOUS at 09:05

## 2020-12-04 RX ADMIN — KETOROLAC TROMETHAMINE 15 MG: 30 INJECTION, SOLUTION INTRAMUSCULAR at 13:19

## 2020-12-04 RX ADMIN — ORPHENADRINE CITRATE 60 MG: 30 INJECTION INTRAMUSCULAR; INTRAVENOUS at 11:05

## 2020-12-04 RX ADMIN — BACLOFEN 10 MG: 10 TABLET ORAL at 13:19

## 2020-12-04 ASSESSMENT — PAIN SCALES - GENERAL
PAINLEVEL_OUTOF10: 10
PAINLEVEL_OUTOF10: 5
PAINLEVEL_OUTOF10: 10
PAINLEVEL_OUTOF10: 5
PAINLEVEL_OUTOF10: 8
PAINLEVEL_OUTOF10: 4

## 2020-12-04 ASSESSMENT — ENCOUNTER SYMPTOMS
ABDOMINAL PAIN: 0
BACK PAIN: 0
EYE PAIN: 0
SHORTNESS OF BREATH: 0
COLOR CHANGE: 0

## 2020-12-04 NOTE — ED PROVIDER NOTES
EMERGENCY DEPARTMENT ENCOUNTER    Pt Name: Jose Hobbs  MRN: [id number]  [place] 1950  Date of evaluation: 12/4/20  CHIEF COMPLAINT       Chief Complaint   Patient presents with    Neck Pain     HISTORY OF PRESENT ILLNESS   78-year-old male with a history of ALS presents with complaint of neck pain. Patient states over the last 2 to 3 days he has been having worsening pain on both sides of his neck and into his shoulders. Patient describes it as a aching tightness sensation, denies radiation of pain, states that the pain got worse this morning when he woke up from sleep. Patient states he took Tylenol without relief of his symptoms. Patient denies any new numbness, tingling or weakness in the upper extremities. Patient denies any new headaches, new injuries or falls. The history is provided by the patient. REVIEW OF SYSTEMS     Review of Systems   Constitutional: Negative for chills and fever. HENT: Negative for congestion and ear pain. Eyes: Negative for pain. Respiratory: Negative for shortness of breath. Cardiovascular: Negative for chest pain, palpitations and leg swelling. Gastrointestinal: Negative for abdominal pain. Genitourinary: Negative for dysuria and flank pain. Musculoskeletal: Positive for neck pain. Negative for back pain. Skin: Negative for color change. Neurological: Negative for numbness and headaches. Psychiatric/Behavioral: Negative for confusion. All other systems reviewed and are negative.     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    Spinal stenosis of lumbar region M48.061    Calculus of kidney and ureter N20.2    Hypoglycemia P87.7    Diastolic dysfunction J44.78    Bilateral enlargement of atria I51.7    Mitral valve regurgitation I34.0    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    Paroxysmal atrial fibrillation (HCC) I48.0    Aortic valve disorder I35.9    Bilateral hearing loss H91.93    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 31XD    Tinnitus of both ears H93.13    Concussion with loss of consciousness S06. 0X9A    Supraventricular beat, premature I49.1    Varicose veins of both lower extremities I83.93    Intervertebral disc prolapse with impingement EUT9811    Obstructive sleep apnea G47.33    Prostatitis N41.9    Stress F43.9    Dermatitis L30.9    Weakness of both lower extremities R29.898    Insulin resistance E88.81    At high risk for falls Z91.81    Other specified diseases of blood and blood-forming organs D75.89    S/P CABG (coronary artery bypass graft) Z95.1    Cardiac segmental configuration with atrial configuration unknown, ventricular configuration unknown, and inverted normally aligned great arteries Q20.8, Q20.9    Vitamin A deficiency E50.9    Fatigue R53.83    Muscular atrophy M62.50    Cervical stenosis of spine M48.02    Diarrhea R19.7    Atrial flutter (HCC) I48.92    Tachy-ralf syndrome (HCC) I49.5    Anticoagulated on Coumadin Z79.01    History of permanent cardiac pacemaker placement 7/1/2020:   Z95.0    Cardiac abnormality Q24.9    Severe malnutrition (HCC) E43    SOB (shortness of breath) R06.02    Monoplegia of left leg (HCC) G83.14    Malfunction of percutaneous endoscopic gastrostomy (PEG) tube ([facility] [address] K94.23     SURGICAL HISTORY       Past Surgical History:   Procedure Laterality Date    CARDIOVERSION  06/27/2020    w/    HERNIA REPAIR      PARATHYROID GLAND SURGERY  1980     CURRENT MEDICATIONS       Discharge Medication List as of 12/4/2020  3:10 PM      CONTINUE these medications which have NOT CHANGED    Details   sotalol (BETAPACE) 80 MG tablet Take 0.5 tablets by mouth 2 times daily, Disp-60 tablet,R-3Normal      pantoprazole (PROTONIX) 20 MG tablet Take 1 tablet by mouth daily, Disp-30 tablet,R-3Normal      warfarin (COUMADIN) 5 MG tablet Take 1 tablet by mouth daily AS DIRECTED PER COUMADIN CLINIC, Disp-30 tablet,R-3Normal      fluticasone (FLONASE) 50 MCG/ACT nasal spray 1 spray by Nasal route daily, Disp-1 Bottle, R-3Normal      SPIRULINA PO Take by mouthHistorical Med      vitamin D (CHOLECALCIFEROL) 1000 UNIT TABS tablet Take 1,000 Units by mouth daily      Vitamin E 100 UNITS TABS Take 60 Units by mouth daily      SELENIUM PO Take 75 mcg by mouth daily      SOYA LECITHIN PO Take 1,000 mg by mouth daily      aspirin 81 MG tablet Take 81 mg by mouth three times a week Historical Med      acetaminophen (TYLENOL) 325 MG tablet Take 650 mg by mouth every 6 hours as needed for Pain      Multiple Vitamins-Minerals (THERAPEUTIC MULTIVITAMIN-MINERALS) tablet Take 1 tablet by mouth daily      OMEGA-3 FATTY ACIDS-VITAMIN E PO Take 1 tablet by mouth daily      Probiotic Product (PROBIOTIC PO) Take 1 tablet by mouth daily      Ascorbic Acid (VITAMIN C) 500 MG tablet Take 120 mg by mouth daily           ALLERGIES     is allergic to ciprofloxacin; lasix [furosemide]; penicillins; sulfa antibiotics; and duloxetine.   FAMILY HISTORY     He indicated that his mother is . He indicated that his father is . SOCIAL HISTORY       Social History     Tobacco Use    Smoking status: Never Smoker    Smokeless tobacco: Never Used   Substance Use Topics    Alcohol use: Yes     Alcohol/week: 1.0 standard drinks     Types: 1 Cans of beer per week    Drug use: No     PHYSICAL EXAM     INITIAL VITALS: /79   Pulse 92   Temp 98.2 °F (36.8 °C) (Oral)   Resp 22   Ht 5' 10\" (1.778 m)   Wt 125 lb (56.7 kg)   SpO2 100%   BMI 17.94 kg/m²    Physical Exam  Vitals signs and nursing note reviewed. Constitutional:       Appearance: Normal appearance. HENT:      Head: Normocephalic and atraumatic. Right Ear: External ear normal.      Left Ear: External ear normal.      Nose: Nose normal.      Mouth/Throat:      Mouth: Mucous membranes are moist.   Eyes:      Pupils: Pupils are equal, round, and reactive to light. Neck:      Musculoskeletal: Neck supple. Muscular tenderness present. No spinous process tenderness. Trachea: Trachea normal.      Comments: Tenderness palpation in bilateral trapezius muscles with spasm  Cardiovascular:      Rate and Rhythm: Normal rate and regular rhythm. Pulses: Normal pulses. Heart sounds: Normal heart sounds. Pulmonary:      Breath sounds: Normal breath sounds. Abdominal:      General: Abdomen is flat. Palpations: Abdomen is soft. Tenderness: There is no abdominal tenderness. Musculoskeletal: Normal range of motion. General: No tenderness. Lymphadenopathy:      Cervical: No cervical adenopathy. Skin:     General: Skin is warm and dry. Capillary Refill: Capillary refill takes less than 2 seconds. Neurological:      General: No focal deficit present. Mental Status: He is alert and oriented to person, place, and time. Psychiatric:         Behavior: Behavior normal.         MEDICAL DECISION MAKIN-year-old male with a history of ALS presents for complaint of neck pain.   On initial exam patient in no acute distress, vitals were reviewed patient noted to be mildly hypertensive, does have history of hypertension, patient with tenderness in the bilateral trapezius muscles, will obtain imaging given history as well as provide pain medication and reassess. Labs reviewed and unremarkable, CT head and cervical spines were negative for acute process    Patient was reexamined after medication without significant improvement in symptoms. Patient was started on AVAPS secondary to lying flat during his CT, patient does wear trilogy at home    Discussed with patient's neurologist Dr. Brendon Morales, recommended trying patient with with baclofen and a dose of NSAIDs.,  Will provide patient with dose of baclofen and Toradol, Dr. Ana Colon also recommended increased use of trilogy at home to help with patient's work of breathing    Patient was again reexamined states pain is much improved, resting comfortably, states he would like to go home. [facility] with [facility] care team.    Discussed use of baclofen and Motrin at home, discussed return precautions including worsening of pain, development of numbness, tingling or weakness in the upper extremities, development of fevers, or other concerning symptoms. Discussed need for follow-up with patient's ALS care team.  Both patient and wife voiced understanding and are comfortable with discharge home    Patient/Guardian was informed of their diagnosis and told to follow up with PCP & ALS care team in 1-3 days. Patient demonstrates understanding and agreement with the plan. They were given the opportunity to ask questions and those questions were answered to the best of our ability with the available information. Patient/Guardian told to return to the ED for any new, worsening, changing or persistent symptoms. This dictation was prepared using [employer] voice recognition software.  As a result, errors may have occurred. When identified, these errors have been corrected. While every attempt is made to correct errors in dictation, errors may still exist.        CRITICAL CARE:       PROCEDURES:    Procedures    DIAGNOSTIC RESULTS   EKG:All EKG's are interpreted by the Emergency Department Physician who either signs or Co-signs this chart in the absence of a cardiologist.        RADIOLOGY:All plain film, CT, MRI, and formal ultrasound images (except ED bedside ultrasound) are read by the radiologist, see reports below, unless otherwisenoted in MDM or here. CT Cervical Spine WO Contrast   Final Result   No acute abnormality of the cervical spine. Advanced multilevel degenerative   disc disease and facet arthropathy. CT HEAD WO CONTRAST   Final Result   No acute intracranial abnormality. LABS: All lab results were reviewed by myself, and all abnormals are listed below.   Labs Reviewed   CBC WITH AUTO DIFFERENTIAL - Abnormal; Notable for the following components:       Result Value    RBC 4.24 (*)     Lymphocytes 19 (*)     Monocytes 11 (*)     Eosinophils % 5 (*)     All other components within normal limits   BASIC METABOLIC PANEL W/ REFLEX TO MG FOR LOW K - Abnormal; Notable for the following components:    Glucose 100 (*)     BUN 24 (*)     CREATININE <0.40 (*)     Anion Gap 8 (*)     All other components within normal limits       EMERGENCY DEPARTMENTCOURSE:         Vitals:    Vitals:    12/04/20 1200 12/04/20 1230 12/04/20 1349 12/04/20 1439   BP: (!) 132/96 (!) 129/95 109/65 110/79   Pulse: 82 81 94 92   Resp: (!) 36 18 22 22   Temp:       TempSrc:       SpO2: 100% 97% 100%    Weight:       Height:           The patient was given the following medications while in the emergency department:  Orders Placed This Encounter   Medications    DISCONTD: morphine sulfate (PF) injection 4 mg    morphine (PF) injection 2 mg    orphenadrine (NORFLEX) injection 60 mg    ketorolac (TORADOL) injection 15 mg    baclofen (LIORESAL) tablet 10 mg    baclofen (LIORESAL) 10 MG tablet     Sig: Take 1 tablet by mouth 3 times daily     Dispense:  30 tablet     Refill:  0    ibuprofen (IBU) 400 MG tablet     Sig: Take 1 tablet by mouth every 6 hours as needed for Pain     Dispense:  20 tablet     Refill:  0     CONSULTS:  None    FINAL IMPRESSION      1.  Neck pain          DISPOSITION/PLAN   DISPOSITION Decision To Discharge 12/04/2020 03:10:28 PM      PATIENT REFERRED TO:  Tatum Vila MD  [facility]  [address]  [phone]    Schedule an appointment as soon as possible for a visit       [facility] ED  [facility] [address]  [phone]    As needed, If symptoms worsen    Jory Medrano MD  [address] [place] [address]  [address] [place] [address] [address] 4 [address] [place] [address] Neurology  [facility] [address] [facility]  [phone]      Go to your scheduled appointment    DISCHARGE MEDICATIONS:  Discharge Medication List as of 12/4/2020  3:10 PM      START taking these medications    Details   baclofen (LIORESAL) 10 MG tablet Take 1 tablet by mouth 3 times daily, Disp-30 tablet,R-0Print      ibuprofen (IBU) 400 MG tablet Take 1 tablet by mouth every 6 hours as needed for Pain, Disp-20 tablet,R-0Print           Prasanth Weller DO  Attending Emergency Physician                  Prasanth Weller DO  12/04/20 [id number]

## 2020-12-04 NOTE — ED NOTES
CT phoned- pt unable to lay flat d/t ALS/accessory breathing at baseline. Ventura Trotter from resp to CT to put pt on bipap- Dr Deedee Nixon aware.  Pt normally on Trilogy at home     Johanny Bentley RN  12/04/20 9820

## 2021-06-09 PROBLEM — R09.82 POST-NASAL DRIP: Status: ACTIVE | Noted: 2021-06-09

## 2021-06-24 PROBLEM — R53.81 PHYSICAL DECONDITIONING: Status: ACTIVE | Noted: 2021-06-24

## 2021-08-18 ENCOUNTER — HOSPITAL ENCOUNTER (OUTPATIENT)
Age: 71
Setting detail: SPECIMEN
Discharge: HOME | End: 2021-08-18
Payer: MEDICARE

## 2021-08-18 LAB
-: ABNORMAL
AMORPHOUS: ABNORMAL
BACTERIA: ABNORMAL
BILIRUBIN URINE: ABNORMAL
CASTS UA: ABNORMAL /LPF
COLOR: ABNORMAL
COMMENT UA: ABNORMAL
CRYSTALS, UA: ABNORMAL /HPF
EPITHELIAL CELLS UA: ABNORMAL /HPF
GLUCOSE URINE: NEGATIVE
KETONES, URINE: ABNORMAL
LEUKOCYTE ESTERASE, URINE: ABNORMAL
MUCUS: ABNORMAL
NITRITE, URINE: NEGATIVE
OTHER OBSERVATIONS UA: ABNORMAL
PH UA: 7 (ref 5–8)
PROTEIN UA: NEGATIVE
RBC UA: ABNORMAL /HPF
RENAL EPITHELIAL, UA: ABNORMAL /HPF
SPECIFIC GRAVITY UA: 1.02 (ref 1–1.03)
TRICHOMONAS: ABNORMAL
TURBIDITY: CLEAR
URINE HGB: NEGATIVE
UROBILINOGEN, URINE: NORMAL
WBC UA: ABNORMAL /HPF
YEAST: ABNORMAL

## 2021-08-18 PROCEDURE — 87186 SC STD MICRODIL/AGAR DIL: CPT

## 2021-08-18 PROCEDURE — 81001 URINALYSIS AUTO W/SCOPE: CPT

## 2021-08-18 PROCEDURE — 87077 CULTURE AEROBIC IDENTIFY: CPT

## 2021-08-18 PROCEDURE — 87086 URINE CULTURE/COLONY COUNT: CPT

## 2021-08-22 LAB
CULTURE: ABNORMAL
Lab: ABNORMAL
SPECIMEN DESCRIPTION: ABNORMAL

## 2021-08-25 ENCOUNTER — HOSPITAL ENCOUNTER (OUTPATIENT)
Age: 71
Discharge: HOME | End: 2021-08-25
Payer: MEDICARE

## 2021-08-25 DIAGNOSIS — I48.0 PAROXYSMAL ATRIAL FIBRILLATION (HCC): ICD-10-CM

## 2021-08-25 DIAGNOSIS — G12.21 ALS (AMYOTROPHIC LATERAL SCLEROSIS) (HCC): ICD-10-CM

## 2021-08-25 DIAGNOSIS — E43 SEVERE MALNUTRITION (HCC): ICD-10-CM

## 2021-08-25 DIAGNOSIS — G83.14 MONOPLEGIA OF LEFT LEG (HCC): ICD-10-CM

## 2021-08-25 LAB
ABSOLUTE EOS #: 0.5 K/UL (ref 0–0.4)
ABSOLUTE IMMATURE GRANULOCYTE: ABNORMAL K/UL (ref 0–0.3)
ABSOLUTE LYMPH #: 1 K/UL (ref 1–4.8)
ABSOLUTE MONO #: 0.4 K/UL (ref 0.1–1.3)
ALBUMIN SERPL-MCNC: 4.4 G/DL (ref 3.5–5.2)
ALBUMIN/GLOBULIN RATIO: ABNORMAL (ref 1–2.5)
ALLEN TEST: ABNORMAL
ALP BLD-CCNC: 70 U/L (ref 40–129)
ALT SERPL-CCNC: 30 U/L (ref 5–41)
ANION GAP SERPL CALCULATED.3IONS-SCNC: 11 MMOL/L (ref 9–17)
AST SERPL-CCNC: 37 U/L
BASOPHILS # BLD: 1 % (ref 0–2)
BASOPHILS ABSOLUTE: 0 K/UL (ref 0–0.2)
BILIRUB SERPL-MCNC: 0.48 MG/DL (ref 0.3–1.2)
BUN BLDV-MCNC: 25 MG/DL (ref 8–23)
BUN/CREAT BLD: ABNORMAL (ref 9–20)
CALCIUM SERPL-MCNC: 9.3 MG/DL (ref 8.6–10.4)
CARBOXYHEMOGLOBIN: 0.9 % (ref 0–5)
CHLORIDE BLD-SCNC: 100 MMOL/L (ref 98–107)
CHOLESTEROL/HDL RATIO: 4.4
CHOLESTEROL: 181 MG/DL
CO2: 28 MMOL/L (ref 20–31)
CREAT SERPL-MCNC: <0.4 MG/DL (ref 0.7–1.2)
DIFFERENTIAL TYPE: ABNORMAL
EOSINOPHILS RELATIVE PERCENT: 9 % (ref 0–4)
FIO2: ABNORMAL
GFR AFRICAN AMERICAN: ABNORMAL ML/MIN
GFR NON-AFRICAN AMERICAN: ABNORMAL ML/MIN
GFR SERPL CREATININE-BSD FRML MDRD: ABNORMAL ML/MIN/{1.73_M2}
GFR SERPL CREATININE-BSD FRML MDRD: ABNORMAL ML/MIN/{1.73_M2}
GLUCOSE BLD-MCNC: 80 MG/DL (ref 70–99)
HCO3 ARTERIAL: 27.3 MMOL/L (ref 22–26)
HCT VFR BLD CALC: 39 % (ref 41–53)
HDLC SERPL-MCNC: 41 MG/DL
HEMOGLOBIN: 13.1 G/DL (ref 13.5–17.5)
IMMATURE GRANULOCYTES: ABNORMAL %
LDL CHOLESTEROL: 119 MG/DL (ref 0–130)
LYMPHOCYTES # BLD: 20 % (ref 24–44)
MAGNESIUM, IONIZED: 0.53 MMOL/L (ref 0.45–0.6)
MCH RBC QN AUTO: 33.6 PG (ref 26–34)
MCHC RBC AUTO-ENTMCNC: 33.7 G/DL (ref 31–37)
MCV RBC AUTO: 99.6 FL (ref 80–100)
METHEMOGLOBIN: 0.7 % (ref 0–1.9)
MODE: ABNORMAL
MONOCYTES # BLD: 7 % (ref 1–7)
NEGATIVE BASE EXCESS, ART: ABNORMAL MMOL/L (ref 0–2)
NOTIFICATION TIME: ABNORMAL
NOTIFICATION: ABNORMAL
NRBC AUTOMATED: ABNORMAL PER 100 WBC
O2 DEVICE/FLOW/%: ABNORMAL
O2 SAT, ARTERIAL: 95.3 % (ref 95–98)
OXYHEMOGLOBIN: ABNORMAL % (ref 95–98)
PATIENT TEMP: 37
PCO2 ARTERIAL: 40.9 MMHG (ref 35–45)
PCO2, ART, TEMP ADJ: ABNORMAL (ref 35–45)
PDW BLD-RTO: 15 % (ref 11.5–14.9)
PEEP/CPAP: ABNORMAL
PH ARTERIAL: 7.43 (ref 7.35–7.45)
PH, ART, TEMP ADJ: ABNORMAL (ref 7.35–7.45)
PLATELET # BLD: 305 K/UL (ref 150–450)
PLATELET ESTIMATE: ABNORMAL
PMV BLD AUTO: 7.6 FL (ref 6–12)
PO2 ARTERIAL: 91.8 MMHG (ref 80–100)
PO2, ART, TEMP ADJ: ABNORMAL MMHG (ref 80–100)
POSITIVE BASE EXCESS, ART: 3 MMOL/L (ref 0–2)
POTASSIUM SERPL-SCNC: 4.2 MMOL/L (ref 3.7–5.3)
PSV: ABNORMAL
PT. POSITION: ABNORMAL
RBC # BLD: 3.92 M/UL (ref 4.5–5.9)
RBC # BLD: ABNORMAL 10*6/UL
RESPIRATORY RATE: 22
SAMPLE SITE: ABNORMAL
SEG NEUTROPHILS: 63 % (ref 36–66)
SEGMENTED NEUTROPHILS ABSOLUTE COUNT: 3.2 K/UL (ref 1.3–9.1)
SET RATE: ABNORMAL
SODIUM BLD-SCNC: 139 MMOL/L (ref 135–144)
TEXT FOR RESPIRATORY: ABNORMAL
THYROXINE, FREE: 1.38 NG/DL (ref 0.93–1.7)
TOTAL HB: ABNORMAL G/DL (ref 12–16)
TOTAL PROTEIN: 7.6 G/DL (ref 6.4–8.3)
TOTAL RATE: ABNORMAL
TRIGL SERPL-MCNC: 105 MG/DL
TSH SERPL DL<=0.05 MIU/L-ACNC: 2.68 MIU/L (ref 0.3–5)
VITAMIN B-12: 820 PG/ML (ref 232–1245)
VITAMIN D 25-HYDROXY: 45.9 NG/ML (ref 30–100)
VLDLC SERPL CALC-MCNC: NORMAL MG/DL (ref 1–30)
VT: ABNORMAL
WBC # BLD: 5.1 K/UL (ref 3.5–11)
WBC # BLD: ABNORMAL 10*3/UL

## 2021-08-25 PROCEDURE — 82805 BLOOD GASES W/O2 SATURATION: CPT

## 2021-08-25 PROCEDURE — 80061 LIPID PANEL: CPT

## 2021-08-25 PROCEDURE — 85025 COMPLETE CBC W/AUTO DIFF WBC: CPT

## 2021-08-25 PROCEDURE — 80053 COMPREHEN METABOLIC PANEL: CPT

## 2021-08-25 PROCEDURE — 82306 VITAMIN D 25 HYDROXY: CPT

## 2021-08-25 PROCEDURE — 36415 COLL VENOUS BLD VENIPUNCTURE: CPT

## 2021-08-25 PROCEDURE — 36600 WITHDRAWAL OF ARTERIAL BLOOD: CPT

## 2021-08-25 PROCEDURE — 82607 VITAMIN B-12: CPT

## 2021-08-25 PROCEDURE — 84443 ASSAY THYROID STIM HORMONE: CPT

## 2021-08-25 PROCEDURE — 83735 ASSAY OF MAGNESIUM: CPT

## 2021-08-25 PROCEDURE — 84439 ASSAY OF FREE THYROXINE: CPT

## 2021-12-04 ENCOUNTER — HOSPITAL ENCOUNTER (EMERGENCY)
Age: 71
Discharge: HOME | End: 2021-12-04
Attending: EMERGENCY MEDICINE
Payer: MEDICARE

## 2021-12-04 VITALS
WEIGHT: 120 LBS | TEMPERATURE: 97.2 F | DIASTOLIC BLOOD PRESSURE: 94 MMHG | RESPIRATION RATE: 18 BRPM | HEART RATE: 77 BPM | OXYGEN SATURATION: 98 % | BODY MASS INDEX: 17.18 KG/M2 | HEIGHT: 70 IN | SYSTOLIC BLOOD PRESSURE: 148 MMHG

## 2021-12-04 DIAGNOSIS — K94.22 INFECTION OF PEG SITE (HCC): Primary | ICD-10-CM

## 2021-12-04 PROCEDURE — 99284 EMERGENCY DEPT VISIT MOD MDM: CPT

## 2021-12-04 PROCEDURE — 6370000000 HC RX 637 (ALT 250 FOR IP): Performed by: EMERGENCY MEDICINE

## 2021-12-04 RX ORDER — CEPHALEXIN 250 MG/1
500 CAPSULE ORAL ONCE
Status: COMPLETED | OUTPATIENT
Start: 2021-12-04 | End: 2021-12-04

## 2021-12-04 RX ORDER — CEPHALEXIN 500 MG/1
500 CAPSULE ORAL 4 TIMES DAILY
Qty: 28 CAPSULE | Refills: 0 | Status: SHIPPED | OUTPATIENT
Start: 2021-12-04 | End: 2021-12-11

## 2021-12-04 RX ADMIN — CEPHALEXIN 500 MG: 250 CAPSULE ORAL at 14:57

## 2021-12-04 ASSESSMENT — ENCOUNTER SYMPTOMS: ABDOMINAL PAIN: 1

## 2021-12-04 ASSESSMENT — PAIN DESCRIPTION - PAIN TYPE: TYPE: ACUTE PAIN

## 2021-12-04 ASSESSMENT — PAIN DESCRIPTION - DESCRIPTORS: DESCRIPTORS: CONSTANT;SHARP

## 2021-12-04 ASSESSMENT — PAIN SCALES - GENERAL: PAINLEVEL_OUTOF10: 2

## 2021-12-04 NOTE — ED TRIAGE NOTES
Mode of arrival (squad #, walk in, police, etc) : wheelchair, from home        Arrival Note (brief scenario, treatment PTA, etc). : patient reports that he had bleeding/white & yellow drainage around his PEG tube for the past 2 weeks, a home health care nurse cleaned around the site and he reports since she cleaned around the site he has had pain at insertion site. Patient reports he has had this PEG tube fore approx. 16 months and did not have previous issues with it. C= \"Have you ever felt that you should Cut down on your drinking? \"  No  A= \"Have people Annoyed you by criticizing your drinking? \"  No  G= \"Have you ever felt bad or Guilty about your drinking? \"  No  E= \"Have you ever had a drink as an Eye-opener first thing in the morning to steady your nerves or to help a hangover? \"  No      Deferred []      Reason for deferring: N/A    *If yes to two or more: probable alcohol abuse. *

## 2021-12-04 NOTE — ED PROVIDER NOTES
EMERGENCY DEPARTMENT ENCOUNTER    Pt Name: Jaclyn Pool  MRN: [id number]  [place] 1950  Date of evaluation: 12/4/21  CHIEF COMPLAINT       Chief Complaint   Patient presents with    Feeding Tube Problem     HISTORY OF PRESENT ILLNESS     Abdominal Pain  Pain location: g tube site painful 3 days, has mild white discharge, no surrounding redness, no vomiting, moving bowels without diffidulty, G tube flushes easily and still works, no pain with flushing, taking tylenol at home. Pain quality: aching    Pain radiates to:  Does not radiate  Pain severity:  Mild  Onset quality:  Gradual  Duration:  3 days  Timing:  Intermittent  Progression:  Waxing and waning  Chronicity:  New  Context comment:  Taking tylenol    Last month had a bleeding area of the g tube site cauterized, then had home care clean the wound, it had some bleeding that resolved  Here with spouse and grandson  Wheelchair user, hx of ALS, on bipap  [facility]     REVIEW OF SYSTEMS     Review of Systems   Gastrointestinal: Positive for abdominal pain. All other systems reviewed and are negative.     PASTMEDICAL HISTORY     Past Medical History:   Diagnosis Date    ALS (amyotrophic lateral sclerosis) ([facility])     [facility]/08/2016    Aortic valve replaced 11/08/2016    Hypertension      Past Problem List  Patient Active Problem List   Diagnosis Code    ALS [facility]otrophic lateral sclerosis) ([email or web address] G12.21    Muscle spasm M62.838    Macrocytosis D75.89    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    Spinal stenosis of lumbar region M48.061    Calculus of kidney and ureter N20.2    Hypoglycemia O10.8    Diastolic dysfunction B63.25    Bilateral enlargement of atria I51.7    Mitral valve regurgitation I34.0    Thoracic aortic aneurysm, without rupture ([facility]) I71.2    Paroxysmal atrial fibrillation ([facility]) I48.0    Aortic valve disorder I35.9    Bilateral hearing loss H91.93    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 31XD    Tinnitus of both ears H93.13    Concussion with loss of consciousness S06. 0X9A    Supraventricular beat, premature I49.1    Varicose veins of both lower extremities I83.93    Intervertebral disc prolapse with impingement QYM2089    Obstructive sleep apnea G47.33    Prostatitis N41.9    Stress F43.9    Dermatitis L30.9    Weakness of both lower extremities R29.898    Insulin resistance E88.81    At high risk for falls Z91.81    Other specified diseases of blood and blood-forming organs D75.89    S/P CABG (coronary artery bypass graft) Z95.1    Cardiac segmental configuration with atrial configuration unknown, ventricular configuration unknown, and inverted normally aligned great arteries Q20.8, Q20.9    Vitamin A deficiency E50.9    Fatigue R53.83    Muscular atrophy M62.50    Cervical stenosis of spine M48.02    Diarrhea R19.7    Atrial flutter (HCC) I48.92    Tachy-ralf syndrome (HCC) I49.5    Anticoagulated on Coumadin Z79.01    History of permanent cardiac pacemaker placement 7/1/2020:   Z95.0    Cardiac abnormality Q24.9    Severe malnutrition (HCC) E43    SOB (shortness of breath) R06.02    Monoplegia of left leg (HCC) G83.14    Malfunction of percutaneous endoscopic gastrostomy (PEG) tube (HCC) K94.23    Post-nasal drip R09.82    Physical deconditioning R53.81     SURGICAL HISTORY       Past Surgical History:   Procedure Laterality Date    CARDIOVERSION  2020    w/    HERNIA REPAIR      PARATHYROID GLAND SURGERY       CURRENT MEDICATIONS       Previous Medications    ACETAMINOPHEN (TYLENOL) 325 MG TABLET    Take 650 mg by mouth every 6 hours as needed    ASCORBIC ACID (VITAMIN C) 500 MG TABLET    Take 120 mg by mouth daily    BACLOFEN (LIORESAL) 10 MG TABLET    Take 1 tablet by mouth 3 times daily    FLUTICASONE (FLONASE) 50 MCG/ACT NASAL SPRAY    1 spray by Nasal route daily    MULTIPLE VITAMINS-MINERALS (THERAPEUTIC MULTIVITAMIN-MINERALS) TABLET    Take 1 tablet by mouth daily    OMEGA-3 FATTY ACIDS-VITAMIN E PO    Take 1 tablet by mouth daily    PROBIOTIC PRODUCT (PROBIOTIC PO)    Take 1 tablet by mouth daily    SELENIUM PO    Take 75 mcg by mouth daily    SOYA LECITHIN PO    Take 1,000 mg by mouth daily    SPIRULINA PO    Take by mouth    VITAMIN E 100 UNITS TABS    Take 60 Units by mouth daily     ALLERGIES     is allergic to ciprofloxacin, lasix [furosemide], penicillins, sulfa antibiotics, and duloxetine. FAMILY HISTORY     He indicated that his mother is . He indicated that his father is . SOCIAL HISTORY       Social History     Tobacco Use    Smoking status: Never Smoker    Smokeless tobacco: Never Used   Vaping Use    Vaping Use: Never used   Substance Use Topics    Alcohol use: Yes     Alcohol/week: 1.0 standard drink     Types: 1 Cans of beer per week    Drug use: No     PHYSICAL EXAM     INITIAL VITALS: BP (!) 148/94   Pulse 77   Temp 97.2 °F (36.2 °C) (Axillary)   Resp 18   Ht 5' 10\" (1.778 m)   Wt 120 lb (54.4 kg)   SpO2 98%   BMI 17.22 kg/m²    Physical Exam  Constitutional:       General: He is not in acute distress. Appearance: Normal appearance. He is well-developed. He is not diaphoretic. Comments: In motorized wheelchair, on continuous bipap   HENT:      Head: Normocephalic and atraumatic.       Right Ear: External ear normal.      Left Ear: External ear normal.      Nose: Nose normal. No congestion. Mouth/Throat:      Mouth: Mucous membranes are moist.      Pharynx: Oropharynx is clear. Comments: Had bipap facemask on  Eyes:      General:         Right eye: No discharge. Left eye: No discharge. Conjunctiva/sclera: Conjunctivae normal.   Neck:      Trachea: No tracheal deviation. Cardiovascular:      Rate and Rhythm: Normal rate and regular rhythm. Pulses: Normal pulses. Heart sounds: Normal heart sounds. Pulmonary:      Effort: Pulmonary effort is normal. No respiratory distress. Breath sounds: Normal breath sounds. No stridor. No wheezing or rales. Abdominal:      Palpations: Abdomen is soft. There is no mass. Tenderness: There is no abdominal tenderness. There is no guarding or rebound. Comments: Mild white drainage from G tube site, pink granulation tissue present, no pus expressed, mild g tibe site tenderness, the g tibe is freely mobile and secured, no induration, no surrounding erythema, no abd tenderness elsewhere   Musculoskeletal:         General: No deformity. Cervical back: Normal range of motion and neck supple. Comments: In wheelchair   Skin:     General: Skin is warm and dry. Capillary Refill: Capillary refill takes less than 2 seconds. Findings: No erythema or rash. Neurological:      Mental Status: He is alert. Comments: Contractions in hands, has diminished strength in arms, phonating well   Psychiatric:         Mood and Affect: Mood normal.         Behavior: Behavior normal.         Thought Content:  Thought content normal.         Judgment: Judgment normal.         MEDICAL DECISION MAKING:   Suspect mild g tube site infection  Do not suspect malfunction of g tube or abscess  No significant abd wall cellulitis identified  Will start keflex  Discussed with patient anticipatory guidance, discharge instructions, follow up PCP and his GI doctor 48 hours Procedures    DIAGNOSTIC RESULTS   Labs Reviewed - No data to display    EMERGENCY DEPARTMENTCOURSE:         Vitals:    Vitals:    12/04/21 1335   BP: (!) 148/94   Pulse: 77   Resp: 18   Temp: 97.2 °F (36.2 °C)   TempSrc: Axillary   SpO2: 98%   Weight: 120 lb (54.4 kg)   Height: 5' 10\" (1.778 m)       The patient was given the following medications while in the emergency department:  Orders Placed This Encounter   Medications    cephALEXin (KEFLEX) capsule 500 mg     Order Specific Question:   Antimicrobial Indications     Answer:   Skin and Soft Tissue Infection    cephALEXin (KEFLEX) 500 MG capsule     Sig: Take 1 capsule by mouth 4 times daily for 7 days     Dispense:  28 capsule     Refill:  0       FINAL IMPRESSION      1. Infection of PEG site [facility])          DISPOSITION/PLAN   DISPOSITION Decision To Discharge 12/04/2021 02:20:03 PM      PATIENT REFERRED TO:  Collins Car MD  [facility]  [phone]          DISCHARGE MEDICATIONS:  New Prescriptions    CEPHALEXIN (KEFLEX) 500 MG CAPSULE    Take 1 capsule by mouth 4 times daily for 7 days     The care is provided during an unprecedented national emergency due to the novel coronavirus, COVID 19.   Yoselyn Maguire MD  Attending Emergency Physician                   Yoselyn Maguire MD  12/04/21 0581

## 2022-02-23 ENCOUNTER — HOSPITAL ENCOUNTER (OUTPATIENT)
Age: 72
Discharge: HOME | End: 2022-02-23
Payer: MEDICARE

## 2022-02-23 DIAGNOSIS — M85.80 OSTEOPENIA, UNSPECIFIED LOCATION: ICD-10-CM

## 2022-02-23 DIAGNOSIS — E43 SEVERE MALNUTRITION (HCC): ICD-10-CM

## 2022-02-23 DIAGNOSIS — R53.83 OTHER FATIGUE: ICD-10-CM

## 2022-02-23 DIAGNOSIS — R53.81 PHYSICAL DECONDITIONING: ICD-10-CM

## 2022-02-23 LAB
ABSOLUTE EOS #: 0.3 K/UL (ref 0–0.4)
ABSOLUTE LYMPH #: 1 K/UL (ref 1–4.8)
ABSOLUTE MONO #: 0.5 K/UL (ref 0.1–1.3)
ALBUMIN SERPL-MCNC: 4.2 G/DL (ref 3.5–5.2)
ALP BLD-CCNC: 99 U/L (ref 40–129)
ALT SERPL-CCNC: 46 U/L (ref 5–41)
ANION GAP SERPL CALCULATED.3IONS-SCNC: 13 MMOL/L (ref 9–17)
AST SERPL-CCNC: 26 U/L
BASOPHILS # BLD: 1 % (ref 0–2)
BASOPHILS ABSOLUTE: 0 K/UL (ref 0–0.2)
BILIRUB SERPL-MCNC: 0.65 MG/DL (ref 0.3–1.2)
BUN BLDV-MCNC: 17 MG/DL (ref 8–23)
CALCIUM SERPL-MCNC: 9.9 MG/DL (ref 8.6–10.4)
CHLORIDE BLD-SCNC: 101 MMOL/L (ref 98–107)
CHOLESTEROL/HDL RATIO: 3.9
CHOLESTEROL: 194 MG/DL
CO2: 25 MMOL/L (ref 20–31)
CREAT SERPL-MCNC: <0.4 MG/DL (ref 0.7–1.2)
EOSINOPHILS RELATIVE PERCENT: 5 % (ref 0–4)
GFR AFRICAN AMERICAN: ABNORMAL ML/MIN
GFR NON-AFRICAN AMERICAN: ABNORMAL ML/MIN
GFR SERPL CREATININE-BSD FRML MDRD: ABNORMAL ML/MIN/{1.73_M2}
GLUCOSE BLD-MCNC: 94 MG/DL (ref 70–99)
HCT VFR BLD CALC: 38.3 % (ref 41–53)
HDLC SERPL-MCNC: 50 MG/DL
HEMOGLOBIN: 13.1 G/DL (ref 13.5–17.5)
LDL CHOLESTEROL: 129 MG/DL (ref 0–130)
LYMPHOCYTES # BLD: 19 % (ref 24–44)
MAGNESIUM, IONIZED: 0.53 MMOL/L (ref 0.45–0.6)
MCH RBC QN AUTO: 33.4 PG (ref 26–34)
MCHC RBC AUTO-ENTMCNC: 34.2 G/DL (ref 31–37)
MCV RBC AUTO: 97.5 FL (ref 80–100)
MONOCYTES # BLD: 10 % (ref 1–7)
PDW BLD-RTO: 13.3 % (ref 11.5–14.9)
PLATELET # BLD: 314 K/UL (ref 150–450)
PMV BLD AUTO: 7.4 FL (ref 6–12)
POTASSIUM SERPL-SCNC: 3.9 MMOL/L (ref 3.7–5.3)
RBC # BLD: 3.93 M/UL (ref 4.5–5.9)
SEG NEUTROPHILS: 65 % (ref 36–66)
SEGMENTED NEUTROPHILS ABSOLUTE COUNT: 3.5 K/UL (ref 1.3–9.1)
SODIUM BLD-SCNC: 139 MMOL/L (ref 135–144)
THYROXINE, FREE: 1.23 NG/DL (ref 0.93–1.7)
TOTAL PROTEIN: 7.4 G/DL (ref 6.4–8.3)
TRIGL SERPL-MCNC: 73 MG/DL
TSH SERPL DL<=0.05 MIU/L-ACNC: 3.99 MIU/L (ref 0.3–5)
VITAMIN B-12: 990 PG/ML (ref 232–1245)
VITAMIN D 25-HYDROXY: 39.1 NG/ML (ref 30–100)
WBC # BLD: 5.4 K/UL (ref 3.5–11)

## 2022-02-23 PROCEDURE — 80053 COMPREHEN METABOLIC PANEL: CPT

## 2022-02-23 PROCEDURE — 82607 VITAMIN B-12: CPT

## 2022-02-23 PROCEDURE — 82306 VITAMIN D 25 HYDROXY: CPT

## 2022-02-23 PROCEDURE — 84443 ASSAY THYROID STIM HORMONE: CPT

## 2022-02-23 PROCEDURE — 83735 ASSAY OF MAGNESIUM: CPT

## 2022-02-23 PROCEDURE — 80061 LIPID PANEL: CPT

## 2022-02-23 PROCEDURE — 84439 ASSAY OF FREE THYROXINE: CPT

## 2022-02-23 PROCEDURE — 85025 COMPLETE CBC W/AUTO DIFF WBC: CPT

## 2022-02-23 PROCEDURE — 36415 COLL VENOUS BLD VENIPUNCTURE: CPT

## 2022-03-23 PROBLEM — K94.22 INFECTION OF PEG SITE (HCC): Status: ACTIVE | Noted: 2022-03-23

## 2022-03-29 ENCOUNTER — NURSE TRIAGE (OUTPATIENT)
Dept: OTHER | Facility: CLINIC | Age: 72
End: 2022-03-29

## 2022-03-29 NOTE — TELEPHONE ENCOUNTER
Received call from [address] at [facility]; Patient with Red Flag Complaint requesting to establish care with [facility]. Subjective: Caller states \"He was diagnosed with ALS four years ago and Lyme disease also looks like ALS, the symptoms are the same. He is deteriorating so much, he cant walk and has lost weight. He needs a test for lyme disease, his doctor wont do the test.\"     Current Symptoms: progressive weakness of legs/ inability to walk with hx of ALS,onset 4 years and worsening progressively     Onset: a few years ago; worsening    Associated Symptoms: NA    Pain Severity: Pain Worsening    Temperature: Denies    What has been tried: multiple therapies per PCP    Recommended disposition: See in Office Within 2 Weeks, pt wife requested same day appt for pt, writer informed wife that because he would be establishing as a new pt that he will unlikely be seen next day. Pt wife was advised to seek medical attention in the ED if symptoms persist or worsen prior to pt being seen. Pt did not wish to schedule with PCP and requested new provider Dr. Aaron merida to his \"specialty in lyme disease\" Pt wife gave verbal understanding to seek ED medical attention of symptoms worsen or persist.      Care advice provided, patient verbalizes understanding; denies any other questions or concerns; instructed to call back for any new or worsening symptoms. Patient/Caller agrees with recommended disposition; writer provided warm transfer to Diana Contreras at [facility] for appointment scheduling    Attention Provider: Thank you for allowing me to participate in the care of your patient. The patient was connected to triage in response to information provided to the ECC. Please do not respond through this encounter as the response is not directed to a shared pool.       Reason for Disposition   Weakness is a chronic symptom (recurrent or ongoing AND present > 4 weeks)    Protocols used: WEAKNESS (GENERALIZED) AND FATIGUE-ADULT-AH

## 2022-03-30 ENCOUNTER — TELEPHONE (OUTPATIENT)
Dept: INTERNAL MEDICINE CLINIC | Age: 72
End: 2022-03-30

## 2022-03-30 NOTE — TELEPHONE ENCOUNTER
----- Message from Ashwini Lance sent at 3/29/2022  5:37 PM EDT -----  Subject: Message to Provider    QUESTIONS  Information for Provider? pt is a return from [place]  He has appt for 5/31    pt was with PCP Shannan Garcia  pt wife would like her  to have a Lyme   disease test  last pcp would not do  pt was told he has ALS in 2018 at   [facility]  pt wife thinks its might be Lyme  They have malhotra   around their house and he was never tested  they also have cabin in [place]    Wife name is Jacy Otero   ---------------------------------------------------------------------------  --------------  [address]  What is the best way for the office to contact you? OK to leave message on   voicemail  Preferred Call Back Phone Number? [phone]  ---------------------------------------------------------------------------  --------------  SCRIPT ANSWERS  Relationship to Patient? Third Party  Third Party Type? Other  Other Third Party Type? Nt  Representative Name?  James Nguyen

## 2022-03-30 NOTE — TELEPHONE ENCOUNTER
[facility] for patients wife Henna Wong to return call to office. Patient is currently scheduled for a new patient appt with Dr. Brendon Chaves in May 2022. I called to see if Henna Wong would be interested in scheduling him with a different provider, Dr. Tyson Stovall to get him in sooner for evaluation and ordering of the blood work.

## 2022-04-04 ENCOUNTER — HOSPITAL ENCOUNTER (OUTPATIENT)
Age: 72
Discharge: HOME | End: 2022-04-04
Payer: MEDICARE

## 2022-04-04 ENCOUNTER — APPOINTMENT (OUTPATIENT)
Dept: CT IMAGING | Age: 72
End: 2022-04-04
Payer: MEDICARE

## 2022-04-04 ENCOUNTER — HOSPITAL ENCOUNTER (EMERGENCY)
Age: 72
Discharge: HOME | End: 2022-04-04
Attending: EMERGENCY MEDICINE
Payer: MEDICARE

## 2022-04-04 VITALS
OXYGEN SATURATION: 99 % | TEMPERATURE: 97.7 F | DIASTOLIC BLOOD PRESSURE: 84 MMHG | RESPIRATION RATE: 18 BRPM | HEIGHT: 70 IN | HEART RATE: 82 BPM | SYSTOLIC BLOOD PRESSURE: 137 MMHG | BODY MASS INDEX: 16.46 KG/M2 | WEIGHT: 115 LBS

## 2022-04-04 DIAGNOSIS — G12.21 ALS (AMYOTROPHIC LATERAL SCLEROSIS) (HCC): ICD-10-CM

## 2022-04-04 DIAGNOSIS — S13.4XXA WHIPLASH INJURY TO NECK, INITIAL ENCOUNTER: Primary | ICD-10-CM

## 2022-04-04 DIAGNOSIS — R21 RASH IN ADULT: ICD-10-CM

## 2022-04-04 PROCEDURE — 36415 COLL VENOUS BLD VENIPUNCTURE: CPT

## 2022-04-04 PROCEDURE — 86618 LYME DISEASE ANTIBODY: CPT

## 2022-04-04 PROCEDURE — 72125 CT NECK SPINE W/O DYE: CPT

## 2022-04-04 PROCEDURE — 99282 EMERGENCY DEPT VISIT SF MDM: CPT

## 2022-04-04 RX ORDER — BACLOFEN 10 MG/1
10 TABLET ORAL 3 TIMES DAILY PRN
Qty: 21 TABLET | Refills: 0 | Status: SHIPPED | OUTPATIENT
Start: 2022-04-04 | End: 2022-05-05

## 2022-04-04 ASSESSMENT — ENCOUNTER SYMPTOMS: ABDOMINAL PAIN: 0

## 2022-04-04 ASSESSMENT — PAIN SCALES - GENERAL: PAINLEVEL_OUTOF10: 5
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
## 2022-04-05 LAB — LYME ANTIBODY: 0.32
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
## 2022-05-05 ENCOUNTER — APPOINTMENT (OUTPATIENT)
Dept: GENERAL RADIOLOGY | Age: 72
DRG: 004 | End: 2022-05-05
Payer: MEDICARE

## 2022-05-05 ENCOUNTER — HOSPITAL ENCOUNTER (INPATIENT)
Age: 72
LOS: 12 days | Discharge: NURSING, REHAB OR LONG-TERM CARE | DRG: 004 | End: 2022-05-17
Attending: EMERGENCY MEDICINE | Admitting: INTERNAL MEDICINE
Payer: MEDICARE

## 2022-05-05 DIAGNOSIS — G12.21 ALS (AMYOTROPHIC LATERAL SCLEROSIS) (HCC): ICD-10-CM

## 2022-05-05 DIAGNOSIS — U07.1 COVID-19: ICD-10-CM

## 2022-05-05 DIAGNOSIS — J96.20 ACUTE ON CHRONIC RESPIRATORY FAILURE, UNSPECIFIED WHETHER WITH HYPOXIA OR HYPERCAPNIA (HCC): Primary | ICD-10-CM

## 2022-05-05 DIAGNOSIS — J96.21 ACUTE ON CHRONIC RESPIRATORY FAILURE WITH HYPOXIA AND HYPERCAPNIA (HCC): ICD-10-CM

## 2022-05-05 DIAGNOSIS — J18.9 PNEUMONIA OF BOTH LUNGS DUE TO INFECTIOUS ORGANISM, UNSPECIFIED PART OF LUNG: ICD-10-CM

## 2022-05-05 DIAGNOSIS — J96.22 ACUTE ON CHRONIC RESPIRATORY FAILURE WITH HYPOXIA AND HYPERCAPNIA (HCC): ICD-10-CM

## 2022-05-05 PROBLEM — J96.90 RESPIRATORY FAILURE (HCC): Status: ACTIVE | Noted: 2022-05-05

## 2022-05-05 LAB
ABSOLUTE BANDS #: 0.38 K/UL (ref 0–1)
ABSOLUTE EOS #: 0 K/UL (ref 0–0.4)
ABSOLUTE LYMPH #: 1.02 K/UL (ref 1–4.8)
ABSOLUTE MONO #: 0.9 K/UL (ref 0.1–1.3)
ALBUMIN SERPL-MCNC: 3 G/DL (ref 3.5–5.2)
ALLEN TEST: ABNORMAL
ALLEN TEST: ABNORMAL
ALP BLD-CCNC: 137 U/L (ref 40–129)
ALT SERPL-CCNC: 46 U/L (ref 5–41)
ANION GAP SERPL CALCULATED.3IONS-SCNC: 13 MMOL/L (ref 9–17)
AST SERPL-CCNC: 30 U/L
BACTERIA: ABNORMAL
BANDS: 3 % (ref 0–10)
BASOPHILS # BLD: 0 % (ref 0–2)
BASOPHILS ABSOLUTE: 0 K/UL (ref 0–0.2)
BILIRUB SERPL-MCNC: 0.66 MG/DL (ref 0.3–1.2)
BILIRUBIN URINE: NEGATIVE
BUN BLDV-MCNC: 12 MG/DL (ref 8–23)
C-REACTIVE PROTEIN: 259.2 MG/L (ref 0–5)
CALCIUM SERPL-MCNC: 9.1 MG/DL (ref 8.6–10.4)
CARBOXYHEMOGLOBIN: 0.5 % (ref 0–5)
CARBOXYHEMOGLOBIN: 0.5 % (ref 0–5)
CHLORIDE BLD-SCNC: 96 MMOL/L (ref 98–107)
CO2: 23 MMOL/L (ref 20–31)
COLOR: YELLOW
CREAT SERPL-MCNC: <0.4 MG/DL (ref 0.7–1.2)
D-DIMER QUANTITATIVE: 0.43 MG/L FEU (ref 0–0.59)
EOSINOPHILS RELATIVE PERCENT: 0 % (ref 0–4)
EPITHELIAL CELLS UA: ABNORMAL /HPF
FIO2: 50
FIO2: 50
GFR AFRICAN AMERICAN: ABNORMAL ML/MIN
GFR NON-AFRICAN AMERICAN: ABNORMAL ML/MIN
GFR SERPL CREATININE-BSD FRML MDRD: ABNORMAL ML/MIN/{1.73_M2}
GLUCOSE BLD-MCNC: 124 MG/DL (ref 70–99)
GLUCOSE URINE: ABNORMAL
HCO3 ARTERIAL: 30.3 MMOL/L (ref 22–26)
HCO3 ARTERIAL: 30.4 MMOL/L (ref 22–26)
HCT VFR BLD CALC: 39.2 % (ref 41–53)
HEMOGLOBIN: 13.9 G/DL (ref 13.5–17.5)
INFLUENZA A: NOT DETECTED
INFLUENZA B: NOT DETECTED
INR BLD: 1.1
KETONES, URINE: NEGATIVE
LACTIC ACID, SEPSIS: 1.5 MMOL/L (ref 0.5–1.9)
LEUKOCYTE ESTERASE, URINE: NEGATIVE
LIPASE: 19 U/L (ref 13–60)
LYMPHOCYTES # BLD: 8 % (ref 24–44)
MCH RBC QN AUTO: 34.3 PG (ref 26–34)
MCHC RBC AUTO-ENTMCNC: 35.5 G/DL (ref 31–37)
MCV RBC AUTO: 96.5 FL (ref 80–100)
METHEMOGLOBIN: 0.6 % (ref 0–1.9)
METHEMOGLOBIN: 0.8 % (ref 0–1.9)
MODE: ABNORMAL
MODE: ABNORMAL
MONOCYTES # BLD: 7 % (ref 1–7)
MORPHOLOGY: NORMAL
NITRITE, URINE: NEGATIVE
O2 DEVICE/FLOW/%: ABNORMAL
O2 DEVICE/FLOW/%: ABNORMAL
O2 SAT, ARTERIAL: 92 % (ref 95–98)
O2 SAT, ARTERIAL: 93.3 % (ref 95–98)
PARTIAL THROMBOPLASTIN TIME: 40.8 SEC (ref 24–36)
PATIENT TEMP: 37
PATIENT TEMP: 37
PCO2 ARTERIAL: 39.3 MMHG (ref 35–45)
PCO2 ARTERIAL: 43.8 MMHG (ref 35–45)
PDW BLD-RTO: 13.5 % (ref 11.5–14.9)
PEEP/CPAP: 7
PEEP/CPAP: 7
PH ARTERIAL: 7.45 (ref 7.35–7.45)
PH ARTERIAL: 7.5 (ref 7.35–7.45)
PH UA: 6 (ref 5–8)
PLATELET # BLD: 351 K/UL (ref 150–450)
PMV BLD AUTO: 7.2 FL (ref 6–12)
PO2 ARTERIAL: 64.8 MMHG (ref 80–100)
PO2 ARTERIAL: 73.2 MMHG (ref 80–100)
POSITIVE BASE EXCESS, ART: 6.3 MMOL/L (ref 0–2)
POSITIVE BASE EXCESS, ART: 7.2 MMOL/L (ref 0–2)
POTASSIUM SERPL-SCNC: 3.9 MMOL/L (ref 3.7–5.3)
PROCALCITONIN: 35.19 NG/ML
PROTEIN UA: ABNORMAL
PROTHROMBIN TIME: 14.6 SEC (ref 11.8–14.6)
PT. POSITION: ABNORMAL
PT. POSITION: ABNORMAL
RBC # BLD: 4.07 M/UL (ref 4.5–5.9)
RBC UA: ABNORMAL /HPF
RESPIRATORY RATE: 25
SAMPLE SITE: ABNORMAL
SAMPLE SITE: ABNORMAL
SARS-COV-2 RNA, RT PCR: DETECTED
SEDIMENTATION RATE, ERYTHROCYTE: 63 MM/HR (ref 0–20)
SEG NEUTROPHILS: 82 % (ref 36–66)
SEGMENTED NEUTROPHILS ABSOLUTE COUNT: 10.5 K/UL (ref 1.3–9.1)
SET RATE: 20
SET RATE: 20
SODIUM BLD-SCNC: 132 MMOL/L (ref 135–144)
SOURCE: ABNORMAL
SPECIFIC GRAVITY UA: 1.01 (ref 1–1.03)
SPECIMEN DESCRIPTION: ABNORMAL
TEXT FOR RESPIRATORY: ABNORMAL
TOTAL PROTEIN: 7.5 G/DL (ref 6.4–8.3)
TOTAL RATE: 20
TOTAL RATE: 25
TROPONIN, HIGH SENSITIVITY: 229 NG/L (ref 0–22)
TROPONIN, HIGH SENSITIVITY: 236 NG/L (ref 0–22)
TURBIDITY: CLEAR
URINE HGB: ABNORMAL
UROBILINOGEN, URINE: NORMAL
VT: 500
VT: 550
WBC # BLD: 12.8 K/UL (ref 3.5–11)
WBC UA: ABNORMAL /HPF

## 2022-05-05 PROCEDURE — 85379 FIBRIN DEGRADATION QUANT: CPT

## 2022-05-05 PROCEDURE — 36600 WITHDRAWAL OF ARTERIAL BLOOD: CPT

## 2022-05-05 PROCEDURE — 31500 INSERT EMERGENCY AIRWAY: CPT

## 2022-05-05 PROCEDURE — 82805 BLOOD GASES W/O2 SATURATION: CPT

## 2022-05-05 PROCEDURE — 80053 COMPREHEN METABOLIC PANEL: CPT

## 2022-05-05 PROCEDURE — XW03396 INTRODUCTION OF CEFTOLOZANE/TAZOBACTAM ANTI-INFECTIVE INTO PERIPHERAL VEIN, PERCUTANEOUS APPROACH, NEW TECHNOLOGY GROUP 6: ICD-10-PCS | Performed by: INTERNAL MEDICINE

## 2022-05-05 PROCEDURE — 93005 ELECTROCARDIOGRAM TRACING: CPT | Performed by: EMERGENCY MEDICINE

## 2022-05-05 PROCEDURE — 2000000000 HC ICU R&B

## 2022-05-05 PROCEDURE — 83605 ASSAY OF LACTIC ACID: CPT

## 2022-05-05 PROCEDURE — 94002 VENT MGMT INPAT INIT DAY: CPT

## 2022-05-05 PROCEDURE — 6360000002 HC RX W HCPCS: Performed by: EMERGENCY MEDICINE

## 2022-05-05 PROCEDURE — 94761 N-INVAS EAR/PLS OXIMETRY MLT: CPT

## 2022-05-05 PROCEDURE — 94660 CPAP INITIATION&MGMT: CPT

## 2022-05-05 PROCEDURE — 87636 SARSCOV2 & INF A&B AMP PRB: CPT

## 2022-05-05 PROCEDURE — 81001 URINALYSIS AUTO W/SCOPE: CPT

## 2022-05-05 PROCEDURE — A4216 STERILE WATER/SALINE, 10 ML: HCPCS | Performed by: INTERNAL MEDICINE

## 2022-05-05 PROCEDURE — 84484 ASSAY OF TROPONIN QUANT: CPT

## 2022-05-05 PROCEDURE — 96365 THER/PROPH/DIAG IV INF INIT: CPT

## 2022-05-05 PROCEDURE — 6360000002 HC RX W HCPCS

## 2022-05-05 PROCEDURE — 87040 BLOOD CULTURE FOR BACTERIA: CPT

## 2022-05-05 PROCEDURE — 85610 PROTHROMBIN TIME: CPT

## 2022-05-05 PROCEDURE — C9113 INJ PANTOPRAZOLE SODIUM, VIA: HCPCS | Performed by: INTERNAL MEDICINE

## 2022-05-05 PROCEDURE — 2500000003 HC RX 250 WO HCPCS: Performed by: EMERGENCY MEDICINE

## 2022-05-05 PROCEDURE — 85652 RBC SED RATE AUTOMATED: CPT

## 2022-05-05 PROCEDURE — 99223 1ST HOSP IP/OBS HIGH 75: CPT | Performed by: INTERNAL MEDICINE

## 2022-05-05 PROCEDURE — 99285 EMERGENCY DEPT VISIT HI MDM: CPT

## 2022-05-05 PROCEDURE — 2580000003 HC RX 258: Performed by: INTERNAL MEDICINE

## 2022-05-05 PROCEDURE — 85730 THROMBOPLASTIN TIME PARTIAL: CPT

## 2022-05-05 PROCEDURE — 83690 ASSAY OF LIPASE: CPT

## 2022-05-05 PROCEDURE — 36415 COLL VENOUS BLD VENIPUNCTURE: CPT

## 2022-05-05 PROCEDURE — 86140 C-REACTIVE PROTEIN: CPT

## 2022-05-05 PROCEDURE — 6360000002 HC RX W HCPCS: Performed by: INTERNAL MEDICINE

## 2022-05-05 PROCEDURE — 85025 COMPLETE CBC W/AUTO DIFF WBC: CPT

## 2022-05-05 PROCEDURE — 96375 TX/PRO/DX INJ NEW DRUG ADDON: CPT

## 2022-05-05 PROCEDURE — 6370000000 HC RX 637 (ALT 250 FOR IP): Performed by: INTERNAL MEDICINE

## 2022-05-05 PROCEDURE — 71045 X-RAY EXAM CHEST 1 VIEW: CPT

## 2022-05-05 PROCEDURE — 2580000003 HC RX 258: Performed by: EMERGENCY MEDICINE

## 2022-05-05 PROCEDURE — 02HV33Z INSERTION OF INFUSION DEVICE INTO SUPERIOR VENA CAVA, PERCUTANEOUS APPROACH: ICD-10-PCS | Performed by: RADIOLOGY

## 2022-05-05 PROCEDURE — 0BH17EZ INSERTION OF ENDOTRACHEAL AIRWAY INTO TRACHEA, VIA NATURAL OR ARTIFICIAL OPENING: ICD-10-PCS | Performed by: EMERGENCY MEDICINE

## 2022-05-05 PROCEDURE — 84145 PROCALCITONIN (PCT): CPT

## 2022-05-05 PROCEDURE — 5A1955Z RESPIRATORY VENTILATION, GREATER THAN 96 CONSECUTIVE HOURS: ICD-10-PCS | Performed by: EMERGENCY MEDICINE

## 2022-05-05 RX ORDER — SODIUM CHLORIDE 0.9 % (FLUSH) 0.9 %
5-40 SYRINGE (ML) INJECTION EVERY 12 HOURS SCHEDULED
Status: DISCONTINUED | OUTPATIENT
Start: 2022-05-05 | End: 2022-05-17 | Stop reason: HOSPADM

## 2022-05-05 RX ORDER — ETOMIDATE 2 MG/ML
20 INJECTION INTRAVENOUS ONCE
Status: COMPLETED | OUTPATIENT
Start: 2022-05-05 | End: 2022-05-05

## 2022-05-05 RX ORDER — SODIUM CHLORIDE 0.9 % (FLUSH) 0.9 %
5-40 SYRINGE (ML) INJECTION PRN
Status: DISCONTINUED | OUTPATIENT
Start: 2022-05-05 | End: 2022-05-17 | Stop reason: HOSPADM

## 2022-05-05 RX ORDER — ONDANSETRON 2 MG/ML
4 INJECTION INTRAMUSCULAR; INTRAVENOUS EVERY 6 HOURS PRN
Status: DISCONTINUED | OUTPATIENT
Start: 2022-05-05 | End: 2022-05-17 | Stop reason: HOSPADM

## 2022-05-05 RX ORDER — SODIUM CHLORIDE 9 MG/ML
INJECTION, SOLUTION INTRAVENOUS PRN
Status: DISCONTINUED | OUTPATIENT
Start: 2022-05-05 | End: 2022-05-17 | Stop reason: HOSPADM

## 2022-05-05 RX ORDER — ROCURONIUM BROMIDE 10 MG/ML
100 INJECTION, SOLUTION INTRAVENOUS ONCE
Status: COMPLETED | OUTPATIENT
Start: 2022-05-05 | End: 2022-05-05

## 2022-05-05 RX ORDER — CHLORHEXIDINE GLUCONATE 0.12 MG/ML
15 RINSE ORAL 2 TIMES DAILY
Status: DISCONTINUED | OUTPATIENT
Start: 2022-05-05 | End: 2022-05-17 | Stop reason: HOSPADM

## 2022-05-05 RX ORDER — ENOXAPARIN SODIUM 100 MG/ML
40 INJECTION SUBCUTANEOUS DAILY
Status: DISCONTINUED | OUTPATIENT
Start: 2022-05-05 | End: 2022-05-17 | Stop reason: HOSPADM

## 2022-05-05 RX ORDER — DEXAMETHASONE SODIUM PHOSPHATE 10 MG/ML
6 INJECTION, SOLUTION INTRAMUSCULAR; INTRAVENOUS ONCE
Status: COMPLETED | OUTPATIENT
Start: 2022-05-05 | End: 2022-05-05

## 2022-05-05 RX ORDER — ENOXAPARIN SODIUM 100 MG/ML
40 INJECTION SUBCUTANEOUS DAILY
Status: DISCONTINUED | OUTPATIENT
Start: 2022-05-05 | End: 2022-05-05 | Stop reason: SDUPTHER

## 2022-05-05 RX ORDER — ONDANSETRON 4 MG/1
4 TABLET, ORALLY DISINTEGRATING ORAL EVERY 8 HOURS PRN
Status: DISCONTINUED | OUTPATIENT
Start: 2022-05-05 | End: 2022-05-17 | Stop reason: HOSPADM

## 2022-05-05 RX ORDER — PROPOFOL 10 MG/ML
INJECTION, EMULSION INTRAVENOUS
Status: COMPLETED
Start: 2022-05-05 | End: 2022-05-05

## 2022-05-05 RX ORDER — MINERAL OIL AND WHITE PETROLATUM 150; 830 MG/G; MG/G
OINTMENT OPHTHALMIC PRN
Status: DISCONTINUED | OUTPATIENT
Start: 2022-05-05 | End: 2022-05-07

## 2022-05-05 RX ORDER — DEXAMETHASONE SODIUM PHOSPHATE 10 MG/ML
6 INJECTION, SOLUTION INTRAMUSCULAR; INTRAVENOUS EVERY 24 HOURS
Status: DISCONTINUED | OUTPATIENT
Start: 2022-05-06 | End: 2022-05-16

## 2022-05-05 RX ORDER — FENTANYL CITRATE 50 UG/ML
50 INJECTION, SOLUTION INTRAMUSCULAR; INTRAVENOUS
Status: DISCONTINUED | OUTPATIENT
Start: 2022-05-05 | End: 2022-05-14 | Stop reason: SDUPTHER

## 2022-05-05 RX ORDER — POLYVINYL ALCOHOL 14 MG/ML
1 SOLUTION/ DROPS OPHTHALMIC PRN
Status: DISCONTINUED | OUTPATIENT
Start: 2022-05-05 | End: 2022-05-07

## 2022-05-05 RX ORDER — PROPOFOL 10 MG/ML
5-50 INJECTION, EMULSION INTRAVENOUS CONTINUOUS
Status: DISCONTINUED | OUTPATIENT
Start: 2022-05-05 | End: 2022-05-16

## 2022-05-05 RX ORDER — PROPOFOL 10 MG/ML
5-50 INJECTION, EMULSION INTRAVENOUS ONCE
Status: COMPLETED | OUTPATIENT
Start: 2022-05-05 | End: 2022-05-05

## 2022-05-05 RX ADMIN — CHLORHEXIDINE GLUCONATE 0.12% ORAL RINSE 15 ML: 1.2 LIQUID ORAL at 19:55

## 2022-05-05 RX ADMIN — CEFTRIAXONE SODIUM 1000 MG: 1 INJECTION, POWDER, FOR SOLUTION INTRAMUSCULAR; INTRAVENOUS at 12:00

## 2022-05-05 RX ADMIN — ENOXAPARIN SODIUM 40 MG: 100 INJECTION SUBCUTANEOUS at 15:44

## 2022-05-05 RX ADMIN — AZITHROMYCIN MONOHYDRATE 500 MG: 500 INJECTION, POWDER, LYOPHILIZED, FOR SOLUTION INTRAVENOUS at 14:02

## 2022-05-05 RX ADMIN — ROCURONIUM BROMIDE 100 MG: 50 INJECTION, SOLUTION INTRAVENOUS at 12:33

## 2022-05-05 RX ADMIN — DEXAMETHASONE SODIUM PHOSPHATE 6 MG: 10 INJECTION, SOLUTION INTRAMUSCULAR; INTRAVENOUS at 11:58

## 2022-05-05 RX ADMIN — PROPOFOL 20 MCG/KG/MIN: 10 INJECTION, EMULSION INTRAVENOUS at 14:03

## 2022-05-05 RX ADMIN — SODIUM CHLORIDE, PRESERVATIVE FREE 40 MG: 5 INJECTION INTRAVENOUS at 17:34

## 2022-05-05 RX ADMIN — ETOMIDATE 20 MG: 2 INJECTION, SOLUTION INTRAVENOUS at 12:31

## 2022-05-05 RX ADMIN — TOCILIZUMAB 400 MG: 20 INJECTION, SOLUTION, CONCENTRATE INTRAVENOUS at 15:44

## 2022-05-05 RX ADMIN — SODIUM CHLORIDE, PRESERVATIVE FREE 10 ML: 5 INJECTION INTRAVENOUS at 19:55

## 2022-05-05 RX ADMIN — PROPOFOL 20 MCG/KG/MIN: 10 INJECTION, EMULSION INTRAVENOUS at 12:41

## 2022-05-05 RX ADMIN — PROPOFOL 45 MCG/KG/MIN: 10 INJECTION, EMULSION INTRAVENOUS at 20:06

## 2022-05-05 ASSESSMENT — PULMONARY FUNCTION TESTS
PIF_VALUE: 21
PIF_VALUE: 22
PIF_VALUE: 21
PIF_VALUE: 20
PIF_VALUE: 20
PIF_VALUE: 21
PIF_VALUE: 22
PIF_VALUE: 21
PIF_VALUE: 22
PIF_VALUE: 21

## 2022-05-05 ASSESSMENT — ENCOUNTER SYMPTOMS
SHORTNESS OF BREATH: 1
COLOR CHANGE: 0
BACK PAIN: 0
ABDOMINAL PAIN: 0
EYE PAIN: 0

## 2022-05-05 ASSESSMENT — PAIN - FUNCTIONAL ASSESSMENT: PAIN_FUNCTIONAL_ASSESSMENT: NONE - DENIES PAIN

## 2022-05-05 NOTE — PROGRESS NOTES
Patient was intubated with 7.5 ET tube secured at the 24 cm anaid at the lips. Bilateral breath sounds and chest rise noted.  Patient placed on Servo vent with prescribed settings of PRVC rate 20, Vt 500, PEEP 7, FiO2 50%

## 2022-05-05 NOTE — CONSULTS
[facility] -   Infectious diseases evaluation  admission date 5/5/2022    reason for consultation:   COVID-19 infection    Impression :   Current:  · COVID-19 with suspected superimposed bacterial pneumonia. · Acute hypoxic respiratory failure required intubation. · Severe ALS  · History of coronary artery disease status post CABG 2016  · Hypertension  · Penicillin, sulfa and Cipro allergy      Recommendations   · Continue Rocephin and Zithromax  · Respiratory culture  · Follow blood cultures  · Decadron  · Avoid Actemra or baricitinib due to suspected bacterial infection. · Follow inflammatory markers  · Follow chest x-ray  · Continue supportive care  · Discussed with nursing staff  · Discussed with Dr. Lui Olivares. · Droplet plus isolation through 5/10/2022        History of Present Illness:   Initial history:  Birgit Esqueda is a 74y.o.-year-old male with history of severe ALS who presented to the hospital with worsening cough and shortness of for 3 days prior to admission. Reportedly cough started around 4/20/2022. Chest x-ray showed bilateral infiltrates  Initial labs showed a procalcitonin level of 35.19, last WBC 12.8, creatinine less than 0.4, C-reactive protein 259, D-dimer 0.43  5/5/22 COVID-19 PCR positive  Urinalysis showed 0-2 WBC. The patient was admitted to ICU, was intubated. The patient is intubated, unable to provide history that was obtained from chart review and nursing staff. Mild amount of respiratory secretion, Brunner catheter in place. PICC line in place  The patient was exposed to COVID-19 positive person on 4/16/2022 reportedly. The patient had a positive COVID 19 test at home.     Interval changes  5/5/2022   Patient Vitals for the past 8 hrs:   BP Temp Temp src Pulse Resp SpO2 Height Weight   05/05/22 1445 137/83 -- -- 91 20 99 % -- --   05/05/22 1430 -- -- -- -- -- 98 % -- --   05/05/22 1400 (!) 145/94 -- -- 97 23 95 % -- -- 05/05/22 1345 -- -- -- -- -- 95 % -- --   05/05/22 1343 -- -- -- -- 22 94 % -- --   05/05/22 1335 (!) 146/100 -- -- 93 21 94 % -- --   05/05/22 1245 (!) 152/106 -- -- 107 20 92 % -- --   05/05/22 1241 -- -- -- 110 24 95 % -- --   05/05/22 1235 -- -- -- 118 18 95 % -- --   05/05/22 1233 (!) 156/84 -- -- 97 (!) 34 94 % -- --   05/05/22 1210 -- -- -- 94 22 -- -- --   05/05/22 0905 -- -- -- -- 25 93 % -- --   05/05/22 0900 (!) 201/154 -- -- 82 26 94 % -- --   05/05/22 0856 -- 98.1 °F (36.7 °C) Temporal -- -- -- -- --   05/05/22 0845 (!) 202/177 -- -- 92 25 93 % -- --   05/05/22 0842 -- -- -- 89 25 93 % -- --   05/05/22 0837 (!) 154/86 -- -- 89 18 92 % 5' 10.05\" (1.779 m) 115 lb (52.2 kg)           I have personally reviewed the past medical history, past surgical history, medications, social history, and family history, and I haveupdated the database accordingly. Allergies:   Ciprofloxacin, Lasix [furosemide], Penicillins, Sulfa antibiotics, and Duloxetine     Review of Systems:     Review of Systems  Intubated, unable to provide  Physical Examination :       Physical Exam  Constitutional:       Appearance: He is ill-appearing. Comments: Intubated   HENT:      Head: Normocephalic and atraumatic. Right Ear: External ear normal.      Left Ear: External ear normal.   Eyes:      General:         Right eye: No discharge. Left eye: No discharge. Cardiovascular:      Rate and Rhythm: Normal rate and regular rhythm. Heart sounds: No murmur heard. Pulmonary:      Comments: Bilateral crackles  Abdominal:      General: There is no distension. Palpations: Abdomen is soft. Musculoskeletal:      Cervical back: Neck supple. No rigidity. Right lower leg: No edema. Left lower leg: No edema. Skin:     Coloration: Skin is not jaundiced. Findings: No bruising or erythema.          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    Aortic valve replaced 11/08/2016    Hypertension        Past Surgical  History:     Past Surgical History:   Procedure Laterality Date    CARDIOVERSION  06/27/2020    w/    HERNIA REPAIR      PARATHYROID GLAND SURGERY  1980       Medications:      sodium chloride flush  5-40 mL IntraVENous 2 times per day    tocilizumab (ACTEMRA) IVPB  400 mg IntraVENous Once    [START ON 5/6/2022] dexamethasone  6 mg IntraVENous Q24H    enoxaparin  40 mg SubCUTAneous Daily    pantoprazole (PROTONIX) 40 mg injection  40 mg IntraVENous Daily       Social History:     Social History     Socioeconomic History    Marital status:      Spouse name: Not on file    Number of children: Not on file    Years of education: Not on file    Highest education level: Not on file   Occupational History    Not on file   Tobacco Use    Smoking status: Never Smoker    Smokeless tobacco: Never Used   Vaping Use    Vaping Use: Never used   Substance and Sexual Activity    Alcohol use: Yes     Alcohol/week: 1.0 standard drink     Types: 1 Cans of beer per week    Drug use: No    Sexual activity: Not on file   Other Topics Concern    Not on file   Social History Narrative    Not on file     Social Determinants of Health     Financial Resource Strain:     Difficulty of Paying Living Expenses: Not on file   Food Insecurity:     Worried About Running Out of Food in the Last Year: Not on file    Fabio of Food in the Last Year: Not on file   Transportation Needs:     Lack of Transportation (Medical): Not on file    Lack of Transportation (Non-Medical):  Not on file   Physical Activity:     Days of Exercise per Week: Not on file    Minutes of Exercise per Session: Not on file   Stress:     Feeling of Stress : Not on file   Social Connections:     Frequency of Communication with Friends and Family: Not on file    Frequency of Social Gatherings with Friends and Family: Not on file    Attends [employer] Services: Not on file    Active Member of Clubs or Organizations: Not on file    Attends Club or Organization Meetings: Not on file    Marital Status: Not on file   Intimate Partner Violence:     Fear of Current or Ex-Partner: Not on file    Emotionally Abused: Not on file    Physically Abused: Not on file    Sexually Abused: Not on file   Housing Stability:     Unable to Pay for Housing in the Last Year: Not on file    Number of [place] in the Last Year: Not on file    Unstable Housing in the Last Year: Not on file       Family History:     Family History   Problem Relation Age of Onset    Lung Cancer Mother     Heart Disease Father       Medical Decision Making:   I have independently reviewed/ordered the following labs:    CBC with Differential:   Recent Labs     05/05/22  0842   WBC 12.8*   HGB 13.9   HCT 39.2*      LYMPHOPCT 8*   MONOPCT 7     BMP:  Recent Labs     05/05/22 0842   *   K 3.9   CL 96*   CO2 23   BUN 12   CREATININE <0.40*     Hepatic Function Panel:   Recent Labs     05/05/22  0842   PROT 7.5   LABALBU 3.0*   BILITOT 0.66   ALKPHOS 137*   ALT 46*   AST 30     No results for input(s): RPR in the last 72 hours. No results for input(s): HIV in the last 72 hours. No results for input(s): BC in the last 72 hours. Lab Results   Component Value Date    CREATININE <0.40 05/05/2022    GLUCOSE 124 05/05/2022       Detailed results: Thank you for allowing us to participate in the care of this patient. Please call with questions. This note is created with the assistance of a speech recognition program.  While intending to generate adocument that actually reflects the content of the visit, the document can still have some errors including those of syntax and sound a like substitutions which may escape proof reading. It such instances, actual meaningcan be extrapolated by contextual diversion.     Aydee Mar, MD  Office: [phone]  Perfect serve / office [phone]

## 2022-05-05 NOTE — PROGRESS NOTES
Medication History completed:    New medications: none    Medications discontinued: baclofen    Changes to dosing: none    Stated allergies: As listed    Other pertinent information: [facility] [place] only fills fluticasone for this patient. Per [place], the patient has not filled medical marijuana since October 2021.      Thank you,  Amarilys Brandon, PharmD, BCPS  [phone]

## 2022-05-05 NOTE — ED PROVIDER NOTES
EMERGENCY DEPARTMENT ENCOUNTER    Pt Name: Matt Lopez  MRN: [id number]  [place] 1950  Date of evaluation: 5/5/22  CHIEF COMPLAINT       Chief Complaint   Patient presents with    Shortness of Breath     HISTORY OF PRESENT ILLNESS   20-year-old male with a history of ALS presents for complaint of shortness of breath. Patient has been having worsening difficulty breathing for about the last week, patient did have recent COVID infection, is currently been wearing his trilogy at home and had to increase his settings yesterday. Patient reports feeling more shortness of breath this morning and EMS was called and he was brought for evaluation. Currently denying any cough chest pain nausea vomiting or fevers. The history is provided by the patient and the EMS personnel. REVIEW OF SYSTEMS     Review of Systems   Constitutional: Negative for chills and fever. HENT: Negative for congestion and ear pain. Eyes: Negative for pain. Respiratory: Positive for shortness of breath. Cardiovascular: Negative for chest pain, palpitations and leg swelling. Gastrointestinal: Negative for abdominal pain. Genitourinary: Negative for dysuria and flank pain. Musculoskeletal: Negative for back pain. Skin: Negative for color change. Neurological: Negative for numbness and headaches. Psychiatric/Behavioral: Negative for confusion. All other systems reviewed and are negative.     PASTMEDICAL HISTORY     Past Medical History:   Diagnosis Date    ALS (amyotrophic lateral sclerosis) (HCC)     Aortic root aneurysm ([facility] [address] 11/08/2016    Aortic valve replaced 11/08/2016    Hypertension      Past Problem List  Patient Active Problem List   Diagnosis Code    Amyotrophic lateral sclerosis (ALS) ([facility] [address] G12.21    Muscle spasm M62.838    Macrocytosis D75.89    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    Spinal stenosis of lumbar region M48.061    Calculus of kidney and ureter N20.2    Hypoglycemia W62.3    Diastolic dysfunction K33.41    Bilateral enlargement of atria I51.7    Mitral valve regurgitation I34.0    Thoracic aortic aneurysm, without rupture (HCC) I71.2    Paroxysmal atrial fibrillation (HCC) I48.0    Aortic valve disorder I35.9    Bilateral hearing loss H91.93    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 31XD    Tinnitus of both ears H93.13    Concussion with loss of consciousness S06. 0X9A    Supraventricular beat, premature I49.1    Varicose veins of both lower extremities I83.93    Intervertebral disc prolapse with impingement [id number]    Obstructive sleep apnea G47.33    Prostatitis N41.9    Stress F43.9    Dermatitis L30.9    Weakness of both lower extremities R29.898    Insulin resistance E88.81    At high risk for falls Z91.81    Other specified diseases of blood and blood-forming organs D75.89    S/P CABG (coronary artery bypass graft) Z95.1    Cardiac segmental configuration with atrial configuration unknown, ventricular configuration unknown, and inverted normally aligned great arteries Q20.8, Q20.9    Vitamin A deficiency E50.9    Fatigue R53.83    Muscular atrophy M62.50    Cervical stenosis of spine M48.02    Diarrhea R19.7    Atrial flutter (HCC) I48.92    Tachy-ralf syndrome (HCC) I49.5    Anticoagulated on Coumadin Z79.01    History of permanent cardiac pacemaker placement 7/1/2020:   Z95.0    Cardiac abnormality Q24.9    Severe malnutrition (HCC) E43    SOB (shortness of breath) R06.02    Monoplegia of left leg ([facility]) G83.14    Malfunction of percutaneous endoscopic gastrostomy (PEG) tube ([facility]) K94.23    Post-nasal drip R09.82    Physical deconditioning R53.81    Infection of PEG site ([facility]) K94.22    Rash in adult R21    Respiratory failure ([address] J96.90     SURGICAL HISTORY       Past Surgical History:   Procedure Laterality Date    CARDIOVERSION  06/27/2020    w/    HERNIA REPAIR      PARATHYROID GLAND SURGERY  1980     CURRENT MEDICATIONS       Current Discharge Medication List      CONTINUE these medications which have NOT CHANGED    Details   neomycin-bacitracin-polymyxin (NEOSPORIN) 3.5-400-5000 OINT ointment Apply topically 3 times daily Apply topically 4 times daily. Qty: 56 g, Refills: 1    Associated Diagnoses: Rash in adult      Handicap Placard MISC by Does not apply route Dx: ALS    Duration: 5 years  Qty: 1 each, Refills: 0    Associated Diagnoses: Weakness of both lower extremities; ALS (amyotrophic lateral sclerosis) ([address] Monoplegia of left leg ([address]      medical marijuana Place under the tongue 2 times daily.       acetaminophen (TYLENOL) 325 MG tablet Take 650 mg by mouth every 6 hours as needed      fluticasone (FLONASE) 50 MCG/ACT nasal spray 1 spray by Nasal route daily  Qty: 1 Bottle, Refills: 3    Associated Diagnoses: Post-nasal drip      SPIRULINA PO Take by mouth      Vitamin E 100 UNITS TABS Take 60 Units by mouth daily      SELENIUM PO Take 75 mcg by mouth daily      SOYA LECITHIN PO Take 1,000 mg by mouth daily      Multiple Vitamins-Minerals (THERAPEUTIC MULTIVITAMIN-MINERALS) tablet Take 1 tablet by mouth daily      OMEGA-3 FATTY ACIDS-VITAMIN E PO Take 1 tablet by mouth daily      Probiotic Product (PROBIOTIC PO) Take 1 tablet by mouth daily      Ascorbic Acid (VITAMIN C) 500 MG tablet Take 120 mg by mouth daily           ALLERGIES     is allergic to ciprofloxacin, lasix [furosemide], penicillins, sulfa antibiotics, and duloxetine. FAMILY HISTORY     He indicated that his mother is . He indicated that his father is . SOCIAL HISTORY       Social History     Tobacco Use    Smoking status: Never Smoker    Smokeless tobacco: Never Used   Vaping Use    Vaping Use: Never used   Substance Use Topics    Alcohol use: Yes     Alcohol/week: 1.0 standard drink     Types: 1 Cans of beer per week    Drug use: No     PHYSICAL EXAM     INITIAL VITALS: /80   Pulse 92   Temp 98.8 °F (37.1 °C) (Oral)   Resp 21   Ht 5' 10.05\" (1.779 m)   Wt 115 lb (52.2 kg)   SpO2 92%   BMI 16.48 kg/m²    Physical Exam  Vitals and nursing note reviewed. Constitutional:       General: He is not in acute distress. Appearance: Normal appearance. He is not ill-appearing. HENT:      Head: Normocephalic and atraumatic. Right Ear: External ear normal.      Left Ear: External ear normal.      Nose: Nose normal.      Mouth/Throat:      Mouth: Mucous membranes are moist.   Eyes:      Extraocular Movements: Extraocular movements intact. Pupils: Pupils are equal, round, and reactive to light. Cardiovascular:      Rate and Rhythm: Normal rate and regular rhythm. Pulses: Normal pulses. Heart sounds: Normal heart sounds. Pulmonary:      Effort: Accessory muscle usage present. Breath sounds: Decreased breath sounds present. Comments: Diminished breath sounds bilaterally, mild accessory muscle use of the scalenes  Abdominal:      General: Abdomen is flat. Palpations: Abdomen is soft. Tenderness: There is no abdominal tenderness. Musculoskeletal:         General: No tenderness. Normal range of motion. Cervical back: Neck supple. Skin:     General: Skin is warm and dry. Capillary Refill: Capillary refill takes less than 2 seconds. Neurological:      General: No focal deficit present.       Mental Status: He is alert and oriented to person, place, and time. Cranial Nerves: Cranial nerves are intact. Sensory: Sensation is intact. Motor: Motor function is intact. Psychiatric:         Behavior: Behavior normal.         Thought Content: Thought content does not include homicidal or suicidal ideation. MEDICAL DECISION MAKIN-year-old male presents for shortness of breath. On initial exam patient noted to have some accessory muscle use, with some mild increased work of breathing, lungs are clear but diminished bilaterally, given his recent COVID infection and history of ALS, patient was changed over to AVAPS, will check labs and chest x-ray    Labs were reviewed patient was found to be COVID-positive, white count of 12, no significant elevation of his D-dimer, CRP ESR elevated as well as Pro-Luis likely consistent with COVID infection, chest x-ray was showing concern for bibasilar opacities, suspect this could be related to COVID infection    Patient given dose of Decadron, discussed with pulmonology Dr. Alejandro Das, given patient's significant comorbidities would like patient to be started on antibiotics for possible bacterial pneumonia as well    Patient was reevaluated, had been doing well on AVAPS over showing increasing signs of fatigue and increased respiratory distress    At this point discussed with patient and wife regarding continued treatment with AVAPS versus intubation this was also discussed with Dr. Alejandro Das who feels patient would benefit from intubation at this time, both patient and wife are in agreement with intubation at this time as well    See procedure note for intubation    Post intubation chest x-ray showing satisfactory placement of endotracheal tube, patient to be admitted to ICU for further treatment    Spoke with Dr. Tristian Bourne who accepts admission with pulmonology consult.  Patient demonstrates understanding and agreement with the plan, was given the opportunity to ask questions, and these questions were answered to the best of the provided information at this time. VS stable for transfer. This dictation was prepared using [employer] voice recognition software. CRITICAL CARE: 53 min      PROCEDURES:    Intubation    Date/Time: 5/5/2022 12:45 PM  Performed by: Ramila Navarrete DO  Authorized by: Ramila Navarrete DO     Consent:     Consent obtained:  Verbal and emergent situation    Consent given by:  Patient and spouse    Risks discussed:  Aspiration, bleeding, brain injury, death, hypoxia, dental trauma, laryngeal injury and pneumothorax  Pre-procedure details:     Patient status:  Awake    Pretreatment meds: etomidate. Paralytics:  Rocuronium  Procedure details:     Preoxygenation:  BiPAP    CPR in progress: no      Intubation method:  Oral    Oral intubation technique:  Video-assisted    Laryngoscope blade: Mac 3    Tube size (mm):  7.5    Tube type:  Cuffed    Number of attempts:  1    Ventilation between attempts: no      Cricoid pressure: no      Tube visualized through cords: yes    Placement assessment:     ETT to lip:  25    Tube secured with:  ETT camargo    Breath sounds:  Equal    Placement verification: condensation, CXR verification, direct visualization, equal breath sounds and ETCO2 detector    Post-procedure details:     Patient tolerance of procedure: Tolerated well, no immediate complications        DIAGNOSTIC RESULTS   EKG:All EKG's are interpreted by the Emergency Department Physician who either signs or Co-signs this chart in the absence of a cardiologist.  Rhythm with PACs, rate of 80, normal axis, no ST segment elevation or depression, nonspecific T wave changes      RADIOLOGY:All plain film, CT, MRI, and formal ultrasound images (except ED bedside ultrasound) are read by the radiologist, see reports below, unless otherwisenoted in MDM or here.   XR CHEST PORTABLE   Final Result   Endotracheal tube in satisfactory position above the chance      NG tip and side-port in gastric fundus      Otherwise, unchanged chest demonstrating bibasilar opacities suggesting   atelectasis or infection         XR CHEST PORTABLE   Final Result   Bibasilar opacities with blunting of right costophrenic angle possibly   combination of pneumonia and small right pleural effusion. There is what appears to be a left chest tube terminating in the periphery of   the upper lung partly obscured by pacemaker electrode. Please correlate   clinically. No recent comparisons. LABS: All lab results were reviewed by myself, and all abnormals are listed below.   Labs Reviewed   COVID-19 & INFLUENZA COMBO - Abnormal; Notable for the following components:       Result Value    SARS-CoV-2 RNA, RT PCR DETECTED (*)     All other components within normal limits   CBC WITH AUTO DIFFERENTIAL - Abnormal; Notable for the following components:    WBC 12.8 (*)     RBC 4.07 (*)     Hematocrit 39.2 (*)     MCH 34.3 (*)     Seg Neutrophils 82 (*)     Lymphocytes 8 (*)     Segs Absolute 10.50 (*)     All other components within normal limits   COMPREHENSIVE METABOLIC PANEL W/ REFLEX TO MG FOR LOW K - Abnormal; Notable for the following components:    Glucose 124 (*)     CREATININE <0.40 (*)     Sodium 132 (*)     Chloride 96 (*)     Alkaline Phosphatase 137 (*)     ALT 46 (*)     Albumin 3.0 (*)     All other components within normal limits   TROPONIN - Abnormal; Notable for the following components:    Troponin, High Sensitivity 229 (*)     All other components within normal limits   TROPONIN - Abnormal; Notable for the following components:    Troponin, High Sensitivity 236 (*)     All other components within normal limits   APTT - Abnormal; Notable for the following components:    PTT 40.8 (*)     All other components within normal limits   URINALYSIS WITH MICROSCOPIC - Abnormal; Notable for the following components:    Glucose, Ur TRACE (*)     Urine Hgb TRACE (*) Protein, UA TRACE (*)     All other components within normal limits   BLOOD GAS, ARTERIAL - Abnormal; Notable for the following components:    pH, Arterial 7.497 (*)     pO2, Arterial 64.8 (*)     HCO3, Arterial 30.4 (*)     Positive Base Excess, Art 7.2 (*)     O2 Sat, Arterial 92.0 (*)     All other components within normal limits   C-REACTIVE PROTEIN - Abnormal; Notable for the following components:    .2 (*)     All other components within normal limits   PROCALCITONIN - Abnormal; Notable for the following components:    Procalcitonin 35.19 (*)     All other components within normal limits   SEDIMENTATION RATE - Abnormal; Notable for the following components:    Sed Rate 63 (*)     All other components within normal limits   BLOOD GAS, ARTERIAL - Abnormal; Notable for the following components:    pO2, Arterial 73.2 (*)     HCO3, Arterial 30.3 (*)     Positive Base Excess, Art 6.3 (*)     O2 Sat, Arterial 93.3 (*)     All other components within normal limits   CULTURE, BLOOD 1   CULTURE, BLOOD 2   CULTURE, RESPIRATORY   LIPASE   PROTIME-INR   LACTATE, SEPSIS   D-DIMER, QUANTITATIVE   LACTATE, SEPSIS       EMERGENCY DEPARTMENTCOURSE:         Vitals:    Vitals:    05/05/22 1615 05/05/22 1630 05/05/22 1636 05/05/22 1700   BP: 118/65 (!) 135/90  132/80   Pulse: 89 91 85 92   Resp: 20 21 20 21   Temp:       TempSrc:       SpO2: 99% 98% 99% 92%   Weight:       Height:           The patient was given the following medications while in the emergency department:  Orders Placed This Encounter   Medications    dexamethasone (PF) (DECADRON) injection 6 mg    cefTRIAXone (ROCEPHIN) 1000 mg IVPB in 50 mL D5W minibag     Order Specific Question:   Antimicrobial Indications     Answer:   Pneumonia (CAP)    azithromycin (ZITHROMAX) 500 mg in D5W 250ml addavial     Order Specific Question:   Antimicrobial Indications     Answer:   Pneumonia (CAP)    propofol 1000 MG/100ML injection     Agusto Calhoun: cabinet override    propofol injection     Order Specific Question:   Titrate Infusion? Answer:   Yes     Order Specific Question:   Initial Infusion Dose: Answer:   20 mcg/kg/min     Order Specific Question:   Goal of Therapy:     Answer:   RASS of -1 to 0     Order Specific Question:   Contact Provider if:     Answer:   New onset HR less than 50 bpm     Order Specific Question:   Contact Provider if:     Answer:   New onset SBP less than 90 mmHg     Order Specific Question:   Contact Provider if:     Answer:   Patient is receiving maximum dose and is not achieving the goal of therapy     Order Specific Question:   Contact Provider if:     Answer:   Triglycerides greater than 500 mg/dL    etomidate (AMIDATE) injection 20 mg    rocuronium (ZEMURON) injection 100 mg    sodium chloride flush 0.9 % injection 5-40 mL    sodium chloride flush 0.9 % injection 5-40 mL    0.9 % sodium chloride infusion    DISCONTD: enoxaparin (LOVENOX) injection 40 mg     Order Specific Question:   Indication of Use     Answer:   Prophylaxis-DVT/PE    OR Linked Order Group     ondansetron (ZOFRAN-ODT) disintegrating tablet 4 mg     ondansetron (ZOFRAN) injection 4 mg    tocilizumab (ACTEMRA) 400 mg in sodium chloride 0.9 % 120 mL IVPB    dexamethasone (PF) (DECADRON) injection 6 mg    enoxaparin (LOVENOX) injection 40 mg     Order Specific Question:   Indication of Use     Answer:   Prophylaxis-DVT/PE    propofol injection     Order Specific Question:   Titrate Infusion? Answer:   Yes     Order Specific Question:   Initial Infusion Dose:      Answer:   20 mcg/kg/min     Order Specific Question:   Goal of Therapy:     Answer:   RASS of -1 to 0     Order Specific Question:   Contact Provider if:     Answer:   New onset HR less than 50 bpm     Order Specific Question:   Contact Provider if:     Answer:   New onset SBP less than 90 mmHg     Order Specific Question:   Contact Provider if:     Answer:   Patient is receiving maximum dose and is not achieving the goal of therapy     Order Specific Question:   Contact Provider if:     Answer:   Triglycerides greater than 500 mg/dL    pantoprazole (PROTONIX) 40 mg in sodium chloride (PF) 10 mL injection     CONSULTS:  IP CONSULT TO INTERNAL MEDICINE  IP CONSULT TO PULMONOLOGY  IP CONSULT TO INFECTIOUS DISEASES    FINAL IMPRESSION      1. Acute on chronic respiratory failure, unspecified whether with hypoxia or hypercapnia ([facility] [address]    2. ALS (amyotrophic lateral sclerosis) ([facility] [address]    3. COVID-19    4. Pneumonia of both lungs due to infectious organism, unspecified part of lung          DISPOSITION/PLAN   DISPOSITION Admitted 05/05/2022 01:05:03 PM      PATIENT REFERRED TO:  No follow-up provider specified. DISCHARGE MEDICATIONS:  Current Discharge Medication List        The care is provided during an unprecedented national emergency due to the novel coronavirus, COVID 19.   DO Georgiana Franco DO  05/05/22 6510

## 2022-05-05 NOTE — ED NOTES
Patient had small BM, patient cleansed and new brief applied. Patient tolerated well.       Kelly Connors RN  05/05/22 1022

## 2022-05-05 NOTE — CONSULTS
[address]                 [address] [facility], [address]                                  CONSULTATION    PATIENT NAME: Lenore Centeno                   :        1950  MED REC NO:   [id number]                              ROOM:       07A  ACCOUNT NO:   [de-identified]                           ADMIT DATE: 2022  PROVIDER:     Jeanne Claire    CONSULT DATE:  2022    REASON FOR CONSULTATION:  Acute respiratory failure. HISTORY OF PRESENT ILLNESS:  The patient is a very pleasant 27-year-old  male. He is unfortunately diagnosed with ALS, history of coronary  artery disease as well. He underwent bypass graft surgery in [facility] back in 2016. About three months later, he started having  problems with symptoms of dropfoot. He reportedly had been evaluated  and eventually in 2018 he went back to [facility]  where he received the diagnosis of ALS. He sees a neurologist over at  [address] [place] [address],[address], but no neurologist here in [facility]. He has been dependent on his Trilogy noninvasive ventilator for quite  some time. They had desires to have a trach placed and the patient been  placed on home ventilation, will it be nocturnal or 24 hours by one of  the surgeons at [facility]. This has not been completed yet. The patient presents to [facility] today. The patient had  problems with cough starting around . There was a known exposure  to a person who was eventually tested positive for COVID . Either   or , the patient did have a home test and was tested  positive. The patient had problems with cough with some mucus. Three  days ago, the patient started having progressively worsening shortness  of breath. The patient's wife related no fever, no chills, no sweats. The breathing got worse and he started feeling tired.   He was brought to  the emergency department at [facility].  Temperature was 98.1,  the patient is on 50% with O2 saturations 93-94%. Systolic blood  pressures are actually elevated at 150-200. Chest x-ray revealed  bilateral infiltrative changes. The lab work included BUN and  creatinine 12/less than 0.40. Procalcitonin level 35.19. CRP _____. Albumin 3. White count 12.8, hemoglobin 13.9, platelet count is 253. Manual diff includes 82 segs, 6 lymphs. Sed rate 63. There was a SARS  COVID test that was done which was positive. D-dimer normal at 0.43. Arterial blood gas, 7.49, 39, 64. This is on AVAPS mode, set rate of  20, tidal volume 550, 7 of PEEP. The patient can speak, but looks  tired. He claims he is more tired than he has been in the past.    PAST MEDICAL HISTORY:  Notable for a diagnosis of multiple medical  conditions. This includes a history of ALS diagnosed in 08/2018,  coronary artery disease status post bypass grafting in 11/2016,  pacemaker placement in 2020. His cardiologist is Dr. Candelario Neely. He has been on noninvasive ventilator for quite some time. He also has  history of hypertension. ALLERGIES:  Medication allergies includes CIPROFLOXACIN, cannot take  because of ALS; LASIX, not specified; PENICILLIN, not specified; SULFA,  not specified; DULOXETINE _____. MEDICATIONS:  Per chart. SOCIAL HISTORY:  He is a nonsmoker. REVIEW OF SYSTEMS:  Not obtainable at this time. PHYSICAL EXAMINATION:  GENERAL:  We have an ill-appearing thin 78-year-old male resting  quietly. VITAL SIGNS:  Blood pressure anywhere from 615 to 723 systolically,  heart rate 80-90, respirations 25-26, temperature 98.1, O2 saturation  93-94% on 50%. BMI is only 16.5. HEENT:  No supraclavicular lymph node enlargement. LUNGS:  Crackles at the bases posteriorly. No rhonchi or wheezes. CARDIOVASCULAR:  Regular rhythm. ABDOMEN:  Soft. EXTREMITIES:  No edema. LABORATORY RESULTS:  As above. IMPRESSION:  1.   Acute respiratory failure with hypoxemia. 2.  Severe ALS. 3.  History of coronary artery disease status post bypass graft surgery  10/2016. 4.  Malnutrition. 5.  Severe debilitation. 6.  Hypertension. PLAN:  1. Currently on BiPAP. 2.  Rocephin and Zithromax were ordered. 3.  D-dimer negative, doubt presence of thromboembolic disease. 4.  We will start the patient on Decadron for now. 5.  We are trying to determine if the patient still needs to be in  quarantine. 6.  The patient is a full code. A lengthy discussion was done with the  patient and wife present. The patient wishes to continue. I  recommended the patient to be intubated and treated for pneumonia and  then we can consult surgery for tracheostomy placement with plans to do  home vent as well. I was told that there are plans for potentially once  the patient improved, he may undergo a clinical trial in [place]. I discussed with ER residents and information. We are trying to obtain  information from infection control regarding the need for isolation at  this time. We will follow the patient while in-house and thank you for the consult. Total time of greater than 60 minutes critical care time spent on this  patient. Extensive history obtained by patient's wife, conversations  with the patient and wife present. Confirmed with ER residents and  nursing staff.         Ezequiel Brewster    D: 05/05/2022 12:25:06       T: 05/05/2022 15:05:02     [email or web address]  Job#: [id number]     Doc#: [id number]    CC:

## 2022-05-05 NOTE — PROGRESS NOTES
Received bedside report from ED RN, Marlen Gonzalez. Patient transported to ICU-2010. RT at bedside. Vent settings remain the same. Patient calm and intubated. Updated wife.

## 2022-05-06 ENCOUNTER — APPOINTMENT (OUTPATIENT)
Dept: GENERAL RADIOLOGY | Age: 72
DRG: 004 | End: 2022-05-06
Payer: MEDICARE

## 2022-05-06 ENCOUNTER — APPOINTMENT (OUTPATIENT)
Dept: NON INVASIVE DIAGNOSTICS | Age: 72
DRG: 004 | End: 2022-05-06
Payer: MEDICARE

## 2022-05-06 LAB
ABSOLUTE BANDS #: 0.23 K/UL (ref 0–1)
ABSOLUTE EOS #: 0 K/UL (ref 0–0.4)
ABSOLUTE LYMPH #: 0.12 K/UL (ref 1–4.8)
ABSOLUTE MONO #: 0.35 K/UL (ref 0.1–1.3)
ALLEN TEST: ABNORMAL
ANION GAP SERPL CALCULATED.3IONS-SCNC: 16 MMOL/L (ref 9–17)
BANDS: 2 % (ref 0–10)
BASOPHILS # BLD: 0 % (ref 0–2)
BASOPHILS ABSOLUTE: 0 K/UL (ref 0–0.2)
BUN BLDV-MCNC: 13 MG/DL (ref 8–23)
CALCIUM SERPL-MCNC: 8.9 MG/DL (ref 8.6–10.4)
CARBOXYHEMOGLOBIN: 0.2 % (ref 0–5)
CHLORIDE BLD-SCNC: 98 MMOL/L (ref 98–107)
CO2: 23 MMOL/L (ref 20–31)
CREAT SERPL-MCNC: <0.4 MG/DL (ref 0.7–1.2)
EOSINOPHILS RELATIVE PERCENT: 0 % (ref 0–4)
FERRITIN: 734 NG/ML (ref 30–400)
FIO2: 40
GFR AFRICAN AMERICAN: ABNORMAL ML/MIN
GFR NON-AFRICAN AMERICAN: ABNORMAL ML/MIN
GFR SERPL CREATININE-BSD FRML MDRD: ABNORMAL ML/MIN/{1.73_M2}
GLUCOSE BLD-MCNC: 140 MG/DL (ref 70–99)
HCO3 ARTERIAL: 28.4 MMOL/L (ref 22–26)
HCT VFR BLD CALC: 38.7 % (ref 41–53)
HEMOGLOBIN: 13 G/DL (ref 13.5–17.5)
LACTATE DEHYDROGENASE: 232 U/L (ref 135–225)
LV EF: 55 %
LVEF MODALITY: NORMAL
LYMPHOCYTES # BLD: 1 % (ref 24–44)
MCH RBC QN AUTO: 32.5 PG (ref 26–34)
MCHC RBC AUTO-ENTMCNC: 33.4 G/DL (ref 31–37)
MCV RBC AUTO: 97.1 FL (ref 80–100)
METHEMOGLOBIN: 0.9 % (ref 0–1.9)
MODE: ABNORMAL
MONOCYTES # BLD: 3 % (ref 1–7)
MORPHOLOGY: NORMAL
O2 DEVICE/FLOW/%: ABNORMAL
O2 SAT, ARTERIAL: 93.6 % (ref 95–98)
PATIENT TEMP: 37
PCO2 ARTERIAL: 38.9 MMHG (ref 35–45)
PDW BLD-RTO: 13.6 % (ref 11.5–14.9)
PEEP/CPAP: 7
PH ARTERIAL: 7.47 (ref 7.35–7.45)
PLATELET # BLD: 343 K/UL (ref 150–450)
PMV BLD AUTO: 7.3 FL (ref 6–12)
PO2 ARTERIAL: 70.9 MMHG (ref 80–100)
POSITIVE BASE EXCESS, ART: 4.7 MMOL/L (ref 0–2)
POTASSIUM SERPL-SCNC: 3.9 MMOL/L (ref 3.7–5.3)
PT. POSITION: ABNORMAL
RBC # BLD: 3.99 M/UL (ref 4.5–5.9)
RESPIRATORY RATE: 20
SAMPLE SITE: ABNORMAL
SEG NEUTROPHILS: 94 % (ref 36–66)
SEGMENTED NEUTROPHILS ABSOLUTE COUNT: 10.8 K/UL (ref 1.3–9.1)
SET RATE: 20
SODIUM BLD-SCNC: 137 MMOL/L (ref 135–144)
TEXT FOR RESPIRATORY: ABNORMAL
TOTAL RATE: 20
VT: 500
WBC # BLD: 11.5 K/UL (ref 3.5–11)

## 2022-05-06 PROCEDURE — 80048 BASIC METABOLIC PNL TOTAL CA: CPT

## 2022-05-06 PROCEDURE — C9113 INJ PANTOPRAZOLE SODIUM, VIA: HCPCS | Performed by: INTERNAL MEDICINE

## 2022-05-06 PROCEDURE — 36600 WITHDRAWAL OF ARTERIAL BLOOD: CPT

## 2022-05-06 PROCEDURE — 82805 BLOOD GASES W/O2 SATURATION: CPT

## 2022-05-06 PROCEDURE — P9047 ALBUMIN (HUMAN), 25%, 50ML: HCPCS | Performed by: INTERNAL MEDICINE

## 2022-05-06 PROCEDURE — 87205 SMEAR GRAM STAIN: CPT

## 2022-05-06 PROCEDURE — 2580000003 HC RX 258: Performed by: INTERNAL MEDICINE

## 2022-05-06 PROCEDURE — 87147 CULTURE TYPE IMMUNOLOGIC: CPT

## 2022-05-06 PROCEDURE — 83615 LACTATE (LD) (LDH) ENZYME: CPT

## 2022-05-06 PROCEDURE — 86403 PARTICLE AGGLUT ANTBDY SCRN: CPT

## 2022-05-06 PROCEDURE — 87070 CULTURE OTHR SPECIMN AEROBIC: CPT

## 2022-05-06 PROCEDURE — 71045 X-RAY EXAM CHEST 1 VIEW: CPT

## 2022-05-06 PROCEDURE — 6360000002 HC RX W HCPCS: Performed by: INTERNAL MEDICINE

## 2022-05-06 PROCEDURE — A4216 STERILE WATER/SALINE, 10 ML: HCPCS | Performed by: INTERNAL MEDICINE

## 2022-05-06 PROCEDURE — 82728 ASSAY OF FERRITIN: CPT

## 2022-05-06 PROCEDURE — 99233 SBSQ HOSP IP/OBS HIGH 50: CPT | Performed by: INTERNAL MEDICINE

## 2022-05-06 PROCEDURE — 6360000002 HC RX W HCPCS: Performed by: EMERGENCY MEDICINE

## 2022-05-06 PROCEDURE — 85025 COMPLETE CBC W/AUTO DIFF WBC: CPT

## 2022-05-06 PROCEDURE — 93306 TTE W/DOPPLER COMPLETE: CPT

## 2022-05-06 PROCEDURE — 6370000000 HC RX 637 (ALT 250 FOR IP): Performed by: INTERNAL MEDICINE

## 2022-05-06 PROCEDURE — 99223 1ST HOSP IP/OBS HIGH 75: CPT | Performed by: INTERNAL MEDICINE

## 2022-05-06 PROCEDURE — 36415 COLL VENOUS BLD VENIPUNCTURE: CPT

## 2022-05-06 PROCEDURE — 99213 OFFICE O/P EST LOW 20 MIN: CPT

## 2022-05-06 PROCEDURE — 87186 SC STD MICRODIL/AGAR DIL: CPT

## 2022-05-06 PROCEDURE — 94003 VENT MGMT INPAT SUBQ DAY: CPT

## 2022-05-06 PROCEDURE — 2500000003 HC RX 250 WO HCPCS: Performed by: INTERNAL MEDICINE

## 2022-05-06 PROCEDURE — 2000000000 HC ICU R&B

## 2022-05-06 RX ORDER — 0.9 % SODIUM CHLORIDE 0.9 %
1000 INTRAVENOUS SOLUTION INTRAVENOUS ONCE
Status: COMPLETED | OUTPATIENT
Start: 2022-05-06 | End: 2022-05-06

## 2022-05-06 RX ORDER — FENTANYL CITRATE-0.9 % NACL/PF 10 MCG/ML
25-200 PLASTIC BAG, INJECTION (ML) INTRAVENOUS CONTINUOUS
Status: DISCONTINUED | OUTPATIENT
Start: 2022-05-06 | End: 2022-05-14

## 2022-05-06 RX ORDER — 0.9 % SODIUM CHLORIDE 0.9 %
500 INTRAVENOUS SOLUTION INTRAVENOUS ONCE
Status: COMPLETED | OUTPATIENT
Start: 2022-05-06 | End: 2022-05-06

## 2022-05-06 RX ORDER — ALBUMIN (HUMAN) 12.5 G/50ML
25 SOLUTION INTRAVENOUS ONCE
Status: COMPLETED | OUTPATIENT
Start: 2022-05-06 | End: 2022-05-06

## 2022-05-06 RX ADMIN — FENTANYL CITRATE 50 MCG: 0.05 INJECTION, SOLUTION INTRAMUSCULAR; INTRAVENOUS at 08:06

## 2022-05-06 RX ADMIN — ALBUMIN (HUMAN) 25 G: 0.25 INJECTION, SOLUTION INTRAVENOUS at 19:47

## 2022-05-06 RX ADMIN — SODIUM CHLORIDE 1000 ML: 9 INJECTION, SOLUTION INTRAVENOUS at 19:42

## 2022-05-06 RX ADMIN — CHLORHEXIDINE GLUCONATE 0.12% ORAL RINSE 15 ML: 1.2 LIQUID ORAL at 08:06

## 2022-05-06 RX ADMIN — SODIUM CHLORIDE, PRESERVATIVE FREE 10 ML: 5 INJECTION INTRAVENOUS at 21:32

## 2022-05-06 RX ADMIN — ENOXAPARIN SODIUM 40 MG: 100 INJECTION SUBCUTANEOUS at 08:06

## 2022-05-06 RX ADMIN — SODIUM CHLORIDE 1000 ML: 9 INJECTION, SOLUTION INTRAVENOUS at 16:25

## 2022-05-06 RX ADMIN — FENTANYL CITRATE 50 MCG: 0.05 INJECTION, SOLUTION INTRAMUSCULAR; INTRAVENOUS at 03:51

## 2022-05-06 RX ADMIN — PROPOFOL 45 MCG/KG/MIN: 10 INJECTION, EMULSION INTRAVENOUS at 21:29

## 2022-05-06 RX ADMIN — DEXAMETHASONE SODIUM PHOSPHATE 6 MG: 10 INJECTION, SOLUTION INTRAMUSCULAR; INTRAVENOUS at 12:23

## 2022-05-06 RX ADMIN — AZITHROMYCIN MONOHYDRATE 500 MG: 500 INJECTION, POWDER, LYOPHILIZED, FOR SOLUTION INTRAVENOUS at 15:22

## 2022-05-06 RX ADMIN — SODIUM CHLORIDE, PRESERVATIVE FREE 10 ML: 5 INJECTION INTRAVENOUS at 08:08

## 2022-05-06 RX ADMIN — PROPOFOL 40 MCG/KG/MIN: 10 INJECTION, EMULSION INTRAVENOUS at 09:10

## 2022-05-06 RX ADMIN — SODIUM CHLORIDE 500 ML: 9 INJECTION, SOLUTION INTRAVENOUS at 13:32

## 2022-05-06 RX ADMIN — CEFTRIAXONE SODIUM 1000 MG: 1 INJECTION, POWDER, FOR SOLUTION INTRAMUSCULAR; INTRAVENOUS at 12:23

## 2022-05-06 RX ADMIN — PROPOFOL 40 MCG/KG/MIN: 10 INJECTION, EMULSION INTRAVENOUS at 02:20

## 2022-05-06 RX ADMIN — Medication 25 MCG/HR: at 15:28

## 2022-05-06 RX ADMIN — PROPOFOL 45 MCG/KG/MIN: 10 INJECTION, EMULSION INTRAVENOUS at 16:29

## 2022-05-06 RX ADMIN — SODIUM CHLORIDE, PRESERVATIVE FREE 40 MG: 5 INJECTION INTRAVENOUS at 08:06

## 2022-05-06 RX ADMIN — CHLORHEXIDINE GLUCONATE 0.12% ORAL RINSE 15 ML: 1.2 LIQUID ORAL at 21:32

## 2022-05-06 ASSESSMENT — PULMONARY FUNCTION TESTS
PIF_VALUE: 20
PIF_VALUE: 21
PIF_VALUE: 20
PIF_VALUE: 21
PIF_VALUE: 21
PIF_VALUE: 19
PIF_VALUE: 20
PIF_VALUE: 19
PIF_VALUE: 21
PIF_VALUE: 21
PIF_VALUE: 20
PIF_VALUE: 19
PIF_VALUE: 22
PIF_VALUE: 20
PIF_VALUE: 20
PIF_VALUE: 21
PIF_VALUE: 22
PIF_VALUE: 20
PIF_VALUE: 21
PIF_VALUE: 21
PIF_VALUE: 20
PIF_VALUE: 21
PIF_VALUE: 21
PIF_VALUE: 20
PIF_VALUE: 21
PIF_VALUE: 22
PIF_VALUE: 19
PIF_VALUE: 20
PIF_VALUE: 20
PIF_VALUE: 21
PIF_VALUE: 20
PIF_VALUE: 21
PIF_VALUE: 20
PIF_VALUE: 21
PIF_VALUE: 21
PIF_VALUE: 20
PIF_VALUE: 22
PIF_VALUE: 19
PIF_VALUE: 21
PIF_VALUE: 20
PIF_VALUE: 19
PIF_VALUE: 22

## 2022-05-06 ASSESSMENT — PAIN SCALES - GENERAL
PAINLEVEL_OUTOF10: 6
PAINLEVEL_OUTOF10: 0

## 2022-05-06 NOTE — PLAN OF CARE
Problem: Respiratory - Adult  Goal: Achieves optimal ventilation and oxygenation  5/6/2022 0849 by Gretchen Hinson RCP  Outcome: Progressing

## 2022-05-06 NOTE — FLOWSHEET NOTE
05/05/22 2117   Treatment Team Notification   Reason for Communication Review case   Team Member Name Dr. Bret Franco Provider   Method of Communication Call   Response See orders   Notification Time 2111     MD updated pt does not currently have antibiotics ordered. Orders received.
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
## 2022-05-06 NOTE — PROGRESS NOTES
Comprehensive Nutrition Assessment    Type and Reason for Visit:  Initial,Consult (Vent)    Nutrition Recommendations/Plan:   1. If tube feed becomes necessary, recommend Vital High Protein at 60 ml  Per hour to provide 1440 kcal, 125 g protein. With diprivan kcal, pt will receive 1770 kcal     Malnutrition Assessment:  Malnutrition Status: At risk for malnutrition (Comment) (05/06/22 4995)    Context:  Acute Illness     Findings of the 6 clinical characteristics of malnutrition:  Energy Intake:  Unable to assess  Weight Loss:  No significant weight loss     Body Fat Loss:  Unable to assess     Muscle Mass Loss:  Unable to assess    Fluid Accumulation:  No significant fluid accumulation     Strength:  Not Performed    Nutrition Assessment:    Pt admitted due to Respiratory Failure and found to have Covid. He is now vented with diprivan at 12.5 ml per hour (330 kcal). Nutrition Related Findings:    no edema, labs:Glu 140, Meds: Reviewed, PMH: ALS, Recent Covid, H/O PEG (removed) Wound Type: Stage II,Pressure Injury       Current Nutrition Intake & Therapies:    Average Meal Intake: NPO     Diet NPO    Anthropometric Measures:  Height: 5' 10.05\" (177.9 cm)  Ideal Body Weight (IBW): 166 lbs (75 kg)    Admission Body Weight: 132 lb (59.9 kg)  Current Body Weight: 132 lb (59.9 kg),   IBW.  Weight Source: Bed Scale  Current BMI (kg/m2): 18.9  Usual Body Weight: 128 lb (58.1 kg) (9/18/20)  % Weight Change (Calculated): 3.1                    BMI Categories: Underweight (BMI less than 22) age over 72    Estimated Daily Nutrient Needs:  Energy Requirements Based On: Formula  Weight Used for Energy Requirements: Admission  Energy (kcal/day): [place] x 1.3= 4893-5011 kcal  Weight Used for Protein Requirements: Current  Protein (g/day): minimum of 1.5 g/kg= 90 g       Nutrition Diagnosis:   · Inadequate oral intake related to impaired respiratory function as evidenced by NPO or clear liquid status due to medical condition,intubation      Nutrition Interventions:   Food and/or Nutrient Delivery: Start Tube Feeding  Nutrition Education/Counseling: No recommendation at this time  Coordination of Nutrition Care: Continue to monitor while inpatient       Goals:     Goals: Meet at least 75% of estimated needs       Nutrition Monitoring and Evaluation:   Behavioral-Environmental Outcomes: None Identified  Food/Nutrient Intake Outcomes: Enteral Nutrition Intake/Tolerance  Physical Signs/Symptoms Outcomes: Biochemical Data,GI Status,Fluid Status or Edema,Skin,Weight    Discharge Planning:     Too soon to determine     Wan Santiago, [address] [place] [address],   Contact: [phone]

## 2022-05-06 NOTE — FLOWSHEET NOTE
05/05/22 1925   Treatment Team Notification   Reason for Communication Review case   Team Member Name Dr. Hina Talbot Provider   Method of Communication Call   Response See orders   Notification Time 1925     Orders received for vent protocol.

## 2022-05-06 NOTE — CONSULTS
[facility]  Consult Note       NAME:  Zack Ramírez  MEDICAL RECORD NUMBER:  [id number]  AGE: 74 y.o. GENDER: male  : 1950  TODAY'S DATE:  2022    Subjective:      Zack Ramírez is a 74 y.o. male with inpatient referral to [facility] for:  Buttocks wound      Wound Identification:  Wound Type: MASD  Contributing Factors: decreased mobility, shear force, malnutrition, incontinence of stool and incontinence of urine    Wound History: wound present upon admission, history not available from patient  Current Wound Care Treatment:  foam    Patient Goal of Care:  [x] Wound Healing  [] Odor Control  [] Palliative Care  [] Pain Control   [] Other:         PAST MEDICAL HISTORY        Diagnosis Date    [facility]) [facility] [address]     [facility]    Aortic valve replaced 2016    Hypertension        PAST SURGICAL HISTORY    Past Surgical History:   Procedure Laterality Date    CARDIOVERSION  2020    w/    HERNIA REPAIR      PARATHYROID GLAND SURGERY         FAMILY HISTORY    Family History   Problem Relation Age of Onset    Lung Cancer Mother     Heart Disease Father        SOCIAL HISTORY    Social History     Tobacco Use    Smoking status: Never Smoker    Smokeless tobacco: Never Used   Vaping Use    Vaping Use: Never used   Substance Use Topics    Alcohol use: Yes     Alcohol/week: 1.0 standard drink     Types: 1 Cans of beer per week    Drug use: No         ALLERGIES    Allergies   Allergen Reactions    Ciprofloxacin      Can not take anything in this group with his ALS    Lasix [Furosemide]     Penicillins     Sulfa Antibiotics     Duloxetine        HOME MEDICATIONS  Prior to Admission medications    Medication Sig Start Date End Date Taking?  Authorizing Provider   neomycin-bacitracin-polymyxin (NEOSPORIN) 3.5-400-5000 OINT ointment Apply topically 3 times daily Apply topically 4 times daily. 4/4/22   River Bethea MD   Handicap Placard MISC by Does not apply route Dx: ALS    Duration: 5 years 3/23/22   River Bethea MD   medical marijuana Place under the tongue 2 times daily.     Historical Provider, MD   acetaminophen (TYLENOL) 325 MG tablet Take 650 mg by mouth every 6 hours as needed 9/25/20   Historical Provider, MD   fluticasone [place]) 50 MCG/ACT nasal spray 1 spray by Nasal route daily 6/8/21   River Bethea MD   SPIRULINA PO Take by mouth    Historical Provider, MD   Vitamin E 100 UNITS TABS Take 60 Units by mouth daily    Historical Provider, MD   SELENIUM PO Take 75 mcg by mouth daily    Historical Provider, MD   SOYA LECITHIN PO Take 1,000 mg by mouth daily    Historical Provider, MD   Multiple Vitamins-Minerals (THERAPEUTIC MULTIVITAMIN-MINERALS) tablet Take 1 tablet by mouth daily    Historical Provider, MD   OMEGA-3 FATTY ACIDS-VITAMIN E PO Take 1 tablet by mouth daily    Historical Provider, MD   Probiotic Product (PROBIOTIC PO) Take 1 tablet by mouth daily    Historical Provider, MD   Ascorbic Acid (VITAMIN C) 500 MG tablet Take 120 mg by mouth daily    Historical Provider, MD       CURRENT MEDICATIONS:  Current Facility-Administered Medications   Medication Dose Route Frequency Provider Last Rate Last Admin    cefTRIAXone (ROCEPHIN) 1000 mg IVPB in 50 mL D5W minibag  1,000 mg IntraVENous Q24H Marcello Smith MD   Stopped at 05/06/22 1253    azithromycin (ZITHROMAX) 500 mg in D5W 250ml addavial  500 mg IntraVENous Q24H Marcello Smith MD        fentaNYL (SUBLIMAZE) 1,000 mcg in sodium chloride 0.9% 100 mL infusion   mcg/hr IntraVENous Continuous Herman Wan MD        0.9 % sodium chloride bolus  500 mL IntraVENous Once Herman Wan .9 mL/hr at 05/06/22 1332 500 mL at 05/06/22 1332    sodium chloride flush 0.9 % injection 5-40 mL  5-40 mL IntraVENous 2 times per day Mima Fagan MD   10 mL at 05/06/22 0808    sodium chloride flush 0.9 % Component Value Date    LABA1C 5.7 09/09/2020     PTT: No components found for: LABPTT      Assessment:     Physical Exam      Candelario Risk Score: Candelario Scale Score: 12    Patient Active Problem List   Diagnosis Code    Amyotrophic lateral sclerosis (ALS) ([facility]) G12.21    Muscle spasm M62.838    Macrocytosis D75.89    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    Spinal stenosis of lumbar region M48.061    Calculus of kidney and ureter N20.2    Hypoglycemia X64.2    Diastolic dysfunction Y63.81    Bilateral enlargement of atria I51.7    Mitral valve regurgitation I34.0    Thoracic aortic aneurysm, without rupture ([facility]) I71.2    Paroxysmal atrial fibrillation ([facility]) I48.0    Aortic valve disorder I35.9    Bilateral hearing loss H91.93    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 31XD    Tinnitus of both ears H93.13    Concussion with loss of consciousness S06. 0X9A    Supraventricular beat, premature I49.1    Varicose veins of both lower extremities I83.93    Intervertebral disc prolapse with impingement [id number]    Obstructive sleep apnea G47.33    Prostatitis N41.9    Stress F43.9    Dermatitis L30.9    Weakness of both lower extremities R29.898    Insulin resistance E88.81    At high risk for falls Z91.81    Other specified diseases of blood and blood-forming organs D75.89    S/P CABG (coronary artery bypass graft) Z95.1    Cardiac segmental configuration with atrial configuration unknown, ventricular configuration unknown, and inverted normally aligned great arteries Q20.8, Q20.9    Vitamin A deficiency E50.9    Fatigue R53.83    Muscular atrophy M62.50    Cervical stenosis of spine M48.02    Diarrhea R19.7    Atrial flutter (HCC) I48.92    Tachy-ralf syndrome ([facility]) I49.5    Anticoagulated on Coumadin Z79.01    History of permanent cardiac pacemaker placement 7/1/2020:   Z95.0    Cardiac abnormality Q24.9    Severe malnutrition ([facility]) E43    SOB (shortness of breath) R06.02    Monoplegia of left leg ([facility]) G83.14    Malfunction of percutaneous endoscopic gastrostomy (PEG) tube ([facility]) K94.23    Post-nasal drip R09.82    Physical deconditioning R53.81    Infection of PEG site ([facility]) K94.22    Rash in adult R21    Respiratory failure ([facility]) J96.90         Measurements:  Wound 05/05/22 Coccyx Posterior (Active)   Wound Image   05/06/22 1211   Wound Etiology Other 05/06/22 1211   Dressing Status Old drainage noted;New dressing applied 05/06/22 1211   Wound Cleansed Other (Comment) 05/06/22 1211   Dressing/Treatment Triad hydro/zinc oxide-based hydrophilic paste 67/71/03 8891   Wound Assessment Pink/red;Denuded 05/06/22 1211   Drainage Amount Scant 05/06/22 1211   Drainage Description Serosanguinous 05/06/22 1211   Odor None 05/06/22 1211   Kelly-wound Assessment Fragile;Dry/flaky 05/06/22 1211   Margins Attached edges 05/06/22 1211   Number of days: 0       WOUND DESCRIPTION:   [address]  nurse consult for buttocks wound. Pt was admitted on 5/5 with increasing shortness of breath due to covid. He was intubated in ER. He remains intubated at time of assessment, but is alert and attempting to answer questions. Buttocks are red and mildly denuded. There is some dark discoloration to the sacral area, however this appears to be from chronic moisture exposure due to incontinence. Pt has pierre catheter at this time. Pressure reduction measures in place. Recommend triad cream to buttocks to protect. Response to treatment:  Well tolerated by patient. Plan:     Plan of Care:     Buttocks: Cleanse with foam cleanser. Apply triad cream twice daily and as needed    -Turn every 2 hours    -Float heels off of bed with pillows under calves. If needed- use offloading boots.    -Sacral foam dressing to sacrococcygeal area. Apply skin barrier wipe to skin first to help adherence. Peel back dressing to inspect skin beneath qshift, re-secure. Change every 72 hours and prn wrinkles, soilage. Discontinue if repeatedly soiled by incontinence.     -Routine incontinence care with foaming cleanser and zinc oxide cream. Apply zinc oxide cream twice daily and prn incontinence. Use moisture wicking under pad (one layer only). -Waffle mattress overlay. Check inflation each shift by sliding hand under the air overlay. Feel for the patient's heaviest/ most dependant body part. Ideally 1/2 to 1\" of air will be between your hand and the patient's body. Add air prn.     Specialty Bed Required : Yes   [] Low Air Loss   [x] Pressure Redistribution  [] Fluid Immersion  [] Bariatric  [] Total Pressure Relief  [] Other:     Current Diet: Diet NPO  ADULT TUBE FEEDING; Nasogastric; Peptide Based High Protein; Continuous; 20; Yes; 10; Q 4 hours; 60; 30; Q 4 hours  Dietician consult:  Yes    Discharge Plan:  Placement for patient upon discharge: home with support   Patient appropriate for Outpatient [address]: N/A    Patient/Caregiver Teaching:  Level of patientunderstanding able to:     [x] Indicates understanding       [x] Needs reinforcement  [] Unsuccessful      [] Verbal Understanding  [] Demonstrated understanding       [] No evidence of learning  [] Refused teaching         [] N/A       Electronically signed by Alverto Murphy RN on  5/6/2022 at 2:05 PM

## 2022-05-06 NOTE — PROGRESS NOTES
(%)  Position: [facility]  Humidification Source: HME  Cuff Pressure (cm H2O): 30 cm H2O  Skin Barrier Applied: No       ABGs:   Lab Results   Component Value Date    PHART 7.472 05/06/2022    PO2ART 70.9 05/06/2022    EOT3PKV 38.9 05/06/2022       Lab Results   Component Value Date    MODE PRVC 05/06/2022         Medications   IV   sodium chloride      propofol 40 mcg/kg/min (05/06/22 0910)      cefTRIAXone (ROCEPHIN) IV  1,000 mg IntraVENous Q24H    azithromycin  500 mg IntraVENous Q24H    sodium chloride flush  5-40 mL IntraVENous 2 times per day    dexamethasone  6 mg IntraVENous Q24H    enoxaparin  40 mg SubCUTAneous Daily    pantoprazole (PROTONIX) 40 mg injection  40 mg IntraVENous Daily    chlorhexidine  15 mL Mouth/Throat BID       Diet/Nutrition   Diet NPO    Exam   VITALS    height is 5' 10.05\" (1.779 m) and weight is 132 lb 4.4 oz (60 kg). His oral temperature is 97.7 °F (36.5 °C). His blood pressure is 128/58 (abnormal) and his pulse is 80. His respiration is 20 and oxygen saturation is 97%. Ventilator Settings (Basic)  Vent Mode: PRVC Resp Rate (Set): 20 bmp/Vt (Set, mL): 500 mL/ /FiO2 : 40 %    Constitutional - Sedated  General Appearance  well developed, well nourished  HEENT - Life support devices in place (ET,),normocephalic, atraumatic. Lungs - Chest expands equally, no wheezes, rales or rhonchi. Cardiovascular - Heart sounds are normal.  normal rate and rhythm regular, no murmur, gallop or rub.   Abdomen - soft, nontender  Extremities - no cyanosis, clubbing     Lab Results   CBC     Lab Results   Component Value Date    WBC 11.5 05/06/2022    RBC 3.99 05/06/2022    HGB 13.0 05/06/2022    HCT 38.7 05/06/2022     05/06/2022    MCV 97.1 05/06/2022    MCH 32.5 05/06/2022    MCHC 33.4 05/06/2022    RDW 13.6 05/06/2022    LYMPHOPCT 1 05/06/2022    MONOPCT 3 05/06/2022    BASOPCT 0 05/06/2022    MONOSABS 0.35 05/06/2022    LYMPHSABS 0.12 05/06/2022    EOSABS 0.00 05/06/2022 BASOSABS 0.00 05/06/2022    DIFFTYPE NOT REPORTED 08/25/2021       BMP   Lab Results   Component Value Date     05/06/2022    K 3.9 05/06/2022    CL 98 05/06/2022    CO2 23 05/06/2022    BUN 13 05/06/2022    CREATININE <0.40 05/06/2022    GLUCOSE 140 05/06/2022    CALCIUM 8.9 05/06/2022       LFTS  Lab Results   Component Value Date    ALKPHOS 137 05/05/2022    ALT 46 05/05/2022    AST 30 05/05/2022    PROT 7.5 05/05/2022    BILITOT 0.66 05/05/2022    LABALBU 3.0 05/05/2022       INR  Recent Labs     05/05/22 0842   PROTIME 14.6   INR 1.1       APTT  Recent Labs     05/05/22 0842   APTT 40.8*       Lactic Acid  No results found for: LACTA     BNP   No results for input(s): BNP in the last 72 hours. Cultures   Blood cultures x2, April 5, negative to date  Sputum culture pending    Radiology     Plain Films         Chest x-ray reveals tracheal tube in fair position. SYSTEM ASSESSMENT    IMPRESSION:  1. Acute respiratory failure with hypoxemia. 2.  Severe ALS. 3.  History of coronary artery disease status post bypass graft surgery  10/2016. 4.  Malnutrition. 5.  Severe debilitation. 6.  Hypertension. 7.  Concern for bacterial pneumonia    Neuro   Patient's awake and alert    Respiratory   On vent support    Hemodynamics   Not on any pressors    Gastrointestinal/Nutrition     Nutrition support for enteric feeds  Renal       Infectious Disease   On Zithromax on Rocephin on Decadron  Actemra given    Hematology/Oncology   On DVT prophylaxis with Lovenox. Patient's D-dimer normal    Endocrine       Social/Spiritual/DNR/Disposition/Other     Omer Stevenson (Spouse)   [phone] [facility] Phone)    Patient family has respected requested one of the [facility] general surgeons to place the trach next week.   They are having ultimate plans for home ventilator support    Critical Care Time   45 min    Electronically signed by Drea Caputo MD on 5/6/2022 at 10:38 AM

## 2022-05-06 NOTE — PROGRESS NOTES
MD Ivan Justice [facility]  notified about decrease urine output.  See orders

## 2022-05-06 NOTE — PLAN OF CARE
Problem: Discharge Planning  Goal: Discharge to home or other facility with appropriate resources  Outcome: Progressing     Problem: Skin/Tissue Integrity  Goal: Absence of new skin breakdown  Description:Monitor for areas of redness and/or skin breakdown  Outcome: Progressing  Note: Patient turned and repositioned every 2 hours and as needed for comfort. Skin kept clean and dry.      Problem: Pain  Goal: Verbalizes/displays adequate comfort level or baseline comfort level  Outcome: Progressing     Problem: Safety - Adult  Goal: Free from fall injury  Outcome: Progressing     Problem: Respiratory - Adult  Goal: Achieves optimal ventilation and oxygenation  Outcome: Progressing

## 2022-05-06 NOTE — H&P
[address] Internal Medicine  Sam Hodges MD; Jose Luis Rich MD; Dayana Vergara MD; MD Joni Harris MD; MD [facility]   [facility]    HISTORY AND PHYSICAL EXAMINATION            Date:   5/6/2022  Patient name:  Renay Desai  Date of admission:  5/5/2022  8:35 AM  MRN:   [id number]  Account:  [de-identified]  YOB: 1950  PCP:    Jorge A Aguilar MD  Room:   [de-identified]  Code Status:    Full Code    Chief Complaint:     Chief Complaint   Patient presents with    Shortness of Breath   dyspnea    History Obtained From:     Medical record and nursing staff  Pt is intubated sedated    History of Present Illness:     Renay Desai is a 74 y.o. Non- / non  male who presents with Shortness of Breath   and is admitted to the hospital for the management of Respiratory failure ([facility] [address] 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 well-known to pulmonary team admitted with increasing cough shortness of breath for 3 to 4 days chest x-ray shows bilateral infiltrate elevated procalcitonin elevated leukocytosis patient was tested positive for COVID-19 pneumonia on May 5  Acute respiratory failure secondary to ALS with multifocal COVID-19 pneumonia intubated and sedated treated in the intensive care unit    Past Medical History:     Past Medical History:   Diagnosis Date    ALS (amyotrophic lateral sclerosis) ([facility] [address]     Aortic root aneurysm ([facility] [address] 11/08/2016    Aortic valve replaced 11/08/2016    Hypertension         Past Surgical History:     Past Surgical History:   Procedure Laterality Date    CARDIOVERSION  06/27/2020    w/    HERNIA REPAIR      PARATHYROID GLAND SURGERY  1980        Medications Prior to Admission:     Prior to Admission medications    Medication Sig Start Date End Date Taking?  Authorizing Provider neomycin-bacitracin-polymyxin (NEOSPORIN) 3.5-400-5000 OINT ointment Apply topically 3 times daily Apply topically 4 times daily. 4/4/22   Cindy Hurtado MD   Handicap Placard MISC by Does not apply route Dx: ALS    Duration: 5 years 3/23/22   Cindy Hurtado MD   medical marijuana Place under the tongue 2 times daily. Historical Provider, MD   acetaminophen (TYLENOL) 325 MG tablet Take 650 mg by mouth every 6 hours as needed 9/25/20   Historical Provider, MD   fluticasone [facility]) 50 MCG/ACT nasal spray 1 spray by Nasal route daily 6/8/21   Cindy Hurtado MD   SPIRULINA PO Take by mouth    Historical Provider, MD   Vitamin E 100 UNITS TABS Take 60 Units by mouth daily    Historical Provider, MD   SELENIUM PO Take 75 mcg by mouth daily    Historical Provider, MD   SOYA LECITHIN PO Take 1,000 mg by mouth daily    Historical Provider, MD   Multiple Vitamins-Minerals (THERAPEUTIC MULTIVITAMIN-MINERALS) tablet Take 1 tablet by mouth daily    Historical Provider, MD   OMEGA-3 FATTY ACIDS-VITAMIN E PO Take 1 tablet by mouth daily    Historical Provider, MD   Probiotic Product (PROBIOTIC PO) Take 1 tablet by mouth daily    Historical Provider, MD   Ascorbic Acid (VITAMIN C) 500 MG tablet Take 120 mg by mouth daily    Historical Provider, MD        Allergies:     Ciprofloxacin, Lasix [furosemide], Penicillins, Sulfa antibiotics, and Duloxetine    Social History:     Tobacco:    reports that he has never smoked. He has never used smokeless tobacco.  Alcohol:      reports current alcohol use of about 1.0 standard drink of alcohol per week. Drug Use:  reports no history of drug use.     Family History:     Family History   Problem Relation Age of Onset    Lung Cancer Mother     Heart Disease Father        Review of Systems:     Unable to get ros  Pt untubated and sedated    Physical Exam:   /64   Pulse 83   Temp 97.7 °F (36.5 °C) (Oral)   Resp 20   Ht 5' 10.05\" (1.779 m)   Wt 132 lb 4.4 oz (60 kg) SpO2 99%   BMI 18.95 kg/m²   Temp (24hrs), Av °F (36.7 °C), Min:97.7 °F (36.5 °C), Max:98.2 °F (36.8 °C)    No results for input(s): POCGLU in the last 72 hours.     Intake/Output Summary (Last 24 hours) at 2022 1457  Last data filed at 2022 1332  Gross per 24 hour   Intake 693.22 ml   Output 1300 ml   Net -606.78 ml       General Appearance:intubated sedated    Mental status: alert responds by nodding to questions  On vent intubated  Head: normocephalic, atraumatic  Eye: no icterus, redness, pupils equal and reactive, extraocular eye movements intact, conjunctiva clear  Ear: normal external ear, no discharge, hearing intact  Nose: no drainage noted  Mouth: mucous membranes moist  Neck: supple, no carotid bruits, thyroid not palpable  Lungs: on vent  Crackles  Cardiovascular: normal rate, regular rhythm, no murmur, gallop, rub  Abdomen: Soft, nontender, nondistended, normal bowel sounds, no hepatomegaly or splenomegaly  Neurologic: ALS  Skin: No gross lesions, rashes, bruising or bleeding on exposed skin area  Extremities: peripheral pulses palpable, no pedal edema or calf pain with palpation      Investigations:      Laboratory Testing:  Recent Results (from the past 24 hour(s))   Ferritin    Collection Time: 22  3:52 AM   Result Value Ref Range    Ferritin 734 (H) 30 - 400 ng/mL   Lactate Dehydrogenase    Collection Time: 22  3:52 AM   Result Value Ref Range     (H) 135 - 225 U/L   CBC with Auto Differential    Collection Time: 22  3:59 AM   Result Value Ref Range    WBC 11.5 (H) 3.5 - 11.0 k/uL    RBC 3.99 (L) 4.5 - 5.9 m/uL    Hemoglobin 13.0 (L) 13.5 - 17.5 g/dL    Hematocrit 38.7 (L) 41 - 53 %    MCV 97.1 80 - 100 fL    MCH 32.5 26 - 34 pg    MCHC 33.4 31 - 37 g/dL    RDW 13.6 11.5 - 14.9 %    Platelets 235 002 - 358 k/uL    MPV 7.3 6.0 - 12.0 fL    Seg Neutrophils 94 (H) 36 - 66 %    Lymphocytes 1 (L) 24 - 44 %    Monocytes 3 1 - 7 %    Eosinophils % 0 0 - 4 %    Basophils 0 0 - 2 %    Bands 2 0 - 10 %    Segs Absolute 10.80 (H) 1.3 - 9.1 k/uL    Absolute Lymph # 0.12 (L) 1.0 - 4.8 k/uL    Absolute Mono # 0.35 0.1 - 1.3 k/uL    Absolute Eos # 0.00 0.0 - 0.4 k/uL    Basophils Absolute 0.00 0.0 - 0.2 k/uL    Absolute Bands # 0.23 0.0 - 1.0 k/uL    Morphology Normal    Basic Metabolic Panel w/ Reflex to MG    Collection Time: 05/06/22  3:59 AM   Result Value Ref Range    Glucose 140 (H) 70 - 99 mg/dL    BUN 13 8 - 23 mg/dL    CREATININE <0.40 (L) 0.70 - 1.20 mg/dL    Calcium 8.9 8.6 - 10.4 mg/dL    Sodium 137 135 - 144 mmol/L    Potassium 3.9 3.7 - 5.3 mmol/L    Chloride 98 98 - 107 mmol/L    CO2 23 20 - 31 mmol/L    Anion Gap 16 9 - 17 mmol/L    GFR Non-African American Can not be calculated >60 mL/min    GFR  Can not be calculated >60 mL/min    GFR Comment         BLOOD GAS, ARTERIAL    Collection Time: 05/06/22  5:42 AM   Result Value Ref Range    pH, Arterial 7.472 (H) 7.350 - 7.450    pCO2, Arterial 38.9 35.0 - 45.0 mmHg    pO2, Arterial 70.9 (L) 80.0 - 100.0 mmHg    HCO3, Arterial 28.4 (H) 22.0 - 26.0 mmol/L    Positive Base Excess, Art 4.7 (H) 0.0 - 2.0 mmol/L    O2 Sat, Arterial 93.6 (L) 95 - 98 %    Carboxyhemoglobin 0.2 0 - 5 %    Methemoglobin 0.9 0.0 - 1.9 %    Pt Temp 37     O2 Device/Flow/% VENTILATOR     Respiratory Rate 20     Rosalino Test PASS     Sample Site Left Radial Artery     Pt. Position SEMI-FOWLERS     Mode PRVC     Set Rate 20     Total Rate 20          FIO2 40     Peep/Cpap 7     Text for Respiratory RESULTS GIVEN TO RN    Culture, Respiratory    Collection Time: 05/06/22  8:30 AM    Specimen: Endotracheal   Result Value Ref Range    Specimen Description . ENDOTRACHEAL     Direct Exam < 10 EPITHELIAL CELLS/LPF     Direct Exam <10 NEUTROPHILS/LPF     Direct Exam NO SIGNIFICANT PATHOGENS SEEN     Culture PENDING        Imaging/Diagnostics:  XR CHEST PORTABLE    Result Date: 5/6/2022  1.   New right arm PICC extending toward SVC, but distal tip is obscured by overlying pacemaker leads. 2.  Endotracheal tube and enteric tube remain in place. 3.  Unchanged right basilar consolidation and small right pleural effusion. Mild patchy left basilar opacities. XR CHEST PORTABLE    Result Date: 5/5/2022  Endotracheal tube in satisfactory position above the chance NG tip and side-port in gastric fundus Otherwise, unchanged chest demonstrating bibasilar opacities suggesting atelectasis or infection     XR CHEST PORTABLE    Result Date: 5/5/2022  Bibasilar opacities with blunting of right costophrenic angle possibly combination of pneumonia and small right pleural effusion. There is what appears to be a left chest tube terminating in the periphery of the upper lung partly obscured by pacemaker electrode. Please correlate clinically. No recent comparisons. Assessment :      Hospital Problems           Last Modified POA    * (Principal) Respiratory failure ([address] 5/5/2022 Yes          Plan:     47-year-old gentleman with a history of ALS  Admitted for multifocal COVID-19 pneumonia with acute respiratory failure  Possible superimposed bacterial pneumonia with underlying neuromuscular disorder of ALS  Acute respiratory failure secondary to ALS plus COVID-19 pneumonia intubated sedated  On IV Rocephin and Zithromax for superimposed infection  For COVID-19 patient received Actemra  Recheck inflammatory markers chest x-ray  Patient care discussed in ICU with critical care attending overall prognosis is poor family is interested in full code and tracheostomy  Consult surg dr Suha Lin MD  5/6/2022  2:57 PM    Copy sent to Dr. Jdae Thomason MD    Please note that this chart was generated using voice recognition Dragon dictation software. Although every effort was made to ensure the accuracy of this automated transcription, some errors in transcription may have occurred.

## 2022-05-06 NOTE — PLAN OF CARE
Problem: Discharge Planning  Goal: Discharge to home or other facility with appropriate resources  Outcome: Progressing     Problem: Skin/Tissue Integrity  Goal: Absence of new skin breakdown  Description: 1. Monitor for areas of redness and/or skin breakdown  2. Assess vascular access sites hourly  3. Every 4-6 hours minimum:  Change oxygen saturation probe site  4. Every 4-6 hours:  If on nasal continuous positive airway pressure, respiratory therapy assess nares and determine need for appliance change or resting period. Outcome: Progressing  Note: On q2 turn schedule. Preventative mepilex placed. Problem: Pain  Goal: Verbalizes/displays adequate comfort level or baseline comfort level  Outcome: Progressing     Problem: Safety - Adult  Goal: Free from fall injury  Outcome: Progressing  Note: Hourly rounds performed. No falls this shift. Problem: Respiratory - Adult  Goal: Achieves optimal ventilation and oxygenation  5/6/2022 1747 by Faheem Whitehead RN  Outcome: Progressing  Flowsheets (Taken 5/6/2022 1747)  Achieves optimal ventilation and oxygenation:   Assess for changes in respiratory status   Assess for changes in mentation and behavior   Position to facilitate oxygenation and minimize respiratory effort   Oxygen supplementation based on oxygen saturation or arterial blood gases  Note: Patient remains on ventilator. 5/6/2022 0849 by Junior Randy RCP  Outcome: Progressing     Problem: Nutrition Deficit:  Goal: Optimize nutritional status  Outcome: Progressing  Note: Nutrition started today with tube feeds. Dietician consulted.

## 2022-05-06 NOTE — PROGRESS NOTES
[facility] -   Infectious diseases evaluation  admission date 5/5/2022    reason for consultation:   COVID-19 infection    Impression :   Current:  · COVID-19 with suspected superimposed bacterial pneumonia. · Acute hypoxic respiratory failure required intubation. · Severe ALS  · History of coronary artery disease status post CABG 2016  · Hypertension  · Penicillin, sulfa and Cipro allergy      Recommendations   · Continue Rocephin and Zithromax  · Respiratory culture pending  · Follow blood cultures  · Decadron  · He received Actemra 5/5/2022  · Follow inflammatory markers  · Follow chest x-ray  · Continue supportive care  · Droplet plus isolation through 5/10/2022        History of Present Illness:   Initial history:  Abby Ryder is a 74y.o.-year-old male with history of severe ALS who presented to the hospital with worsening cough and shortness of for 3 days prior to admission. Reportedly cough started around 4/20/2022. Chest x-ray showed bilateral infiltrates  Initial labs showed a procalcitonin level of 35.19, last WBC 12.8, creatinine less than 0.4, C-reactive protein 259, D-dimer 0.43  5/5/22 COVID-19 PCR positive  Urinalysis showed 0-2 WBC. The patient was admitted to ICU, was intubated. The patient is intubated, unable to provide history that was obtained from chart review and nursing staff. Mild amount of respiratory secretion, Brunner catheter in place. PICC line in place  The patient was exposed to COVID-19 positive person on 4/16/2022 reportedly. The patient had a positive COVID 19 test at home. Interval changes  5/6/2022   He was seen at the ICU, remains intubated, mild amount of respiratory secretions, afebrile. PICC line was placed in the right arm. Chest x-ray from earlier today showed unchanged right basilar consolidation with a small effusion.   Patient Vitals for the past 8 hrs:   BP Temp Temp src Pulse Resp SpO2   05/06/22 1400 129/64 -- -- 83 20 --   05/06/22 1330 (!) 100/55 -- -- 72 20 --   05/06/22 1300 (!) 95/56 -- -- 76 20 --   05/06/22 1230 133/70 -- -- 89 21 99 %   05/06/22 1220 -- -- -- 82 20 98 %   05/06/22 1200 (!) 107/54 -- -- 65 20 96 %   05/06/22 1130 (!) 104/56 -- -- 69 20 --   05/06/22 1100 (!) 103/52 -- -- 70 20 --   05/06/22 1030 120/68 -- -- 85 20 96 %   05/06/22 1000 (!) 128/58 -- -- 80 20 97 %   05/06/22 0930 137/67 -- -- 85 22 98 %   05/06/22 0900 (!) 99/52 -- -- 79 20 96 %   05/06/22 0847 -- -- -- 81 20 98 %   05/06/22 0830 (!) 83/56 -- -- 72 20 95 %   05/06/22 0800 110/70 97.7 °F (36.5 °C) Oral 72 20 96 %   05/06/22 0730 (!) 159/76 -- -- 75 22 100 %   05/06/22 0700 118/62 -- -- 64 20 --           I have personally reviewed the past medical history, past surgical history, medications, social history, and family history, and I haveupdated the database accordingly. Allergies:   Ciprofloxacin, Lasix [furosemide], Penicillins, Sulfa antibiotics, and Duloxetine     Review of Systems:     Review of Systems  Intubated, unable to provide  Physical Examination :       Physical Exam  Constitutional:       Appearance: He is ill-appearing. Comments: Intubated   HENT:      Head: Normocephalic and atraumatic. Right Ear: External ear normal.      Left Ear: External ear normal.   Eyes:      General:         Right eye: No discharge. Left eye: No discharge. Cardiovascular:      Rate and Rhythm: Normal rate and regular rhythm. Heart sounds: No murmur heard. Pulmonary:      Comments: Bilateral crackles  Abdominal:      General: There is no distension. Palpations: Abdomen is soft. Musculoskeletal:      Cervical back: Neck supple. No rigidity. Right lower leg: No edema. Left lower leg: No edema. Skin:     Coloration: Skin is not jaundiced. Findings: No bruising or erythema.          Past Medical History:     Past Medical History:   Diagnosis Date    [facility] ([facility]     Aortic root aneurysm ([facility] 11/08/2016    Aortic valve replaced 11/08/2016    Hypertension        Past Surgical  History:     Past Surgical History:   Procedure Laterality Date    CARDIOVERSION  06/27/2020    w/    HERNIA REPAIR      PARATHYROID GLAND SURGERY  1980       Medications:      cefTRIAXone (ROCEPHIN) IV  1,000 mg IntraVENous Q24H    azithromycin  500 mg IntraVENous Q24H    sodium chloride  500 mL IntraVENous Once    sodium chloride flush  5-40 mL IntraVENous 2 times per day    dexamethasone  6 mg IntraVENous Q24H    enoxaparin  40 mg SubCUTAneous Daily    pantoprazole (PROTONIX) 40 mg injection  40 mg IntraVENous Daily    chlorhexidine  15 mL Mouth/Throat BID       Social History:     Social History     Socioeconomic History    Marital status:      Spouse name: Not on file    Number of children: Not on file    Years of education: Not on file    Highest education level: Not on file   Occupational History    Not on file   Tobacco Use    Smoking status: Never Smoker    Smokeless tobacco: Never Used   Vaping Use    Vaping Use: Never used   Substance and Sexual Activity    Alcohol use: Yes     Alcohol/week: 1.0 standard drink     Types: 1 Cans of beer per week    Drug use: No    Sexual activity: Not on file   Other Topics Concern    Not on file   Social History Narrative    Not on file     Social Determinants of Health     Financial Resource Strain:     Difficulty of Paying Living Expenses: Not on file   Food Insecurity:     Worried About Running Out of Food in the Last Year: Not on file    Fabio of Food in the Last Year: Not on file   Transportation Needs:     Lack of Transportation (Medical): Not on file    Lack of Transportation (Non-Medical):  Not on file   Physical Activity:     Days of Exercise per Week: Not on file    Minutes of Exercise per Session: Not on file   Stress:     Feeling of Stress : Not on file   Social Connections:     Frequency of Communication with Friends and Family: Not on file    Frequency of Social Gatherings with Friends and Family: Not on file    Attends Rastafarian Services: Not on file    Active Member of Clubs or Organizations: Not on file    Attends Club or Organization Meetings: Not on file    Marital Status: Not on file   Intimate Partner Violence:     Fear of Current or Ex-Partner: Not on file    Emotionally Abused: Not on file    Physically Abused: Not on file    Sexually Abused: Not on file   Housing Stability:     Unable to Pay for Housing in the Last Year: Not on file    Number of [place] in the Last Year: Not on file    Unstable Housing in the Last Year: Not on file       Family History:     Family History   Problem Relation Age of Onset    Lung Cancer Mother     Heart Disease Father       Medical Decision Making:   I have independently reviewed/ordered the following labs:    CBC with Differential:   Recent Labs     05/05/22  0842 05/06/22  0359   WBC 12.8* 11.5*   HGB 13.9 13.0*   HCT 39.2* 38.7*    343   LYMPHOPCT 8* 1*   MONOPCT 7 3     BMP:  Recent Labs     05/05/22  0842 05/06/22  0359   * 137   K 3.9 3.9   CL 96* 98   CO2 23 23   BUN 12 13   CREATININE <0.40* <0.40*     Hepatic Function Panel:   Recent Labs     05/05/22  0842   PROT 7.5   LABALBU 3.0*   BILITOT 0.66   ALKPHOS 137*   ALT 46*   AST 30     No results for input(s): RPR in the last 72 hours. No results for input(s): HIV in the last 72 hours. No results for input(s): BC in the last 72 hours. Lab Results   Component Value Date    CREATININE <0.40 05/06/2022    GLUCOSE 140 05/06/2022       Detailed results: Thank you for allowing us to participate in the care of this patient. Please call with questions.     This note is created with the assistance of a speech recognition program.  While intending to generate adocument that actually reflects the content of the visit, the document can still have some errors including those of syntax and sound a like substitutions which may escape proof reading. It such instances, actual meaningcan be extrapolated by contextual diversion.     Jani Sullivan MD  Office: [phone]  Perfect serve / office [phone]

## 2022-05-06 NOTE — FLOWSHEET NOTE
05/06/22 4134   Encounter Summary   Support System Worship/carloz community  ([address] [facility])   Plan and Referrals   Plan/Referrals Contacted support as requested per patient/family request Plan  ([address] [facility])

## 2022-05-06 NOTE — ACP (ADVANCE CARE PLANNING)
Advance Care Planning     Advance Care Planning Activator (Inpatient)  Conversation Note      Date of ACP Conversation: 5/6/2022     Conversation Conducted with: Pt's Wife, Varinder Barnes    ACP Activator: Yuri Taylor RN      Health Care Decision Maker:     Current Designated Health Care Decision Maker:     Primary Decision Maker: Virgil Claire,  Spouse, [phone]    Secondary Decision Maker: Campbell Christianson, Child, [address] [place] [address]    Ventilation: \"If you were in your present state of health and suddenly became very ill and were unable to breathe on your own, what would your preference be about the use of a ventilator (breathing machine) if it were available to you? \"      Would the patient desire the use of ventilator (breathing machine)?: yes    \"If your health worsens and it becomes clear that your chance of recovery is unlikely, what would your preference be about the use of a ventilator (breathing machine) if it were available to you? \"     Would the patient desire the use of ventilator (breathing machine)?: Yes      Resuscitation  \"CPR works best to restart the heart when there is a sudden event, like a heart attack, in someone who is otherwise healthy. Unfortunately, CPR does not typically restart the heart for people who have serious health conditions or who are very sick. \"    \"In the event your heart stopped as a result of an underlying serious health condition, would you want attempts to be made to restart your heart (answer \"yes\" for attempt to resuscitate) or would you prefer a natural death (answer \"no\" for do not attempt to resuscitate)? \" yes       [] Yes   [] No   Educated Patient / Marisa Shoemaker regarding differences between Advance Directives and portable DNR orders.     Length of ACP Conversation in minutes:      Conversation Outcomes:  [x] ACP discussion completed  [] Existing advance directive reviewed with patient; no changes to patient's previously recorded wishes  [] New Advance Directive completed  [] Portable Do Not Rescitate prepared for Provider review and signature  [] POLST/POST/MOLST/MOST prepared for Provider review and signature      Follow-up plan:    [] Schedule follow-up conversation to continue planning  [] Referred individual to Provider for additional questions/concerns   [] Advised patient/agent/surrogate to review completed ACP document and update if needed with changes in condition, patient preferences or care setting    [x] This note routed to one or more involved healthcare providers

## 2022-05-06 NOTE — FLOWSHEET NOTE
05/05/22 1921   Treatment Team Notification   Reason for Communication Review case   Team Member Name Dr. Karla Nunn Provider   Method of Communication Secure Message   Response Waiting for response   Notification Time 1921     MD notified pt does not have antibiotics ordered.

## 2022-05-06 NOTE — DISCHARGE INSTR - COC
Continuity of Care Form    Patient Name: Krzysztof Gavin   :  1950  MRN:  [id number]    Admit date:  2022  Discharge date:  ***    Code Status Order: Full Code   Advance Directives:      Admitting Physician:  Gavino Lo MD  PCP: Kapil Contreras MD    Discharging Nurse: [facility] Unit/Room#:   Discharging Unit Phone Number: ***    Emergency Contact:   Extended Emergency Contact Information  Primary Emergency Contact: Sheri Simmons  Address: [address] [place] [address], [address] [place] [address] Phone: [phone]  Relation: Spouse  Secondary Emergency Contact: [address] Phone: [phone]  Relation: Child    Past Surgical History:  Past Surgical History:   Procedure Laterality Date    CARDIOVERSION  2020    w/    HERNIA REPAIR      PARATHYROID GLAND SURGERY         Immunization History:   Immunization History   Administered Date(s) Administered    Influenza Virus Vaccine 2011, 2011, 10/05/2016    Influenza, High Dose (Fluzone 65 yrs and older) 10/05/2016, 2017    Pneumococcal Conjugate 13-valent (Qircqyg56) 2015    Pneumococcal Polysaccharide (Avjcvhuef39) 2012    Tdap (Boostrix, Adacel) 2009       Active Problems:  Patient Active Problem List   Diagnosis Code    Amyotrophic lateral sclerosis (ALS) ([facility] [address] G12.21    Muscle spasm M62.838    Macrocytosis D75.89    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    Spinal stenosis of lumbar region M48.061    Calculus of kidney and ureter N20.2    Hypoglycemia X88.5    Diastolic dysfunction Z62.10    Bilateral enlargement of atria I51.7    Mitral valve regurgitation I34.0    Thoracic aortic aneurysm, without rupture (HCC) I71.2    Paroxysmal atrial fibrillation (HCC) I48.0    Aortic valve disorder I35.9    Bilateral hearing loss H91.93    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 31XD    Tinnitus of both ears H93.13    Concussion with loss of consciousness S06. 0X9A    Supraventricular beat, premature I49.1    Varicose veins of both lower extremities I83.93    Intervertebral disc prolapse with impingement [id number]    Obstructive sleep apnea G47.33    Prostatitis N41.9    Stress F43.9    Dermatitis L30.9    Weakness of both lower extremities R29.898    Insulin resistance E88.81    At high risk for falls Z91.81    Other specified diseases of blood and blood-forming organs D75.89    S/P CABG (coronary artery bypass graft) Z95.1    Cardiac segmental configuration with atrial configuration unknown, ventricular configuration unknown, and inverted normally aligned great arteries Q20.8, Q20.9    Vitamin A deficiency E50.9    Fatigue R53.83    Muscular atrophy M62.50    Cervical stenosis of spine M48.02    Diarrhea R19.7    Atrial flutter (HCC) I48.92    Tachy-ralf syndrome ([facility]) I49.5    Anticoagulated on Coumadin Z79.01    History of permanent cardiac pacemaker placement 7/1/2020:   Z95.0    Cardiac abnormality Q24.9    Severe malnutrition ([facility]) E43    SOB (shortness of breath) R06.02    Monoplegia of left leg ([facility]) G83.14    Malfunction of percutaneous endoscopic gastrostomy (PEG) tube ([facility]) K94.23    Post-nasal drip R09.82    Physical deconditioning R53.81    Infection of PEG site ([facility]) K94.22    Rash in adult R21    Respiratory failure ([facility]) J96.90       Isolation/Infection:   Isolation            Droplet Plus Patient Infection Status       Infection Onset Added Last Indicated Last Indicated By Review Planned Expiration Resolved Resolved By    COVID-19 05/05/22 05/05/22 05/05/22 COVID-19 & Influenza Combo 05/12/22 05/19/22      Resolved    COVID-19 (Rule Out) 05/05/22 05/05/22 05/05/22 COVID-19 & Influenza Combo (Ordered)   05/05/22 Rule-Out Test Resulted    COVID-19 (Rule Out) 09/20/20 09/21/20 09/20/20 Covid-19 Ambulatory (Ordered)   09/23/20 Rule-Out Test Resulted    C-diff Rule Out 06/25/20 06/25/20 06/15/20 Clostridium Difficile Toxin/Antigen (Ordered)   06/25/20 Rule-Out Test Resulted            Nurse Assessment:  Last Vital Signs: /64   Pulse 83   Temp 97.7 °F (36.5 °C) (Oral)   Resp 20   Ht 5' 10.05\" (1.779 m)   Wt 132 lb 4.4 oz (60 kg)   SpO2 99%   BMI 18.95 kg/m²     Last documented pain score (0-10 scale): Pain Level: 6 (cpot)  Last Weight:   Wt Readings from Last 1 Encounters:   05/05/22 132 lb 4.4 oz (60 kg)     Mental Status:  oriented and alert    IV Access:  - PICC - site  R Upper Arm, insertion date: 5/5/2022    Nursing Mobility/ADLs:  Walking   Dependent  Transfer  Dependent  Bathing  Dependent  Dressing  Dependent  Toileting  Dependent  Feeding  Dependent  Med Admin  Dependent  Med Delivery   crushed    Wound Care Documentation and Therapy:  Wound 05/05/22 Coccyx Posterior (Active)   Wound Image   05/06/22 1211   Wound Etiology Other 05/06/22 1211   Dressing Status Old drainage noted;New dressing applied 05/06/22 1211   Wound Cleansed Other (Comment) 05/06/22 1211   Dressing/Treatment Triad hydro/zinc oxide-based hydrophilic paste 85/42/73 8432   Wound Assessment Pink/red;Denuded 05/06/22 1211   Drainage Amount Scant 05/06/22 1211   Drainage Description Serosanguinous 05/06/22 1211   Odor None 05/06/22 1211   Kelly-wound Assessment Fragile;Dry/flaky 05/06/22 1211   Margins Attached edges 05/06/22 1211   Number of days: 0       Incision 07/01/20 Chest Left; Anterior (Active)   Number of days: 674        Buttocks: Cleanse with foam cleanser. Apply triad cream twice daily and as needed    Elimination:  Continence: Bowel: No  Bladder: No  Urinary Catheter: Insertion Date: 5/52022    Colostomy/Ileostomy/Ileal Conduit: No       Date of Last BM: unknown    Intake/Output Summary (Last 24 hours) at 5/6/2022 1413  Last data filed at 5/6/2022 1332  Gross per 24 hour   Intake 848.34 ml   Output 1300 ml   Net -451.66 ml     I/O last 3 completed shifts: In: 626.7 [I.V.:365.3; IV Piggyback:261.4]  Out: 1300 [Urine:1100; Emesis/NG output:200]    Safety Concerns:     Aspiration Risk    Impairments/Disabilities:      Language Barrier - trached and Paralysis - ALS    Nutrition Therapy:  Current Nutrition Therapy:   - Tube Feedings:  Low Calorie/High Protein    Routes of Feeding: Gastrostomy Tube  Liquids: No Restrictions  Daily Fluid Restriction: no  Last Modified Barium Swallow with Video (Video Swallowing Test): not done    Treatments at the Time of Hospital Discharge:   Respiratory Treatments: ***  Oxygen Therapy:   Ventilator  Ventilator:    - Ventilator Settings:    Vt (Set, mL): 500 mL  Resp Rate (Set): 20 bmp  FiO2 : 30 %    PEEP/CPAP (cmH2O): 10  Pressure Support: 12 cmH20    Rehab Therapies: Physical Therapy and Occupational Therapy  Weight Bearing Status/Restrictions: No weight bearing restrictions  Other Medical Equipment (for information only, NOT a DME order):  hospital bed  Other Treatments: Skilled Nursing assessment and monitoring. Medication education and monitoring per protocol.       Patient's personal belongings (please select all that are sent with patient):  None    RN SIGNATURE:  Electronically signed by Megan Benito RN on 5/17/22 at 2:05 PM EDT    CASE MANAGEMENT/SOCIAL WORK SECTION    Inpatient Status Date: ***    Readmission Risk Assessment Score:  Readmission Risk              Risk of Unplanned Readmission:  13           Discharging to Facility/ [address] [address] [address] [place] [address], [address] [place] [address]  Phone: [phone]  Fax: [phone]       Dialysis Facility (if applicable)   Name:  Address:  Dialysis Schedule:  Phone:  Fax:    / signature: Electronically signed by Shayna Mtz RN on 5/10/22 at 9:17 AM EDT    PHYSICIAN SECTION    Prognosis: {Prognosis:0121639278}    Condition at Discharge: [address] [place] [address] Patient Condition:024446277}    Rehab Potential (if transferring to Rehab): {Prognosis:9957147901}    Recommended Labs or Other Treatments After Discharge: ***    Physician Certification: I certify the above information and transfer of Vinayak Mcgowan  is necessary for the continuing treatment of the diagnosis listed and that he requires {Admit to Appropriate Level of Care:66550} for {GREATER/LESS:130091140} 30 days.      Update Admission H&P: {CHP DME Changes in DHQSP:004340869}    PHYSICIAN SIGNATURE:  Electronically signed by Tracey Esparza MD on 5/13/22 at 1:07 PM EDT

## 2022-05-07 ENCOUNTER — APPOINTMENT (OUTPATIENT)
Dept: GENERAL RADIOLOGY | Age: 72
DRG: 004 | End: 2022-05-07
Payer: MEDICARE

## 2022-05-07 LAB
ALLEN TEST: ABNORMAL
ANION GAP SERPL CALCULATED.3IONS-SCNC: 8 MMOL/L (ref 9–17)
BUN BLDV-MCNC: 13 MG/DL (ref 8–23)
CALCIUM IONIZED: 1.1 MMOL/L (ref 1.13–1.33)
CALCIUM SERPL-MCNC: 8.5 MG/DL (ref 8.6–10.4)
CARBOXYHEMOGLOBIN: 0.2 % (ref 0–5)
CHLORIDE BLD-SCNC: 99 MMOL/L (ref 98–107)
CO2: 28 MMOL/L (ref 20–31)
CREAT SERPL-MCNC: <0.4 MG/DL (ref 0.7–1.2)
EKG ATRIAL RATE: 71 BPM
EKG P AXIS: 60 DEGREES
EKG P-R INTERVAL: 132 MS
EKG Q-T INTERVAL: 376 MS
EKG QRS DURATION: 92 MS
EKG QTC CALCULATION (BAZETT): 433 MS
EKG R AXIS: 72 DEGREES
EKG T AXIS: 84 DEGREES
EKG VENTRICULAR RATE: 80 BPM
FIO2: 40
GFR AFRICAN AMERICAN: ABNORMAL ML/MIN
GFR NON-AFRICAN AMERICAN: ABNORMAL ML/MIN
GFR SERPL CREATININE-BSD FRML MDRD: ABNORMAL ML/MIN/{1.73_M2}
GLUCOSE BLD-MCNC: 259 MG/DL (ref 70–99)
HCO3 ARTERIAL: 29.8 MMOL/L (ref 22–26)
HCT VFR BLD CALC: 35.5 % (ref 41–53)
HEMOGLOBIN: 11.9 G/DL (ref 13.5–17.5)
MAGNESIUM: 1.8 MG/DL (ref 1.6–2.6)
MCH RBC QN AUTO: 32.8 PG (ref 26–34)
MCHC RBC AUTO-ENTMCNC: 33.7 G/DL (ref 31–37)
MCV RBC AUTO: 97.4 FL (ref 80–100)
METHEMOGLOBIN: 0.8 % (ref 0–1.9)
MODE: ABNORMAL
O2 DEVICE/FLOW/%: ABNORMAL
O2 SAT, ARTERIAL: 93.8 % (ref 95–98)
PATIENT TEMP: 37
PCO2 ARTERIAL: 42.8 MMHG (ref 35–45)
PDW BLD-RTO: 13.3 % (ref 11.5–14.9)
PEEP/CPAP: 7
PH ARTERIAL: 7.45 (ref 7.35–7.45)
PLATELET # BLD: 397 K/UL (ref 150–450)
PMV BLD AUTO: 6.9 FL (ref 6–12)
PO2 ARTERIAL: 73 MMHG (ref 80–100)
POSITIVE BASE EXCESS, ART: 5.8 MMOL/L (ref 0–2)
POTASSIUM SERPL-SCNC: 3.5 MMOL/L (ref 3.7–5.3)
PROCALCITONIN: 6.54 NG/ML
PT. POSITION: ABNORMAL
RBC # BLD: 3.64 M/UL (ref 4.5–5.9)
RESPIRATORY RATE: 20
SAMPLE SITE: ABNORMAL
SET RATE: 20
SODIUM BLD-SCNC: 135 MMOL/L (ref 135–144)
TEXT FOR RESPIRATORY: ABNORMAL
TOTAL RATE: 20
TRIGL SERPL-MCNC: 128 MG/DL
VT: 500
WBC # BLD: 8.5 K/UL (ref 3.5–11)

## 2022-05-07 PROCEDURE — 6360000002 HC RX W HCPCS: Performed by: NURSE PRACTITIONER

## 2022-05-07 PROCEDURE — 6370000000 HC RX 637 (ALT 250 FOR IP): Performed by: INTERNAL MEDICINE

## 2022-05-07 PROCEDURE — 2580000003 HC RX 258: Performed by: INTERNAL MEDICINE

## 2022-05-07 PROCEDURE — 71045 X-RAY EXAM CHEST 1 VIEW: CPT

## 2022-05-07 PROCEDURE — 85027 COMPLETE CBC AUTOMATED: CPT

## 2022-05-07 PROCEDURE — 82330 ASSAY OF CALCIUM: CPT

## 2022-05-07 PROCEDURE — 99232 SBSQ HOSP IP/OBS MODERATE 35: CPT | Performed by: NURSE PRACTITIONER

## 2022-05-07 PROCEDURE — 6360000002 HC RX W HCPCS: Performed by: EMERGENCY MEDICINE

## 2022-05-07 PROCEDURE — 93010 ELECTROCARDIOGRAM REPORT: CPT | Performed by: INTERNAL MEDICINE

## 2022-05-07 PROCEDURE — 82805 BLOOD GASES W/O2 SATURATION: CPT

## 2022-05-07 PROCEDURE — 99291 CRITICAL CARE FIRST HOUR: CPT | Performed by: INTERNAL MEDICINE

## 2022-05-07 PROCEDURE — 84478 ASSAY OF TRIGLYCERIDES: CPT

## 2022-05-07 PROCEDURE — 94003 VENT MGMT INPAT SUBQ DAY: CPT

## 2022-05-07 PROCEDURE — 6360000002 HC RX W HCPCS: Performed by: INTERNAL MEDICINE

## 2022-05-07 PROCEDURE — 36415 COLL VENOUS BLD VENIPUNCTURE: CPT

## 2022-05-07 PROCEDURE — 83735 ASSAY OF MAGNESIUM: CPT

## 2022-05-07 PROCEDURE — 2000000000 HC ICU R&B

## 2022-05-07 PROCEDURE — 36600 WITHDRAWAL OF ARTERIAL BLOOD: CPT

## 2022-05-07 PROCEDURE — A4216 STERILE WATER/SALINE, 10 ML: HCPCS | Performed by: INTERNAL MEDICINE

## 2022-05-07 PROCEDURE — 84145 PROCALCITONIN (PCT): CPT

## 2022-05-07 PROCEDURE — C9113 INJ PANTOPRAZOLE SODIUM, VIA: HCPCS | Performed by: INTERNAL MEDICINE

## 2022-05-07 PROCEDURE — 80048 BASIC METABOLIC PNL TOTAL CA: CPT

## 2022-05-07 RX ORDER — POTASSIUM CHLORIDE 7.45 MG/ML
10 INJECTION INTRAVENOUS PRN
Status: DISCONTINUED | OUTPATIENT
Start: 2022-05-07 | End: 2022-05-17 | Stop reason: HOSPADM

## 2022-05-07 RX ORDER — LINEZOLID 2 MG/ML
600 INJECTION, SOLUTION INTRAVENOUS EVERY 12 HOURS
Status: DISCONTINUED | OUTPATIENT
Start: 2022-05-07 | End: 2022-05-09

## 2022-05-07 RX ORDER — POLYVINYL ALCOHOL 14 MG/ML
1 SOLUTION/ DROPS OPHTHALMIC PRN
Status: CANCELLED | OUTPATIENT
Start: 2022-05-07

## 2022-05-07 RX ORDER — POLYVINYL ALCOHOL 14 MG/ML
1 SOLUTION/ DROPS OPHTHALMIC EVERY 4 HOURS
Status: DISCONTINUED | OUTPATIENT
Start: 2022-05-07 | End: 2022-05-17 | Stop reason: HOSPADM

## 2022-05-07 RX ORDER — NOREPINEPHRINE BIT/0.9 % NACL 16MG/250ML
1-100 INFUSION BOTTLE (ML) INTRAVENOUS CONTINUOUS
Status: DISCONTINUED | OUTPATIENT
Start: 2022-05-07 | End: 2022-05-14

## 2022-05-07 RX ORDER — POTASSIUM CHLORIDE 29.8 MG/ML
20 INJECTION INTRAVENOUS PRN
Status: DISCONTINUED | OUTPATIENT
Start: 2022-05-07 | End: 2022-05-07 | Stop reason: RX

## 2022-05-07 RX ORDER — MINERAL OIL AND WHITE PETROLATUM 150; 830 MG/G; MG/G
OINTMENT OPHTHALMIC PRN
Status: DISCONTINUED | OUTPATIENT
Start: 2022-05-07 | End: 2022-05-17 | Stop reason: HOSPADM

## 2022-05-07 RX ORDER — 0.9 % SODIUM CHLORIDE 0.9 %
1000 INTRAVENOUS SOLUTION INTRAVENOUS ONCE
Status: COMPLETED | OUTPATIENT
Start: 2022-05-07 | End: 2022-05-07

## 2022-05-07 RX ADMIN — CHLORHEXIDINE GLUCONATE 0.12% ORAL RINSE 15 ML: 1.2 LIQUID ORAL at 08:25

## 2022-05-07 RX ADMIN — POLYVINYL ALCOHOL 1 DROP: 14 SOLUTION/ DROPS OPHTHALMIC at 20:04

## 2022-05-07 RX ADMIN — AZITHROMYCIN MONOHYDRATE 500 MG: 500 INJECTION, POWDER, LYOPHILIZED, FOR SOLUTION INTRAVENOUS at 14:28

## 2022-05-07 RX ADMIN — CHLORHEXIDINE GLUCONATE 0.12% ORAL RINSE 15 ML: 1.2 LIQUID ORAL at 20:04

## 2022-05-07 RX ADMIN — PROPOFOL 50 MCG/KG/MIN: 10 INJECTION, EMULSION INTRAVENOUS at 04:15

## 2022-05-07 RX ADMIN — POLYVINYL ALCOHOL 1 DROP: 14 SOLUTION/ DROPS OPHTHALMIC at 16:21

## 2022-05-07 RX ADMIN — SODIUM CHLORIDE, PRESERVATIVE FREE 40 MG: 5 INJECTION INTRAVENOUS at 08:26

## 2022-05-07 RX ADMIN — PROPOFOL 45 MCG/KG/MIN: 10 INJECTION, EMULSION INTRAVENOUS at 17:56

## 2022-05-07 RX ADMIN — PROPOFOL 45 MCG/KG/MIN: 10 INJECTION, EMULSION INTRAVENOUS at 22:17

## 2022-05-07 RX ADMIN — ENOXAPARIN SODIUM 40 MG: 100 INJECTION SUBCUTANEOUS at 08:26

## 2022-05-07 RX ADMIN — SODIUM CHLORIDE 1000 ML: 9 INJECTION, SOLUTION INTRAVENOUS at 10:31

## 2022-05-07 RX ADMIN — SODIUM CHLORIDE, PRESERVATIVE FREE 10 ML: 5 INJECTION INTRAVENOUS at 08:26

## 2022-05-07 RX ADMIN — POLYVINYL ALCOHOL 1 DROP: 14 SOLUTION/ DROPS OPHTHALMIC at 23:30

## 2022-05-07 RX ADMIN — Medication 50 MCG/HR: at 08:33

## 2022-05-07 RX ADMIN — DEXAMETHASONE SODIUM PHOSPHATE 6 MG: 10 INJECTION, SOLUTION INTRAMUSCULAR; INTRAVENOUS at 12:01

## 2022-05-07 RX ADMIN — SODIUM CHLORIDE, PRESERVATIVE FREE 10 ML: 5 INJECTION INTRAVENOUS at 20:04

## 2022-05-07 RX ADMIN — LINEZOLID 600 MG: 600 INJECTION, SOLUTION INTRAVENOUS at 22:16

## 2022-05-07 RX ADMIN — CEFTRIAXONE SODIUM 1000 MG: 1 INJECTION, POWDER, FOR SOLUTION INTRAMUSCULAR; INTRAVENOUS at 12:10

## 2022-05-07 RX ADMIN — PROPOFOL 40 MCG/KG/MIN: 10 INJECTION, EMULSION INTRAVENOUS at 10:35

## 2022-05-07 ASSESSMENT — PAIN SCALES - GENERAL
PAINLEVEL_OUTOF10: 0

## 2022-05-07 ASSESSMENT — PULMONARY FUNCTION TESTS
PIF_VALUE: 20
PIF_VALUE: 20
PIF_VALUE: 21
PIF_VALUE: 20
PIF_VALUE: 21
PIF_VALUE: 20
PIF_VALUE: 20
PIF_VALUE: 21
PIF_VALUE: 20
PIF_VALUE: 20
PIF_VALUE: 21
PIF_VALUE: 20
PIF_VALUE: 21
PIF_VALUE: 20
PIF_VALUE: 21
PIF_VALUE: 20
PIF_VALUE: 20
PIF_VALUE: 21
PIF_VALUE: 20
PIF_VALUE: 21
PIF_VALUE: 21
PIF_VALUE: 20
PIF_VALUE: 21
PIF_VALUE: 21
PIF_VALUE: 20
PIF_VALUE: 21
PIF_VALUE: 20
PIF_VALUE: 21
PIF_VALUE: 20

## 2022-05-07 NOTE — PLAN OF CARE
Problem: Respiratory - Adult  Goal: Achieves optimal ventilation and oxygenation  5/7/2022 0934 by Jennifer Morrissey RCP  Outcome: Progressing

## 2022-05-07 NOTE — PROGRESS NOTES
Dr. Sasha Lan put order in to Consult Dr. Sandra Lawrence for Trach/PEG placement. RN notified Dr. Reshma Lim who is on call tonight in Dr. Sandra Lawrence group. Dr. Reshma Lim secure messaged back that he is not on staff call for [facility] and to consult the on call surgeon. RN notified Lindsay Walker NP of the above information. Lindsay Walker NP said she will tell DrGrecia In the morning and they will have to consult another group for Trach/PEG.

## 2022-05-07 NOTE — PLAN OF CARE
Problem: Discharge Planning  Goal: Discharge to home or other facility with appropriate resources  5/7/2022 0010 by Amirah Zambrano RN  Outcome: Progressing  5/6/2022 1747 by Faheem Whitehead RN  Outcome: Progressing     Problem: Skin/Tissue Integrity  Goal: Absence of new skin breakdown  Description: 1. Monitor for areas of redness and/or skin breakdown  2. Assess vascular access sites hourly  3. Every 4-6 hours minimum:  Change oxygen saturation probe site  4. Every 4-6 hours:  If on nasal continuous positive airway pressure, respiratory therapy assess nares and determine need for appliance change or resting period. 5/7/2022 0010 by Amirah Zambrano RN  Outcome: Progressing  5/6/2022 1747 by Faheem Whitehead RN  Outcome: Progressing  Note: On q2 turn schedule. Preventative mepilex placed. Problem: Pain  Goal: Verbalizes/displays adequate comfort level or baseline comfort level  5/7/2022 0010 by Amirah Zambrano RN  Outcome: Progressing  5/6/2022 1747 by Faheem Whitehead RN  Outcome: Progressing     Problem: Safety - Adult  Goal: Free from fall injury  5/7/2022 0010 by Amirah Zambrano RN  Outcome: Progressing  5/6/2022 1747 by Faheem Whitehead RN  Outcome: Progressing  Note: Hourly rounds performed. No falls this shift. Problem: Respiratory - Adult  Goal: Achieves optimal ventilation and oxygenation  5/7/2022 0010 by Amirah Zambrano RN  Outcome: Progressing  5/6/2022 1747 by Faheem Whitehead RN  Outcome: Progressing  Flowsheets (Taken 5/6/2022 1747)  Achieves optimal ventilation and oxygenation:   Assess for changes in respiratory status   Assess for changes in mentation and behavior   Position to facilitate oxygenation and minimize respiratory effort   Oxygen supplementation based on oxygen saturation or arterial blood gases  Note: Patient remains on ventilator.      Problem: Nutrition Deficit:  Goal: Optimize nutritional status  5/7/2022 0010 by Amirah Zambrano RN  Outcome: Progressing  5/6/2022 1747 by Faheem Whitehead RN  Outcome: Progressing  Note: Nutrition started today with tube feeds. Dietician consulted.

## 2022-05-07 NOTE — PROGRESS NOTES
ICU Progress Note (Vent)   Pulmonary and Critical Care Specialists    Patient - Zora Chatman,  Age - 74 y.o.    - 1950      Room Number -    N -  [id number]   [facility]  Date of Admission -  2022  8:35 AM    Events of Past 24 Hours   Patient remains intubated on vent support. We have given IV fluids because of low blood pressure. Currently he is on both fentanyl and propofol for sedation    Vitals    height is 5' 10.05\" (1.779 m) and weight is 130 lb (59 kg). His oral temperature is 97.7 °F (36.5 °C). His blood pressure is 76/39 (abnormal) and his pulse is 78. His respiration is 21 and oxygen saturation is 98%. Temperature Range: Temp: 97.7 °F (36.5 °C) Temp  Av.9 °F (36.6 °C)  Min: 97.7 °F (36.5 °C)  Max: 98.3 °F (36.8 °C)  BP Range:  Systolic (80KWU), TEQ:187 , Min:76 , LSA:184     Diastolic (32GAW), EED:02, Min:39, Max:94    Pulse Range: Pulse  Av.6  Min: 71  Max: 89  Respiration Range: Resp  Av.1  Min: 19  Max: 21  Current Pulse Ox[de-identified]  SpO2: 98 %  24HR Pulse Ox Range:  SpO2  Av.2 %  Min: 94 %  Max: 99 %  Oxygen Amount and Delivery: O2 Flow Rate (L/min): 8 L/min      Wt Readings from Last 3 Encounters:   22 130 lb (59 kg)   22 115 lb (52.2 kg)   22 120 lb (54.4 kg)     I/O       Intake/Output Summary (Last 24 hours) at 2022 1227  Last data filed at 2022 1035  Gross per 24 hour   Intake 4001.39 ml   Output 770 ml   Net 3231.39 ml     I/O last 3 completed shifts:   In: 2989.4 [I.V.:708.8; NG/GT:584; IV Piggyback:1696.6]  Out: 1260 [Urine:1050; Emesis/NG output:210]     DRAIN/TUBE OUTPUT:     Invasive Lines   ETT Day -   3  picc day 3    Mechanical Ventilation Data   SETTINGS (Comprehensive)  Vent Information  Ventilator Day(s): 2  Ventilator ID: SERVO [id number]  Vent Mode: PRVC  Ventilator Initiate: Yes  Additional Respiratory Assessments  Pulse: 78  Resp: 21  SpO2: 98 %  End Tidal CO2: 37 (%)  Position: Semi-Christie's  Humidification Source: [facility]  Cuff Pressure (cm H2O): 30 cm H2O  Skin Barrier Applied: No       ABGs:   Lab Results   Component Value Date    PHART 7.451 05/07/2022    PO2ART 73.0 05/07/2022    NRI2XKC 42.8 05/07/2022       Lab Results   Component Value Date    MODE PRVC 05/07/2022         Medications   IV   fentaNYL 50 mcg/hr (05/07/22 1035)    sodium chloride      propofol 40 mcg/kg/min (05/07/22 1035)      cefTRIAXone (ROCEPHIN) IV  1,000 mg IntraVENous Q24H    azithromycin  500 mg IntraVENous Q24H    sodium chloride flush  5-40 mL IntraVENous 2 times per day    dexamethasone  6 mg IntraVENous Q24H    enoxaparin  40 mg SubCUTAneous Daily    pantoprazole (PROTONIX) 40 mg injection  40 mg IntraVENous Daily    chlorhexidine  15 mL Mouth/Throat BID       Diet/Nutrition   Diet NPO  ADULT TUBE FEEDING; Nasogastric; Peptide Based High Protein; Continuous; 20; Yes; 10; Q 4 hours; 60; 30; Q 4 hours    Exam   VITALS    height is 5' 10.05\" (1.779 m) and weight is 130 lb (59 kg). His oral temperature is 97.7 °F (36.5 °C). His blood pressure is 76/39 (abnormal) and his pulse is 78. His respiration is 21 and oxygen saturation is 98%. Ventilator Settings (Basic)  Vent Mode: PRV Resp Rate (Set): 20 bmp/Vt (Set, mL): 500 mL/ /FiO2 : 40 %    Constitutional - Sedated  General Appearance intubated critically ill patient  HEENT - Life support devices in place (ET, OG),normocephalic, atraumatic. PERRLA  Lungs - Chest expands equally, no wheezes, rales or rhonchi. Cardiovascular - Heart sounds are normal.  normal rate and rhythm regular, no murmur, gallop or rub.   Abdomen - soft, nontender, nondistended, no masses or organomegaly  Extremities - no cyanosis, clubbing or edema    Lab Results   CBC     Lab Results   Component Value Date    WBC 8.5 05/07/2022    RBC 3.64 05/07/2022    HGB 11.9 05/07/2022    HCT 35.5 05/07/2022     05/07/2022    MCV 97.4 05/07/2022    MCH 32.8 05/07/2022    MCHC 33.7 05/07/2022    RDW 13.3 05/07/2022 LYMPHOPCT 1 05/06/2022    MONOPCT 3 05/06/2022    BASOPCT 0 05/06/2022    MONOSABS 0.35 05/06/2022    LYMPHSABS 0.12 05/06/2022    EOSABS 0.00 05/06/2022    BASOSABS 0.00 05/06/2022    DIFFTYPE NOT REPORTED 08/25/2021       BMP   Lab Results   Component Value Date     05/07/2022    K 3.5 05/07/2022    CL 99 05/07/2022    CO2 28 05/07/2022    BUN 13 05/07/2022    CREATININE <0.40 05/07/2022    GLUCOSE 259 05/07/2022    CALCIUM 8.5 05/07/2022       LFTS  Lab Results   Component Value Date    ALKPHOS 137 05/05/2022    ALT 46 05/05/2022    AST 30 05/05/2022    PROT 7.5 05/05/2022    BILITOT 0.66 05/05/2022    LABALBU 3.0 05/05/2022       INR  Recent Labs     05/05/22  0842   PROTIME 14.6   INR 1.1       APTT  Recent Labs     05/05/22  0842   APTT 40.8*       Lactic Acid  No results found for: LACTA     BNP   No results for input(s): BNP in the last 72 hours. Cultures   Cultures x2, May 5, negative. Radiology     Plain Films    SYSTEM ASSESSMENT    IMPRESSION:  1.  Acute respiratory failure with hypoxemia. 2.  Severe ALS. 3.  History of coronary artery disease status post bypass graft surgery  10/2016. 4.  Malnutrition. 5.  Severe debilitation. 6.  Hypertension. 7.  Concern for bacterial pneumonia         Neuro   Sedated on vent support. Will check triglyceride    Respiratory   Wean oxygen as tolerated. Keep O2 sat 90-92%    Hemodynamics   Currently we are giving of a volume. May need pressors to keep blood pressure up. Gastrointestinal/Nutrition   Protein calorie malnutrition. Renal   Currently renal function stable    Infectious Disease   On Rocephin and Zithromax. On Decadron. Hematology/Oncology     On Lovenox for DVT prophylaxis. Endocrine       Social/Spiritual/DNR/Disposition/Other     Lawrance Hailey (Spouse)   [phone] [facility] Phone)    Patient's wife has been updated by telephone. Questions were answered.   She voiced understanding and appreciated the care and the call  Critical Care Time   45 min    Electronically signed by Bartolome Mckeon MD on 5/7/2022 at 12:27 PM

## 2022-05-07 NOTE — PROGRESS NOTES
[facility] -   Infectious diseases evaluation  admission date 5/5/2022    reason for consultation:   COVID-19 infection    Impression :   Current:  · COVID-19 with suspected superimposed bacterial pneumonia. · Acute hypoxic respiratory failure required intubation. · Severe ALS  · History of coronary artery disease status post CABG 2016  · Hypertension  · Penicillin, sulfa and Cipro allergy      Recommendations   · Start Zyvox 600mg IV q 12 hours  · D/C  Rocephin and Zithromax  · Respiratory culture positive for heavy growth staph aureus, sensitivity pending per lab. Results of lab PVP2 identified Staph aureus as MRSA, will start Zyvox  · Follow blood cultures  · Decadron  · He received Actemra 5/5/2022  · Follow inflammatory markers  · Follow chest x-ray  · Continue supportive care  · Droplet plus isolation through 5/10/2022  · Discussed with Dr Gerri Cee        History of Present Illness:   Initial history:  Lizzie Joshi is a 74y.o.-year-old male with history of severe ALS who presented to the hospital with worsening cough and shortness of for 3 days prior to admission. Reportedly cough started around 4/20/2022. Chest x-ray showed bilateral infiltrates  Initial labs showed a procalcitonin level of 35.19, last WBC 12.8, creatinine less than 0.4, C-reactive protein 259, D-dimer 0.43  5/5/22 COVID-19 PCR positive  Urinalysis showed 0-2 WBC. The patient was admitted to ICU, was intubated. The patient is intubated, unable to provide history that was obtained from chart review and nursing staff. Mild amount of respiratory secretion, Brunner catheter in place. PICC line in place  The patient was exposed to COVID-19 positive person on 4/16/2022 reportedly. The patient had a positive COVID 19 test at home.     Interval changes  5/7/2022   Afebrile  Remains intubated, SpO2 98% FIO2 40%, P7  Mild amount of respiratory secretions  5/5 PICC line placed to Rt arm  5/7 Chest x-ray today showed bibasilar airspace opacities, right greater than left, with small right pleural effusion    Labs  Creat <0.40  Procalcitonin 35.19-6.54  .2    WBC 12.8-11.5-8.5  Ferritin 734  Sed Rate 63  D dimer 0.43    Micro  5/6 Resp Cx - Staph aureus, heavy growth  5/5 Blood Cx x2 - no growth to date  5/5 COVID19 - detected    Imaging  5/5 CXR   Bibasilar opacities with blunting of right costophrenic angle possibly   combination of pneumonia and small right pleural effusion. There is what appears to be a left chest tube terminating in the periphery of the upper lung partly obscured by pacemaker electrode.  Please correlate clinically.  No recent comparisons. Patient Vitals for the past 8 hrs:   BP Temp Temp src Pulse Resp SpO2 Weight   05/07/22 0800 108/62 -- -- 77 20 -- --   05/07/22 0730 98/60 -- -- 79 20 -- --   05/07/22 0700 109/66 -- -- 76 20 97 % --   05/07/22 0630 137/78 -- -- 75 20 -- --   05/07/22 0600 120/67 -- -- 79 20 -- --   05/07/22 0530 122/68 -- -- 80 20 97 % --   05/07/22 0500 114/71 -- -- 80 20 96 % 130 lb (59 kg)   05/07/22 0435 -- -- -- 79 20 96 % --   05/07/22 0430 123/68 -- -- 80 20 97 % --   05/07/22 0415 -- -- -- 77 20 98 % --   05/07/22 0414 -- -- -- -- 20 98 % --   05/07/22 0400 (!) 165/94 97.7 °F (36.5 °C) Oral 80 20 98 % --   05/07/22 0330 (!) 174/87 -- -- 79 20 97 % --   05/07/22 0300 (!) 164/78 -- -- 80 20 95 % --   05/07/22 0230 (!) 167/88 -- -- 81 20 95 % --   05/07/22 0200 (!) 152/78 -- -- 79 20 94 % --   05/07/22 0130 (!) 156/84 -- -- 75 20 96 % --   05/07/22 0100 (!) 154/76 -- -- 83 20 95 % --           I have personally reviewed the past medical history, past surgical history, medications, social history, and family history, and I haveupdated the database accordingly.       Allergies:   Ciprofloxacin, Lasix [furosemide], Penicillins, Sulfa antibiotics, and Duloxetine     Review of Systems:     Review of Systems  Intubated, unable to provide  Physical Examination :       Physical Exam  Vitals and nursing note reviewed. Constitutional:       Appearance: He is ill-appearing. Comments: Intubated   HENT:      Head: Normocephalic and atraumatic. Right Ear: External ear normal.      Left Ear: External ear normal.   Eyes:      Conjunctiva/sclera: Conjunctivae normal.   Cardiovascular:      Rate and Rhythm: Normal rate and regular rhythm. Heart sounds: Normal heart sounds. Pulmonary:      Comments: Bilateral crackles  Abdominal:      General: There is no distension. Palpations: Abdomen is soft. Genitourinary:     Comments: Brunner  Musculoskeletal:      Cervical back: Neck supple. No rigidity. Right lower leg: No edema. Left lower leg: No edema. Skin:     General: Skin is warm and dry. Coloration: Skin is not jaundiced.    Neurological:      Comments: ARTUR   Psychiatric:      Comments: ARTUR         Past Medical History:     Past Medical History:   Diagnosis Date    [facility]) [facility]     Aortic root aneurysm ([facility] [facility] 11/08/2016    Aortic valve replaced 11/08/2016    Hypertension        Past Surgical  History:     Past Surgical History:   Procedure Laterality Date    CARDIOVERSION  06/27/2020    w/    HERNIA REPAIR      PARATHYROID GLAND SURGERY  1980       Medications:      cefTRIAXone (ROCEPHIN) IV  1,000 mg IntraVENous Q24H    azithromycin  500 mg IntraVENous Q24H    sodium chloride flush  5-40 mL IntraVENous 2 times per day    dexamethasone  6 mg IntraVENous Q24H    enoxaparin  40 mg SubCUTAneous Daily    pantoprazole (PROTONIX) 40 mg injection  40 mg IntraVENous Daily    chlorhexidine  15 mL Mouth/Throat BID       Social History:     Social History     Socioeconomic History    Marital status:      Spouse name: Not on file    Number of children: Not on file    Years of education: Not on file    Highest education level: Not on file   Occupational History    Not on file   Tobacco Use    Smoking status: Never Smoker    Smokeless tobacco: Never Used   Vaping Use    Vaping Use: Never used   Substance and Sexual Activity    Alcohol use: Yes     Alcohol/week: 1.0 standard drink     Types: 1 Cans of beer per week    Drug use: No    Sexual activity: Not on file   Other Topics Concern    Not on file   Social History Narrative    Not on file     Social Determinants of Health     Financial Resource Strain:     Difficulty of Paying Living Expenses: Not on file   Food Insecurity:     Worried About Running Out of Food in the Last Year: Not on file    Fabio of Food in the Last Year: Not on file   Transportation Needs:     Lack of Transportation (Medical): Not on file    Lack of Transportation (Non-Medical):  Not on file   Physical Activity:     Days of Exercise per Week: Not on file    Minutes of Exercise per Session: Not on file   Stress:     Feeling of Stress : Not on file   Social Connections:     Frequency of Communication with Friends and Family: Not on file    Frequency of Social Gatherings with Friends and Family: Not on file    Attends Hoahaoism Services: Not on file    Active Member of [address] [place] [address] or Organizations: Not on file    Attends Club or Organization Meetings: Not on file    Marital Status: Not on file   Intimate Partner Violence:     Fear of Current or Ex-Partner: Not on file    Emotionally Abused: Not on file    Physically Abused: Not on file    Sexually Abused: Not on file   Housing Stability:     Unable to Pay for Housing in the Last Year: Not on file    Number of [place] in the Last Year: Not on file    Unstable Housing in the Last Year: Not on file       Family History:     Family History   Problem Relation Age of Onset    Lung Cancer Mother     Heart Disease Father       Medical Decision Making:   I have independently reviewed/ordered the following labs:    CBC with Differential:   Recent Labs     05/05/22  0842 05/05/22  0842 05/06/22  0359 05/07/22  0507   WBC 12.8*   < > 11.5* 8.5   HGB 13.9   < > 13.0* 11.9*   HCT 39.2*   < > 38.7* 35.5*      < > 343 397   LYMPHOPCT 8*  --  1*  --    MONOPCT 7  --  3  --     < > = values in this interval not displayed. BMP:  Recent Labs     05/06/22  0359 05/07/22  0507    135   K 3.9 3.5*   CL 98 99   CO2 23 28   BUN 13 13   CREATININE <0.40* <0.40*   MG  --  1.8     Hepatic Function Panel:   Recent Labs     05/05/22  0842   PROT 7.5   LABALBU 3.0*   BILITOT 0.66   ALKPHOS 137*   ALT 46*   AST 30     No results for input(s): RPR in the last 72 hours. No results for input(s): HIV in the last 72 hours. No results for input(s): BC in the last 72 hours. Lab Results   Component Value Date    CREATININE <0.40 05/07/2022    GLUCOSE 259 05/07/2022       Detailed results: Thank you for allowing us to participate in the care of this patient. Please call with questions. This note is created with the assistance of a speech recognition program.  While intending to generate adocument that actually reflects the content of the visit, the document can still have some errors including those of syntax and sound a like substitutions which may escape proof reading. It such instances, actual meaningcan be extrapolated by contextual diversion.     MATEO Whitney - CNP  Office: [phone]  Perfect serve / office [phone]

## 2022-05-07 NOTE — PROGRESS NOTES
Zoomed call daughter, [place] [address]. Patient very emotional afterwards. Asked yes or No if he would like to try to breath on his own/CPAP trial nodded/shook head appropriately No. Asked yes or No if he wanted to be trached, multiple times. Appropriately nodded yes. Daughter Shawna Sepulveda updated.

## 2022-05-08 ENCOUNTER — APPOINTMENT (OUTPATIENT)
Dept: GENERAL RADIOLOGY | Age: 72
DRG: 004 | End: 2022-05-08
Payer: MEDICARE

## 2022-05-08 LAB
ALLEN TEST: ABNORMAL
ANION GAP SERPL CALCULATED.3IONS-SCNC: 7 MMOL/L (ref 9–17)
BUN BLDV-MCNC: 15 MG/DL (ref 8–23)
C-REACTIVE PROTEIN: 36.7 MG/L (ref 0–5)
CALCIUM SERPL-MCNC: 8.4 MG/DL (ref 8.6–10.4)
CARBOXYHEMOGLOBIN: 0 % (ref 0–5)
CHLORIDE BLD-SCNC: 100 MMOL/L (ref 98–107)
CO2: 27 MMOL/L (ref 20–31)
CREAT SERPL-MCNC: <0.4 MG/DL (ref 0.7–1.2)
CULTURE: ABNORMAL
CULTURE: ABNORMAL
DIRECT EXAM: ABNORMAL
FIO2: 40
GFR AFRICAN AMERICAN: ABNORMAL ML/MIN
GFR NON-AFRICAN AMERICAN: ABNORMAL ML/MIN
GFR SERPL CREATININE-BSD FRML MDRD: ABNORMAL ML/MIN/{1.73_M2}
GLUCOSE BLD-MCNC: 153 MG/DL (ref 75–110)
GLUCOSE BLD-MCNC: 206 MG/DL (ref 70–99)
HCO3 ARTERIAL: 31 MMOL/L (ref 22–26)
HCT VFR BLD CALC: 39.2 % (ref 41–53)
HEMOGLOBIN: 13 G/DL (ref 13.5–17.5)
MCH RBC QN AUTO: 32.6 PG (ref 26–34)
MCHC RBC AUTO-ENTMCNC: 33.2 G/DL (ref 31–37)
MCV RBC AUTO: 98 FL (ref 80–100)
METHEMOGLOBIN: 0.9 % (ref 0–1.9)
MODE: ABNORMAL
O2 DEVICE/FLOW/%: ABNORMAL
O2 SAT, ARTERIAL: 94.8 % (ref 95–98)
PATIENT TEMP: 37.1
PCO2 ARTERIAL: 45.1 MMHG (ref 35–45)
PDW BLD-RTO: 13.2 % (ref 11.5–14.9)
PEEP/CPAP: 7
PH ARTERIAL: 7.45 (ref 7.35–7.45)
PLATELET # BLD: 421 K/UL (ref 150–450)
PMV BLD AUTO: 6.9 FL (ref 6–12)
PO2 ARTERIAL: 77.7 MMHG (ref 80–100)
POSITIVE BASE EXCESS, ART: 7 MMOL/L (ref 0–2)
POTASSIUM SERPL-SCNC: 4.2 MMOL/L (ref 3.7–5.3)
PT. POSITION: ABNORMAL
RBC # BLD: 4 M/UL (ref 4.5–5.9)
REASON FOR REJECTION: NORMAL
RESPIRATORY RATE: 20
SAMPLE SITE: ABNORMAL
SEDIMENTATION RATE, ERYTHROCYTE: 2 MM/HR (ref 0–20)
SET RATE: 20
SODIUM BLD-SCNC: 134 MMOL/L (ref 135–144)
SPECIMEN DESCRIPTION: ABNORMAL
TEXT FOR RESPIRATORY: ABNORMAL
TOTAL RATE: 20
VT: 500
WBC # BLD: 9.1 K/UL (ref 3.5–11)
ZZ NTE CLEAN UP: ORDERED TEST: NORMAL
ZZ NTE WITH NAME CLEAN UP: SPECIMEN SOURCE: NORMAL

## 2022-05-08 PROCEDURE — 6360000002 HC RX W HCPCS: Performed by: INTERNAL MEDICINE

## 2022-05-08 PROCEDURE — 2580000003 HC RX 258: Performed by: INTERNAL MEDICINE

## 2022-05-08 PROCEDURE — 82805 BLOOD GASES W/O2 SATURATION: CPT

## 2022-05-08 PROCEDURE — 71045 X-RAY EXAM CHEST 1 VIEW: CPT

## 2022-05-08 PROCEDURE — 85652 RBC SED RATE AUTOMATED: CPT

## 2022-05-08 PROCEDURE — 86140 C-REACTIVE PROTEIN: CPT

## 2022-05-08 PROCEDURE — C9113 INJ PANTOPRAZOLE SODIUM, VIA: HCPCS | Performed by: INTERNAL MEDICINE

## 2022-05-08 PROCEDURE — 2700000000 HC OXYGEN THERAPY PER DAY

## 2022-05-08 PROCEDURE — 83036 HEMOGLOBIN GLYCOSYLATED A1C: CPT

## 2022-05-08 PROCEDURE — 36415 COLL VENOUS BLD VENIPUNCTURE: CPT

## 2022-05-08 PROCEDURE — 36600 WITHDRAWAL OF ARTERIAL BLOOD: CPT

## 2022-05-08 PROCEDURE — 99232 SBSQ HOSP IP/OBS MODERATE 35: CPT | Performed by: NURSE PRACTITIONER

## 2022-05-08 PROCEDURE — 2000000000 HC ICU R&B

## 2022-05-08 PROCEDURE — 99291 CRITICAL CARE FIRST HOUR: CPT | Performed by: INTERNAL MEDICINE

## 2022-05-08 PROCEDURE — 80048 BASIC METABOLIC PNL TOTAL CA: CPT

## 2022-05-08 PROCEDURE — 6370000000 HC RX 637 (ALT 250 FOR IP): Performed by: INTERNAL MEDICINE

## 2022-05-08 PROCEDURE — 94003 VENT MGMT INPAT SUBQ DAY: CPT

## 2022-05-08 PROCEDURE — 6360000002 HC RX W HCPCS: Performed by: NURSE PRACTITIONER

## 2022-05-08 PROCEDURE — 85027 COMPLETE CBC AUTOMATED: CPT

## 2022-05-08 PROCEDURE — 2500000003 HC RX 250 WO HCPCS: Performed by: INTERNAL MEDICINE

## 2022-05-08 PROCEDURE — 82947 ASSAY GLUCOSE BLOOD QUANT: CPT

## 2022-05-08 PROCEDURE — A4216 STERILE WATER/SALINE, 10 ML: HCPCS | Performed by: INTERNAL MEDICINE

## 2022-05-08 PROCEDURE — 94761 N-INVAS EAR/PLS OXIMETRY MLT: CPT

## 2022-05-08 PROCEDURE — 6360000002 HC RX W HCPCS: Performed by: EMERGENCY MEDICINE

## 2022-05-08 RX ORDER — INSULIN LISPRO 100 [IU]/ML
0-6 INJECTION, SOLUTION INTRAVENOUS; SUBCUTANEOUS
Status: DISCONTINUED | OUTPATIENT
Start: 2022-05-08 | End: 2022-05-09

## 2022-05-08 RX ORDER — DEXTROSE MONOHYDRATE 25 G/50ML
12.5 INJECTION, SOLUTION INTRAVENOUS PRN
Status: DISCONTINUED | OUTPATIENT
Start: 2022-05-08 | End: 2022-05-17 | Stop reason: HOSPADM

## 2022-05-08 RX ORDER — DEXTROSE MONOHYDRATE 50 MG/ML
100 INJECTION, SOLUTION INTRAVENOUS PRN
Status: DISCONTINUED | OUTPATIENT
Start: 2022-05-08 | End: 2022-05-17 | Stop reason: HOSPADM

## 2022-05-08 RX ORDER — SENNA PLUS 8.6 MG/1
1 TABLET ORAL 2 TIMES DAILY
Status: DISCONTINUED | OUTPATIENT
Start: 2022-05-08 | End: 2022-05-08

## 2022-05-08 RX ORDER — SENNA PLUS 8.6 MG/1
1 TABLET ORAL 2 TIMES DAILY
Status: DISCONTINUED | OUTPATIENT
Start: 2022-05-08 | End: 2022-05-17 | Stop reason: HOSPADM

## 2022-05-08 RX ADMIN — SENNOSIDES 8.6 MG: 8.6 TABLET, COATED ORAL at 20:02

## 2022-05-08 RX ADMIN — SENNOSIDES 8.6 MG: 8.6 TABLET, COATED ORAL at 13:12

## 2022-05-08 RX ADMIN — INSULIN LISPRO 2 UNITS: 100 INJECTION, SOLUTION INTRAVENOUS; SUBCUTANEOUS at 13:12

## 2022-05-08 RX ADMIN — POLYVINYL ALCOHOL 1 DROP: 14 SOLUTION/ DROPS OPHTHALMIC at 19:58

## 2022-05-08 RX ADMIN — INSULIN LISPRO 1 UNITS: 100 INJECTION, SOLUTION INTRAVENOUS; SUBCUTANEOUS at 18:00

## 2022-05-08 RX ADMIN — SODIUM CHLORIDE, PRESERVATIVE FREE 10 ML: 5 INJECTION INTRAVENOUS at 08:43

## 2022-05-08 RX ADMIN — Medication 50 MCG/HR: at 05:12

## 2022-05-08 RX ADMIN — SODIUM CHLORIDE, PRESERVATIVE FREE 40 MG: 5 INJECTION INTRAVENOUS at 08:42

## 2022-05-08 RX ADMIN — PROPOFOL 40 MCG/KG/MIN: 10 INJECTION, EMULSION INTRAVENOUS at 11:38

## 2022-05-08 RX ADMIN — SODIUM CHLORIDE, PRESERVATIVE FREE 10 ML: 5 INJECTION INTRAVENOUS at 20:02

## 2022-05-08 RX ADMIN — POLYVINYL ALCOHOL 1 DROP: 14 SOLUTION/ DROPS OPHTHALMIC at 11:16

## 2022-05-08 RX ADMIN — LINEZOLID 600 MG: 600 INJECTION, SOLUTION INTRAVENOUS at 22:31

## 2022-05-08 RX ADMIN — PROPOFOL 45 MCG/KG/MIN: 10 INJECTION, EMULSION INTRAVENOUS at 17:54

## 2022-05-08 RX ADMIN — LINEZOLID 600 MG: 600 INJECTION, SOLUTION INTRAVENOUS at 08:53

## 2022-05-08 RX ADMIN — POLYVINYL ALCOHOL 1 DROP: 14 SOLUTION/ DROPS OPHTHALMIC at 07:15

## 2022-05-08 RX ADMIN — Medication 2 MCG/MIN: at 11:10

## 2022-05-08 RX ADMIN — CHLORHEXIDINE GLUCONATE 0.12% ORAL RINSE 15 ML: 1.2 LIQUID ORAL at 08:42

## 2022-05-08 RX ADMIN — ENOXAPARIN SODIUM 40 MG: 100 INJECTION SUBCUTANEOUS at 08:42

## 2022-05-08 RX ADMIN — POLYVINYL ALCOHOL 1 DROP: 14 SOLUTION/ DROPS OPHTHALMIC at 16:07

## 2022-05-08 RX ADMIN — POLYVINYL ALCOHOL 1 DROP: 14 SOLUTION/ DROPS OPHTHALMIC at 04:00

## 2022-05-08 RX ADMIN — PROPOFOL 50 MCG/KG/MIN: 10 INJECTION, EMULSION INTRAVENOUS at 05:13

## 2022-05-08 RX ADMIN — DEXAMETHASONE SODIUM PHOSPHATE 6 MG: 10 INJECTION, SOLUTION INTRAMUSCULAR; INTRAVENOUS at 11:17

## 2022-05-08 RX ADMIN — CHLORHEXIDINE GLUCONATE 0.12% ORAL RINSE 15 ML: 1.2 LIQUID ORAL at 20:02

## 2022-05-08 ASSESSMENT — PULMONARY FUNCTION TESTS
PIF_VALUE: 21
PIF_VALUE: 19
PIF_VALUE: 20
PIF_VALUE: 21
PIF_VALUE: 20
PIF_VALUE: 21
PIF_VALUE: 21
PIF_VALUE: 20
PIF_VALUE: 21
PIF_VALUE: 20
PIF_VALUE: 21
PIF_VALUE: 20
PIF_VALUE: 21
PIF_VALUE: 20
PIF_VALUE: 21
PIF_VALUE: 20
PIF_VALUE: 21
PIF_VALUE: 20
PIF_VALUE: 18
PIF_VALUE: 20
PIF_VALUE: 21
PIF_VALUE: 20
PIF_VALUE: 21
PIF_VALUE: 20
PIF_VALUE: 21
PIF_VALUE: 21
PIF_VALUE: 22
PIF_VALUE: 20
PIF_VALUE: 21
PIF_VALUE: 21
PIF_VALUE: 20
PIF_VALUE: 20
PIF_VALUE: 21
PIF_VALUE: 21
PIF_VALUE: 20
PIF_VALUE: 22
PIF_VALUE: 21
PIF_VALUE: 20
PIF_VALUE: 20

## 2022-05-08 ASSESSMENT — PAIN SCALES - GENERAL
PAINLEVEL_OUTOF10: 0

## 2022-05-08 NOTE — PLAN OF CARE
Problem: Discharge Planning  Goal: Discharge to home or other facility with appropriate resources  Outcome: Progressing     Problem: Skin/Tissue Integrity  Goal: Absence of new skin breakdown  Description: 1. Monitor for areas of redness and/or skin breakdown  2. Assess vascular access sites hourly  3. Every 4-6 hours minimum:  Change oxygen saturation probe site  4. Every 4-6 hours:  If on nasal continuous positive airway pressure, respiratory therapy assess nares and determine need for appliance change or resting period.   Outcome: Progressing     Problem: Pain  Goal: Verbalizes/displays adequate comfort level or baseline comfort level  Outcome: Progressing     Problem: Safety - Adult  Goal: Free from fall injury  Outcome: Progressing     Problem: Respiratory - Adult  Goal: Achieves optimal ventilation and oxygenation  Outcome: Progressing     Problem: Nutrition Deficit:  Goal: Optimize nutritional status  Outcome: Progressing     Problem: ABCDS Injury Assessment  Goal: Absence of physical injury  Outcome: Progressing

## 2022-05-08 NOTE — PLAN OF CARE
Problem: Discharge Planning  Goal: Discharge to home or other facility with appropriate resources  5/8/2022 1905 by Wilian Allen RN  Outcome: Not Progressing  5/8/2022 1904 by Wilian Allen RN  Outcome: Not Progressing     Problem: Respiratory - Adult  Goal: Achieves optimal ventilation and oxygenation  5/8/2022 1905 by Wilian Allen RN  Outcome: Not Progressing  5/8/2022 1904 by Wilian Allen RN  Outcome: Not Progressing     Problem: Skin/Tissue Integrity  Goal: Absence of new skin breakdown  Description: 1. Monitor for areas of redness and/or skin breakdown  2. Assess vascular access sites hourly  3. Every 4-6 hours minimum:  Change oxygen saturation probe site  4. Every 4-6 hours:  If on nasal continuous positive airway pressure, respiratory therapy assess nares and determine need for appliance change or resting period. 5/8/2022 1905 by Wilian Allen RN  Outcome: Progressing  5/8/2022 1904 by Wilian Allen RN  Outcome: Progressing     Problem: Pain  Goal: Verbalizes/displays adequate comfort level or baseline comfort level  5/8/2022 1905 by Wilian Allen RN  Outcome: Progressing  5/8/2022 1904 by Wilian Allen RN  Outcome: Progressing     Problem: Safety - Adult  Goal: Free from fall injury  5/8/2022 1905 by Wilian Allen RN  Outcome: Progressing  5/8/2022 1904 by Wilian Allen RN  Outcome: Progressing     Problem: Nutrition Deficit:  Goal: Optimize nutritional status  5/8/2022 1905 by Wilian Allen RN  Outcome: Progressing  5/8/2022 1904 by Wilian Allen RN  Outcome: Progressing     Problem: ABCDS Injury Assessment  Goal: Absence of physical injury  5/8/2022 1905 by Wilian Allen RN  Outcome: Progressing  5/8/2022 1904 by Wilian Allen RN  Outcome: Progressing     Problem: Safety - Medical Restraint  Goal: Remains free of injury from restraints (Restraint for Interference with Medical Device)  Description: INTERVENTIONS:  1.  Determine that other, less restrictive measures have been tried or would not be effective before applying the restraint  2. Evaluate the patient's condition at the time of restraint application  3. Inform patient/family regarding the reason for restraint  4.  Q2H: Monitor safety, psychosocial status, comfort, nutrition and hydration  Outcome: Progressing

## 2022-05-08 NOTE — PROGRESS NOTES
ID NP notified on rounds, patients bilateral hands are cool and R arm where PICC is swollen and cool. Orders received, see orders.

## 2022-05-08 NOTE — PROGRESS NOTES
Pulmonary Progress Note  [facility]      Patient - Stacey Abbott,  Age - 74 y.o.    - 1950      Room Number -    N -  [id number]   [facility]  Date of Admission -  2022  8:35 AM        Consulting Baron Correa MD  Primary Care Physician - Grupo Coreas MD     SUBJECTIVE   Patient intubated on vent support. Currently on low-dose Levophed. Currently systolic blood pressures in anywhere from 96 -109 nNo fevers appreciated. FiO2 is on ventilator is 40% with sats in the 90s      OBJECTIVE   VITALS    height is 5' 10.05\" (1.779 m) and weight is 130 lb (59 kg). His axillary temperature is 98.6 °F (37 °C). His blood pressure is 96/49 (abnormal) and his pulse is 72. His respiration is 20 and oxygen saturation is 98%. Body mass index is 18.63 kg/m². Temperature Range: Temp: 98.6 °F (37 °C) Temp  Av.9 °F (36.6 °C)  Min: 97.6 °F (36.4 °C)  Max: 98.6 °F (37 °C)  BP Range:  Systolic (90YOX), PPM:435 , Min:79 , WIV:181     Diastolic (87SAF), GYH:23, Min:48, Max:96    Pulse Range: Pulse  Av.6  Min: 70  Max: 87  Respiration Range: Resp  Av.1  Min: 20  Max: 22  Current Pulse Ox[de-identified]  SpO2: 98 %  24HR Pulse Ox Range:  SpO2  Av.6 %  Min: 94 %  Max: 98 %  Oxygen Amount and Delivery: O2 Flow Rate (L/min): 8 L/min    Wt Readings from Last 3 Encounters:   22 130 lb (59 kg)   22 115 lb (52.2 kg)   22 120 lb (54.4 kg)       I/O (24 Hours)    Intake/Output Summary (Last 24 hours) at 2022 1222  Last data filed at 2022 0616  Gross per 24 hour   Intake 2765.64 ml   Output 1400 ml   Net 1365.64 ml       EXAM     General Appearance  Awake, alert, oriented, in no acute distress  HEENT - normocephalic, atraumatic. Neck - Supple,  trachea midline   Lungs -coarse breath sounds no crackles or wheezes   Heart Exam:PMI normal. No lifts, heaves, or thrills. RRR.  No murmurs, clicks, gallops, or rubs  Abdomen Exam: Abdomen soft, non-tender. BS normal.   Extremity Exam: No signs of cyanosis    MEDS      insulin lispro  0-6 Units SubCUTAneous TID WC    polyvinyl alcohol  1 drop Both Eyes Q4H    linezolid  600 mg IntraVENous Q12H    sodium chloride flush  5-40 mL IntraVENous 2 times per day    dexamethasone  6 mg IntraVENous Q24H    enoxaparin  40 mg SubCUTAneous Daily    pantoprazole (PROTONIX) 40 mg injection  40 mg IntraVENous Daily    chlorhexidine  15 mL Mouth/Throat BID      dextrose      norepinephrine 2 mcg/min (05/08/22 1110)    fentaNYL 50 mcg/hr (05/08/22 0700)    sodium chloride      propofol 40 mcg/kg/min (05/08/22 1138)     glucose, dextrose, glucagon (rDNA), dextrose, polyvinyl alcohol **AND** artificial tears, [DISCONTINUED] potassium chloride **OR** potassium chloride, sodium chloride flush, sodium chloride, ondansetron **OR** ondansetron, fentanNYL    LABS   CBC   Recent Labs     05/08/22  0507   WBC 9.1   HGB 13.0*   HCT 39.2*   MCV 98.0        BMP:   Lab Results   Component Value Date     05/08/2022    K 4.2 05/08/2022     05/08/2022    CO2 27 05/08/2022    BUN 15 05/08/2022    LABALBU 3.0 05/05/2022    CREATININE <0.40 05/08/2022    CALCIUM 8.4 05/08/2022    GFRAA Can not be calculated 05/08/2022    LABGLOM Can not be calculated 05/08/2022     ABGs:  Lab Results   Component Value Date    PHART 7.446 05/08/2022    PO2ART 77.7 05/08/2022    BKL3IKR 45.1 05/08/2022      Lab Results   Component Value Date    MODE PRVC 05/08/2022     Ionized Calcium:  No results found for: IONCA  Magnesium:    Lab Results   Component Value Date    MG 1.8 05/07/2022     Phosphorus:  No results found for: PHOS     LIVER PROFILE No results for input(s): AST, ALT, LIPASE, BILIDIR, BILITOT, ALKPHOS in the last 72 hours. Invalid input(s): AMYLASE,  ALB  INR No results for input(s): INR in the last 72 hours.   PTT   Lab Results   Component Value Date    APTT 40.8 (H) 05/05/2022 RADIOLOGY     (See actual reports for details)    ASSESSMENT/PLAN     Patient Active Problem List   Diagnosis    [facility]otrophic lateral sclerosis (ALS) ([facility] [address]    Muscle spasm    Macrocytosis    Rheumatic tricuspid valve regurgitation    Spinal stenosis of lumbar region    Calculus of kidney and ureter    Hypoglycemia    Diastolic dysfunction    Bilateral enlargement of atria    Mitral valve regurgitation    Thoracic aortic aneurysm, without rupture (HCC)    Paroxysmal atrial fibrillation (HCC)    Aortic valve disorder    Bilateral hearing loss    Acquired cystic kidney disease    History of hypertension    Other atopic dermatitis    Closed fracture of base of distal phalanx of finger    Epididymitis    Osteopenia    Hyperparathyroidism, unspecified ([address]    Primary osteoarthritis    Senile hyperkeratosis    MVP (mitral valve prolapse)    Presbyopia    Hydrocele    Other hyperlipidemia    Carpal tunnel syndrome    Rheumatic aortic valve insufficiency    Closed fracture of one rib of right side with routine healing    Tinnitus of both ears    Concussion with loss of consciousness    Supraventricular beat, premature    Varicose veins of both lower extremities    Intervertebral disc prolapse with impingement    Obstructive sleep apnea    Prostatitis    Stress    Dermatitis    Weakness of both lower extremities    Insulin resistance    At high risk for falls    Other specified diseases of blood and blood-forming organs    S/P CABG (coronary artery bypass graft)    Cardiac segmental configuration with atrial configuration unknown, ventricular configuration unknown, and inverted normally aligned great arteries    Vitamin A deficiency    Fatigue    Muscular atrophy    Cervical stenosis of spine    Diarrhea    Atrial flutter (HCC)    Tachy-ralf syndrome (HCC)    Anticoagulated on Coumadin    History of permanent cardiac pacemaker placement 7/1/2020:      Cardiac abnormality    Severe malnutrition (HCC)    SOB (shortness of breath)    Monoplegia of left leg (HCC)    Malfunction of percutaneous endoscopic gastrostomy (PEG) tube (HCC)    Post-nasal drip    Physical deconditioning    Infection of PEG site (HCC)    Rash in adult    Respiratory failure (HCC)    COVID-19    ALS (amyotrophic lateral sclerosis) (HCC)    Elevated procalcitonin    Elevated C-reactive protein (CRP)    Elevated ferritin    Elevated erythrocyte sedimentation rate    Leukocytosis    Allergy to multiple antibiotics     SYSTEM ASSESSMENT     IMPRESSION:  1.  Acute respiratory failure with hypoxemia. 2.  Severe ALS. 3.  History of coronary artery disease status post bypass graft surgery  10/2016. 4.  Malnutrition. 5.  Severe debilitation. 6.  Hypertension. 7.  Concern for bacterial pneumonia    Patient with MRSA in the sputum. Currently on vent support. No plans for weaning  Now on Zyvox. ID following. Appreciate their efforts  On Lovenox for DVT prophylaxis  On Decadron  On fentanyl and propofol. Triglyceride levels judi Moore (Spouse)   [phone] [facility] Phone)    Patient's wife updated she voiced appreciation of the call  Rounded with bedside nurse    CC time 45-minutes    The eventual plan is to stabilize him from the pneumonia and sepsis. He has respectfully requested a tracheostomy with plans for eventual home ventilator support. This is from his end-stage ALS. He has been on chronic noninvasive for months.     Electronically signed by Ney Rendon MD on 5/8/2022 at 12:22 PM

## 2022-05-08 NOTE — PROGRESS NOTES
[facility] -   Infectious diseases evaluation  admission date 5/5/2022    reason for consultation:   Covid-19 Infection    Impression :   Current:  · COVID-19 with suspected superimposed bacterial pneumonia. · Acute hypoxic respiratory failure required intubation. · Elevated Procalcitonin  · Elevated CRP  · Elevated Ferritin  · Elevated ESR  · Leukocytosis  · Severe ALS  · History of coronary artery disease status post CABG 2016  · Hypertension  · Penicillin, sulfa and Cipro allergy  ·     Other:  · PPM  Discussion / summary of stay / plan of care   · Patient received Actemra 5/5 22. Recommendations   · Zyvox 600 mg IV every 12 hours until 5/14/22. Hold dose if platelets < 454, call ID. · Follow inflammatory markers and CXR. · On Dexamethasone 6 mg IV daily. · On Lovenox 40 mg sq daily. · Follow blood and sputum cx. Adjust antibiotics. · US Doppler RUE  · Discussed with patient, RN. Infection Control Recommendations   · Universal Precautions  · Droplet Plus Precautions through 5/10/22. Antimicrobial Stewardship Recommendations   · Simplification of therapy  · Targeted therapy    History of Present Illness:   Initial history:  Yadira Koch is a 74y.o.-year-old male a with history of severe ALS who presented to the hospital with worsening cough and shortness of for 3 days prior to admission. Reportedly cough started around 4/20/2022. Chest x-ray showed bilateral infiltrates  Initial labs showed a procalcitonin level of 35.19, last WBC 12.8, creatinine less than 0.4, C-reactive protein 259, D-dimer 0.43  5/5/22 COVID-19 PCR positive  Urinalysis showed 0-2 WBC. The patient was admitted to ICU, was intubated. The patient is intubated, unable to provide history that was obtained from chart review and nursing staff. Mild amount of respiratory secretion, Brunner catheter in place.   PICC line in place  The patient was exposed to COVID-19 positive person on 2022 reportedly. The patient had a positive COVID 19 test at home. Interval changes  2022   Afebrile. Levophed at 2 mcg/min. Nods head yes/no. Intubated. FIO2 40%/P 7.  SpO2 97%. Denies any pain. Right UE mid forearm down to hand cool to touch. R UE PICC site clean. Brunner. Urine clear. Fentanyl gtt at 50 mcg/hr. , Propofol at 50 mcg/kg/min. Patient Vitals for the past 8 hrs:   BP Temp Temp src Pulse Resp SpO2   22 0905 -- -- -- 76 20 97 %   22 0900 (!) 109/54 -- -- 78 20 --   22 0830 (!) 157/87 -- -- 80 20 --   22 0800 (!) 144/79 97.6 °F (36.4 °C) Oral 78 20 --   22 0730 (!) 142/74 -- -- 78 20 --   22 0700 132/72 -- -- 74 20 --   22 0630 (!) 142/86 -- -- 73 22 --   22 0600 (!) 151/78 -- -- 70 20 --   22 0530 136/81 -- -- 78 20 --   22 0500 (!) 146/84 -- -- 75 20 --   22 0430 (!) 153/82 -- -- 72 20 --   22 0400 (!) 169/86 97.6 °F (36.4 °C) Oral 74 20 97 %   22 0330 (!) 181/96 -- -- 75 20 96 %   22 0300 (!) 183/96 -- -- 77 20 96 %       Summary of relevant labs:  Labs:  Cre: <0.40  Procalcitonin: 35.19-6.54  CRP: 259.2-36.7  WBC: 11.5-8.5-9.1  Plts: 421  Ferritin: 734  ESR: 63-2    Micro:  5/ BC x 2-Negative to date.  Sputum Cx- MRSA    Imagin/8 CXR-Stable bibasilar opacities    I have personally reviewed the past medical history, past surgical history, medications, social history, and family history, and I haveupdated the database accordingly. Allergies:   Ciprofloxacin, Lasix [furosemide], Penicillins, Sulfa antibiotics, and Duloxetine     Review of Systems:     Review of Systems   Reason unable to perform ROS: Intubated. Physical Examination :       Physical Exam  Vitals and nursing note reviewed. Constitutional:       Comments: Nods head yes/no. HENT:      Head: Normocephalic and atraumatic.       Right Ear: External ear normal.      Left Ear: External ear normal.      Nose: Nose normal.      Mouth/Throat:      Mouth: Mucous membranes are moist.      Pharynx: Oropharynx is clear. Eyes:      Extraocular Movements: Extraocular movements intact. Conjunctiva/sclera: Conjunctivae normal.      Pupils: Pupils are equal, round, and reactive to light. Cardiovascular:      Rate and Rhythm: Normal rate and regular rhythm. Heart sounds: Normal heart sounds. No murmur heard. Pulmonary:      Effort: Pulmonary effort is normal. No respiratory distress. Breath sounds: Normal breath sounds. No wheezing. Comments: Intubated. FIO2 40%. Abdominal:      General: Bowel sounds are normal. There is no distension. Palpations: Abdomen is soft. Tenderness: There is no abdominal tenderness. Genitourinary:     Comments: Brunner. Urine clear. Musculoskeletal:      Cervical back: No rigidity. Right lower leg: No edema. Left lower leg: No edema. Skin:     General: Skin is dry. Capillary Refill: Capillary refill takes less than 2 seconds. Comments: RUE cool from midforearm to fingertips. No mottling. Neurological:      Comments: Nods yes/no.    Psychiatric:         Mood and Affect: Mood normal.         Behavior: Behavior normal.         Past Medical History:     Past Medical History:   Diagnosis Date    ALS (amyotrophic lateral sclerosis) (HCC)     Aortic root aneurysm ([facility] [address] 11/08/2016    Aortic valve replaced 11/08/2016    Hypertension        Past Surgical  History:     Past Surgical History:   Procedure Laterality Date    CARDIOVERSION  06/27/2020    w/    HERNIA REPAIR      PARATHYROID GLAND SURGERY  1980       Medications:      polyvinyl alcohol  1 drop Both Eyes Q4H    linezolid  600 mg IntraVENous Q12H    sodium chloride flush  5-40 mL IntraVENous 2 times per day    dexamethasone  6 mg IntraVENous Q24H    enoxaparin  40 mg SubCUTAneous Daily    pantoprazole (PROTONIX) 40 mg injection  40 mg IntraVENous Daily    chlorhexidine  15 mL Mouth/Throat BID       Social History:     Social History     Socioeconomic History    Marital status:      Spouse name: Not on file    Number of children: Not on file    Years of education: Not on file    Highest education level: Not on file   Occupational History    Not on file   Tobacco Use    Smoking status: Never Smoker    Smokeless tobacco: Never Used   Vaping Use    Vaping Use: Never used   Substance and Sexual Activity    Alcohol use: Yes     Alcohol/week: 1.0 standard drink     Types: 1 Cans of beer per week    Drug use: No    Sexual activity: Not on file   Other Topics Concern    Not on file   Social History Narrative    Not on file     Social Determinants of Health     Financial Resource Strain:     Difficulty of Paying Living Expenses: Not on file   Food Insecurity:     Worried About Running Out of Food in the Last Year: Not on file    Fabio of Food in the Last Year: Not on file   Transportation Needs:     Lack of Transportation (Medical): Not on file    Lack of Transportation (Non-Medical):  Not on file   Physical Activity:     Days of Exercise per Week: Not on file    Minutes of Exercise per Session: Not on file   Stress:     Feeling of Stress : Not on file   Social Connections:     Frequency of Communication with Friends and Family: Not on file    Frequency of Social Gatherings with Friends and Family: Not on file    Attends Catholic Services: Not on file    Active Member of [address] [place] [address] or Organizations: Not on file    Attends Club or Organization Meetings: Not on file    Marital Status: Not on file   Intimate Partner Violence:     Fear of Current or Ex-Partner: Not on file    Emotionally Abused: Not on file    Physically Abused: Not on file    Sexually Abused: Not on file   Housing Stability:     Unable to Pay for Housing in the Last Year: Not on file    Number of [place] in the Last Year: Not on file    Unstable Housing in the Last Year: Not on file Family History:     Family History   Problem Relation Age of Onset    Lung Cancer Mother     Heart Disease Father       Medical Decision Making:   I have independently reviewed/ordered the following labs:    CBC with Differential:   Recent Labs     05/06/22  0359 05/06/22  0359 05/07/22  0507 05/08/22  0507   WBC 11.5*   < > 8.5 9.1   HGB 13.0*   < > 11.9* 13.0*   HCT 38.7*   < > 35.5* 39.2*      < > 397 421   LYMPHOPCT 1*  --   --   --    MONOPCT 3  --   --   --     < > = values in this interval not displayed. BMP:  Recent Labs     05/07/22  0507 05/08/22  0507    134*   K 3.5* 4.2   CL 99 100   CO2 28 27   BUN 13 15   CREATININE <0.40* <0.40*   MG 1.8  --      Hepatic Function Panel: No results for input(s): PROT, LABALBU, BILIDIR, IBILI, BILITOT, ALKPHOS, ALT, AST in the last 72 hours. No results for input(s): RPR in the last 72 hours. No results for input(s): HIV in the last 72 hours. No results for input(s): BC in the last 72 hours. Lab Results   Component Value Date    CREATININE <0.40 05/08/2022    GLUCOSE 206 05/08/2022       Detailed results: Thank you for allowing us to participate in the care of this patient. Please call with questions. This note is created with the assistance of a speech recognition program.  While intending to generate adocument that actually reflects the content of the visit, the document can still have some errors including those of syntax and sound a like substitutions which may escape proof reading. It such instances, actual meaningcan be extrapolated by contextual diversion.     MATEO [place] - HARLEEN  Office: [phone]  Perfect serve / office [phone]

## 2022-05-08 NOTE — PROGRESS NOTES
[facility] Internal Medicine  Adolph Ryan MD; Lita Sterlnig MD; Ivan Ya MD; MD Abdirashid Ag MD; Matthieu Buckley MD    [facility] Internal Medicine   [facility]    HISTORY AND PHYSICAL EXAMINATION            Date:   5/8/2022  Patient name:  Yanira Villa  Date of admission:  5/5/2022  8:35 AM  MRN:   [id number]  Account:  [de-identified]  YOB: 1950  PCP:    Tisha Johns MD  Room:   [de-identified]  Code Status:    Full Code    Chief Complaint:     Chief Complaint   Patient presents with    Shortness of Breath   dyspnea    History Obtained From:     Medical record and nursing staff  Pt is intubated sedated    History of Present Illness:     Yanira Villa is a 74 y.o. Non- / non  male who presents with Shortness of Breath   and is admitted to the hospital for the management of Respiratory failure ([facility] [address]     27-year-old  gentleman with history of MR atrophic lateral sclerosis well-known to pulmonary team admitted with increasing cough shortness of breath for 3 to 4 days chest x-ray shows bilateral infiltrate elevated procalcitonin elevated leukocytosis patient was tested positive for COVID-19 pneumonia on May 5  Acute respiratory failure secondary to ALS with multifocal COVID-19 pneumonia intubated and sedated treated in the intensive care unit  5/8  Patient, still intubated, sedated with fentanyl propofol  On tube feeds  Had reading of low blood pressure, On Levophed  Antibiotics changed to Zyvox, Sputum Culture growing MRSA   Past Medical History:     Past Medical History:   Diagnosis Date    ALS (amyotrophic lateral sclerosis) ([facility] [address]     Aortic root aneurysm ([address] 11/08/2016    Aortic valve replaced 11/08/2016    Hypertension         Past Surgical History:     Past Surgical History:   Procedure Laterality Date    CARDIOVERSION  06/27/2020    w/    HERNIA REPAIR      PARATHYROID GLAND SURGERY  1980        Medications Prior to Admission:     Prior to Admission medications    Medication Sig Start Date End Date Taking? Authorizing Provider   neomycin-bacitracin-polymyxin (NEOSPORIN) 3.5-400-5000 OINT ointment Apply topically 3 times daily Apply topically 4 times daily. 4/4/22   Bobo Albright MD   Handicap Placard MISC by Does not apply route Dx: ALS    Duration: 5 years 3/23/22   Bobo Albright MD   medical marijuana Place under the tongue 2 times daily. Historical Provider, MD   acetaminophen (TYLENOL) 325 MG tablet Take 650 mg by mouth every 6 hours as needed 9/25/20   Historical Provider, MD   fluticasone Alysha Canter) 50 MCG/ACT nasal spray 1 spray by Nasal route daily 6/8/21   Bobo Albright MD   SPIRULINA PO Take by mouth    Historical Provider, MD   Vitamin E 100 UNITS TABS Take 60 Units by mouth daily    Historical Provider, MD   SELENIUM PO Take 75 mcg by mouth daily    Historical Provider, MD   SOYA LECITHIN PO Take 1,000 mg by mouth daily    Historical Provider, MD   Multiple Vitamins-Minerals (THERAPEUTIC MULTIVITAMIN-MINERALS) tablet Take 1 tablet by mouth daily    Historical Provider, MD   OMEGA-3 FATTY ACIDS-VITAMIN E PO Take 1 tablet by mouth daily    Historical Provider, MD   Probiotic Product (PROBIOTIC PO) Take 1 tablet by mouth daily    Historical Provider, MD   Ascorbic Acid (VITAMIN C) 500 MG tablet Take 120 mg by mouth daily    Historical Provider, MD        Allergies:     Ciprofloxacin, Lasix [furosemide], Penicillins, Sulfa antibiotics, and Duloxetine    Social History:     Tobacco:    reports that he has never smoked. He has never used smokeless tobacco.  Alcohol:      reports current alcohol use of about 1.0 standard drink of alcohol per week. Drug Use:  reports no history of drug use.     Family History:     Family History   Problem Relation Age of Onset    Lung Cancer Mother     Heart Disease Father        Review of Systems:     Unable to get ros  Pt untubated and sedated    Physical Exam:   BP (!) 167/93   Pulse 80   Temp 98.6 °F (37 °C) (Axillary)   Resp 21   Ht 5' 10.05\" (1.779 m)   Wt 130 lb (59 kg)   SpO2 94%   BMI 18.63 kg/m²   Temp (24hrs), Av.9 °F (36.6 °C), Min:97.6 °F (36.4 °C), Max:98.6 °F (37 °C)    Recent Labs     22  1216   POCGLU 153*       Intake/Output Summary (Last 24 hours) at 2022 1634  Last data filed at 2022 1600  Gross per 24 hour   Intake 2561.2 ml   Output 1360 ml   Net 1201.2 ml       General Appearance:intubated sedated    Mental status: alert responds by nodding to questions  On vent intubated  Head: normocephalic, atraumatic  Eye: no icterus, redness, pupils equal and reactive, extraocular eye movements intact, conjunctiva clear  Ear: normal external ear, no discharge, hearing intact  Nose: no drainage noted  Mouth: mucous membranes moist  Neck: supple, no carotid bruits, thyroid not palpable  Lungs: on vent  Crackles  Cardiovascular: normal rate, regular rhythm, no murmur, gallop, rub  Abdomen: Soft, nontender, nondistended, normal bowel sounds, no hepatomegaly or splenomegaly  Neurologic: ALS  Skin: No gross lesions, rashes, bruising or bleeding on exposed skin area  Extremities: peripheral pulses palpable, no pedal edema or calf pain with palpation, RUE is Sligtly swollen       Investigations:      Laboratory Testing:  Recent Results (from the past 24 hour(s))   BLOOD GAS, ARTERIAL    Collection Time: 22  4:37 AM   Result Value Ref Range    pH, Arterial 7.446 7.350 - 7.450    pCO2, Arterial 45.1 (H) 35.0 - 45.0 mmHg    pO2, Arterial 77.7 (L) 80.0 - 100.0 mmHg    HCO3, Arterial 31.0 (H) 22.0 - 26.0 mmol/L    Positive Base Excess, Art 7.0 (H) 0.0 - 2.0 mmol/L    O2 Sat, Arterial 94.8 (L) 95 - 98 %    Carboxyhemoglobin 0.0 0 - 5 %    Methemoglobin 0.9 0.0 - 1.9 %    Pt Temp 37.1     O2 Device/Flow/% VENTILATOR     Respiratory Rate 20     Rosalino Test PASS     Sample Site Right Radial Artery Pt. Position SEMI-FOWLERS     Mode PRVC     Set Rate 20     Total Rate 20          FIO2 40     Peep/Cpap 7     Text for Respiratory RESULT TO RN    CBC    Collection Time: 05/08/22  5:07 AM   Result Value Ref Range    WBC 9.1 3.5 - 11.0 k/uL    RBC 4.00 (L) 4.5 - 5.9 m/uL    Hemoglobin 13.0 (L) 13.5 - 17.5 g/dL    Hematocrit 39.2 (L) 41 - 53 %    MCV 98.0 80 - 100 fL    MCH 32.6 26 - 34 pg    MCHC 33.2 31 - 37 g/dL    RDW 13.2 11.5 - 14.9 %    Platelets 810 364 - 423 k/uL    MPV 6.9 6.0 - 12.0 fL   Basic Metabolic Panel w/ Reflex to MG    Collection Time: 05/08/22  5:07 AM   Result Value Ref Range    Glucose 206 (H) 70 - 99 mg/dL    BUN 15 8 - 23 mg/dL    CREATININE <0.40 (L) 0.70 - 1.20 mg/dL    Calcium 8.4 (L) 8.6 - 10.4 mg/dL    Sodium 134 (L) 135 - 144 mmol/L    Potassium 4.2 3.7 - 5.3 mmol/L    Chloride 100 98 - 107 mmol/L    CO2 27 20 - 31 mmol/L    Anion Gap 7 (L) 9 - 17 mmol/L    GFR Non-African American Can not be calculated >60 mL/min    GFR  Can not be calculated >60 mL/min    GFR Comment         C-Reactive Protein    Collection Time: 05/08/22  5:07 AM   Result Value Ref Range    CRP 36.7 (H) 0.0 - 5.0 mg/L   SPECIMEN REJECTION    Collection Time: 05/08/22  5:07 AM   Result Value Ref Range    Specimen Source . BLOOD     Ordered Test SED     Reason for Rejection       Unable to perform testing: Specimen quantity not sufficient. Sedimentation Rate    Collection Time: 05/08/22  6:58 AM   Result Value Ref Range    Sed Rate 2 0 - 20 mm/Hr   POC Glucose Fingerstick    Collection Time: 05/08/22 12:16 PM   Result Value Ref Range    POC Glucose 153 (H) 75 - 110 mg/dL       Imaging/Diagnostics:  XR CHEST PORTABLE    Result Date: 5/6/2022  1. New right arm PICC extending toward SVC, but distal tip is obscured by overlying pacemaker leads. 2.  Endotracheal tube and enteric tube remain in place. 3.  Unchanged right basilar consolidation and small right pleural effusion.  Mild patchy left basilar opacities. XR CHEST PORTABLE    Result Date: 5/5/2022  Endotracheal tube in satisfactory position above the chance NG tip and side-port in gastric fundus Otherwise, unchanged chest demonstrating bibasilar opacities suggesting atelectasis or infection     XR CHEST PORTABLE    Result Date: 5/5/2022  Bibasilar opacities with blunting of right costophrenic angle possibly combination of pneumonia and small right pleural effusion. There is what appears to be a left chest tube terminating in the periphery of the upper lung partly obscured by pacemaker electrode. Please correlate clinically. No recent comparisons.        Assessment :      Hospital Problems           Last Modified POA    * (Principal) Respiratory failure ([facility] [address] 5/5/2022 Yes    COVID-19 5/8/2022 Yes    ALS (amyotrophic lateral sclerosis) ([facility] [address] 5/8/2022 Yes    Elevated procalcitonin 5/8/2022 Yes    Elevated C-reactive protein (CRP) 5/8/2022 Yes    Elevated ferritin 5/8/2022 Yes    Elevated erythrocyte sedimentation rate 5/8/2022 Yes    Leukocytosis 5/8/2022 Yes    Allergy to multiple antibiotics 5/8/2022 Yes          Plan:     64-year-old gentleman with a history of ALS  Admitted for multifocal COVID-19 pneumonia with acute respiratory failure, reviewed recent chest x-ray  Possible superimposed bacterial pneumonia with underlying neuromuscular disorder of ALS, Pro-Luis is high  Acute respiratory failure secondary to ALS plus COVID-19 pneumonia intubated sedated  On IV Zyvox , respiratory Cultures growing MRSA   For COVID-19 patient received Actemra, on Decadron  Hypotension, received 3.5 L, on Levophed  History of coronary artery disease s/p CABG,  On Protonix for gastric prophylaxis  Hypokalemia, Improved   DVT prophylaxis Lovenox  Patient critically sick, high chance of clinical deterioration, seen in ICU  Plan for RUE Ultrasound per ID ( has good pulsations)   CC time 32 minutes  Kenya Rosario MD  5/8/2022  4:34 PM    Copy sent to Dr. Amada Valiente, MD    Please note that this chart was generated using voice recognition Dragon dictation software. Although every effort was made to ensure the accuracy of this automated transcription, some errors in transcription may have occurred.

## 2022-05-09 ENCOUNTER — APPOINTMENT (OUTPATIENT)
Dept: GENERAL RADIOLOGY | Age: 72
DRG: 004 | End: 2022-05-09
Payer: MEDICARE

## 2022-05-09 ENCOUNTER — APPOINTMENT (OUTPATIENT)
Dept: VASCULAR LAB | Age: 72
DRG: 004 | End: 2022-05-09
Payer: MEDICARE

## 2022-05-09 LAB
ALLEN TEST: ABNORMAL
ANION GAP SERPL CALCULATED.3IONS-SCNC: 6 MMOL/L (ref 9–17)
BUN BLDV-MCNC: 14 MG/DL (ref 8–23)
CALCIUM SERPL-MCNC: 8.4 MG/DL (ref 8.6–10.4)
CARBOXYHEMOGLOBIN: 0 % (ref 0–5)
CHLORIDE BLD-SCNC: 100 MMOL/L (ref 98–107)
CO2: 29 MMOL/L (ref 20–31)
CREAT SERPL-MCNC: <0.4 MG/DL (ref 0.7–1.2)
ESTIMATED AVERAGE GLUCOSE: 140 MG/DL
FIO2: 40
GFR AFRICAN AMERICAN: ABNORMAL ML/MIN
GFR NON-AFRICAN AMERICAN: ABNORMAL ML/MIN
GFR SERPL CREATININE-BSD FRML MDRD: ABNORMAL ML/MIN/{1.73_M2}
GLUCOSE BLD-MCNC: 106 MG/DL (ref 75–110)
GLUCOSE BLD-MCNC: 121 MG/DL (ref 75–110)
GLUCOSE BLD-MCNC: 144 MG/DL (ref 75–110)
GLUCOSE BLD-MCNC: 149 MG/DL (ref 70–99)
HBA1C MFR BLD: 6.5 % (ref 4–6)
HCO3 ARTERIAL: 32.1 MMOL/L (ref 22–26)
HCT VFR BLD CALC: 36.9 % (ref 41–53)
HEMOGLOBIN: 12.5 G/DL (ref 13.5–17.5)
MCH RBC QN AUTO: 33.2 PG (ref 26–34)
MCHC RBC AUTO-ENTMCNC: 33.9 G/DL (ref 31–37)
MCV RBC AUTO: 97.8 FL (ref 80–100)
METHEMOGLOBIN: 0.9 % (ref 0–1.9)
MODE: ABNORMAL
O2 DEVICE/FLOW/%: ABNORMAL
O2 SAT, ARTERIAL: 95.1 % (ref 95–98)
PATIENT TEMP: 37
PCO2 ARTERIAL: 44.4 MMHG (ref 35–45)
PDW BLD-RTO: 13.7 % (ref 11.5–14.9)
PEEP/CPAP: 7
PH ARTERIAL: 7.47 (ref 7.35–7.45)
PLATELET # BLD: 446 K/UL (ref 150–450)
PMV BLD AUTO: 6.6 FL (ref 6–12)
PO2 ARTERIAL: 79.5 MMHG (ref 80–100)
POSITIVE BASE EXCESS, ART: 8.4 MMOL/L (ref 0–2)
POTASSIUM SERPL-SCNC: 4 MMOL/L (ref 3.7–5.3)
PT. POSITION: ABNORMAL
RBC # BLD: 3.78 M/UL (ref 4.5–5.9)
RESPIRATORY RATE: 20
SAMPLE SITE: ABNORMAL
SET RATE: 20
SODIUM BLD-SCNC: 135 MMOL/L (ref 135–144)
TEXT FOR RESPIRATORY: ABNORMAL
TOTAL RATE: 20
VT: 500
WBC # BLD: 10.6 K/UL (ref 3.5–11)

## 2022-05-09 PROCEDURE — 6370000000 HC RX 637 (ALT 250 FOR IP): Performed by: INTERNAL MEDICINE

## 2022-05-09 PROCEDURE — 80048 BASIC METABOLIC PNL TOTAL CA: CPT

## 2022-05-09 PROCEDURE — 2700000000 HC OXYGEN THERAPY PER DAY

## 2022-05-09 PROCEDURE — 2580000003 HC RX 258: Performed by: INTERNAL MEDICINE

## 2022-05-09 PROCEDURE — 2000000000 HC ICU R&B

## 2022-05-09 PROCEDURE — 94761 N-INVAS EAR/PLS OXIMETRY MLT: CPT

## 2022-05-09 PROCEDURE — 2500000003 HC RX 250 WO HCPCS: Performed by: INTERNAL MEDICINE

## 2022-05-09 PROCEDURE — 82805 BLOOD GASES W/O2 SATURATION: CPT

## 2022-05-09 PROCEDURE — 94003 VENT MGMT INPAT SUBQ DAY: CPT

## 2022-05-09 PROCEDURE — 99291 CRITICAL CARE FIRST HOUR: CPT | Performed by: INTERNAL MEDICINE

## 2022-05-09 PROCEDURE — 6360000002 HC RX W HCPCS: Performed by: INTERNAL MEDICINE

## 2022-05-09 PROCEDURE — 71045 X-RAY EXAM CHEST 1 VIEW: CPT

## 2022-05-09 PROCEDURE — 99212 OFFICE O/P EST SF 10 MIN: CPT

## 2022-05-09 PROCEDURE — 6360000002 HC RX W HCPCS: Performed by: EMERGENCY MEDICINE

## 2022-05-09 PROCEDURE — C9113 INJ PANTOPRAZOLE SODIUM, VIA: HCPCS | Performed by: INTERNAL MEDICINE

## 2022-05-09 PROCEDURE — 85027 COMPLETE CBC AUTOMATED: CPT

## 2022-05-09 PROCEDURE — 93971 EXTREMITY STUDY: CPT

## 2022-05-09 PROCEDURE — A4216 STERILE WATER/SALINE, 10 ML: HCPCS | Performed by: INTERNAL MEDICINE

## 2022-05-09 PROCEDURE — 6360000002 HC RX W HCPCS: Performed by: NURSE PRACTITIONER

## 2022-05-09 PROCEDURE — 99233 SBSQ HOSP IP/OBS HIGH 50: CPT | Performed by: INTERNAL MEDICINE

## 2022-05-09 PROCEDURE — 82947 ASSAY GLUCOSE BLOOD QUANT: CPT

## 2022-05-09 RX ORDER — INSULIN LISPRO 100 [IU]/ML
0-6 INJECTION, SOLUTION INTRAVENOUS; SUBCUTANEOUS EVERY 6 HOURS
Status: DISCONTINUED | OUTPATIENT
Start: 2022-05-09 | End: 2022-05-17 | Stop reason: HOSPADM

## 2022-05-09 RX ORDER — INSULIN LISPRO 100 [IU]/ML
0-6 INJECTION, SOLUTION INTRAVENOUS; SUBCUTANEOUS 4 TIMES DAILY
Status: DISCONTINUED | OUTPATIENT
Start: 2022-05-09 | End: 2022-05-09

## 2022-05-09 RX ADMIN — SODIUM CHLORIDE, PRESERVATIVE FREE 10 ML: 5 INJECTION INTRAVENOUS at 09:48

## 2022-05-09 RX ADMIN — VANCOMYCIN HYDROCHLORIDE 1750 MG: 1 INJECTION, POWDER, LYOPHILIZED, FOR SOLUTION INTRAVENOUS at 21:01

## 2022-05-09 RX ADMIN — INSULIN LISPRO 1 UNITS: 100 INJECTION, SOLUTION INTRAVENOUS; SUBCUTANEOUS at 20:18

## 2022-05-09 RX ADMIN — PROPOFOL 45 MCG/KG/MIN: 10 INJECTION, EMULSION INTRAVENOUS at 04:40

## 2022-05-09 RX ADMIN — POLYVINYL ALCOHOL 1 DROP: 14 SOLUTION/ DROPS OPHTHALMIC at 07:54

## 2022-05-09 RX ADMIN — Medication 50 MCG/HR: at 01:38

## 2022-05-09 RX ADMIN — LINEZOLID 600 MG: 600 INJECTION, SOLUTION INTRAVENOUS at 11:21

## 2022-05-09 RX ADMIN — SENNOSIDES 8.6 MG: 8.6 TABLET, COATED ORAL at 09:47

## 2022-05-09 RX ADMIN — ENOXAPARIN SODIUM 40 MG: 100 INJECTION SUBCUTANEOUS at 09:47

## 2022-05-09 RX ADMIN — SENNOSIDES 8.6 MG: 8.6 TABLET, COATED ORAL at 19:50

## 2022-05-09 RX ADMIN — DEXAMETHASONE SODIUM PHOSPHATE 6 MG: 10 INJECTION, SOLUTION INTRAMUSCULAR; INTRAVENOUS at 11:22

## 2022-05-09 RX ADMIN — POLYVINYL ALCOHOL 1 DROP: 14 SOLUTION/ DROPS OPHTHALMIC at 03:45

## 2022-05-09 RX ADMIN — SODIUM CHLORIDE, PRESERVATIVE FREE 40 MG: 5 INJECTION INTRAVENOUS at 09:47

## 2022-05-09 RX ADMIN — POLYVINYL ALCOHOL 1 DROP: 14 SOLUTION/ DROPS OPHTHALMIC at 15:45

## 2022-05-09 RX ADMIN — PROPOFOL 30 MCG/KG/MIN: 10 INJECTION, EMULSION INTRAVENOUS at 14:22

## 2022-05-09 RX ADMIN — POLYVINYL ALCOHOL 1 DROP: 14 SOLUTION/ DROPS OPHTHALMIC at 20:18

## 2022-05-09 RX ADMIN — PROPOFOL 45 MCG/KG/MIN: 10 INJECTION, EMULSION INTRAVENOUS at 00:35

## 2022-05-09 RX ADMIN — CHLORHEXIDINE GLUCONATE 0.12% ORAL RINSE 15 ML: 1.2 LIQUID ORAL at 19:49

## 2022-05-09 RX ADMIN — CHLORHEXIDINE GLUCONATE 0.12% ORAL RINSE 15 ML: 1.2 LIQUID ORAL at 09:47

## 2022-05-09 RX ADMIN — Medication 50 MCG/HR: at 18:18

## 2022-05-09 RX ADMIN — POLYVINYL ALCOHOL 1 DROP: 14 SOLUTION/ DROPS OPHTHALMIC at 00:15

## 2022-05-09 RX ADMIN — Medication 2 MCG/MIN: at 21:27

## 2022-05-09 RX ADMIN — PROPOFOL 40 MCG/KG/MIN: 10 INJECTION, EMULSION INTRAVENOUS at 21:49

## 2022-05-09 RX ADMIN — SODIUM CHLORIDE, PRESERVATIVE FREE 20 ML: 5 INJECTION INTRAVENOUS at 20:37

## 2022-05-09 RX ADMIN — POLYVINYL ALCOHOL 1 DROP: 14 SOLUTION/ DROPS OPHTHALMIC at 11:25

## 2022-05-09 ASSESSMENT — PULMONARY FUNCTION TESTS
PIF_VALUE: 18
PIF_VALUE: 20
PIF_VALUE: 22
PIF_VALUE: 22
PIF_VALUE: 19
PIF_VALUE: 21
PIF_VALUE: 20
PIF_VALUE: 22
PIF_VALUE: 20
PIF_VALUE: 21
PIF_VALUE: 20
PIF_VALUE: 24
PIF_VALUE: 21
PIF_VALUE: 20
PIF_VALUE: 23
PIF_VALUE: 21
PIF_VALUE: 20
PIF_VALUE: 21
PIF_VALUE: 22
PIF_VALUE: 22
PIF_VALUE: 18
PIF_VALUE: 19
PIF_VALUE: 21
PIF_VALUE: 20
PIF_VALUE: 25
PIF_VALUE: 20
PIF_VALUE: 20
PIF_VALUE: 21
PIF_VALUE: 20
PIF_VALUE: 21
PIF_VALUE: 22
PIF_VALUE: 19
PIF_VALUE: 20
PIF_VALUE: 21
PIF_VALUE: 20
PIF_VALUE: 21
PIF_VALUE: 18
PIF_VALUE: 20
PIF_VALUE: 20
PIF_VALUE: 19
PIF_VALUE: 20

## 2022-05-09 ASSESSMENT — PAIN SCALES - GENERAL
PAINLEVEL_OUTOF10: 0

## 2022-05-09 NOTE — PLAN OF CARE
Problem: Discharge Planning  Goal: Discharge to home or other facility with appropriate resources  Outcome: Progressing  Flowsheets (Taken 5/9/2022 0730)  Discharge to home or other facility with appropriate resources:   Identify barriers to discharge with patient and caregiver   Arrange for needed discharge resources and transportation as appropriate   Refer to discharge planning if patient needs post-hospital services based on physician order or complex needs related to functional status, cognitive ability or social support system     Problem: Skin/Tissue Integrity  Goal: Absence of new skin breakdown  Outcome: Progressing     Problem: Pain  Goal: Verbalizes/displays adequate comfort level or baseline comfort level  Outcome: Progressing  Flowsheets (Taken 5/9/2022 1530)  Verbalizes/displays adequate comfort level or baseline comfort level:   Encourage patient to monitor pain and request assistance   Assess pain using appropriate pain scale   Administer analgesics based on type and severity of pain and evaluate response   Implement non-pharmacological measures as appropriate and evaluate response   Consider cultural and social influences on pain and pain management   Notify Licensed Independent Practitioner if interventions unsuccessful or patient reports new pain     Problem: Safety - Adult  Goal: Free from fall injury  Outcome: Progressing  Flowsheets (Taken 5/9/2022 0730)  Free From Fall Injury:   Instruct family/caregiver on patient safety   Based on caregiver fall risk screen, instruct family/caregiver to ask for assistance with transferring infant if caregiver noted to have fall risk factors     Problem: Respiratory - Adult  Goal: Achieves optimal ventilation and oxygenation  Outcome: Progressing  Flowsheets (Taken 5/9/2022 0730)  Achieves optimal ventilation and oxygenation:   Assess for changes in respiratory status   Assess for changes in mentation and behavior   Position to facilitate oxygenation and

## 2022-05-09 NOTE — FLOWSHEET NOTE
05/09/22 1803   Encounter Summary   Service Provided For: Patient not available   Referral/Consult From: Rounding  (outside room)   Complexity of Encounter Low   Spiritual/Emotional needs   Type Spiritual Support   Assessment/Intervention/Outcome   Assessment Unable to assess  (Covid)   Intervention Prayer (assurance of)/[place]

## 2022-05-09 NOTE — PROGRESS NOTES
PARATHYROID GLAND SURGERY  1982        Medications Prior to Admission:     Prior to Admission medications    Medication Sig Start Date End Date Taking? Authorizing Provider   neomycin-bacitracin-polymyxin (NEOSPORIN) 3.5-400-5000 OINT ointment Apply topically 3 times daily Apply topically 4 times daily. 4/4/22   Amada Valiente MD   Handicap Placard MISC by Does not apply route Dx: ALS    Duration: 5 years 3/23/22   Amada Valiente MD   medical marijuana Place under the tongue 2 times daily. Historical Provider, MD   acetaminophen (TYLENOL) 325 MG tablet Take 650 mg by mouth every 6 hours as needed 9/25/20   Historical Provider, MD   fluticasone Jackquline Sees) 50 MCG/ACT nasal spray 1 spray by Nasal route daily 6/8/21   mAada Valiente MD   SPIRULINA PO Take by mouth    Historical Provider, MD   Vitamin E 100 UNITS TABS Take 60 Units by mouth daily    Historical Provider, MD   SELENIUM PO Take 75 mcg by mouth daily    Historical Provider, MD   SOYA LECITHIN PO Take 1,000 mg by mouth daily    Historical Provider, MD   Multiple Vitamins-Minerals (THERAPEUTIC MULTIVITAMIN-MINERALS) tablet Take 1 tablet by mouth daily    Historical Provider, MD   OMEGA-3 FATTY ACIDS-VITAMIN E PO Take 1 tablet by mouth daily    Historical Provider, MD   Probiotic Product (PROBIOTIC PO) Take 1 tablet by mouth daily    Historical Provider, MD   Ascorbic Acid (VITAMIN C) 500 MG tablet Take 120 mg by mouth daily    Historical Provider, MD        Allergies:     Ciprofloxacin, Lasix [furosemide], Penicillins, Sulfa antibiotics, and Duloxetine    Social History:     Tobacco:    reports that he has never smoked. He has never used smokeless tobacco.  Alcohol:      reports current alcohol use of about 1.0 standard drink of alcohol per week. Drug Use:  reports no history of drug use.     Family History:     Family History   Problem Relation Age of Onset    Lung Cancer Mother     Heart Disease Father        Review of Systems:     Unable to get - 23 mg/dL    CREATININE <0.40 (L) 0.70 - 1.20 mg/dL    Calcium 8.4 (L) 8.6 - 10.4 mg/dL    Sodium 135 135 - 144 mmol/L    Potassium 4.0 3.7 - 5.3 mmol/L    Chloride 100 98 - 107 mmol/L    CO2 29 20 - 31 mmol/L    Anion Gap 6 (L) 9 - 17 mmol/L    GFR Non-African American Can not be calculated >60 mL/min    GFR  Can not be calculated >60 mL/min    GFR Comment         Blood gas, arterial    Collection Time: 05/09/22  4:50 AM   Result Value Ref Range    pH, Arterial 7.467 (H) 7.350 - 7.450    pCO2, Arterial 44.4 35.0 - 45.0 mmHg    pO2, Arterial 79.5 (L) 80.0 - 100.0 mmHg    HCO3, Arterial 32.1 (H) 22.0 - 26.0 mmol/L    Positive Base Excess, Art 8.4 (H) 0.0 - 2.0 mmol/L    O2 Sat, Arterial 95.1 95 - 98 %    Carboxyhemoglobin 0.0 0 - 5 %    Methemoglobin 0.9 0.0 - 1.9 %    Pt Temp 37.0     O2 Device/Flow/% VENTILATOR     Respiratory Rate 20     Rosalino Test PASS     Sample Site Left Radial Artery     Pt. Position SEMI-FOWLERS     Mode PRVC     Set Rate 20     Total Rate 20          FIO2 40     Peep/Cpap 7     Text for Respiratory RESULTS TO ALISSA RN    POC Glucose Fingerstick    Collection Time: 05/09/22  7:43 AM   Result Value Ref Range    POC Glucose 121 (H) 75 - 110 mg/dL   POC Glucose Fingerstick    Collection Time: 05/09/22  1:44 PM   Result Value Ref Range    POC Glucose 106 75 - 110 mg/dL       Imaging/Diagnostics:  XR CHEST PORTABLE    Result Date: 5/6/2022  1. New right arm PICC extending toward SVC, but distal tip is obscured by overlying pacemaker leads. 2.  Endotracheal tube and enteric tube remain in place. 3.  Unchanged right basilar consolidation and small right pleural effusion. Mild patchy left basilar opacities.      XR CHEST PORTABLE    Result Date: 5/5/2022  Endotracheal tube in satisfactory position above the chance NG tip and side-port in gastric fundus Otherwise, unchanged chest demonstrating bibasilar opacities suggesting atelectasis or infection     XR CHEST PORTABLE    Result Date: 5/5/2022  Bibasilar opacities with blunting of right costophrenic angle possibly combination of pneumonia and small right pleural effusion. There is what appears to be a left chest tube terminating in the periphery of the upper lung partly obscured by pacemaker electrode. Please correlate clinically. No recent comparisons. Assessment :      Hospital Problems           Last Modified POA    * (Principal) Respiratory failure ([address] 5/5/2022 Yes    COVID-19 5/8/2022 Yes    ALS (amyotrophic lateral sclerosis) ([address] 5/8/2022 Yes    Elevated procalcitonin 5/8/2022 Yes    Elevated C-reactive protein (CRP) 5/8/2022 Yes    Elevated ferritin 5/8/2022 Yes    Elevated erythrocyte sedimentation rate 5/8/2022 Yes    Leukocytosis 5/8/2022 Yes    Allergy to multiple antibiotics 5/8/2022 Yes          Plan:     24-year-old gentleman with a history of ALS  Admitted for multifocal COVID-19 pneumonia with acute respiratory failure,   Possible superimposed bacterial pneumonia with underlying neuromuscular disorder of ALS, Pro-Luis is high  Acute respiratory failure secondary to ALS plus COVID-19 pneumonia intubated sedated  On IV Zyvox , respiratory Cultures growing MRSA   For COVID-19 patient received Actemra, on Decadron  Hypotension, received 3.5 L, on Levophed  History of coronary artery disease s/p CABG,  On Protonix for gastric prophylaxis  Hypokalemia, Improved   DVT prophylaxis Lovenox  Patient critically sick, high chance of clinical deterioration, seen in ICU  Plan for RUE Ultrasound per ID ( has good pulsations)   CC time 35 minutes        Regino Canada MD  5/9/2022  4:58 PM    Copy sent to Dr. Grant Rose MD    Please note that this chart was generated using voice recognition Dragon dictation software. Although every effort was made to ensure the accuracy of this automated transcription, some errors in transcription may have occurred.

## 2022-05-09 NOTE — PROGRESS NOTES
Vancomycin Dosing by Pharmacy - Initial Note   TODAY'S DATE:  5/9/2022  Patient name, age:  Farhan Mendoza, 74 y.o. Indication: Respiratory infection, MRSA confirmed  Additional antimicrobials:  none    Allergies:  Ciprofloxacin, Lasix [furosemide], Penicillins, Sulfa antibiotics, and Duloxetine   Actual Weight:    Wt Readings from Last 1 Encounters:   05/09/22 145 lb 8.1 oz (66 kg)     Labs/Ancillary Data  Estimated Creatinine Clearance: 151 mL/min (based on SCr of 0.4 mg/dL). Recent Labs     05/07/22  0507 05/08/22  0507 05/09/22  0436   CREATININE <0.40* <0.40* <0.40*   BUN 13 15 14   WBC 8.5 9.1 10.6     Procalcitonin   Date Value Ref Range Status   05/07/2022 6.54 (H) <0.09 ng/mL Final     Comment:           Suspected Sepsis:  <0.50 ng/mL     Low likelihood of sepsis. 0.50-2.00 ng/mL     Increased likelihood of sepsis. Antibiotics encouraged. >2.00 ng/mL     High risk of sepsis/shock. Antibiotics strongly encouraged. Suspected Lower Resp Tract Infections:  <0.24 ng/mL     Low likelihood of bacterial infection. >0.24 ng/mL     Increased likelihood of bacterial infection. Antibiotics encouraged. With successful antibiotic therapy, PCT levels should decrease rapidly. (Half-life of 24 to   36 hours.)        Procalcitonin values from samples collected within the first 6 hours of systemic infection   may still be low. Retesting may be indicated. Values from day 1 and day 4 can be entered into the Change in Procalcitonin Calculator   ([email or web address]-pct-[email or web address] com) to determine the patient's Mortality Risk Prognosis        In healthy neonates, plasma Procalcitonin (PCT) concentrations increase gradually after   birth, reaching peak values at about 24 hours of age then decrease to normal values below   0.5 ng/mL by 48-72 hours of age.          Intake/Output Summary (Last 24 hours) at 5/9/2022 1147  Last data filed at 5/9/2022 0730  Gross per 24 hour   Intake 1419.64 ml   Output 1610 ml   Net -190.36 ml     Temp: 97.7 F    Recent vancomycin administrations        No vancomycin IV orders with administrations found. Orders not given:            vancomycin (VANCOCIN) intermittent dosing (placeholder)    vancomycin (VANCOCIN) 1,750 mg in dextrose 5 % 500 mL IVPB    vancomycin (VANCOCIN) 1,250 mg in dextrose 5 % 250 mL IVPB (ADDAVIAL)                  Culture Date / Source  /  Results  See micro    MRSA Nasal Swab: patient already has a respiratory culture with MRSA heavy growth  PLAN   Initial loading dose of 25mg/kg (max of 2,500 mg) = 1750  mg  x 1, then  vancomycin 1250 mg IV every 12 hours. ( Note: patient with ALS, so serum creatinine may be falsely low.)  Ensured BUN/sCr ordered at baseline and every 48 hours x at least 3 levels, then at least weekly. Vancomycin level ordered for 05/11 @ 0600      Vancomycin Target Concentration Parameters  Treatment  Population Target AUC/HARDEEP Target Trough   Invasive MRSA Infection (bacteremia, pneumonia, meningitis, endocarditis, osteomyelitis)  Sepsis (undifferentiated) 400-600 N/A   Infection due to non-MRSA pathogen  Empiric treatment of non-invasive MRSA infection  (SSTI, UTI) <500 10-15 mg/L   CrCl < 29 mL/min  Rapidly fluctuating serum creatinine   PATI N/A < 15 mg/L     Renal replacement therapy is dosed by levels, per hospital protocol. Abbreviations  * Pauc: probability that AUC is >400 (efficacy); Pconc: probability that Ctrough is above 20 ?g/mL (toxicity); Tox: Probability of nephrotoxicity, based on Osiris et al. Clin Infect Dis 2009. Loading dose: 1750 mg at 21:00 05/09/2022. Regimen: 1250 mg IV every 12 hours. Start time: 11:47 on 05/09/2022  Exposure target: AUC24 (range)400-600 mg/L.hr   AUC24,ss: 463 mg/L.hr  Probability of AUC24 > 400: 65 %  Ctrough,ss: 12.5 mg/L  Probability of Ctrough,ss > 20: 20 %  Probability of nephrotoxicity (Lodise MARILEE 2009): 8 %    Thank you for the consult. Pharmacy will continue to follow.      Tabitha Pederson Lance Craig. Ph.  5/9/2022  11:51 AM

## 2022-05-09 NOTE — PROGRESS NOTES
Writer in room with patient. Wife was called and updated on patient's condition.  Phone was placed to patient's ear so wife could speak to him

## 2022-05-09 NOTE — PROGRESS NOTES
[facility]  Follow Up      NAME:  Farhan Mendoza  MEDICAL RECORD NUMBER:  [id number]  AGE: 74 y.o. GENDER: male  : 1950  TODAY'S DATE:  2022    [facility] nurse follow up visit for buttocks wound. Wound has improved since last visit. Open areas that were previously present have now healed. Skin remains dry and flaky. Recommend continuing triad cream to protect the skin.       Measurements:  Wound 22 Coccyx Posterior (Active)   Wound Image   22 1136   Wound Etiology Other 22 1136   Dressing Status New dressing applied 22 1136   Wound Cleansed Cleansed with saline 22 1136   Dressing/Treatment Triad hydro/zinc oxide-based hydrophilic paste  3576   Wound Assessment Pink/red 22 1136   Drainage Amount None 22 1136   Drainage Description Serosanguinous 22 0400   Odor None 22 1136   Kelly-wound Assessment Blanchable erythema 22 1136   Margins Attached edges 22 1136   Wound Thickness Description not for Pressure Injury Partial thickness 22 0730   Number of days: 3     DAYAN FinneyN, RN-WCC, CSWS, DAPVIRIDIANACA  [facility]  [facility], Ostomy, and Continence Nursing  [phone]

## 2022-05-09 NOTE — PROGRESS NOTES
ICU Progress Note (Vent)   Pulmonary and Critical Care Specialists    Patient - Yadira Koch,  Age - 74 y.o.    - 1950      Room Number -    MRN -  [id number]   Acct # - [de-identified]  Date of Admission -  2022  8:35 AM    Events of Past 24 Hours   Patient intubated on vent support. Vitals    height is 5' 10.05\" (1.779 m) and weight is 145 lb 8.1 oz (66 kg). His oral temperature is 98.4 °F (36.9 °C). His blood pressure is 118/69 and his pulse is 74. His respiration is 21 and oxygen saturation is 98%. Temperature Range: Temp: 98.4 °F (36.9 °C) Temp  Av.9 °F (36.6 °C)  Min: 97.5 °F (36.4 °C)  Max: 98.4 °F (36.9 °C)  BP Range:  Systolic (29MNY), ANGELA:315 , Min:90 , OBH:502     Diastolic (46JBX), ALEC:30, Min:56, Max:93    Pulse Range: Pulse  Av.6  Min: 64  Max: 85  Respiration Range: Resp  Av  Min: 19  Max: 21  Current Pulse Ox[de-identified]  SpO2: 98 %  24HR Pulse Ox Range:  SpO2  Av.9 %  Min: 94 %  Max: 98 %  Oxygen Amount and Delivery: O2 Flow Rate (L/min): 8 L/min      Wt Readings from Last 3 Encounters:   22 145 lb 8.1 oz (66 kg)   22 115 lb (52.2 kg)   22 120 lb (54.4 kg)     I/O       Intake/Output Summary (Last 24 hours) at 2022 1424  Last data filed at 2022 0730  Gross per 24 hour   Intake 948.08 ml   Output 1250 ml   Net -301.92 ml     I/O last 3 completed shifts:   In: 2550.7 [I.V.:487.8; NG/GT:1466; IV Piggyback:597]  Out: 5 [Urine:2350; Emesis/NG output:10]     DRAIN/TUBE OUTPUT:     Invasive Lines   ETT Day -   5  PICC line day #5    Mechanical Ventilation Data   SETTINGS (Comprehensive)  Vent Information  Ventilator Day(s): 2  Ventilator ID: [id number]  Vent Mode: PRVC  Ventilator Initiate: Yes  Additional Respiratory Assessments  Pulse: 74  Resp: 21  SpO2: 98 %  End Tidal CO2: 32 (%)  Position: Semi-Christie's  Humidification Source: HME  Cuff Pressure (cm H2O): 28 cm H2O  Skin Barrier Applied: No       ABGs:   Lab Results   Component Value Date    PHART 7.467 05/09/2022    PO2ART 79.5 05/09/2022    IWP3PZG 44.4 05/09/2022       Lab Results   Component Value Date    MODE PRVC 05/09/2022         Medications   IV   dextrose      norepinephrine Stopped (05/08/22 1303)    fentaNYL 50 mcg/hr (05/09/22 0138)    sodium chloride      propofol 30 mcg/kg/min (05/09/22 1422)      insulin lispro  0-6 Units SubCUTAneous Q6H    vancomycin (VANCOCIN) intermittent dosing (placeholder)   Other RX Placeholder    vancomycin  1,750 mg IntraVENous Once    [START ON 5/10/2022] vancomycin  1,250 mg IntraVENous Q12H    senna  1 tablet PEG Tube BID    polyvinyl alcohol  1 drop Both Eyes Q4H    sodium chloride flush  5-40 mL IntraVENous 2 times per day    dexamethasone  6 mg IntraVENous Q24H    enoxaparin  40 mg SubCUTAneous Daily    pantoprazole (PROTONIX) 40 mg injection  40 mg IntraVENous Daily    chlorhexidine  15 mL Mouth/Throat BID       Diet/Nutrition   Diet NPO  ADULT TUBE FEEDING; Nasogastric; Peptide Based High Protein; Continuous; 20; Yes; 10; Q 4 hours; 60; 30; Q 4 hours    Exam   VITALS    height is 5' 10.05\" (1.779 m) and weight is 145 lb 8.1 oz (66 kg). His oral temperature is 98.4 °F (36.9 °C). His blood pressure is 118/69 and his pulse is 74. His respiration is 21 and oxygen saturation is 98%. Ventilator Settings (Basic)  Vent Mode: PRVC Resp Rate (Set): 20 bmp/Vt (Set, mL): 500 mL/ /FiO2 : 40 %    Constitutional -he appears to be responding  General Appearance critically ill. HEENT - Life support devices in place (ET,),normocephalic, atraumatic. Lungs - Chest expands equally, no wheezes, rales or rhonchi. Cardiovascular - Heart sounds are normal.  normal rate and rhythm regular, no murmur, gallop or rub.   Abdomen - soft, nontender,  Extremities - no cyanosis, clubbing or edema    Lab Results   CBC     Lab Results   Component Value Date    WBC 10.6 05/09/2022    RBC 3.78 05/09/2022    HGB 12.5 05/09/2022    HCT 36.9 05/09/2022     05/09/2022    MCV 97.8 05/09/2022    MCH 33.2 05/09/2022    MCHC 33.9 05/09/2022    RDW 13.7 05/09/2022    LYMPHOPCT 1 05/06/2022    MONOPCT 3 05/06/2022    BASOPCT 0 05/06/2022    MONOSABS 0.35 05/06/2022    LYMPHSABS 0.12 05/06/2022    EOSABS 0.00 05/06/2022    BASOSABS 0.00 05/06/2022    DIFFTYPE NOT REPORTED 08/25/2021       BMP   Lab Results   Component Value Date     05/09/2022    K 4.0 05/09/2022     05/09/2022    CO2 29 05/09/2022    BUN 14 05/09/2022    CREATININE <0.40 05/09/2022    GLUCOSE 149 05/09/2022    CALCIUM 8.4 05/09/2022       LFTS  Lab Results   Component Value Date    ALKPHOS 137 05/05/2022    ALT 46 05/05/2022    AST 30 05/05/2022    PROT 7.5 05/05/2022    BILITOT 0.66 05/05/2022    LABALBU 3.0 05/05/2022       INR  No results for input(s): PROTIME, INR in the last 72 hours. APTT  No results for input(s): APTT in the last 72 hours. Lactic Acid  No results found for: LACTA     BNP   No results for input(s): BNP in the last 72 hours. Cultures   Sputum, May 6, MRSA  Blood cultures x2 negative. Radiology     Plain Films         Chest x-ray reveals right lower lobe process. SYSTEM ASSESSMENT    1.  Acute respiratory failure with hypoxemia. 2.  Severe ALS. 3.  History of coronary artery disease status post bypass graft surgery  10/2016. 4.  Malnutrition. 5.  Severe debilitation. 6.  Hypertension. 7.  MRSA pneumonia    Neuro   Propofol to help with sedation. Triglyceride level yesterday 128. We will repeat in a.m. Respiratory   With vent support. Patient will need a trach once pneumonia is cleared    Hemodynamics   Not on Levophed    Gastrointestinal/Nutrition   Enteric feeds.     Renal   Renal function normal    Infectious Disease   Currently on vancomycin    Hematology/Oncology   lovenox    Endocrine       Social/Spiritual/DNR/Disposition/Other     Elesa Agent (Spouse)   [phone] [facility] Phone)    Patient's wife informed me that he has had 2 feeding tubes G-tubes i placed. First 1 at [address] [facility] second 1 here at [facility]... After 16 months with the second tube, it reportedly got infected.     Critical Care Time   45 min    Electronically signed by Sj Frost MD on 5/9/2022 at 2:24 PM

## 2022-05-09 NOTE — PROGRESS NOTES
[facility] -   Infectious diseases evaluation  admission date 5/5/2022    reason for consultation:   Covid-19 Infection    Impression :   Current:  · COVID-19 with superimposed MRSA pneumonia. · Acute hypoxic respiratory failure required intubation. · Leukocytosis  · Severe ALS  · History of coronary artery disease status post CABG 2016  · Hypertension  · Penicillin, sulfa and Cipro allergy  ·     Other:  · PPM  Discussion / summary of stay / plan of care   · Patient received Actemra 5/5 22. Recommendations   · Discontinue Zyvox  · IV vancomycin, may change to oral Zyvox upon discharge  through 5/21/22  · Follow inflammatory markers and CXR. · On Dexamethasone 6 mg IV daily. · On Lovenox 40 mg sq daily. Infection Control Recommendations   · Universal Precautions  · Droplet Plus Precautions through 5/10/22. Antimicrobial Stewardship Recommendations   · Simplification of therapy  · Targeted therapy    History of Present Illness:   Initial history:  Katelyn Topete is a 74y.o.-year-old male a with history of severe ALS who presented to the hospital with worsening cough and shortness of for 3 days prior to admission. Reportedly cough started around 4/20/2022. Chest x-ray showed bilateral infiltrates  Initial labs showed a procalcitonin level of 35.19, last WBC 12.8, creatinine less than 0.4, C-reactive protein 259, D-dimer 0.43  5/5/22 COVID-19 PCR positive  Urinalysis showed 0-2 WBC. The patient was admitted to ICU, was intubated. The patient is intubated, unable to provide history that was obtained from chart review and nursing staff. Mild amount of respiratory secretion, Brunner catheter in place. PICC line in place  The patient was exposed to COVID-19 positive person on 4/16/2022 reportedly. The patient had a positive COVID 19 test at home. Interval changes  5/9/2022   Afebrile.   He remains intubated, sedated, no new events  Right upper extremity Doppler pending    Patient Vitals for the past 8 hrs:   BP Temp Temp src Pulse Resp SpO2 Weight   22 0800 (!) 149/70 -- -- 79 21 98 % --   22 0730 97/64 97.7 °F (36.5 °C) Axillary 76 20 95 % 145 lb 8.1 oz (66 kg)   22 0600 (!) 154/73 -- -- 73 20 97 % --   22 0530 (!) 168/93 -- -- 73 20 98 % --   22 0500 (!) 162/86 -- -- 76 20 97 % --   22 0430 (!) 147/90 -- -- 75 20 94 % --   22 0400 (!) 156/84 -- -- 75 20 96 % --   22 0330 125/78 -- -- 75 20 95 % --   22 0313 -- -- -- 77 20 95 % --   22 0311 -- -- -- -- 20 94 % --   22 0300 129/76 -- -- 76 20 94 % --   22 0230 117/74 -- -- 74 20 95 % --   22 0200 111/65 -- -- 76 20 95 % --   22 0130 117/69 -- -- 74 20 96 % --   22 0100 135/78 -- -- 77 20 96 % --   22 0030 134/78 -- -- 70 20 96 % --       Summary of relevant labs:  Labs:  Cre: <0.40  Procalcitonin: 35.19-6.54  CRP: 259.2-36.7  WBC: 11.5-8.5-9.1  Plts: 421  Ferritin: 734  ESR: 63-2    Micro:   BC x 2-Negative to date.  Sputum Cx- MRSA    Imagin/8 CXR-Stable bibasilar opacities    I have personally reviewed the past medical history, past surgical history, medications, social history, and family history, and I haveupdated the database accordingly. Allergies:   Ciprofloxacin, Lasix [furosemide], Penicillins, Sulfa antibiotics, and Duloxetine     Review of Systems:     Review of Systems   Reason unable to perform ROS: Intubated. Physical Examination :       Physical Exam  Vitals and nursing note reviewed. HENT:      Head: Normocephalic and atraumatic. Right Ear: External ear normal.      Left Ear: External ear normal.   Eyes:      Conjunctiva/sclera: Conjunctivae normal.      Pupils: Pupils are equal, round, and reactive to light. Cardiovascular:      Rate and Rhythm: Normal rate and regular rhythm. Heart sounds: Normal heart sounds. No murmur heard.       Pulmonary:      Effort: Pulmonary effort is normal. No respiratory distress. Breath sounds: Normal breath sounds. No wheezing. Abdominal:      General: Bowel sounds are normal. There is no distension. Palpations: Abdomen is soft. Tenderness: There is no abdominal tenderness. Genitourinary:     Comments: Brunner. Urine clear. Musculoskeletal:      Cervical back: No rigidity. Right lower leg: No edema. Left lower leg: No edema. Skin:     General: Skin is dry. Comments: RUE cool from midforearm to fingertips. No mottling. Neurological:      Comments: Nods yes/no. 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    Aortic valve replaced 11/08/2016    Hypertension        Past Surgical  History:     Past Surgical History:   Procedure Laterality Date    CARDIOVERSION  06/27/2020    w/    HERNIA REPAIR      PARATHYROID GLAND SURGERY  1980       Medications:      insulin lispro  0-6 Units SubCUTAneous 4x Daily    senna  1 tablet PEG Tube BID    polyvinyl alcohol  1 drop Both Eyes Q4H    linezolid  600 mg IntraVENous Q12H    sodium chloride flush  5-40 mL IntraVENous 2 times per day    dexamethasone  6 mg IntraVENous Q24H    enoxaparin  40 mg SubCUTAneous Daily    pantoprazole (PROTONIX) 40 mg injection  40 mg IntraVENous Daily    chlorhexidine  15 mL Mouth/Throat BID       Social History:     Social History     Socioeconomic History    Marital status:      Spouse name: Not on file    Number of children: Not on file    Years of education: Not on file    Highest education level: Not on file   Occupational History    Not on file   Tobacco Use    Smoking status: Never Smoker    Smokeless tobacco: Never Used   Vaping Use    Vaping Use: Never used   Substance and Sexual Activity    Alcohol use:  Yes     Alcohol/week: 1.0 standard drink     Types: 1 Cans of beer per week    Drug use: No    Sexual activity: Not on file   Other Topics Concern    Not on file   Social History Narrative    Not on file     Social Determinants of Health     Financial Resource Strain:     Difficulty of Paying Living Expenses: Not on file   Food Insecurity:     Worried About Running Out of Food in the Last Year: Not on file    Fabio of Food in the Last Year: Not on file   Transportation Needs:     Lack of Transportation (Medical): Not on file    Lack of Transportation (Non-Medical):  Not on file   Physical Activity:     Days of Exercise per Week: Not on file    Minutes of Exercise per Session: Not on file   Stress:     Feeling of Stress : Not on file   Social Connections:     Frequency of Communication with Friends and Family: Not on file    Frequency of Social Gatherings with Friends and Family: Not on file    Attends Yarsanism Services: Not on file    Active Member of [address] [place] [address] [employer] or Organizations: Not on file    Attends Club or Organization Meetings: Not on file    Marital Status: Not on file   Intimate Partner Violence:     Fear of Current or Ex-Partner: Not on file    Emotionally Abused: Not on file    Physically Abused: Not on file    Sexually Abused: Not on file   Housing Stability:     Unable to Pay for Housing in the Last Year: Not on file    Number of [place] in the Last Year: Not on file    Unstable Housing in the Last Year: Not on file       Family History:     Family History   Problem Relation Age of Onset    Lung Cancer Mother     Heart Disease Father       Medical Decision Making:   I have independently reviewed/ordered the following labs:    CBC with Differential:   Recent Labs     05/08/22  0507 05/09/22  0436   WBC 9.1 10.6   HGB 13.0* 12.5*   HCT 39.2* 36.9*    446     BMP:  Recent Labs     05/07/22  0507 05/07/22  0507 05/08/22  0507 05/09/22  0436      < > 134* 135   K 3.5*   < > 4.2 4.0   CL 99   < > 100 100   CO2 28   < > 27 29   BUN 13   < > 15 14 CREATININE <0.40*   < > <0.40* <0.40*   MG 1.8  --   --   --     < > = values in this interval not displayed. Hepatic Function Panel: No results for input(s): PROT, LABALBU, BILIDIR, IBILI, BILITOT, ALKPHOS, ALT, AST in the last 72 hours. No results for input(s): RPR in the last 72 hours. No results for input(s): HIV in the last 72 hours. No results for input(s): BC in the last 72 hours. Lab Results   Component Value Date    CREATININE <0.40 05/09/2022    GLUCOSE 149 05/09/2022       Detailed results: Thank you for allowing us to participate in the care of this patient. Please call with questions. This note is created with the assistance of a speech recognition program.  While intending to generate adocument that actually reflects the content of the visit, the document can still have some errors including those of syntax and sound a like substitutions which may escape proof reading. It such instances, actual meaningcan be extrapolated by contextual diversion.     Linus Barcenas MD  Office: [phone]  Perfect serve / office [phone]

## 2022-05-09 NOTE — PROGRESS NOTES
Comprehensive Nutrition Assessment    Type and Reason for Visit:  Reassess    Nutrition Recommendations/Plan:   1. Changed tube feeding product to better meet needs. Ordered Vital AF start at 20 ml increase as tolerated to 65 ml/hr to provide: 1560 kcals (2120 kcals with Propofol rate), 117 gm protein. Follow for tolerance. Malnutrition Assessment:  Malnutrition Status: At risk for malnutrition (Comment) (05/06/22 1132)    Context:  Acute Illness     Findings of the 6 clinical characteristics of malnutrition:  Energy Intake:  Unable to assess  Weight Loss:  No significant weight loss     Body Fat Loss:  Unable to assess     Muscle Mass Loss:  Unable to assess    Fluid Accumulation:  No significant fluid accumulation     Strength:  Not Performed    Nutrition Assessment:    Pt remains intubated with Propofol at 9.4 ml providing 248 kcals. RN reports pt is tolerating tube feeding at goal rate. Pulmonologist note indicated trach will be done once pneumonia has cleared, wife wants it done at [facility]. [facility] Nutrition Related Findings:    Edema: +1 all extremities. Labs & meds reviewed. Hx: ALS for approximately 5 years, 2 feeding tubes in the past, the last one removed 16 months ago after it got infected. Wound Type: Stage II,Pressure Injury       Current Nutrition Intake & Therapies:    Average Meal Intake: NPO     Diet NPO  ADULT TUBE FEEDING; Orogastric; Peptide Based; Continuous; 20; Yes; 10; Q 4 hours; 65; 30; Q 4 hours  Current Tube Feeding (TF) Orders:  · Feeding Route: Orogastric  · Formula: Peptide Based  · Schedule: Continuous  · Feeding Regimen: 20 to 65 ml  · Water Flushes: 30 ml every 4 hours  · Goal TF & Flush Orders Provides: 1560 kcals, 117 gm protein      Anthropometric Measures:  Height: 5' 10.05\" (177.9 cm)  Ideal Body Weight (IBW): 166 lbs (75 kg)    Admission Body Weight: 132 lb (59.9 kg)  Current Body Weight: 145 lb (65.8 kg) (weight discrepancy noted),   IBW.  Weight Source: Bed Scale  Current BMI (kg/m2): 20.8  Usual Body Weight: 128 lb (58.1 kg) (9/18/20)  % Weight Change (Calculated): 3.1                    BMI Categories: Underweight (BMI less than 22) age over 72    Estimated Daily Nutrient Needs:  Energy Requirements Based On: Kcal/kg (revised)  Weight Used for Energy Requirements: Admission  Energy (kcal/day): 7123-2809 kcals based on 32-34 kcals/kg using adm wt 60 kg  Weight Used for Protein Requirements: Admission  Protein (g/day): 108- 120 gm protein based on adm wt       Nutrition Diagnosis:   · Inadequate oral intake related to impaired respiratory function as evidenced by NPO or clear liquid status due to medical condition,intubation      Nutrition Interventions:   Food and/or Nutrient Delivery: Continue Current Tube Feeding,Modify Tube Feeding  Nutrition Education/Counseling: No recommendation at this time  Coordination of Nutrition Care: Continue to monitor while inpatient       Goals:  Previous Goal Met: No Progress toward Goal(s)  Goals: Meet at least 75% of estimated needs       Nutrition Monitoring and Evaluation:   Behavioral-Environmental Outcomes: None Identified  Food/Nutrient Intake Outcomes: Enteral Nutrition Intake/Tolerance  Physical Signs/Symptoms Outcomes: Biochemical Data,GI Status,Fluid Status or Edema,Skin,Weight    Discharge Planning:    Enteral Nutrition     Alfreda Olson N [address] [place] [address]

## 2022-05-10 ENCOUNTER — APPOINTMENT (OUTPATIENT)
Dept: GENERAL RADIOLOGY | Age: 72
DRG: 004 | End: 2022-05-10
Payer: MEDICARE

## 2022-05-10 LAB
ALLEN TEST: ABNORMAL
ANION GAP SERPL CALCULATED.3IONS-SCNC: 7 MMOL/L (ref 9–17)
BUN BLDV-MCNC: 18 MG/DL (ref 8–23)
CALCIUM SERPL-MCNC: 8.5 MG/DL (ref 8.6–10.4)
CARBOXYHEMOGLOBIN: 0 % (ref 0–5)
CHLORIDE BLD-SCNC: 98 MMOL/L (ref 98–107)
CO2: 30 MMOL/L (ref 20–31)
CREAT SERPL-MCNC: <0.4 MG/DL (ref 0.7–1.2)
CULTURE: NORMAL
CULTURE: NORMAL
FIO2: 40
GFR AFRICAN AMERICAN: ABNORMAL ML/MIN
GFR NON-AFRICAN AMERICAN: ABNORMAL ML/MIN
GFR SERPL CREATININE-BSD FRML MDRD: ABNORMAL ML/MIN/{1.73_M2}
GLUCOSE BLD-MCNC: 107 MG/DL (ref 75–110)
GLUCOSE BLD-MCNC: 147 MG/DL (ref 75–110)
GLUCOSE BLD-MCNC: 148 MG/DL (ref 70–99)
GLUCOSE BLD-MCNC: 151 MG/DL (ref 75–110)
GLUCOSE BLD-MCNC: 164 MG/DL (ref 75–110)
GLUCOSE BLD-MCNC: 211 MG/DL (ref 75–110)
HCO3 ARTERIAL: 32.5 MMOL/L (ref 22–26)
HCT VFR BLD CALC: 37.8 % (ref 41–53)
HEMOGLOBIN: 12.5 G/DL (ref 13.5–17.5)
MCH RBC QN AUTO: 32.6 PG (ref 26–34)
MCHC RBC AUTO-ENTMCNC: 33.2 G/DL (ref 31–37)
MCV RBC AUTO: 98.3 FL (ref 80–100)
METHEMOGLOBIN: 1.1 % (ref 0–1.9)
MODE: ABNORMAL
O2 DEVICE/FLOW/%: ABNORMAL
O2 SAT, ARTERIAL: 93.8 % (ref 95–98)
PATIENT TEMP: 37
PCO2 ARTERIAL: 58.5 MMHG (ref 35–45)
PDW BLD-RTO: 13.4 % (ref 11.5–14.9)
PEEP/CPAP: 7
PH ARTERIAL: 7.35 (ref 7.35–7.45)
PLATELET # BLD: 436 K/UL (ref 150–450)
PMV BLD AUTO: 6.8 FL (ref 6–12)
PO2 ARTERIAL: 82.7 MMHG (ref 80–100)
POSITIVE BASE EXCESS, ART: 6.9 MMOL/L (ref 0–2)
POTASSIUM SERPL-SCNC: 4.1 MMOL/L (ref 3.7–5.3)
PT. POSITION: ABNORMAL
RBC # BLD: 3.84 M/UL (ref 4.5–5.9)
RESPIRATORY RATE: 20
SAMPLE SITE: ABNORMAL
SET RATE: 20
SODIUM BLD-SCNC: 135 MMOL/L (ref 135–144)
SPECIMEN DESCRIPTION: NORMAL
SPECIMEN DESCRIPTION: NORMAL
TEXT FOR RESPIRATORY: ABNORMAL
TOTAL RATE: 20
TRIGL SERPL-MCNC: 246 MG/DL
VT: 500
WBC # BLD: 19.2 K/UL (ref 3.5–11)

## 2022-05-10 PROCEDURE — C9113 INJ PANTOPRAZOLE SODIUM, VIA: HCPCS | Performed by: INTERNAL MEDICINE

## 2022-05-10 PROCEDURE — 6360000002 HC RX W HCPCS: Performed by: INTERNAL MEDICINE

## 2022-05-10 PROCEDURE — 99233 SBSQ HOSP IP/OBS HIGH 50: CPT | Performed by: INTERNAL MEDICINE

## 2022-05-10 PROCEDURE — 71045 X-RAY EXAM CHEST 1 VIEW: CPT

## 2022-05-10 PROCEDURE — 6370000000 HC RX 637 (ALT 250 FOR IP): Performed by: INTERNAL MEDICINE

## 2022-05-10 PROCEDURE — 80048 BASIC METABOLIC PNL TOTAL CA: CPT

## 2022-05-10 PROCEDURE — 84478 ASSAY OF TRIGLYCERIDES: CPT

## 2022-05-10 PROCEDURE — 82805 BLOOD GASES W/O2 SATURATION: CPT

## 2022-05-10 PROCEDURE — 36415 COLL VENOUS BLD VENIPUNCTURE: CPT

## 2022-05-10 PROCEDURE — 36600 WITHDRAWAL OF ARTERIAL BLOOD: CPT

## 2022-05-10 PROCEDURE — 82947 ASSAY GLUCOSE BLOOD QUANT: CPT

## 2022-05-10 PROCEDURE — 85027 COMPLETE CBC AUTOMATED: CPT

## 2022-05-10 PROCEDURE — 94761 N-INVAS EAR/PLS OXIMETRY MLT: CPT

## 2022-05-10 PROCEDURE — A4216 STERILE WATER/SALINE, 10 ML: HCPCS | Performed by: INTERNAL MEDICINE

## 2022-05-10 PROCEDURE — 94003 VENT MGMT INPAT SUBQ DAY: CPT

## 2022-05-10 PROCEDURE — 2500000003 HC RX 250 WO HCPCS: Performed by: INTERNAL MEDICINE

## 2022-05-10 PROCEDURE — 2580000003 HC RX 258: Performed by: INTERNAL MEDICINE

## 2022-05-10 PROCEDURE — 2700000000 HC OXYGEN THERAPY PER DAY

## 2022-05-10 PROCEDURE — 99291 CRITICAL CARE FIRST HOUR: CPT | Performed by: INTERNAL MEDICINE

## 2022-05-10 PROCEDURE — 2000000000 HC ICU R&B

## 2022-05-10 PROCEDURE — 6360000002 HC RX W HCPCS: Performed by: EMERGENCY MEDICINE

## 2022-05-10 RX ORDER — MIDODRINE HYDROCHLORIDE 5 MG/1
5 TABLET ORAL 2 TIMES DAILY
Status: DISCONTINUED | OUTPATIENT
Start: 2022-05-10 | End: 2022-05-17

## 2022-05-10 RX ORDER — MIDAZOLAM HYDROCHLORIDE 1 MG/ML
2 INJECTION INTRAMUSCULAR; INTRAVENOUS
Status: DISCONTINUED | OUTPATIENT
Start: 2022-05-10 | End: 2022-05-14 | Stop reason: SDUPTHER

## 2022-05-10 RX ADMIN — PROPOFOL 42.5 MCG/KG/MIN: 10 INJECTION, EMULSION INTRAVENOUS at 10:39

## 2022-05-10 RX ADMIN — ENOXAPARIN SODIUM 40 MG: 100 INJECTION SUBCUTANEOUS at 08:16

## 2022-05-10 RX ADMIN — SODIUM CHLORIDE, PRESERVATIVE FREE 40 MG: 5 INJECTION INTRAVENOUS at 08:16

## 2022-05-10 RX ADMIN — SENNOSIDES 8.6 MG: 8.6 TABLET, COATED ORAL at 20:25

## 2022-05-10 RX ADMIN — POLYVINYL ALCOHOL 1 DROP: 14 SOLUTION/ DROPS OPHTHALMIC at 10:37

## 2022-05-10 RX ADMIN — VANCOMYCIN HYDROCHLORIDE 1250 MG: 1.25 INJECTION, POWDER, LYOPHILIZED, FOR SOLUTION INTRAVENOUS at 21:49

## 2022-05-10 RX ADMIN — Medication 5 MCG/MIN: at 10:32

## 2022-05-10 RX ADMIN — VANCOMYCIN HYDROCHLORIDE 1250 MG: 1.25 INJECTION, POWDER, LYOPHILIZED, FOR SOLUTION INTRAVENOUS at 10:00

## 2022-05-10 RX ADMIN — SODIUM CHLORIDE, PRESERVATIVE FREE 20 ML: 5 INJECTION INTRAVENOUS at 21:43

## 2022-05-10 RX ADMIN — INSULIN LISPRO 1 UNITS: 100 INJECTION, SOLUTION INTRAVENOUS; SUBCUTANEOUS at 14:00

## 2022-05-10 RX ADMIN — MIDAZOLAM 2 MG/HR: 5 INJECTION INTRAMUSCULAR; INTRAVENOUS at 20:18

## 2022-05-10 RX ADMIN — INSULIN LISPRO 1 UNITS: 100 INJECTION, SOLUTION INTRAVENOUS; SUBCUTANEOUS at 21:06

## 2022-05-10 RX ADMIN — POLYVINYL ALCOHOL 1 DROP: 14 SOLUTION/ DROPS OPHTHALMIC at 08:17

## 2022-05-10 RX ADMIN — PROPOFOL 42 MCG/KG/MIN: 10 INJECTION, EMULSION INTRAVENOUS at 03:50

## 2022-05-10 RX ADMIN — INSULIN LISPRO 1 UNITS: 100 INJECTION, SOLUTION INTRAVENOUS; SUBCUTANEOUS at 08:22

## 2022-05-10 RX ADMIN — DEXAMETHASONE SODIUM PHOSPHATE 6 MG: 10 INJECTION, SOLUTION INTRAMUSCULAR; INTRAVENOUS at 10:35

## 2022-05-10 RX ADMIN — CHLORHEXIDINE GLUCONATE 0.12% ORAL RINSE 15 ML: 1.2 LIQUID ORAL at 08:22

## 2022-05-10 RX ADMIN — POLYVINYL ALCOHOL 1 DROP: 14 SOLUTION/ DROPS OPHTHALMIC at 23:42

## 2022-05-10 RX ADMIN — Medication 75 MCG/HR: at 13:37

## 2022-05-10 RX ADMIN — SODIUM CHLORIDE, PRESERVATIVE FREE 10 ML: 5 INJECTION INTRAVENOUS at 08:37

## 2022-05-10 RX ADMIN — Medication 75 MCG/HR: at 15:15

## 2022-05-10 RX ADMIN — CHLORHEXIDINE GLUCONATE 0.12% ORAL RINSE 15 ML: 1.2 LIQUID ORAL at 20:25

## 2022-05-10 RX ADMIN — POLYVINYL ALCOHOL 1 DROP: 14 SOLUTION/ DROPS OPHTHALMIC at 00:10

## 2022-05-10 RX ADMIN — SENNOSIDES 8.6 MG: 8.6 TABLET, COATED ORAL at 08:17

## 2022-05-10 RX ADMIN — POLYVINYL ALCOHOL 1 DROP: 14 SOLUTION/ DROPS OPHTHALMIC at 19:04

## 2022-05-10 RX ADMIN — POLYVINYL ALCOHOL 1 DROP: 14 SOLUTION/ DROPS OPHTHALMIC at 03:45

## 2022-05-10 ASSESSMENT — PULMONARY FUNCTION TESTS
PIF_VALUE: 18
PIF_VALUE: 19
PIF_VALUE: 19
PIF_VALUE: 23
PIF_VALUE: 24
PIF_VALUE: 23
PIF_VALUE: 19
PIF_VALUE: 24
PIF_VALUE: 24
PIF_VALUE: 18
PIF_VALUE: 19
PIF_VALUE: 18
PIF_VALUE: 24
PIF_VALUE: 18
PIF_VALUE: 23
PIF_VALUE: 23
PIF_VALUE: 25
PIF_VALUE: 20
PIF_VALUE: 20
PIF_VALUE: 23
PIF_VALUE: 24
PIF_VALUE: 19
PIF_VALUE: 20
PIF_VALUE: 23
PIF_VALUE: 23
PIF_VALUE: 20
PIF_VALUE: 18
PIF_VALUE: 18
PIF_VALUE: 8
PIF_VALUE: 16
PIF_VALUE: 22
PIF_VALUE: 19
PIF_VALUE: 17
PIF_VALUE: 18
PIF_VALUE: 25
PIF_VALUE: 16
PIF_VALUE: 18
PIF_VALUE: 18
PIF_VALUE: 27
PIF_VALUE: 24
PIF_VALUE: 24
PIF_VALUE: 25
PIF_VALUE: 20
PIF_VALUE: 17
PIF_VALUE: 22
PIF_VALUE: 17
PIF_VALUE: 18
PIF_VALUE: 19
PIF_VALUE: 23
PIF_VALUE: 21
PIF_VALUE: 18
PIF_VALUE: 25

## 2022-05-10 NOTE — CARE COORDINATION
ONGOING DISCHARGE PLAN:    COVID POSITIVE. Aferbrile, on vent 40% FiO2.    5/10 : [facility] has accepted the patient when medically ready. Wife Tj Monday notified of acceptance. Pt wants trach placed  and vent support at home eventually due to having end stage MS. IV decadron 6 mg, IV Vanco every 12 hours. IV levophed gtt. Will continue to follow for additional discharge needs.     Electronically signed by Nadja Lewis RN on 5/10/2022 at 9:06 AM

## 2022-05-10 NOTE — PLAN OF CARE
Problem: Respiratory - Adult  Goal: Achieves optimal ventilation and oxygenation  Outcome: Not Progressing  Flowsheets (Taken 5/10/2022 0858)  Achieves optimal ventilation and oxygenation:   Position to facilitate oxygenation and minimize respiratory effort   Oxygen supplementation based on oxygen saturation or arterial blood gases   Assess the need for suctioning and aspirate as needed   Respiratory therapy support as indicated  Note: Patient has ALS      Problem: Discharge Planning  Goal: Discharge to home or other facility with appropriate resources  Outcome: Progressing     Problem: Skin/Tissue Integrity  Goal: Absence of new skin breakdown  Description: 1. Monitor for areas of redness and/or skin breakdown  2. Assess vascular access sites hourly  3. Every 4-6 hours minimum:  Change oxygen saturation probe site  4. Every 4-6 hours:  If on nasal continuous positive airway pressure, respiratory therapy assess nares and determine need for appliance change or resting period.   Outcome: Progressing     Problem: Pain  Goal: Verbalizes/displays adequate comfort level or baseline comfort level  Outcome: Progressing  Flowsheets (Taken 5/9/2022 2000 by Demian Oconnell RN)  Verbalizes/displays adequate comfort level or baseline comfort level:   Assess pain using appropriate pain scale   Administer analgesics based on type and severity of pain and evaluate response   Implement non-pharmacological measures as appropriate and evaluate response     Problem: Safety - Adult  Goal: Free from fall injury  Outcome: Progressing     Problem: Nutrition Deficit:  Goal: Optimize nutritional status  Outcome: Progressing  Note: Patient tolerating tube feed as evidenced by little to no residuals at or near goal.      Problem: ABCDS Injury Assessment  Goal: Absence of physical injury  Outcome: Progressing

## 2022-05-10 NOTE — PROGRESS NOTES
[facility] -   Infectious diseases evaluation  admission date 5/5/2022    reason for consultation:   Covid-19 Infection    Impression :   Current:  · COVID-19 with superimposed MRSA pneumonia. · Acute hypoxic respiratory failure required intubation. · Leukocytosis suspect steroid effect. · Severe ALS  · History of coronary artery disease status post CABG 2016  · Hypertension  · Penicillin, sulfa and Cipro allergy  ·     Other:  · PPM  Discussion / summary of stay / plan of care   · Patient received Actemra 5/5 22. Recommendations     · IV vancomycin, likely change to oral Zyvox upon discharge  through 5/21/22  · Follow inflammatory markers and CXR. · On Dexamethasone 6 mg IV daily. · On Lovenox 40 mg sq daily. Infection Control Recommendations   · Universal Precautions  · Droplet Plus Precautions through 5/10/22. Antimicrobial Stewardship Recommendations   · Simplification of therapy  · Targeted therapy    History of Present Illness:   Initial history:  Meliza Isidro is a 74y.o.-year-old male a with history of severe ALS who presented to the hospital with worsening cough and shortness of for 3 days prior to admission. Reportedly cough started around 4/20/2022. Chest x-ray showed bilateral infiltrates  Initial labs showed a procalcitonin level of 35.19, last WBC 12.8, creatinine less than 0.4, C-reactive protein 259, D-dimer 0.43  5/5/22 COVID-19 PCR positive  Urinalysis showed 0-2 WBC. The patient was admitted to ICU, was intubated. The patient is intubated, unable to provide history that was obtained from chart review and nursing staff. Mild amount of respiratory secretion, Brunner catheter in place. PICC line in place  The patient was exposed to COVID-19 positive person on 4/16/2022 reportedly. The patient had a positive COVID 19 test at home. Interval changes  5/10/2022   Afebrile.   He remains intubated, ,on  levopheda ,no reported fever  WBC 19.2    Patient Vitals for the past 8 hrs:   BP Temp Temp src Pulse Resp SpO2   05/10/22 0811 -- -- -- -- 20 97 %   05/10/22 0805 -- -- -- 75 20 96 %   05/10/22 0800 -- 97.8 °F (36.6 °C) Axillary -- -- --   05/10/22 0615 (!) 132/104 -- -- 96 19 94 %   05/10/22 0610 125/69 -- -- 95 22 --   05/10/22 0600 (!) 196/129 -- -- 95 26 --   05/10/22 0545 (!) 152/103 -- -- 86 18 --   05/10/22 0530 (!) 102/54 -- -- 75 20 100 %   05/10/22 0515 (!) 67/38 -- -- 73 20 99 %   05/10/22 0500 (!) 75/43 -- -- 75 20 99 %   05/10/22 0445 (!) 145/75 -- -- 80 20 --   05/10/22 0430 (!) 179/83 -- -- 88 21 99 %   05/10/22 0415 (!) 179/107 -- -- 94 24 --   05/10/22 0400 (!) 190/92 98.6 °F (37 °C) Oral 97 21 --   05/10/22 0345 (!) 197/89 -- -- 101 23 --   05/10/22 0330 (!) 157/96 -- -- 82 21 97 %   05/10/22 0300 (!) 78/45 -- -- 71 20 98 %   05/10/22 0245 (!) 109/52 -- -- 69 20 98 %   05/10/22 0230 (!) 87/53 -- -- 79 18 97 %   05/10/22 0215 (!) 98/58 -- -- 76 20 95 %   05/10/22 0200 102/60 -- -- 75 18 95 %   05/10/22 0145 109/70 -- -- 77 21 --   05/10/22 0130 123/70 -- -- 92 18 --       Summary of relevant labs:  Labs:  Cre: <0.40  Procalcitonin: 35.19-6.54  CRP: 259.2-36.7  WBC: 11.5-8.5-9.1  Plts: 421  Ferritin: 734  ESR: 63-2    Micro:   BC x 2-Negative to date.  Sputum Cx- MRSA    Imagin/8 CXR-Stable bibasilar opacities    I have personally reviewed the past medical history, past surgical history, medications, social history, and family history, and I haveupdated the database accordingly. Allergies:   Ciprofloxacin, Lasix [furosemide], Penicillins, Sulfa antibiotics, and Duloxetine     Review of Systems:     Review of Systems   Reason unable to perform ROS: Intubated. Physical Examination :       Physical Exam  Vitals and nursing note reviewed. HENT:      Head: Normocephalic and atraumatic.       Right Ear: External ear normal.      Left Ear: External ear normal.   Eyes:      Conjunctiva/sclera: Conjunctivae normal.      Pupils: Pupils are equal, round, and reactive to light. Cardiovascular:      Rate and Rhythm: Normal rate and regular rhythm. Heart sounds: Normal heart sounds. No murmur heard. Pulmonary:      Effort: Pulmonary effort is normal. No respiratory distress. Breath sounds: Normal breath sounds. No wheezing. Abdominal:      General: Bowel sounds are normal. There is no distension. Palpations: Abdomen is soft. Tenderness: There is no abdominal tenderness. Genitourinary:     Comments: Brunner. Urine clear. Musculoskeletal:      Cervical back: No rigidity. Right lower leg: No edema. Left lower leg: No edema. Skin:     General: Skin is dry. Comments: RUE cool from midforearm to fingertips. No mottling. Neurological:      Comments: Nods yes/no.          Past Medical History:     Past Medical History:   Diagnosis Date    ALS (amyotrophic lateral sclerosis) (HCC)     Aortic root aneurysm (HCC) 11/08/2016    Aortic valve replaced 11/08/2016    Hypertension        Past Surgical  History:     Past Surgical History:   Procedure Laterality Date    CARDIOVERSION  06/27/2020    w/    HERNIA REPAIR      PARATHYROID GLAND SURGERY  1980       Medications:      insulin lispro  0-6 Units SubCUTAneous Q6H    vancomycin (VANCOCIN) intermittent dosing (placeholder)   Other RX Placeholder    vancomycin  1,250 mg IntraVENous Q12H    senna  1 tablet PEG Tube BID    polyvinyl alcohol  1 drop Both Eyes Q4H    sodium chloride flush  5-40 mL IntraVENous 2 times per day    dexamethasone  6 mg IntraVENous Q24H    enoxaparin  40 mg SubCUTAneous Daily    pantoprazole (PROTONIX) 40 mg injection  40 mg IntraVENous Daily    chlorhexidine  15 mL Mouth/Throat BID       Social History:     Social History     Socioeconomic History    Marital status:      Spouse name: Not on file    Number of children: Not on file    Years of education: Not on file    Highest education level: Not on file   Occupational History    Not on file   Tobacco Use    Smoking status: Never Smoker    Smokeless tobacco: Never Used   Vaping Use    Vaping Use: Never used   Substance and Sexual Activity    Alcohol use: Yes     Alcohol/week: 1.0 standard drink     Types: 1 Cans of beer per week    Drug use: No    Sexual activity: Not on file   Other Topics Concern    Not on file   Social History Narrative    Not on file     Social Determinants of Health     Financial Resource Strain:     Difficulty of Paying Living Expenses: Not on file   Food Insecurity:     Worried About Running Out of Food in the Last Year: Not on file    Fabio of Food in the Last Year: Not on file   Transportation Needs:     Lack of Transportation (Medical): Not on file    Lack of Transportation (Non-Medical):  Not on file   Physical Activity:     Days of Exercise per Week: Not on file    Minutes of Exercise per Session: Not on file   Stress:     Feeling of Stress : Not on file   Social Connections:     Frequency of Communication with Friends and Family: Not on file    Frequency of Social Gatherings with Friends and Family: Not on file    Attends [employer] Services: Not on file    Active Member of [address] [place] [address] [employer] or Organizations: Not on file    Attends Club or Organization Meetings: Not on file    Marital Status: Not on file   Intimate Partner Violence:     Fear of Current or Ex-Partner: Not on file    Emotionally Abused: Not on file    Physically Abused: Not on file    Sexually Abused: Not on file   Housing Stability:     Unable to Pay for Housing in the Last Year: Not on file    Number of [place] in the Last Year: Not on file    Unstable Housing in the Last Year: Not on file       Family History:     Family History   Problem Relation Age of Onset    Lung Cancer Mother     Heart Disease Father       Medical Decision Making:   I have independently reviewed/ordered the following labs:    CBC with Differential:   Recent Labs     05/09/22  0436 05/10/22  0525   WBC 10.6 19.2*   HGB 12.5* 12.5*   HCT 36.9* 37.8*    436     BMP:  Recent Labs     05/09/22  0436 05/10/22  0525    135   K 4.0 4.1    98   CO2 29 30   BUN 14 18   CREATININE <0.40* <0.40*     Hepatic Function Panel: No results for input(s): PROT, LABALBU, BILIDIR, IBILI, BILITOT, ALKPHOS, ALT, AST in the last 72 hours. No results for input(s): RPR in the last 72 hours. No results for input(s): HIV in the last 72 hours. No results for input(s): BC in the last 72 hours. Lab Results   Component Value Date    CREATININE <0.40 05/10/2022    GLUCOSE 148 05/10/2022       Detailed results: Thank you for allowing us to participate in the care of this patient. Please call with questions. This note is created with the assistance of a speech recognition program.  While intending to generate adocument that actually reflects the content of the visit, the document can still have some errors including those of syntax and sound a like substitutions which may escape proof reading. It such instances, actual meaningcan be extrapolated by contextual diversion.     Jil Knight MD  Office: [phone]  Perfect serve / office [phone]

## 2022-05-10 NOTE — PROGRESS NOTES
ICU Progress Note (Vent)   Pulmonary and Critical Care Specialists    Patient - Caitlyn Rosa,  Age - 74 y.o.    - 1950      Room Number -    MRN -  [id number]   [facility]  Date of Admission -  2022  8:35 AM    Events of Past 24 Hours   Patient intubated on vent support. Patient currently is on 45 of dipper Van, 50 of fentanyl, on Levophed as well. Blood pressures have been labile. Most recent 1 in the 200s. The RN is working to get the Levophed off. He opens his eyes to voice. Vitals    height is 5' 10.05\" (1.779 m) and weight is 145 lb 8.1 oz (66 kg). His axillary temperature is 97.8 °F (36.6 °C). His blood pressure is 147/84 (abnormal) and his pulse is 70. His respiration is 20 and oxygen saturation is 100%. Temperature Range: Temp: 97.8 °F (36.6 °C) Temp  Av.5 °F (36.9 °C)  Min: 97.8 °F (36.6 °C)  Max: 99 °F (37.2 °C)  BP Range:  Systolic (34UWN), JVZ:475 , Min:59 , BCF:375     Diastolic (20FKZ), DRB:96, Min:36, Max:129    Pulse Range: Pulse  Av.5  Min: 69  Max: 120  Respiration Range: Resp  Av.6  Min: 15  Max: 27  Current Pulse Ox[de-identified]  SpO2: 100 %  24HR Pulse Ox Range:  SpO2  Av.1 %  Min: 90 %  Max: 100 %  Oxygen Amount and Delivery: O2 Flow Rate (L/min): 8 L/min      Wt Readings from Last 3 Encounters:   22 145 lb 8.1 oz (66 kg)   22 115 lb (52.2 kg)   22 120 lb (54.4 kg)     I/O       Intake/Output Summary (Last 24 hours) at 5/10/2022 1135  Last data filed at 5/10/2022 1000  Gross per 24 hour   Intake 2993.9 ml   Output 1950 ml   Net 1043.9 ml     I/O last 3 completed shifts:   In: 3951.9 [I.V.:560.6; NG/GT:2593; IV Piggyback:798.4]  Out: 2700 [Urine:2700]     DRAIN/TUBE OUTPUT:     Invasive Lines   ETT Day -   6  PICC line day 6       Mechanical Ventilation Data   SETTINGS (Comprehensive)  Vent Information  Ventilator Day(s): 5  Ventilator ID: [id number]  Vent Mode: PRVC  Ventilator Initiate: Yes  Additional Respiratory Assessments  Pulse: 70  Resp: 20  SpO2: 100 %  End Tidal CO2: 38 (%)  Position: Semi-Christie's  Humidification Source: HME  Cuff Pressure (cm H2O): 287 cm H2O  Skin Barrier Applied: No       ABGs:   Lab Results   Component Value Date    PHART 7.352 05/10/2022    PO2ART 82.7 05/10/2022    IWG1EOI 58.5 05/10/2022       Lab Results   Component Value Date    MODE PRVC 05/10/2022         Medications   IV   dextrose      norepinephrine 1 mcg/min (05/10/22 1131)    fentaNYL 50 mcg/hr (05/10/22 0545)    sodium chloride      propofol 42.5 mcg/kg/min (05/10/22 1039)      insulin lispro  0-6 Units SubCUTAneous Q6H    vancomycin (VANCOCIN) intermittent dosing (placeholder)   Other RX Placeholder    vancomycin  1,250 mg IntraVENous Q12H    senna  1 tablet PEG Tube BID    polyvinyl alcohol  1 drop Both Eyes Q4H    sodium chloride flush  5-40 mL IntraVENous 2 times per day    dexamethasone  6 mg IntraVENous Q24H    enoxaparin  40 mg SubCUTAneous Daily    pantoprazole (PROTONIX) 40 mg injection  40 mg IntraVENous Daily    chlorhexidine  15 mL Mouth/Throat BID       Diet/Nutrition   Diet NPO  ADULT TUBE FEEDING; Orogastric; Peptide Based; Continuous; 20; Yes; 10; Q 4 hours; 65; 30; Q 4 hours    Exam   VITALS    height is 5' 10.05\" (1.779 m) and weight is 145 lb 8.1 oz (66 kg). His axillary temperature is 97.8 °F (36.6 °C). His blood pressure is 147/84 (abnormal) and his pulse is 70. His respiration is 20 and oxygen saturation is 100%. Ventilator Settings (Basic)  Vent Mode: PRVC Resp Rate (Set): 20 bmp/Vt (Set, mL): 500 mL/ /FiO2 : 40 %    Constitutional - Sedated  General Appearance  well developed, well nourished  HEENT - Life support devices in place (ET),normocephalic, atraumatic. Lungs - Chest expands equally, no wheezes, rales or rhonchi. Cardiovascular - Heart sounds are normal.  normal rate and rhythm regular, no murmur, gallop or rub.   Abdomen - soft, nontender,  Extremities - no cyanosis, clubbing Lab Results   CBC     Lab Results   Component Value Date    WBC 19.2 05/10/2022    RBC 3.84 05/10/2022    HGB 12.5 05/10/2022    HCT 37.8 05/10/2022     05/10/2022    MCV 98.3 05/10/2022    MCH 32.6 05/10/2022    MCHC 33.2 05/10/2022    RDW 13.4 05/10/2022    LYMPHOPCT 1 05/06/2022    MONOPCT 3 05/06/2022    BASOPCT 0 05/06/2022    MONOSABS 0.35 05/06/2022    LYMPHSABS 0.12 05/06/2022    EOSABS 0.00 05/06/2022    BASOSABS 0.00 05/06/2022    DIFFTYPE NOT REPORTED 08/25/2021       BMP   Lab Results   Component Value Date     05/10/2022    K 4.1 05/10/2022    CL 98 05/10/2022    CO2 30 05/10/2022    BUN 18 05/10/2022    CREATININE <0.40 05/10/2022    GLUCOSE 148 05/10/2022    CALCIUM 8.5 05/10/2022       LFTS  Lab Results   Component Value Date    ALKPHOS 137 05/05/2022    ALT 46 05/05/2022    AST 30 05/05/2022    PROT 7.5 05/05/2022    BILITOT 0.66 05/05/2022    LABALBU 3.0 05/05/2022       INR  No results for input(s): PROTIME, INR in the last 72 hours. APTT  No results for input(s): APTT in the last 72 hours. Lactic Acid  No results found for: LACTA     BNP   No results for input(s): BNP in the last 72 hours. Cultures   Sputum, Lasix, MRSA in sputum. Blood cultures, negative to date      Radiology     Plain Films         Chest x-ray reveals a right lower lobe process    SYSTEM ASSESSMENT        1.  Acute respiratory failure with hypoxemia. 2.  Severe ALS. 3.  History of coronary artery disease status post bypass graft surgery  10/2016. 4.  Malnutrition. 5.  Severe debilitation. 6.  Hypertension. 7.  MRSA pneumonia      Neuro   Patient sedated on vent support. On propofol on fentanyl. Check triglyceride  Respiratory   Acute respiratory failure with hypoxemia  Currently on vent support    Hemodynamics   Try to wean Levophed off with labile blood pressures. May need midodrine if blood pressure drops off Levophed    2D echo, May 6, EF greater than 55%.   Gastrointestinal/Nutrition Renal   Creatinine 0.40    Infectious Disease   On vancomycin. On Decadron. Hematology/Oncology   On Lovenox for DVT prophylaxis    Endocrine       Social/Spiritual/DNR/Disposition/Other     Updated wife, Kaylee Hummel, she was actually in the ICU. Standing outside the patient's room.     Critical Care Time   45  min    Electronically signed by Ivan Cueva MD on 5/10/2022 at 11:35 AM

## 2022-05-10 NOTE — PROGRESS NOTES
[facility] Internal Medicine  Len Langston MD; Edie Moreno MD; Ricky Gonzales MD; MD Mary Jane Merino MD; MD YANET Santana [facility] Internal Medicine   [facility]    HISTORY AND PHYSICAL EXAMINATION            Date:   5/10/2022  Patient name:  Matt Lopez  Date of admission:  5/5/2022  8:35 AM  MRN:   [id number]  Account:  [de-identified]  YOB: 1950  PCP:    Jenelle Chadwick MD  Room:   [de-identified]  Code Status:    Full Code    Chief Complaint:     Chief Complaint   Patient presents with    Shortness of Breath   dyspnea    History Obtained From:     Medical record and nursing staff  Pt is intubated sedated    History of Present Illness:     Matt Lopez is a 74 y.o. Non- / non  male who presents with Shortness of Breath   and is admitted to the hospital for the [facility] ([facility]     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 well-known to pulmonary team admitted with increasing cough shortness of breath for 3 to 4 days chest x-ray shows bilateral infiltrate elevated procalcitonin elevated leukocytosis patient was tested positive for COVID-19 pneumonia on May 5  Acute respiratory failure secondary to ALS with multifocal COVID-19 pneumonia intubated and sedated treated in the intensive care unit  5/8  Patient, still intubated, sedated with fentanyl propofol  On tube feeds  Had reading of low blood pressure, On Levophed  Antibiotics changed to Zyvox, Sputum Culture growing MRSA   Past Medical History:     Past Medical History:   Diagnosis Date    ALS [facility]amy[facility] lateral sclerosis) ([facility]     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 Authorizing Provider   neomycin-bacitracin-polymyxin (NEOSPORIN) 3.5-400-5000 OINT ointment Apply topically 3 times daily Apply topically 4 times daily. 4/4/22   Murlene Rinne, MD   Handicap Placard MISC by Does not apply route Dx: ALS    Duration: 5 years 3/23/22   Murlene Rinne, MD   medical marijuana Place under the tongue 2 times daily. Historical Provider, MD   acetaminophen (TYLENOL) 325 MG tablet Take 650 mg by mouth every 6 hours as needed 9/25/20   Historical Provider, MD   fluticasone [facility]) 50 MCG/ACT nasal spray 1 spray by Nasal route daily 6/8/21   Murlene Rinne, MD   SPIRULINA PO Take by mouth    Historical Provider, MD   Vitamin E 100 UNITS TABS Take 60 Units by mouth daily    Historical Provider, MD   SELENIUM PO Take 75 mcg by mouth daily    Historical Provider, MD   SOYA LECITHIN PO Take 1,000 mg by mouth daily    Historical Provider, MD   Multiple Vitamins-Minerals (THERAPEUTIC MULTIVITAMIN-MINERALS) tablet Take 1 tablet by mouth daily    Historical Provider, MD   OMEGA-3 FATTY ACIDS-VITAMIN E PO Take 1 tablet by mouth daily    Historical Provider, MD   Probiotic Product (PROBIOTIC PO) Take 1 tablet by mouth daily    Historical Provider, MD   Ascorbic Acid (VITAMIN C) 500 MG tablet Take 120 mg by mouth daily    Historical Provider, MD        Allergies:     Ciprofloxacin, Lasix [furosemide], Penicillins, Sulfa antibiotics, and Duloxetine    Social History:     Tobacco:    reports that he has never smoked. He has never used smokeless tobacco.  Alcohol:      reports current alcohol use of about 1.0 standard drink of alcohol per week. Drug Use:  reports no history of drug use.     Family History:     Family History   Problem Relation Age of Onset    Lung Cancer Mother     Heart Disease Father        Review of Systems:     Unable to get ros  Pt untubated and sedated    Physical Exam:   /62   Pulse 76   Temp 98.2 °F (36.8 °C)   Resp 20   Ht 5' 10.05\" (1.779 m)   Wt 145 lb 8.1 oz (66 kg)   SpO2 96%   BMI 20.85 kg/m²   Temp (24hrs), Av.4 °F (36.9 °C), Min:97.8 °F (36.6 °C), Max:99 °F (37.2 °C)    Recent Labs     22  1927 05/10/22  0304 05/10/22  0744 05/10/22  1148   POCGLU 144* 107 147* 151*       Intake/Output Summary (Last 24 hours) at 5/10/2022 1557  Last data filed at 5/10/2022 1545  Gross per 24 hour   Intake 3444.61 ml   Output 2600 ml   Net 844.61 ml       General Appearance:intubated sedated    Mental status: alert responds by nodding to questions  On vent intubated  Head: normocephalic, atraumatic  Eye: no icterus, redness, pupils equal and reactive, extraocular eye movements intact, conjunctiva clear  Ear: normal external ear, no discharge, hearing intact  Nose: no drainage noted  Mouth: mucous membranes moist  Neck: supple, no carotid bruits, thyroid not palpable  Lungs: on vent  Crackles  Cardiovascular: normal rate, regular rhythm, no murmur, gallop, rub  Abdomen: Soft, nontender, nondistended, normal bowel sounds, no hepatomegaly or splenomegaly  Neurologic: ALS  Skin: No gross lesions, rashes, bruising or bleeding on exposed skin area  Extremities: peripheral pulses palpable, no pedal edema or calf pain with palpation, RUE is Sligtly swollen       Investigations:      Laboratory Testing:  Recent Results (from the past 24 hour(s))   POC Glucose Fingerstick    Collection Time: 22  7:27 PM   Result Value Ref Range    POC Glucose 144 (H) 75 - 110 mg/dL   POC Glucose Fingerstick    Collection Time: 05/10/22  3:04 AM   Result Value Ref Range    POC Glucose 107 75 - 110 mg/dL   Blood gas, arterial    Collection Time: 05/10/22  4:10 AM   Result Value Ref Range    pH, Arterial 7.352 7.350 - 7.450    pCO2, Arterial 58.5 (HH) 35.0 - 45.0 mmHg    pO2, Arterial 82.7 80.0 - 100.0 mmHg    HCO3, Arterial 32.5 (H) 22.0 - 26.0 mmol/L    Positive Base Excess, Art 6.9 (H) 0.0 - 2.0 mmol/L    O2 Sat, Arterial 93.8 (L) 95 - 98 %    Carboxyhemoglobin 0.0 0 - 5 %    Methemoglobin 1.1 0.0 - 1.9 %    Pt Temp 37.0     O2 Device/Flow/% VENTILATOR     Respiratory Rate 20     Rosalino Test PASS     Sample Site Left Radial Artery     Pt. Position SEMI-FOWLERS     Mode PRVC     Set Rate 20     Total Rate 20          FIO2 40     Peep/Cpap 7     Text for Respiratory RESULTS TO RN    CBC    Collection Time: 05/10/22  5:25 AM   Result Value Ref Range    WBC 19.2 (H) 3.5 - 11.0 k/uL    RBC 3.84 (L) 4.5 - 5.9 m/uL    Hemoglobin 12.5 (L) 13.5 - 17.5 g/dL    Hematocrit 37.8 (L) 41 - 53 %    MCV 98.3 80 - 100 fL    MCH 32.6 26 - 34 pg    MCHC 33.2 31 - 37 g/dL    RDW 13.4 11.5 - 14.9 %    Platelets 515 211 - 435 k/uL    MPV 6.8 6.0 - 12.0 fL   Basic Metabolic Panel w/ Reflex to MG    Collection Time: 05/10/22  5:25 AM   Result Value Ref Range    Glucose 148 (H) 70 - 99 mg/dL    BUN 18 8 - 23 mg/dL    CREATININE <0.40 (L) 0.70 - 1.20 mg/dL    Calcium 8.5 (L) 8.6 - 10.4 mg/dL    Sodium 135 135 - 144 mmol/L    Potassium 4.1 3.7 - 5.3 mmol/L    Chloride 98 98 - 107 mmol/L    CO2 30 20 - 31 mmol/L    Anion Gap 7 (L) 9 - 17 mmol/L    GFR Non-African American Can not be calculated >60 mL/min    GFR  Can not be calculated >60 mL/min    GFR Comment         Triglyceride    Collection Time: 05/10/22  5:25 AM   Result Value Ref Range    Triglycerides 246 (H) <150 mg/dL   POC Glucose Fingerstick    Collection Time: 05/10/22  7:44 AM   Result Value Ref Range    POC Glucose 147 (H) 75 - 110 mg/dL   POC Glucose Fingerstick    Collection Time: 05/10/22 11:48 AM   Result Value Ref Range    POC Glucose 151 (H) 75 - 110 mg/dL       Imaging/Diagnostics:  XR CHEST PORTABLE    Result Date: 5/6/2022  1. New right arm PICC extending toward SVC, but distal tip is obscured by overlying pacemaker leads. 2.  Endotracheal tube and enteric tube remain in place.  3. Unchanged right basilar consolidation and small right pleural effusion. Mild patchy left basilar opacities. XR CHEST PORTABLE    Result Date: 5/5/2022  Endotracheal tube in satisfactory position above the chance NG tip and side-port in gastric fundus Otherwise, unchanged chest demonstrating bibasilar opacities suggesting atelectasis or infection     XR CHEST PORTABLE    Result Date: 5/5/2022  Bibasilar opacities with blunting of right costophrenic angle possibly combination of pneumonia and small right pleural effusion. There is what appears to be a left chest tube terminating in the periphery of the upper lung partly obscured by pacemaker electrode. Please correlate clinically. No recent comparisons.        Assessment :      Hospital Problems           Last Modified POA    * (Principal) Respiratory failure ([address] 5/5/2022 Yes    COVID-19 5/8/2022 Yes    ALS (amyotrophic lateral sclerosis) ([address] 5/8/2022 Yes    Elevated procalcitonin 5/8/2022 Yes    Elevated C-reactive protein (CRP) 5/8/2022 Yes    Elevated ferritin 5/8/2022 Yes    Elevated erythrocyte sedimentation rate 5/8/2022 Yes    Leukocytosis 5/8/2022 Yes    Allergy to multiple antibiotics 5/8/2022 Yes          Plan:     78-year-old gentleman with a history of ALS  Admitted for multifocal COVID-19 pneumonia with acute respiratory failure,   Possible superimposed bacterial pneumonia with underlying neuromuscular disorder of ALS, Pro-Luis is high  Acute respiratory failure secondary to ALS plus COVID-19 pneumonia intubated sedated  MRSA IN SPUTUM on vanco , respiratory Cultures growing MRSA   For COVID-19 patient received Actemra, on Decadron  Hypotension, received 3.5 L, on Levophed  History of coronary artery disease s/p CABG,  On Protonix for gastric prophylaxis  Hypokalemia, Improved   DVT prophylaxis Lovenox  Patient critically sick, high chance of clinical deterioration, seen in ICU  Plan for RUE Ultrasound per ID ( has good pulsations)   CC time 35 nuha Nguyen MD  5/10/2022  3:57 PM    Copy sent to Dr. Eugene Gilford, MD    Please note that this chart was generated using voice recognition Dragon dictation software. Although every effort was made to ensure the accuracy of this automated transcription, some errors in transcription may have occurred.

## 2022-05-10 NOTE — CONSULTS
Chief Complaint   Patient presents with    Shortness of Breath       HxCC:  73 y/o male consulted for trach and peg. Patient admitted on 5/7/2022 for respiratory failure. Patient had increasing shortness of breath prior to admission for 3-4 days. Patient tested positive for COVID on May 5. CXR showed bilateral infiltrates. Patient has been intubated since admission. Family would like trach and peg for vent support at home.       Vitals:    05/10/22 1200   BP:    Pulse:    Resp:    Temp: 98.2 °F (36.8 °C)   SpO2:        Patient Active Problem List   Diagnosis    Amyotrophic lateral sclerosis (ALS) (HCC)    Muscle spasm    Macrocytosis    Rheumatic tricuspid valve regurgitation    Spinal stenosis of lumbar region    Calculus of kidney and ureter    Hypoglycemia    Diastolic dysfunction    Bilateral enlargement of atria    Mitral valve regurgitation    Thoracic aortic aneurysm, without rupture ([facility])    Paroxysmal atrial fibrillation ([facility])    Aortic valve disorder    Bilateral hearing loss    Acquired cystic kidney disease    History of hypertension    Other atopic dermatitis    Closed fracture of base of distal phalanx of finger    Epididymitis    Osteopenia    Hyperparathyroidism, unspecified ([address]    Primary osteoarthritis    Senile hyperkeratosis    MVP (mitral valve prolapse)    Presbyopia    Hydrocele    Other hyperlipidemia    Carpal tunnel syndrome    Rheumatic aortic valve insufficiency    Closed fracture of one rib of right side with routine healing    Tinnitus of both ears    Concussion with loss of consciousness    Supraventricular beat, premature    Varicose veins of both lower extremities    Intervertebral disc prolapse with impingement    Obstructive sleep apnea    Prostatitis    Stress    Dermatitis    Weakness of both lower extremities    Insulin resistance    At high risk for falls    Other specified diseases of blood and blood-forming organs  S/P CABG (coronary artery bypass graft)    Cardiac segmental configuration with atrial configuration unknown, ventricular configuration unknown, and inverted normally aligned great arteries    Vitamin A deficiency    Fatigue    Muscular atrophy    Cervical stenosis of spine    Diarrhea    Atrial flutter (HCC)    Tachy-ralf syndrome (HCC)    Anticoagulated on Coumadin    History of permanent cardiac pacemaker placement 7/1/2020:      Cardiac abnormality    Severe malnutrition (HCC)    SOB (shortness of breath)    Monoplegia of left leg (HCC)    Malfunction of percutaneous endoscopic gastrostomy (PEG) tube (HCC)    Post-nasal drip    Physical deconditioning    Infection of PEG site (HCC)    Rash in adult    Respiratory failure (HCC)    COVID-19    ALS (amyotrophic lateral sclerosis) (HCC)    Elevated procalcitonin    Elevated C-reactive protein (CRP)    Elevated ferritin    Elevated erythrocyte sedimentation rate    Leukocytosis    Allergy to multiple antibiotics       Current Facility-Administered Medications   Medication Dose Route Frequency Provider Last Rate Last Admin    insulin lispro (HUMALOG) injection vial 0-6 Units  0-6 Units SubCUTAneous Q6H Gavino Lo MD   1 Units at 05/10/22 9099    vancomycin (VANCOCIN) intermittent dosing (placeholder)   Other RX Placeholder Lori Aldana MD        vancomycin (VANCOCIN) 1,250 mg in dextrose 5 % 250 mL IVPB (ADDAVIAL)  1,250 mg IntraVENous Q12H Lori Aldana MD   Stopped at 05/10/22 1129    glucose chewable tablet 16 g  16 g Oral PRN Sulema Thomas MD        dextrose 50 % IV solution  12.5 g IntraVENous PRN Sulema Thomas MD        glucagon (rDNA) injection 1 mg  1 mg IntraMUSCular PRN Sulema Thomas MD        dextrose 5 % solution  100 mL/hr IntraVENous PRN Sulema Thomas MD        senna (SENOKOT) tablet 8.6 mg  1 tablet PEG Tube BID Sj Frost MD   8.6 mg at 05/10/22 0817    polyvinyl alcohol (LIQUIFILM TEARS) 1.4 % ophthalmic solution 1 drop  1 drop Both Eyes Q4H Jose Angel Peace MD   1 drop at 05/10/22 1037    And    lubrifresh P.M. (artificial tears) ophthalmic ointment   Both Eyes PRN Jose Angel Peace MD        norepinephrine (LEVOPHED) 16 mg in sodium chloride 0.9 % 250 mL infusion  1-100 mcg/min IntraVENous Continuous Adalberto Conley MD 0.9 mL/hr at 05/10/22 1152 1 mcg/min at 05/10/22 1152    potassium chloride 10 mEq/100 mL IVPB (Peripheral Line)  10 mEq IntraVENous PRN Jose Angel Peace MD        fentaNYL (SUBLIMAZE) 1,000 mcg in sodium chloride 0.9% 100 mL infusion   mcg/hr IntraVENous Continuous Adalberto Conley MD 5 mL/hr at 05/10/22 1152 50 mcg/hr at 05/10/22 1152    sodium chloride flush 0.9 % injection 5-40 mL  5-40 mL IntraVENous 2 times per day Gladis Zhu MD   10 mL at 05/10/22 0837    sodium chloride flush 0.9 % injection 5-40 mL  5-40 mL IntraVENous PRN Gladis Zhu MD        0.9 % sodium chloride infusion   IntraVENous PRN Waldemar Olguin MD        ondansetron (ZOFRAN-ODT) disintegrating tablet 4 mg  4 mg Oral Q8H PRN Waldemar Olguin MD        Or    ondansetron (ZOFRAN) injection 4 mg  4 mg IntraVENous Q6H PRN Gladis Zhu MD        dexamethasone (PF) (DECADRON) injection 6 mg  6 mg IntraVENous Q24H Adalberto Conley MD   6 mg at 05/10/22 1035    enoxaparin (LOVENOX) injection 40 mg  40 mg SubCUTAneous Daily Adalberto Conley MD   40 mg at 05/10/22 0816    propofol injection  5-50 mcg/kg/min IntraVENous Continuous Lobito Reddy DO 13.3 mL/hr at 05/10/22 1152 42.5 mcg/kg/min at 05/10/22 1152    pantoprazole (PROTONIX) 40 mg in sodium chloride (PF) 10 mL injection  40 mg IntraVENous Daily Adalberto Conley MD   40 mg at 05/10/22 0816    chlorhexidine (PERIDEX) 0.12 % solution 15 mL  15 mL Mouth/Throat BID Delon Cruz MD   15 mL at 05/10/22 3925    fentaNYL (SUBLIMAZE) injection 50 mcg  50 mcg IntraVENous Q1H PRN Delon Cruz MD   50 mcg at 05/06/22 9515 Allergies   Allergen Reactions    Ciprofloxacin      Can not take anything in this group with his ALS    Lasix [Furosemide]     Penicillins     Sulfa Antibiotics     Duloxetine        Past Medical History:   Diagnosis Date    [facility]     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    Aortic valve replaced 11/08/2016    Hypertension        Past Surgical History:   Procedure Laterality Date    CARDIOVERSION  06/27/2020    w/    HERNIA REPAIR      PARATHYROID GLAND SURGERY  1980       Family History   Problem Relation Age of Onset    Lung Cancer Mother     Heart Disease Father        Social History     Socioeconomic History    Marital status:      Spouse name: Not on file    Number of children: Not on file    Years of education: Not on file    Highest education level: Not on file   Occupational History    Not on file   Tobacco Use    Smoking status: Never Smoker    Smokeless tobacco: Never Used   Vaping Use    Vaping Use: Never used   Substance and Sexual Activity    Alcohol use: Yes     Alcohol/week: 1.0 standard drink     Types: 1 Cans of beer per week    Drug use: No    Sexual activity: Not on file   Other Topics Concern    Not on file   Social History Narrative    Not on file     Social Determinants of Health     Financial Resource Strain:     Difficulty of Paying Living Expenses: Not on file   Food Insecurity:     Worried About Running Out of Food in the Last Year: Not on file    Fabio of Food in the Last Year: Not on file   Transportation Needs:     Lack of Transportation (Medical): Not on file    Lack of Transportation (Non-Medical):  Not on file   Physical Activity:     Days of Exercise per Week: Not on file    Minutes of Exercise per Session: Not on file   Stress:     Feeling of Stress : Not on file   Social Connections:     Frequency of Communication with Friends and Family: Not on file    Frequency of Social Gatherings with Friends and Family: Not on file    Attends Hoahaoism Services: Not on file    Active Member of Clubs or Organizations: Not on file    Attends Club or Organization Meetings: Not on file    Marital Status: Not on file   Intimate Partner Violence:     Fear of Current or Ex-Partner: Not on file    Emotionally Abused: Not on file    Physically Abused: Not on file    Sexually Abused: Not on file   Housing Stability:     Unable to Pay for Housing in the Last Year: Not on file    Number of [place] in the Last Year: Not on file    Unstable Housing in the Last Year: Not on file       Review of Systems:  14 systems were reviewed. Positives noted above. Remainder are negative per CMS guidelines. Exam  General: AAOx3, NAD  Head: atraumatic normocephalic  Neck: trachea midline. No masses or lymphadenopathy  Heart: RRR with no murmurs  Lungs: BCTA  Abdomen: soft, nontender, and nondistended  Ext: motor 5/5 all extremities with no gross deformities    Impression:  1. Respiratory failure  2. Dysphagia    Plan:  Spoke with wife about the risks for tracheostomy and peg tube placement. Patient with hx of complicated peg tube placements in the past.  Wife will like to hold off on peg placement for now. Will plan for trach on Thursday may 12 at 0730. Wife informed of the risks of tracheostomy which include but not limited to bleeding, infection, injury to surrounding structures, and death. Wife understood risks and gave verbal consent over the phone for tracheostomy under general anesthesia.       Electronically signed by Angelina De Jesus IV, DO on 5/10/22 at 1:18 PM EDT

## 2022-05-10 NOTE — PROGRESS NOTES
Vancomycin Dosing by Pharmacy - Daily Note   Vancomycin Therapy Day:  2  Indication: CAP with ALS    Allergies:  Ciprofloxacin, Lasix [furosemide], Penicillins, Sulfa antibiotics, and Duloxetine   Actual Weight:    Wt Readings from Last 1 Encounters:   05/09/22 145 lb 8.1 oz (66 kg)       Labs/Ancillary Data  Estimated Creatinine Clearance: 151 mL/min (based on SCr of 0.4 mg/dL). Recent Labs     05/08/22  0507 05/09/22  0436 05/10/22  0525   CREATININE <0.40* <0.40* <0.40*   BUN 15 14 18   WBC 9.1 10.6 19.2*     Procalcitonin   Date Value Ref Range Status   05/07/2022 6.54 (H) <0.09 ng/mL Final     Comment:           Suspected Sepsis:  <0.50 ng/mL     Low likelihood of sepsis. 0.50-2.00 ng/mL     Increased likelihood of sepsis. Antibiotics encouraged. >2.00 ng/mL     High risk of sepsis/shock. Antibiotics strongly encouraged. Suspected Lower Resp Tract Infections:  <0.24 ng/mL     Low likelihood of bacterial infection. >0.24 ng/mL     Increased likelihood of bacterial infection. Antibiotics encouraged. With successful antibiotic therapy, PCT levels should decrease rapidly. (Half-life of 24 to   36 hours.)        Procalcitonin values from samples collected within the first 6 hours of systemic infection   may still be low. Retesting may be indicated. Values from day 1 and day 4 can be entered into the Change in Procalcitonin Calculator   (www.[employer]-pct-calculator. [employer]) to determine the patient's Mortality Risk Prognosis        In healthy neonates, plasma Procalcitonin (PCT) concentrations increase gradually after   birth, reaching peak values at about 24 hours of age then decrease to normal values below   0.5 ng/mL by 48-72 hours of age.          Intake/Output Summary (Last 24 hours) at 5/10/2022 0857  Last data filed at 5/10/2022 0615  Gross per 24 hour   Intake 3083.9 ml   Output 1700 ml   Net 1383.9 ml     Temp: 98.6 F    Culture Date / Source  /  Results  See micro  Recent vancomycin administrations                     vancomycin (VANCOCIN) 1,750 mg in dextrose 5 % 500 mL IVPB (mg) 1,750 mg New Bag 05/09/22 2101                    Vancomycin Concentrations:   TROUGH:  No results for input(s): VANCOTROUGH in the last 72 hours. RANDOM:  No results for input(s): VANCORANDOM in the last 72 hours. MRSA Nasal Swab: patient already has a respiratory culture with MRSA heavy growth. PLAN     Continue current dose of 1250 mg q12h IV  Ensured BUN/sCr ordered at baseline and every 48 hours x at least 3 levels, then at least weekly. Repeat vancomycin concentration ordered for 05/11 @ 0600. Pharmacy will continue to monitor patient and adjust therapy as indicated      Vancomycin Target Concentration Parameters  Treatment  Population Target AUC/HARDEEP Target Trough   Invasive MRSA Infection (bacteremia, pneumonia, meningitis, endocarditis, osteomyelitis)  Sepsis (undifferentiated) 400-600 N/A   Infection due to non-MRSA pathogen  Empiric treatment of non-invasive MRSA infection  (SSTI, UTI) <500 10-15 mg/L   CrCl < 29 mL/min  Rapidly fluctuating serum creatinine   PATI N/A < 15 mg/L     Renal replacement therapy is dosed by levels, per hospital protocol. Abbreviations  * Pauc: probability that AUC is >400 (efficacy); Pconc: probability that Ctrough is above 20 ?g/mL (toxicity); Tox: Probability of nephrotoxicity, based on Osiris et al. Clin Infect Dis 2009. Loading dose: 1750 mg at 21:00 05/09/2022. Regimen: 1250 mg IV every 12 hours. Start time: 09:01 on 05/10/2022  Exposure target: AUC24 (range)400-600 mg/L.hr   AUC24,ss: 463 mg/L.hr  Probability of AUC24 > 400: 65 %  Ctrough,ss: 12.5 mg/L  Probability of Ctrough,ss > 20: 20 %  Probability of nephrotoxicity (Lodise MARILEE 2009): 8 %    Thank you for the consult. Pharmacy will continue to follow. Peyton Carmen. Ph.  5/10/2022  8:59 AM

## 2022-05-11 ENCOUNTER — ANESTHESIA EVENT (OUTPATIENT)
Dept: OPERATING ROOM | Age: 72
DRG: 004 | End: 2022-05-11
Payer: MEDICARE

## 2022-05-11 ENCOUNTER — APPOINTMENT (OUTPATIENT)
Dept: GENERAL RADIOLOGY | Age: 72
DRG: 004 | End: 2022-05-11
Payer: MEDICARE

## 2022-05-11 LAB
ALLEN TEST: ABNORMAL
ANION GAP SERPL CALCULATED.3IONS-SCNC: 6 MMOL/L (ref 9–17)
BUN BLDV-MCNC: 22 MG/DL (ref 8–23)
CALCIUM SERPL-MCNC: 8.2 MG/DL (ref 8.6–10.4)
CARBOXYHEMOGLOBIN: 0.4 % (ref 0–5)
CHLORIDE BLD-SCNC: 100 MMOL/L (ref 98–107)
CO2: 32 MMOL/L (ref 20–31)
CREAT SERPL-MCNC: <0.4 MG/DL (ref 0.7–1.2)
FIO2: 30
GFR AFRICAN AMERICAN: ABNORMAL ML/MIN
GFR NON-AFRICAN AMERICAN: ABNORMAL ML/MIN
GFR SERPL CREATININE-BSD FRML MDRD: ABNORMAL ML/MIN/{1.73_M2}
GLUCOSE BLD-MCNC: 105 MG/DL (ref 75–110)
GLUCOSE BLD-MCNC: 135 MG/DL (ref 75–110)
GLUCOSE BLD-MCNC: 136 MG/DL (ref 70–99)
GLUCOSE BLD-MCNC: 142 MG/DL (ref 75–110)
HCO3 ARTERIAL: 34 MMOL/L (ref 22–26)
HCT VFR BLD CALC: 35.6 % (ref 41–53)
HEMOGLOBIN: 11.8 G/DL (ref 13.5–17.5)
MCH RBC QN AUTO: 32.6 PG (ref 26–34)
MCHC RBC AUTO-ENTMCNC: 33.3 G/DL (ref 31–37)
MCV RBC AUTO: 98.1 FL (ref 80–100)
METHEMOGLOBIN: 1 % (ref 0–1.9)
MODE: ABNORMAL
O2 DEVICE/FLOW/%: ABNORMAL
O2 SAT, ARTERIAL: 92.3 % (ref 95–98)
PATIENT TEMP: 37.1
PCO2 ARTERIAL: 49.7 MMHG (ref 35–45)
PDW BLD-RTO: 13.5 % (ref 11.5–14.9)
PEEP/CPAP: 7
PH ARTERIAL: 7.44 (ref 7.35–7.45)
PLATELET # BLD: 444 K/UL (ref 150–450)
PMV BLD AUTO: 6.6 FL (ref 6–12)
PO2 ARTERIAL: 67.7 MMHG (ref 80–100)
POSITIVE BASE EXCESS, ART: 10 MMOL/L (ref 0–2)
POTASSIUM SERPL-SCNC: 4.2 MMOL/L (ref 3.7–5.3)
PT. POSITION: ABNORMAL
RBC # BLD: 3.63 M/UL (ref 4.5–5.9)
SAMPLE SITE: ABNORMAL
SET RATE: 20
SODIUM BLD-SCNC: 138 MMOL/L (ref 135–144)
TEXT FOR RESPIRATORY: ABNORMAL
TOTAL RATE: 23
VANCOMYCIN RANDOM DATE LAST DOSE: NORMAL
VANCOMYCIN RANDOM DOSE AMOUNT: NORMAL
VANCOMYCIN RANDOM TIME LAST DOSE: 2144
VANCOMYCIN RANDOM: 16.1 UG/ML
VT: 500
WBC # BLD: 18.3 K/UL (ref 3.5–11)

## 2022-05-11 PROCEDURE — 99291 CRITICAL CARE FIRST HOUR: CPT | Performed by: INTERNAL MEDICINE

## 2022-05-11 PROCEDURE — 94761 N-INVAS EAR/PLS OXIMETRY MLT: CPT

## 2022-05-11 PROCEDURE — 80202 ASSAY OF VANCOMYCIN: CPT

## 2022-05-11 PROCEDURE — 94003 VENT MGMT INPAT SUBQ DAY: CPT

## 2022-05-11 PROCEDURE — 71045 X-RAY EXAM CHEST 1 VIEW: CPT

## 2022-05-11 PROCEDURE — 99233 SBSQ HOSP IP/OBS HIGH 50: CPT | Performed by: INTERNAL MEDICINE

## 2022-05-11 PROCEDURE — 36600 WITHDRAWAL OF ARTERIAL BLOOD: CPT

## 2022-05-11 PROCEDURE — 82805 BLOOD GASES W/O2 SATURATION: CPT

## 2022-05-11 PROCEDURE — 6370000000 HC RX 637 (ALT 250 FOR IP): Performed by: INTERNAL MEDICINE

## 2022-05-11 PROCEDURE — 2580000003 HC RX 258: Performed by: INTERNAL MEDICINE

## 2022-05-11 PROCEDURE — 36415 COLL VENOUS BLD VENIPUNCTURE: CPT

## 2022-05-11 PROCEDURE — 2000000000 HC ICU R&B

## 2022-05-11 PROCEDURE — 6360000002 HC RX W HCPCS: Performed by: INTERNAL MEDICINE

## 2022-05-11 PROCEDURE — 80048 BASIC METABOLIC PNL TOTAL CA: CPT

## 2022-05-11 PROCEDURE — 85027 COMPLETE CBC AUTOMATED: CPT

## 2022-05-11 PROCEDURE — 2700000000 HC OXYGEN THERAPY PER DAY

## 2022-05-11 PROCEDURE — C9113 INJ PANTOPRAZOLE SODIUM, VIA: HCPCS | Performed by: INTERNAL MEDICINE

## 2022-05-11 PROCEDURE — 82947 ASSAY GLUCOSE BLOOD QUANT: CPT

## 2022-05-11 RX ADMIN — METOPROLOL TARTRATE 25 MG: 25 TABLET, FILM COATED ORAL at 21:02

## 2022-05-11 RX ADMIN — MIDAZOLAM HYDROCHLORIDE 2 MG: 1 INJECTION INTRAMUSCULAR; INTRAVENOUS at 18:53

## 2022-05-11 RX ADMIN — SODIUM CHLORIDE, PRESERVATIVE FREE 40 MG: 5 INJECTION INTRAVENOUS at 08:37

## 2022-05-11 RX ADMIN — SENNOSIDES 8.6 MG: 8.6 TABLET, COATED ORAL at 08:38

## 2022-05-11 RX ADMIN — INSULIN LISPRO 1 UNITS: 100 INJECTION, SOLUTION INTRAVENOUS; SUBCUTANEOUS at 21:02

## 2022-05-11 RX ADMIN — POLYVINYL ALCOHOL 1 DROP: 14 SOLUTION/ DROPS OPHTHALMIC at 08:38

## 2022-05-11 RX ADMIN — VANCOMYCIN HYDROCHLORIDE 1250 MG: 1.25 INJECTION, POWDER, LYOPHILIZED, FOR SOLUTION INTRAVENOUS at 08:42

## 2022-05-11 RX ADMIN — MIDAZOLAM HYDROCHLORIDE 2 MG: 1 INJECTION INTRAMUSCULAR; INTRAVENOUS at 11:21

## 2022-05-11 RX ADMIN — VANCOMYCIN HYDROCHLORIDE 1250 MG: 1.25 INJECTION, POWDER, LYOPHILIZED, FOR SOLUTION INTRAVENOUS at 21:40

## 2022-05-11 RX ADMIN — Medication 50 MCG/HR: at 11:21

## 2022-05-11 RX ADMIN — SODIUM CHLORIDE, PRESERVATIVE FREE 10 ML: 5 INJECTION INTRAVENOUS at 08:39

## 2022-05-11 RX ADMIN — CHLORHEXIDINE GLUCONATE 0.12% ORAL RINSE 15 ML: 1.2 LIQUID ORAL at 08:37

## 2022-05-11 RX ADMIN — POLYVINYL ALCOHOL 1 DROP: 14 SOLUTION/ DROPS OPHTHALMIC at 02:53

## 2022-05-11 RX ADMIN — SENNOSIDES 8.6 MG: 8.6 TABLET, COATED ORAL at 21:02

## 2022-05-11 RX ADMIN — CHLORHEXIDINE GLUCONATE 0.12% ORAL RINSE 15 ML: 1.2 LIQUID ORAL at 21:02

## 2022-05-11 RX ADMIN — MIDAZOLAM HYDROCHLORIDE 2 MG: 1 INJECTION INTRAMUSCULAR; INTRAVENOUS at 13:45

## 2022-05-11 RX ADMIN — MIDAZOLAM 3 MG/HR: 5 INJECTION INTRAMUSCULAR; INTRAVENOUS at 17:40

## 2022-05-11 RX ADMIN — SODIUM CHLORIDE, PRESERVATIVE FREE 10 ML: 5 INJECTION INTRAVENOUS at 21:03

## 2022-05-11 RX ADMIN — DEXAMETHASONE SODIUM PHOSPHATE 6 MG: 10 INJECTION, SOLUTION INTRAMUSCULAR; INTRAVENOUS at 11:15

## 2022-05-11 RX ADMIN — POLYVINYL ALCOHOL 1 DROP: 14 SOLUTION/ DROPS OPHTHALMIC at 23:45

## 2022-05-11 RX ADMIN — MIDAZOLAM HYDROCHLORIDE 2 MG: 1 INJECTION INTRAMUSCULAR; INTRAVENOUS at 16:45

## 2022-05-11 RX ADMIN — POLYVINYL ALCOHOL 1 DROP: 14 SOLUTION/ DROPS OPHTHALMIC at 11:18

## 2022-05-11 RX ADMIN — ENOXAPARIN SODIUM 40 MG: 100 INJECTION SUBCUTANEOUS at 08:36

## 2022-05-11 ASSESSMENT — PULMONARY FUNCTION TESTS
PIF_VALUE: 22
PIF_VALUE: 19
PIF_VALUE: 21
PIF_VALUE: 19
PIF_VALUE: 19
PIF_VALUE: 20
PIF_VALUE: 19
PIF_VALUE: 21
PIF_VALUE: 18
PIF_VALUE: 19
PIF_VALUE: 20
PIF_VALUE: 19
PIF_VALUE: 17
PIF_VALUE: 19
PIF_VALUE: 20
PIF_VALUE: 20
PIF_VALUE: 25
PIF_VALUE: 23
PIF_VALUE: 19
PIF_VALUE: 20
PIF_VALUE: 21
PIF_VALUE: 18
PIF_VALUE: 16
PIF_VALUE: 20
PIF_VALUE: 19
PIF_VALUE: 17
PIF_VALUE: 18
PIF_VALUE: 19
PIF_VALUE: 20
PIF_VALUE: 21
PIF_VALUE: 19
PIF_VALUE: 19

## 2022-05-11 NOTE — FLOWSHEET NOTE
Attempted consult to Dr. James Beauchamp through phone number provided via [facility]. Per their answering service message, I will consult in the a.m. for non-urgent matters.

## 2022-05-11 NOTE — PROGRESS NOTES
ICU Progress Note (Vent)   Pulmonary and Critical Care Specialists    Patient - Lowell Cowden,  Age - 74 y.o.    - 1950      Room Number -    N -  [id number]   [facility]  Date of Admission -  2022  8:35 AM    Events of Past 24 Hours   Patient intubated possible. He appears to be able to understand. Currently not on any pressors. Vitals    height is 5' 10.05\" (1.779 m) and weight is 145 lb 8.1 oz (66 kg). His oral temperature is 98.6 °F (37 °C). His blood pressure is 113/50 (abnormal) and his pulse is 87. His respiration is 22 and oxygen saturation is 95%. Temperature Range: Temp: 98.6 °F (37 °C) Temp  Av.6 °F (37 °C)  Min: 98.6 °F (37 °C)  Max: 98.6 °F (37 °C)  BP Range:  Systolic (40UWF), VCP:124 , Min:59 , NRB:326     Diastolic (62WGP), JJZ:36, Min:37, Max:120    Pulse Range: Pulse  Av  Min: 65  Max: 115  Respiration Range: Resp  Av.5  Min: 15  Max: 30  Current Pulse Ox[de-identified]  SpO2: 95 %  24HR Pulse Ox Range:  SpO2  Av.7 %  Min: 86 %  Max: 99 %  Oxygen Amount and Delivery: O2 Flow Rate (L/min): 8 L/min      Wt Readings from Last 3 Encounters:   22 145 lb 8.1 oz (66 kg)   22 115 lb (52.2 kg)   22 120 lb (54.4 kg)     I/O       Intake/Output Summary (Last 24 hours) at 2022 1422  Last data filed at 2022 0630  Gross per 24 hour   Intake 2151.59 ml   Output 1300 ml   Net 851.59 ml     I/O last 3 completed shifts:   In: 3950.2 [I.V.:546.8; NG/GT:2369; IV Piggyback:1034.4]  Out: 2700 [Urine:2700]     DRAIN/TUBE OUTPUT:     Invasive Lines   ETT Day -   7  PICC line day #7    Mechanical Ventilation Data   SETTINGS (Comprehensive)  Vent Information  Ventilator Day(s): 5  Ventilator ID: [id number]  Vent Mode: PRVC  Ventilator Initiate: Yes  Additional Respiratory Assessments  Pulse: 87  Resp: 22  SpO2: 95 %  End Tidal CO2: 37 (%)  Position: Semi-Christie's  Humidification Source: HME  Cuff Pressure (cm H2O): 30 cm H2O  Skin Barrier Applied: No       ABGs:   Lab Results   Component Value Date    PHART 7.444 05/11/2022    PO2ART 67.7 05/11/2022    JZR3FNL 49.7 05/11/2022       Lab Results   Component Value Date    MODE PRVC 05/11/2022         Medications   IV   midazolam 2 mg/hr (05/11/22 0300)    dextrose      norepinephrine Stopped (05/10/22 1154)    fentaNYL 50 mcg/hr (05/11/22 1121)    sodium chloride      propofol Stopped (05/10/22 2322)      midodrine  5 mg Oral BID    insulin lispro  0-6 Units SubCUTAneous Q6H    vancomycin (VANCOCIN) intermittent dosing (placeholder)   Other RX Placeholder    vancomycin  1,250 mg IntraVENous Q12H    senna  1 tablet PEG Tube BID    polyvinyl alcohol  1 drop Both Eyes Q4H    sodium chloride flush  5-40 mL IntraVENous 2 times per day    dexamethasone  6 mg IntraVENous Q24H    enoxaparin  40 mg SubCUTAneous Daily    pantoprazole (PROTONIX) 40 mg injection  40 mg IntraVENous Daily    chlorhexidine  15 mL Mouth/Throat BID       Diet/Nutrition   Diet NPO  ADULT TUBE FEEDING; Orogastric; Peptide Based; Continuous; 20; Yes; 10; Q 4 hours; 65; 30; Q 4 hours    Exam   VITALS    height is 5' 10.05\" (1.779 m) and weight is 145 lb 8.1 oz (66 kg). His oral temperature is 98.6 °F (37 °C). His blood pressure is 113/50 (abnormal) and his pulse is 87. His respiration is 22 and oxygen saturation is 95%. Ventilator Settings (Basic)  Vent Mode: PRVC Resp Rate (Set): 20 bmp/Vt (Set, mL): 500 mL/ /FiO2 : 30 %    Constitutional - Sedated intubated on vent support  General Appearance thin. HEENT - Life support devices in place (ET,),normocephalic, atraumatic. PERRLA  Lungs - Chest expands equally, no wheezes, rales or rhonchi. Cardiovascular - Heart sounds are normal.  normal rate and rhythm regular, no murmur, gallop or rub.   Abdomen - soft, nontender, nondistended, no masses or organomegaly  Extremities - no cyanosis, clubbing or edema    Lab Results   CBC     Lab Results   Component Value Date    WBC 18.3 05/11/2022    RBC 3.63 05/11/2022    HGB 11.8 05/11/2022    HCT 35.6 05/11/2022     05/11/2022    MCV 98.1 05/11/2022    MCH 32.6 05/11/2022    MCHC 33.3 05/11/2022    RDW 13.5 05/11/2022    LYMPHOPCT 1 05/06/2022    MONOPCT 3 05/06/2022    BASOPCT 0 05/06/2022    MONOSABS 0.35 05/06/2022    LYMPHSABS 0.12 05/06/2022    EOSABS 0.00 05/06/2022    BASOSABS 0.00 05/06/2022    DIFFTYPE NOT REPORTED 08/25/2021       BMP   Lab Results   Component Value Date     05/11/2022    K 4.2 05/11/2022     05/11/2022    CO2 32 05/11/2022    BUN 22 05/11/2022    CREATININE <0.40 05/11/2022    GLUCOSE 136 05/11/2022    CALCIUM 8.2 05/11/2022       LFTS  Lab Results   Component Value Date    ALKPHOS 137 05/05/2022    ALT 46 05/05/2022    AST 30 05/05/2022    PROT 7.5 05/05/2022    BILITOT 0.66 05/05/2022    LABALBU 3.0 05/05/2022       INR  No results for input(s): PROTIME, INR in the last 72 hours. APTT  No results for input(s): APTT in the last 72 hours. Lactic Acid  No results found for: LACTA     BNP   No results for input(s): BNP in the last 72 hours. Cultures   Patient with MRSA in sputum, 5/6   Blood cultures x2, negative to date. 5/5  Radiology     Plain Films         Chest x-ray reveals evidence of infiltrate in the right lower lobe density    See actual reports for details    SYSTEM ASSESSMENT    1.  Acute respiratory failure with hypoxemia. 2.  Severe ALS. 3.  History of coronary artery disease status post bypass graft surgery  10/2016. 4.  Malnutrition. 5.  Severe debilitation. 6.  Hypertension. 7.  MRSA pneumonia       Neuro   Appears to be able to understand things    Respiratory   On vent support. Has evidence of chronic hypercapnia    Hemodynamics       Gastrointestinal/Nutrition       Renal       Infectious Disease   Last fever was May 9, at 8pm T-max 99.0    Hematology/Oncology   Leukocytosis. White count 18.3, was 19.2 yesterday  Steroid effect?     On Lovenox for DVT prophylaxis  Endocrine       Social/Spiritual/DNR/Disposition/Other     Sheri Simmons (Spouse)   [phone] [facility] Phone)  I told her that I was informed earlier that plans for tracheostomy tomorrow morning are in place. He voiced appreciation of the call. She will confer with the bedside nurse regarding the times.     Critical Care Time   45 min    Electronically signed by Sj Frost MD on 5/11/2022 at 2:22 PM

## 2022-05-11 NOTE — ANESTHESIA PRE PROCEDURE
Department of Anesthesiology  Preprocedure Note       Name:  Erick Pena   Age:  74 y.o.  :  1950                                          MRN:  [id number]         Date:  2022      Surgeon: Jyotsna Jackson):  Demetrio De Jesus IV,     Procedure: Procedure(s):  EGD/PEG TUBE INSERTION  TRACHEOTOMY    Medications prior to admission:   Prior to Admission medications    Medication Sig Start Date End Date Taking? Authorizing Provider   neomycin-bacitracin-polymyxin (NEOSPORIN) 3.5-400-5000 OINT ointment Apply topically 3 times daily Apply topically 4 times daily. 22   Kizzy Valdovinos MD   Handicap Placard MISC by Does not apply route Dx: ALS    Duration: 5 years 3/23/22   Kizzy Valdovinos MD   medical marijuana Place under the tongue 2 times daily.     Historical Provider, MD   acetaminophen (TYLENOL) 325 MG tablet Take 650 mg by mouth every 6 hours as needed 20   Historical Provider, MD   fluticasone Karrie Ran) 50 MCG/ACT nasal spray 1 spray by Nasal route daily 21   Kizzy Valdovinos MD   SPIRULINA PO Take by mouth    Historical Provider, MD   Vitamin E 100 UNITS TABS Take 60 Units by mouth daily    Historical Provider, MD   SELENIUM PO Take 75 mcg by mouth daily    Historical Provider, MD   SOYA LECITHIN PO Take 1,000 mg by mouth daily    Historical Provider, MD   Multiple Vitamins-Minerals (THERAPEUTIC MULTIVITAMIN-MINERALS) tablet Take 1 tablet by mouth daily    Historical Provider, MD   OMEGA-3 FATTY ACIDS-VITAMIN E PO Take 1 tablet by mouth daily    Historical Provider, MD   Probiotic Product (PROBIOTIC PO) Take 1 tablet by mouth daily    Historical Provider, MD   Ascorbic Acid (VITAMIN C) 500 MG tablet Take 120 mg by mouth daily    Historical Provider, MD       Current medications:    Current Facility-Administered Medications   Medication Dose Route Frequency Provider Last Rate Last Admin    midodrine (PROAMATINE) tablet 5 mg  5 mg Oral BID Gino Foy MD        midazolam (VERSED) 100 mg in dextrose 5 % 100 mL infusion  2-9 mg/hr IntraVENous Continuous Adalberto Conley MD 2 mL/hr at 05/11/22 0300 2 mg/hr at 05/11/22 0300    midazolam (VERSED) injection 2 mg  2 mg IntraVENous Q2H PRN Adalberto Conley MD        insulin lispro (HUMALOG) injection vial 0-6 Units  0-6 Units SubCUTAneous Q6H Gladis Zhu MD   1 Units at 05/10/22 2106    vancomycin (VANCOCIN) intermittent dosing (placeholder)   Other RX Placeholder Francisco Ahuja MD        vancomycin (VANCOCIN) 1,250 mg in dextrose 5 % 250 mL IVPB (ADDAVIAL)  1,250 mg IntraVENous Q12H Francisco Ahuja MD   Stopped at 05/11/22 1012    glucose chewable tablet 16 g  16 g Oral PRN Jose Angel Peace MD        dextrose 50 % IV solution  12.5 g IntraVENous PRN Jose Angel Peace MD        glucagon (rDNA) injection 1 mg  1 mg IntraMUSCular PRN Jose Angel Peace MD        dextrose 5 % solution  100 mL/hr IntraVENous PRN Jose Angel Peace MD        senna (SENOKOT) tablet 8.6 mg  1 tablet PEG Tube BID Adalberto Conley MD   8.6 mg at 05/11/22 9135    polyvinyl alcohol (LIQUIFILM TEARS) 1.4 % ophthalmic solution 1 drop  1 drop Both Eyes Q4H Jose Angel Peace MD   1 drop at 05/11/22 1118    And    lubrifresh P.M. (artificial tears) ophthalmic ointment   Both Eyes PRN Jose Angel Peace MD        norepinephrine (LEVOPHED) 16 mg in sodium chloride 0.9 % 250 mL infusion  1-100 mcg/min IntraVENous Continuous Adalberto Conley MD   Stopped at 05/10/22 1154    potassium chloride 10 mEq/100 mL IVPB (Peripheral Line)  10 mEq IntraVENous PRN Jose Angel Peace MD        fentaNYL (SUBLIMAZE) 1,000 mcg in sodium chloride 0.9% 100 mL infusion   mcg/hr IntraVENous Continuous Adalberto Conley MD 5 mL/hr at 05/11/22 1121 50 mcg/hr at 05/11/22 1121    sodium chloride flush 0.9 % injection 5-40 mL  5-40 mL IntraVENous 2 times per day Gladis Zhu MD   10 mL at 05/11/22 0839    sodium chloride flush 0.9 % injection 5-40 mL  5-40 mL IntraVENous PRN Gladis Zhu MD        0.9 % sodium chloride infusion   IntraVENous PRN Sotero Smith MD        ondansetron (ZOFRAN-ODT) disintegrating tablet 4 mg  4 mg Oral Q8H PRN Sotero Smith MD        Or    ondansetron (ZOFRAN) injection 4 mg  4 mg IntraVENous Q6H PRN Sotero Smith MD        dexamethasone (PF) (DECADRON) injection 6 mg  6 mg IntraVENous Q24H Ivan Cueva MD   6 mg at 05/11/22 1115    enoxaparin (LOVENOX) injection 40 mg  40 mg SubCUTAneous Daily Ivan Cueva MD   40 mg at 05/11/22 0836    propofol injection  5-50 mcg/kg/min IntraVENous Continuous Andrew Reddy DO   Stopped at 05/10/22 2322    pantoprazole (PROTONIX) 40 mg in sodium chloride (PF) 10 mL injection  40 mg IntraVENous Daily Ivan Cueva MD   40 mg at 05/11/22 0837    chlorhexidine (PERIDEX) 0.12 % solution 15 mL  15 mL Mouth/Throat BID Erika Acosta MD   15 mL at 05/11/22 0837    fentaNYL (SUBLIMAZE) injection 50 mcg  50 mcg IntraVENous Q1H PRN Erika Acosta MD   50 mcg at 05/06/22 9070       Allergies:     Allergies   Allergen Reactions    Ciprofloxacin      Can not take anything in this group with his ALS    Lasix [Furosemide]     Penicillins     Sulfa Antibiotics     Duloxetine        Problem List:    Patient Active Problem List   Diagnosis Code    Amyotrophic lateral sclerosis (ALS) ([facility] [address] G12.21    Muscle spasm M62.838    Macrocytosis D75.89    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    Spinal stenosis of lumbar region M48.061    Calculus of kidney and ureter N20.2    Hypoglycemia A03.2    Diastolic dysfunction T52.77    Bilateral enlargement of atria I51.7    Mitral valve regurgitation I34.0    Thoracic aortic aneurysm, without rupture (HCC) I71.2    Paroxysmal atrial fibrillation (HCC) I48.0    Aortic valve disorder I35.9    Bilateral hearing loss H91.93    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 31XD    Tinnitus of both ears H93.13    Concussion with loss of consciousness S06. 0X9A    Supraventricular beat, premature I49.1    Varicose veins of both lower extremities I83.93    Intervertebral disc prolapse with impingement [id number]    Obstructive sleep apnea G47.33    Prostatitis N41.9    Stress F43.9    Dermatitis L30.9    Weakness of both lower extremities R29.898    Insulin resistance E88.81    At high risk for falls Z91.81    Other specified diseases of blood and blood-forming organs D75.89    S/P CABG (coronary artery bypass graft) Z95.1    Cardiac segmental configuration with atrial configuration unknown, ventricular configuration unknown, and inverted normally aligned great arteries Q20.8, Q20.9    Vitamin A deficiency E50.9    Fatigue R53.83    Muscular atrophy M62.50    Cervical stenosis of spine M48.02    Diarrhea R19.7    Atrial flutter (HCC) I48.92    Tachy-ralf syndrome ([facility]) I49.5    Anticoagulated on Coumadin Z79.01    History of permanent cardiac pacemaker placement 7/1/2020:   Z95.0    Cardiac abnormality Q24.9    Severe malnutrition ([facility]) E43    SOB (shortness of breath) R06.02    Monoplegia of left leg ([facility]) G83.14    Malfunction of percutaneous endoscopic gastrostomy (PEG) tube ([facility]) K94.23    Post-nasal drip R09.82    Physical deconditioning R53.81    Infection of PEG site ([facility]) K94.22    Rash in adult R21    Respiratory failure ([facility]) J96.90    COVID-19 U07.1    ALS (amyotrophic lateral sclerosis) ([facility]) G12.21    Elevated procalcitonin R79.89    Elevated C-reactive protein (CRP) R79.82    Elevated ferritin R79.89    Elevated erythrocyte sedimentation rate R70.0    Leukocytosis D72.829    Allergy to multiple antibiotics Z88.1       Past Medical History:        Diagnosis Date    ALS (amyotrophic lateral sclerosis) ([facility]     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    Aortic valve replaced 11/08/2016    Hypertension        Past Surgical History:        Procedure Laterality Date    CARDIOVERSION  06/27/2020    w/    HERNIA REPAIR      PARATHYROID GLAND SURGERY  1980       Social History:    Social History     Tobacco Use    Smoking status: Never Smoker    Smokeless tobacco: Never Used   Substance Use Topics    Alcohol use: Yes     Alcohol/week: 1.0 standard drink     Types: 1 Cans of beer per week                                Counseling given: Not Answered      Vital Signs (Current):   Vitals:    05/11/22 1150 05/11/22 1200 05/11/22 1300 05/11/22 1400   BP:  (!) 142/91 139/65 (!) 184/78   Pulse: 87 88 82 94   Resp: 22 16 19 22   Temp:  98 °F (36.7 °C)     TempSrc:  Axillary     SpO2: 95% 95% 97% 96%   Weight:       Height:                                                  BP Readings from Last 3 Encounters:   05/11/22 (!) 184/78   04/04/22 137/84   04/04/22 130/82       NPO Status:                                                                                 BMI:   Wt Readings from Last 3 Encounters:   05/09/22 145 lb 8.1 oz (66 kg)   04/04/22 115 lb (52.2 kg)   03/23/22 120 lb (54.4 kg)     Body mass index is 20.85 kg/m².     CBC:   Lab Results   Component Value Date    WBC 18.3 05/11/2022    RBC 3.63 05/11/2022    HGB 11.8 05/11/2022    HCT 35.6 05/11/2022    MCV 98.1 05/11/2022    RDW 13.5 05/11/2022     05/11/2022       CMP:   Lab Results   Component Value Date     05/11/2022    K 4.2 05/11/2022     05/11/2022    CO2 32 05/11/2022    BUN 22 05/11/2022    CREATININE <0.40 05/11/2022 GFRAA Can not be calculated 05/11/2022    LABGLOM Can not be calculated 05/11/2022    GLUCOSE 136 05/11/2022    PROT 7.5 05/05/2022    CALCIUM 8.2 05/11/2022    BILITOT 0.66 05/05/2022    ALKPHOS 137 05/05/2022    AST 30 05/05/2022    ALT 46 05/05/2022       POC Tests:   Recent Labs     05/11/22  0736   POCGLU 105       Coags:   Lab Results   Component Value Date    PROTIME 14.6 05/05/2022    INR 1.1 05/05/2022    APTT 40.8 05/05/2022       HCG (If Applicable): No results found for: PREGTESTUR, PREGSERUM, HCG, HCGQUANT     ABGs:   Lab Results   Component Value Date    PHART 7.444 05/11/2022    PO2ART 67.7 05/11/2022    HAG8DMB 49.7 05/11/2022    KQL4MEA 34.0 05/11/2022    D1XCJZWK 92.3 05/11/2022        Type & Screen (If Applicable):  No results found for: [facility]    Drug/Infectious Status (If Applicable):  No results found for: HIV, HEPCAB    COVID-19 Screening (If Applicable):   Lab Results   Component Value Date    COVID19 DETECTED 05/05/2022    COVID19 Not Detected 09/20/2020           Anesthesia Evaluation  Patient summary reviewed and Nursing notes reviewed no history of anesthetic complications:   Airway: Mallampati: Unable to assess / NA  TM distance: >3 FB   Neck ROM: full  Mouth opening: > = 3 FB Dental:    (+) other  Comment: Pt intubated - unable to assess    Pulmonary:normal exam  breath sounds clear to auscultation  (+) pneumonia:  sleep apnea (on continuous bipap prior to this admission):                            ROS comment: COVID-19   Acute respiratory failure requiring mechanical ventilation   tested positive for COVID 04/20 or 04/21  Intubated of 5/5/22    Patient off droplet isolation 5/11/22 per ID note - Droplet Plus Precautions through 5/10/22.     Vent settings 20/500/40/7   Cardiovascular:    (+) hypertension:, valvular problems/murmurs (Aortic valve replaced): AI, pacemaker (cardiac pacemaker placement 7/1/2020): pacemaker, CAD:, CABG/stent (s/p CABG):, dysrhythmias (Paroxysmal atrial fibrillation ):,       ECG reviewed  Rhythm: regular  Rate: normal  Echocardiogram reviewed               ROS comment: Left ventricle is normal in size and wall thickness. Global left ventricular systolic function is normal. Estimated LV EF >55 %. (5/2022)    low blood pressure, On Levophed     Neuro/Psych:   (+) neuromuscular disease:,              ROS comment: Amyotrophic lateral sclerosis (ALS)  Bilateral hearing loss GI/Hepatic/Renal:   (+) renal disease: kidney stones,           Endo/Other:    (+) : arthritis: OA., .                  ROS comment:  Sepsis d/t multifactorial etiologies    Prior to this admission, used  motorized wheelchair,  Abdominal:             Vascular: negative vascular ROS. Other Findings:           Anesthesia Plan      general     ASA 4     (Versed gtt 3, Fentanyl 75)  Induction: intravenous. MIPS: Postoperative opioids intended and Prophylactic antiemetics administered. Anesthetic plan and risks discussed with patient and spouse. Plan discussed with CRNA. Spoke with wife over the phone, who later came in to visit.  Plans for anesthesia discussed, questions answered and consent obtained from the wife who is also the Major Coello MD   5/11/2022

## 2022-05-11 NOTE — PROGRESS NOTES
Physician Progress Note      PATIENT:               Gosia Shen  CSN #:                  [id number]  :                       1950  ADMIT DATE:       2022 8:35 AM  DISCH DATE:  RESPONDING  PROVIDER #:        Katharina Hooks MD          QUERY TEXT:    Pt admitted w/ Acute Respiratory Failure secondary to ALS w/ COVID-19 and MRSA   PNA. Pt noted to meet SIRS criteria, have a viral and bacterial infection w/   associated acute organ failure. If possible, please document in the progress   notes and discharge summary if you are evaluating and /or treating any of the   following: The medical record reflects the following:  Risk Factors: ALS, MRSA PNA, COVID 19 infection, HTN, HFpEF, debility  Clinical Indicators: + COVID w/ Acute respiratory failure requiring   intubation. Hypotension s/p 3L IVF, Norepi for hemodynamic support. BP 59/41,   59/36, 67/38; HR ; RR 15- 34; SPO2 ; Resp Culture + MRSA; LA 1.5 ,   PROCAL 35.19--> 6.54; WBC 12.8/ 3% Bands; DDimer 0.43; SOFA 8; SEPSIS RISK   SCORE < 12.59  Treatment: 3.5 L IVF, Levophed; Actemra, Decadron, Vanco, Lovenox, Vent   support, ID and Pulm Consults, Labs and hemodynamic monitoring  Options provided:  -- Sepsis d/t multifactorial etiologies, present/ evolving at the time of   admission  -- Sepsis due to COVID 19 POA  -- Sepsis due to MRSA PNA POA  -- Sepsis was ruled out  -- Other - I will add my own diagnosis  -- Disagree - Not applicable / Not valid  -- Disagree - Clinically unable to determine / Unknown  -- Refer to Clinical Documentation Reviewer    PROVIDER RESPONSE TEXT:    This patient has sepsis due to COVID 19 that was present on admission.     Query created by: Mona Cano on 2022 5:17 PM      Electronically signed by:  Katharina Hooks MD 2022 3:25 PM

## 2022-05-11 NOTE — CARE COORDINATION
ONGOING DISCHARGE PLAN:    COVID+ afebrile, Pt remains intubated on vent FIO2 30 %. Pt to have trach and peg placed on Friday 5/12  with Dr Aby Camarena. IV decadron, IV Vanco, Levo gtt    [facility] following. Pt goal is to eventually go home with spouse with trach. Will continue to follow for additional discharge needs.     Electronically signed by Farhad Sylvester RN on 5/11/2022 at 11:34 AM

## 2022-05-11 NOTE — PROGRESS NOTES
[facility] -   Infectious diseases evaluation  admission date 5/5/2022    reason for consultation:   Covid-19 Infection    Impression :   Current:  · COVID-19 with superimposed MRSA pneumonia. · Acute hypoxic respiratory failure required intubation. · Leukocytosis suspect steroid effect. · Severe ALS  · History of coronary artery disease status post CABG 2016  · Hypertension  · Penicillin, sulfa and Cipro allergy  ·     Other:  · PPM  Discussion / summary of stay / plan of care   · Patient received Actemra 5/5 22. Recommendations     · IV vancomycin, likely change to oral Zyvox upon discharge  through 5/21/22  · Follow inflammatory markers and CXR. · On Dexamethasone 6 mg IV daily. · On Lovenox 40 mg sq daily. · Bronchoscopy planned for tomorrow. · Discussed with Dr. Hardik Bethea. Infection Control Recommendations   · Universal Precautions  · Droplet Plus Precautions through 5/10/22. Antimicrobial Stewardship Recommendations   · Simplification of therapy  · Targeted therapy    History of Present Illness:   Initial history:  Matt Lopez is a 74y.o.-year-old male a with history of severe ALS who presented to the hospital with worsening cough and shortness of for 3 days prior to admission. Reportedly cough started around 4/20/2022. Chest x-ray showed bilateral infiltrates  Initial labs showed a procalcitonin level of 35.19, last WBC 12.8, creatinine less than 0.4, C-reactive protein 259, D-dimer 0.43  5/5/22 COVID-19 PCR positive  Urinalysis showed 0-2 WBC. The patient was admitted to ICU, was intubated. The patient is intubated, unable to provide history that was obtained from chart review and nursing staff. Mild amount of respiratory secretion, Brunner catheter in place. PICC line in place  The patient was exposed to COVID-19 positive person on 4/16/2022 reportedly. The patient had a positive COVID 19 test at home.     Interval changes  5/11/2022 Afebrile. He remains intubated, afebrile, off pressors, no new events. WBC 18.3  MRSA growth on respiratory culture from 2022  No growth on blood cultures from 2022  Patient Vitals for the past 8 hrs:   BP Temp Temp src Pulse Resp SpO2   22 1400 (!) 184/78 -- -- 94 22 96 %   22 1300 139/65 -- -- 82 19 97 %   22 1200 (!) 142/91 98 °F (36.7 °C) Axillary 88 16 95 %   22 1150 -- -- -- 87 22 95 %   22 1130 (!) 113/50 -- -- 101 30 95 %   22 1115 (!) 181/100 -- -- 90 23 97 %   22 1100 (!) 175/84 -- -- 87 22 95 %   22 1000 132/77 -- -- 79 22 98 %   22 0915 (!) 110/49 -- -- 82 20 --   22 0900 (!) 180/94 -- -- 101 24 96 %   22 0845 (!) 152/84 -- -- 90 23 96 %   22 0830 (!) 153/112 -- -- 76 20 96 %   22 0800 (!) 171/63 98.6 °F (37 °C) Oral 88 22 95 %   22 0700 (!) 171/70 -- -- 89 23 94 %       Summary of relevant labs:  Labs:  Cre: <0.40  Procalcitonin: 35.19-6.54  CRP: 259.2-36.7  WBC: 11.5-8.5-9.1  Plts: 421  Ferritin: 734  ESR: 63-2    Micro:   BC x 2-Negative to date.  Sputum Cx- MRSA    Imagin/8 CXR-Stable bibasilar opacities    I have personally reviewed the past medical history, past surgical history, medications, social history, and family history, and I haveupdated the database accordingly. Allergies:   Ciprofloxacin, Lasix [furosemide], Penicillins, Sulfa antibiotics, and Duloxetine     Review of Systems:     Review of Systems   Reason unable to perform ROS: Intubated. Physical Examination :       Physical Exam  Vitals and nursing note reviewed. HENT:      Head: Normocephalic and atraumatic. Right Ear: External ear normal.      Left Ear: External ear normal.   Eyes:      Conjunctiva/sclera: Conjunctivae normal.      Pupils: Pupils are equal, round, and reactive to light. Cardiovascular:      Rate and Rhythm: Normal rate and regular rhythm. Heart sounds: Normal heart sounds.  No murmur heard.      Pulmonary:      Effort: Pulmonary effort is normal. No respiratory distress. Breath sounds: Normal breath sounds. No wheezing. Abdominal:      General: Bowel sounds are normal. There is no distension. Palpations: Abdomen is soft. Tenderness: There is no abdominal tenderness. Genitourinary:     Comments: Brunner. Urine clear. Musculoskeletal:      Cervical back: No rigidity. Right lower leg: No edema. Left lower leg: No edema. Skin:     General: Skin is dry. Comments: RUE cool from midforearm to fingertips. No mottling. Neurological:      Comments: Nods yes/no.          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    Aortic valve replaced 11/08/2016    Hypertension        Past Surgical  History:     Past Surgical History:   Procedure Laterality Date    CARDIOVERSION  06/27/2020    w/    HERNIA REPAIR      PARATHYROID GLAND SURGERY  1980       Medications:      midodrine  5 mg Oral BID    insulin lispro  0-6 Units SubCUTAneous Q6H    vancomycin (VANCOCIN) intermittent dosing (placeholder)   Other RX Placeholder    vancomycin  1,250 mg IntraVENous Q12H    senna  1 tablet PEG Tube BID    polyvinyl alcohol  1 drop Both Eyes Q4H    sodium chloride flush  5-40 mL IntraVENous 2 times per day    dexamethasone  6 mg IntraVENous Q24H    enoxaparin  40 mg SubCUTAneous Daily    pantoprazole (PROTONIX) 40 mg injection  40 mg IntraVENous Daily    chlorhexidine  15 mL Mouth/Throat BID       Social History:     Social History     Socioeconomic History    Marital status:      Spouse name: Not on file    Number of children: Not on file    Years of education: Not on file    Highest education level: Not on file   Occupational History    Not on file   Tobacco Use    Smoking status: Never Smoker    Smokeless tobacco: Never Used   Vaping Use    Vaping Use: Never used   Substance and Sexual Activity    Alcohol use: Yes     Alcohol/week: 1.0 standard drink     Types: 1 Cans of beer per week    Drug use: No    Sexual activity: Not on file   Other Topics Concern    Not on file   Social History Narrative    Not on file     Social Determinants of Health     Financial Resource Strain:     Difficulty of Paying Living Expenses: Not on file   Food Insecurity:     Worried About Running Out of Food in the Last Year: Not on file    Fabio of Food in the Last Year: Not on file   Transportation Needs:     Lack of Transportation (Medical): Not on file    Lack of Transportation (Non-Medical):  Not on file   Physical Activity:     Days of Exercise per Week: Not on file    Minutes of Exercise per Session: Not on file   Stress:     Feeling of Stress : Not on file   Social Connections:     Frequency of Communication with Friends and Family: Not on file    Frequency of Social Gatherings with Friends and Family: Not on file    Attends Confucianism Services: Not on file    Active Member of [address] [place] [address] or Organizations: Not on file    Attends Club or Organization Meetings: Not on file    Marital Status: Not on file   Intimate Partner Violence:     Fear of Current or Ex-Partner: Not on file    Emotionally Abused: Not on file    Physically Abused: Not on file    Sexually Abused: Not on file   Housing Stability:     Unable to Pay for Housing in the Last Year: Not on file    Number of [place] in the Last Year: Not on file    Unstable Housing in the Last Year: Not on file       Family History:     Family History   Problem Relation Age of Onset    Lung Cancer Mother     Heart Disease Father       Medical Decision Making:   I have independently reviewed/ordered the following labs:    CBC with Differential:   Recent Labs     05/10/22  0525 05/11/22  0440   WBC 19.2* 18.3*   HGB 12.5* 11.8*   HCT 37.8* 35.6*    444     BMP:  Recent Labs     05/10/22  0525 05/11/22  0440    138   K 4.1 4.2   CL 98 100   CO2 30 32*   BUN 18 22   CREATININE <0.40* <0.40*     Hepatic Function Panel: No results for input(s): PROT, LABALBU, BILIDIR, IBILI, BILITOT, ALKPHOS, ALT, AST in the last 72 hours. No results for input(s): RPR in the last 72 hours. No results for input(s): HIV in the last 72 hours. No results for input(s): BC in the last 72 hours. Lab Results   Component Value Date    CREATININE <0.40 05/11/2022    GLUCOSE 136 05/11/2022       Detailed results: Thank you for allowing us to participate in the care of this patient. Please call with questions. This note is created with the assistance of a speech recognition program.  While intending to generate adocument that actually reflects the content of the visit, the document can still have some errors including those of syntax and sound a like substitutions which may escape proof reading. It such instances, actual meaningcan be extrapolated by contextual diversion.     Remigio Ibrahim MD  Office: [phone]  Perfect serve / office [phone]

## 2022-05-11 NOTE — PROGRESS NOTES
[facility] Internal Medicine  Sulaiman Marie MD; Serjio Becker MD; Naveen Montoya MD; MD Syeda Yancey MD; MD YANET Castrejon [facility] Internal Medicine   [facility]    HISTORY AND PHYSICAL EXAMINATION            Date:   5/11/2022  Patient name:  Felicita Gonzalez  Date of admission:  5/5/2022  8:35 AM  MRN:   [id number]  Account:  [de-identified]  YOB: 1950  PCP:    Bobo Albright MD  Room:   [de-identified]  Code Status:    Full Code    Chief Complaint:     Chief Complaint   Patient presents with    Shortness of Breath   dyspnea    History Obtained From:     Medical record and nursing staff  Pt is intubated sedated    History of Present Illness:     Felicita Gonzalez is a 74 y.o. Non- / non  male who presents with Shortness of Breath   and is admitted to the hospital for the management of Respiratory failure ([facility] [address]     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 well-known to pulmonary team admitted with increasing cough shortness of breath for 3 to 4 days chest x-ray shows bilateral infiltrate elevated procalcitonin elevated leukocytosis patient was tested positive for COVID-19 pneumonia on May 5  Acute respiratory failure secondary to ALS with multifocal COVID-19 pneumonia intubated and sedated treated in the intensive care unit  5/8  Patient, still intubated, sedated with fentanyl propofol  On tube feeds  Had reading of low blood pressure, On Levophed  Antibiotics changed to Zyvox, Sputum Culture growing MRSA   Past Medical History:     Past Medical History:   Diagnosis Date    [facility]otrophic [facility]) ([facility]     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 Authorizing Provider   neomycin-bacitracin-polymyxin (NEOSPORIN) 3.5-400-5000 OINT ointment Apply topically 3 times daily Apply topically 4 times daily. 4/4/22   Carin Morin MD   Handicap Placard MISC by Does not apply route Dx: ALS    Duration: 5 years 3/23/22   Carin Morin MD   medical marijuana Place under the tongue 2 times daily. Historical Provider, MD   acetaminophen (TYLENOL) 325 MG tablet Take 650 mg by mouth every 6 hours as needed 9/25/20   Historical Provider, MD   fluticasone [facility]) 50 MCG/ACT nasal spray 1 spray by Nasal route daily 6/8/21   Carin Morin MD   SPIRULINA PO Take by mouth    Historical Provider, MD   Vitamin E 100 UNITS TABS Take 60 Units by mouth daily    Historical Provider, MD   SELENIUM PO Take 75 mcg by mouth daily    Historical Provider, MD   SOYA LECITHIN PO Take 1,000 mg by mouth daily    Historical Provider, MD   Multiple Vitamins-Minerals (THERAPEUTIC MULTIVITAMIN-MINERALS) tablet Take 1 tablet by mouth daily    Historical Provider, MD   OMEGA-3 FATTY ACIDS-VITAMIN E PO Take 1 tablet by mouth daily    Historical Provider, MD   Probiotic Product (PROBIOTIC PO) Take 1 tablet by mouth daily    Historical Provider, MD   Ascorbic Acid (VITAMIN C) 500 MG tablet Take 120 mg by mouth daily    Historical Provider, MD        Allergies:     Ciprofloxacin, Lasix [furosemide], Penicillins, Sulfa antibiotics, and Duloxetine    Social History:     Tobacco:    reports that he has never smoked. He has never used smokeless tobacco.  Alcohol:      reports current alcohol use of about 1.0 standard drink of alcohol per week. Drug Use:  reports no history of drug use.     Family History:     Family History   Problem Relation Age of Onset    Lung Cancer Mother     Heart Disease Father        Review of Systems:     Unable to get ros  Pt untubated and sedated    Physical Exam:   BP (!) 113/50 Comment: Versed 2mg given bolus from bag  Pulse 87   Temp 98.6 °F (37 °C) (Oral)   Resp 22   Ht 5' 10.05\" (1.779 m)   Wt 145 lb 8.1 oz (66 kg)   SpO2 95%   BMI 20.85 kg/m²   Temp (24hrs), Av.6 °F (37 °C), Min:98.6 °F (37 °C), Max:98.6 °F (37 °C)    Recent Labs     05/10/22  1552 05/10/22  2032 22  0237 22  0736   POCGLU 211* 164* 135* 105       Intake/Output Summary (Last 24 hours) at 2022 1420  Last data filed at 2022 0630  Gross per 24 hour   Intake 2151.59 ml   Output 1300 ml   Net 851.59 ml       General Appearance:intubated sedated    Mental status: alert responds by nodding to questions  On vent intubated  Head: normocephalic, atraumatic  Eye: no icterus, redness, pupils equal and reactive, extraocular eye movements intact, conjunctiva clear  Ear: normal external ear, no discharge, hearing intact  Nose: no drainage noted  Mouth: mucous membranes moist  Neck: supple, no carotid bruits, thyroid not palpable  Lungs: on vent  Crackles  Cardiovascular: normal rate, regular rhythm, no murmur, gallop, rub  Abdomen: Soft, nontender, nondistended, normal bowel sounds, no hepatomegaly or splenomegaly  Neurologic: ALS  Skin: No gross lesions, rashes, bruising or bleeding on exposed skin area  Extremities: peripheral pulses palpable, no pedal edema or calf pain with palpation, RUE is Sligtly swollen       Investigations:      Laboratory Testing:  Recent Results (from the past 24 hour(s))   POC Glucose Fingerstick    Collection Time: 05/10/22  3:52 PM   Result Value Ref Range    POC Glucose 211 (H) 75 - 110 mg/dL   POC Glucose Fingerstick    Collection Time: 05/10/22  8:32 PM   Result Value Ref Range    POC Glucose 164 (H) 75 - 110 mg/dL   POC Glucose Fingerstick    Collection Time: 22  2:37 AM   Result Value Ref Range    POC Glucose 135 (H) 75 - 110 mg/dL   CBC    Collection Time: 22  4:40 AM   Result Value Ref Range    WBC 18.3 (H) 3.5 - 11.0 k/uL    RBC 3.63 (L) 4.5 - 5.9 m/uL    Hemoglobin 11.8 (L) 13.5 - 17.5 g/dL    Hematocrit 35.6 (L) 41 - 53 %    MCV 98.1 80 - 100 fL    MCH 32.6 26 - 34 pg    MCHC 33.3 31 - 37 g/dL    RDW 13.5 11.5 - 14.9 %    Platelets 279 370 - 472 k/uL    MPV 6.6 6.0 - 12.0 fL   Basic Metabolic Panel w/ Reflex to MG    Collection Time: 05/11/22  4:40 AM   Result Value Ref Range    Glucose 136 (H) 70 - 99 mg/dL    BUN 22 8 - 23 mg/dL    CREATININE <0.40 (L) 0.70 - 1.20 mg/dL    Calcium 8.2 (L) 8.6 - 10.4 mg/dL    Sodium 138 135 - 144 mmol/L    Potassium 4.2 3.7 - 5.3 mmol/L    Chloride 100 98 - 107 mmol/L    CO2 32 (H) 20 - 31 mmol/L    Anion Gap 6 (L) 9 - 17 mmol/L    GFR Non-African American Can not be calculated >60 mL/min    GFR  Can not be calculated >60 mL/min    GFR Comment         Vancomycin Level, Random    Collection Time: 05/11/22  4:40 AM   Result Value Ref Range    Vancomycin Rm 16.1 ug/mL    Vancomycin Random Dose amount 1250 MG     Vancomycin Random Date last dose 5,102,022     Vancomycin Random Time last dose 2,144    Blood gas, arterial    Collection Time: 05/11/22  5:02 AM   Result Value Ref Range    pH, Arterial 7.444 7.350 - 7.450    pCO2, Arterial 49.7 (HH) 35.0 - 45.0 mmHg    pO2, Arterial 67.7 (L) 80.0 - 100.0 mmHg    HCO3, Arterial 34.0 (H) 22.0 - 26.0 mmol/L    Positive Base Excess, Art 10.0 (H) 0.0 - 2.0 mmol/L    O2 Sat, Arterial 92.3 (L) 95 - 98 %    Carboxyhemoglobin 0.4 0 - 5 %    Methemoglobin 1.0 0.0 - 1.9 %    Pt Temp 37.1     O2 Device/Flow/% VENTILATOR     Rosalino Test PASS     Sample Site Left Radial Artery     Pt.  Position SEMI-FOWLERS     Mode PRVC     Set Rate 20     Total Rate 23          FIO2 30     Peep/Cpap 7     Text for Respiratory RESULT TO RN    POC Glucose Fingerstick    Collection Time: 05/11/22  7:36 AM   Result Value Ref Range    POC Glucose 105 75 - 110 mg/dL       Imaging/Diagnostics:  XR CHEST PORTABLE    Result Date: 5/6/2022  1. New right arm PICC extending toward SVC, but distal tip is obscured by overlying pacemaker leads. 2.  Endotracheal tube and enteric tube remain in place. 3.  Unchanged right basilar consolidation and small right pleural effusion. Mild patchy left basilar opacities. XR CHEST PORTABLE    Result Date: 5/5/2022  Endotracheal tube in satisfactory position above the chance NG tip and side-port in gastric fundus Otherwise, unchanged chest demonstrating bibasilar opacities suggesting atelectasis or infection     XR CHEST PORTABLE    Result Date: 5/5/2022  Bibasilar opacities with blunting of right costophrenic angle possibly combination of pneumonia and small right pleural effusion. There is what appears to be a left chest tube terminating in the periphery of the upper lung partly obscured by pacemaker electrode. Please correlate clinically. No recent comparisons.        Assessment :      Hospital Problems           Last Modified POA    * (Principal) Respiratory failure ([address] 5/5/2022 Yes    COVID-19 5/8/2022 Yes    ALS (amyotrophic lateral sclerosis) ([address] 5/8/2022 Yes    Elevated procalcitonin 5/8/2022 Yes    Elevated C-reactive protein (CRP) 5/8/2022 Yes    Elevated ferritin 5/8/2022 Yes    Elevated erythrocyte sedimentation rate 5/8/2022 Yes    Leukocytosis 5/8/2022 Yes    Allergy to multiple antibiotics 5/8/2022 Yes          Plan:     25-year-old gentleman with a history of ALS  Admitted for multifocal COVID-19 pneumonia with acute respiratory failure,   Possible superimposed bacterial pneumonia with underlying neuromuscular disorder of ALS, Pro-Luis is high  Acute respiratory failure secondary to ALS plus COVID-19 pneumonia intubated sedated  MRSA IN SPUTUM on vanco , respiratory Cultures growing MRSA   For COVID-19 patient received Actemra, on Decadron  Hypotension, received 3.5 L, on Levophed  History of coronary artery disease s/p CABG,  On Protonix for gastric prophylaxis  Hypokalemia, Improved DVT prophylaxis Lovenox  Patient critically sick, high chance of clinical deterioration, seen in ICU  Plan for RUE Ultrasound per ID ( has good pulsations)   afib with rvr  Will attempt rate control  Cardio consult    CC time 35 minutes        Emmanuelle Sorto MD  5/11/2022  2:20 PM    Copy sent to Dr. Walker Hi MD    Please note that this chart was generated using voice recognition Dragon dictation software. Although every effort was made to ensure the accuracy of this automated transcription, some errors in transcription may have occurred.

## 2022-05-11 NOTE — FLOWSHEET NOTE
Dr. Steven Pierson spoke to the wife regarding placement of Trach/Peg 5/12 @0700. The wife, Li Loera, then expressed that in the past he had trouble with the previous peg tube. Dr. Steven Pierson replied with, maybe he would just start with the [facility]. I then had a conversation with her explaining that if he didn't get the peg, we would have to leave the NG in because he won't be able to eat immediately after procedure. She told me to ask Mr. Best Card. Mr. Best Card wants both the Trach/Peg placed. I notified Li Loera and his daughter, Marlen Wolf and they are agreeable to this also.

## 2022-05-11 NOTE — PROGRESS NOTES
Vancomycin Dosing by Pharmacy - Daily Note   Vancomycin Therapy Day:  3  Indication: CAP with ALS    Allergies:  Ciprofloxacin, Lasix [furosemide], Penicillins, Sulfa antibiotics, and Duloxetine   Actual Weight:    Wt Readings from Last 1 Encounters:   05/09/22 145 lb 8.1 oz (66 kg)       Labs/Ancillary Data  Estimated Creatinine Clearance: 151 mL/min (based on SCr of 0.4 mg/dL). Recent Labs     05/09/22  0436 05/10/22  0525 05/11/22  0440   CREATININE <0.40* <0.40* <0.40*   BUN 14 18 22   WBC 10.6 19.2* 18.3*     Procalcitonin   Date Value Ref Range Status   05/07/2022 6.54 (H) <0.09 ng/mL Final     Comment:           Suspected Sepsis:  <0.50 ng/mL     Low likelihood of sepsis. 0.50-2.00 ng/mL     Increased likelihood of sepsis. Antibiotics encouraged. >2.00 ng/mL     High risk of sepsis/shock. Antibiotics strongly encouraged. Suspected Lower Resp Tract Infections:  <0.24 ng/mL     Low likelihood of bacterial infection. >0.24 ng/mL     Increased likelihood of bacterial infection. Antibiotics encouraged. With successful antibiotic therapy, PCT levels should decrease rapidly. (Half-life of 24 to   36 hours.)        Procalcitonin values from samples collected within the first 6 hours of systemic infection   may still be low. Retesting may be indicated. Values from day 1 and day 4 can be entered into the Change in Procalcitonin Calculator   (www.[employer]-pct-calculator. [employer]) to determine the patient's Mortality Risk Prognosis        In healthy neonates, plasma Procalcitonin (PCT) concentrations increase gradually after   birth, reaching peak values at about 24 hours of age then decrease to normal values below   0.5 ng/mL by 48-72 hours of age.          Intake/Output Summary (Last 24 hours) at 5/11/2022 0531  Last data filed at 5/11/2022 0300  Gross per 24 hour   Intake 2220.37 ml   Output 1100 ml   Net 1120.37 ml     Temp: 98.6    Culture Date / Source  /  Results  05/10/2022     resp culture    MRSA heavy growth  Recent vancomycin administrations                     vancomycin (VANCOCIN) 1,250 mg in dextrose 5 % 250 mL IVPB (ADDAVIAL) (mg) 1,250 mg New Bag 05/10/22 2149     1,250 mg New Bag  1000    vancomycin (VANCOCIN) 1,750 mg in dextrose 5 % 500 mL IVPB (mg) 1,750 mg New Bag 05/09/22 2101                    Vancomycin Concentrations:   TROUGH:  No results for input(s): VANCOTROUGH in the last 72 hours. RANDOM:    Recent Labs     05/11/22  0440   VANCORANDOM 16.1       MRSA Nasal Swab:  Resp culture MRSA heavy growth . PLAN     Continue current dose of 1250 mg q12h IV  Ensured BUN/sCr ordered at baseline and every 48 hours x at least 3 levels, then at least weekly. Pharmacy will continue to monitor patient and adjust therapy as indicated      Vancomycin Target Concentration Parameters  Treatment  Population Target AUC/HARDEEP Target Trough   Invasive MRSA Infection (bacteremia, pneumonia, meningitis, endocarditis, osteomyelitis)  Sepsis (undifferentiated) 400-600 N/A   Infection due to non-MRSA pathogen  Empiric treatment of non-invasive MRSA infection  (SSTI, UTI) <500 10-15 mg/L   CrCl < 29 mL/min  Rapidly fluctuating serum creatinine   PATI N/A < 15 mg/L     Renal replacement therapy is dosed by levels, per hospital protocol. Abbreviations  * Pauc: probability that AUC is >400 (efficacy); Pconc: probability that Ctrough is above 20 ?g/mL (toxicity); Tox: Probability of nephrotoxicity, based on Osiris et al. Clin Infect Dis 2009. Loading dose: N/A  Regimen: 1250 mg IV every 12 hours. Start time: 09:00 on 05/11/2022  Exposure target: AUC24 (range)400-600 mg/L.hr   AUC24,ss: 459 mg/L.hr  Probability of AUC24 > 400: 77 %  Ctrough,ss: 12.3 mg/L  Probability of Ctrough,ss > 20: 4 %  Probability of nephrotoxicity (Osiris MARILEE 2009): 8 %    Thank you for the consult. Pharmacy will continue to follow.     [address] [facility]    5/11/2022   5:36 AM

## 2022-05-12 ENCOUNTER — APPOINTMENT (OUTPATIENT)
Dept: GENERAL RADIOLOGY | Age: 72
DRG: 004 | End: 2022-05-12
Payer: MEDICARE

## 2022-05-12 ENCOUNTER — ANESTHESIA (OUTPATIENT)
Dept: OPERATING ROOM | Age: 72
DRG: 004 | End: 2022-05-12
Payer: MEDICARE

## 2022-05-12 VITALS
SYSTOLIC BLOOD PRESSURE: 90 MMHG | OXYGEN SATURATION: 97 % | TEMPERATURE: 97.7 F | DIASTOLIC BLOOD PRESSURE: 56 MMHG | RESPIRATION RATE: 10 BRPM

## 2022-05-12 LAB
ALLEN TEST: ABNORMAL
ANION GAP SERPL CALCULATED.3IONS-SCNC: 7 MMOL/L (ref 9–17)
BUN BLDV-MCNC: 18 MG/DL (ref 8–23)
CALCIUM SERPL-MCNC: 8.5 MG/DL (ref 8.6–10.4)
CARBOXYHEMOGLOBIN: 0.4 % (ref 0–5)
CHLORIDE BLD-SCNC: 96 MMOL/L (ref 98–107)
CO2: 31 MMOL/L (ref 20–31)
CREAT SERPL-MCNC: <0.4 MG/DL (ref 0.7–1.2)
FIO2: 40
GFR AFRICAN AMERICAN: ABNORMAL ML/MIN
GFR NON-AFRICAN AMERICAN: ABNORMAL ML/MIN
GFR SERPL CREATININE-BSD FRML MDRD: ABNORMAL ML/MIN/{1.73_M2}
GLUCOSE BLD-MCNC: 102 MG/DL (ref 70–99)
GLUCOSE BLD-MCNC: 135 MG/DL (ref 75–110)
GLUCOSE BLD-MCNC: 180 MG/DL (ref 75–110)
GLUCOSE BLD-MCNC: 187 MG/DL (ref 75–110)
GLUCOSE BLD-MCNC: 206 MG/DL (ref 75–110)
GLUCOSE BLD-MCNC: 99 MG/DL (ref 75–110)
HCO3 ARTERIAL: 33.8 MMOL/L (ref 22–26)
HCT VFR BLD CALC: 35.5 % (ref 41–53)
HEMOGLOBIN: 12.1 G/DL (ref 13.5–17.5)
MCH RBC QN AUTO: 32.9 PG (ref 26–34)
MCHC RBC AUTO-ENTMCNC: 34 G/DL (ref 31–37)
MCV RBC AUTO: 96.9 FL (ref 80–100)
METHEMOGLOBIN: 0.9 % (ref 0–1.9)
MODE: ABNORMAL
O2 DEVICE/FLOW/%: ABNORMAL
O2 SAT, ARTERIAL: 90 % (ref 95–98)
PATIENT TEMP: 37
PCO2 ARTERIAL: 44.9 MMHG (ref 35–45)
PDW BLD-RTO: 13.6 % (ref 11.5–14.9)
PEEP/CPAP: 7
PH ARTERIAL: 7.49 (ref 7.35–7.45)
PLATELET # BLD: 484 K/UL (ref 150–450)
PMV BLD AUTO: 6.9 FL (ref 6–12)
PO2 ARTERIAL: 58.5 MMHG (ref 80–100)
POSITIVE BASE EXCESS, ART: 10.3 MMOL/L (ref 0–2)
POTASSIUM SERPL-SCNC: 4 MMOL/L (ref 3.7–5.3)
PT. POSITION: ABNORMAL
RBC # BLD: 3.67 M/UL (ref 4.5–5.9)
RESPIRATORY RATE: 20
SAMPLE SITE: ABNORMAL
SET RATE: 20
SODIUM BLD-SCNC: 134 MMOL/L (ref 135–144)
TOTAL RATE: 20
VT: 500
WBC # BLD: 16 K/UL (ref 3.5–11)

## 2022-05-12 PROCEDURE — 86403 PARTICLE AGGLUT ANTBDY SCRN: CPT

## 2022-05-12 PROCEDURE — 0DH63UZ INSERTION OF FEEDING DEVICE INTO STOMACH, PERCUTANEOUS APPROACH: ICD-10-PCS | Performed by: STUDENT IN AN ORGANIZED HEALTH CARE EDUCATION/TRAINING PROGRAM

## 2022-05-12 PROCEDURE — 3600000013 HC SURGERY LEVEL 3 ADDTL 15MIN: Performed by: SURGERY

## 2022-05-12 PROCEDURE — 6360000002 HC RX W HCPCS: Performed by: INTERNAL MEDICINE

## 2022-05-12 PROCEDURE — 6370000000 HC RX 637 (ALT 250 FOR IP): Performed by: STUDENT IN AN ORGANIZED HEALTH CARE EDUCATION/TRAINING PROGRAM

## 2022-05-12 PROCEDURE — 2580000003 HC RX 258: Performed by: INTERNAL MEDICINE

## 2022-05-12 PROCEDURE — 71045 X-RAY EXAM CHEST 1 VIEW: CPT

## 2022-05-12 PROCEDURE — 2580000003 HC RX 258: Performed by: STUDENT IN AN ORGANIZED HEALTH CARE EDUCATION/TRAINING PROGRAM

## 2022-05-12 PROCEDURE — 2500000003 HC RX 250 WO HCPCS: Performed by: NURSE ANESTHETIST, CERTIFIED REGISTERED

## 2022-05-12 PROCEDURE — 87205 SMEAR GRAM STAIN: CPT

## 2022-05-12 PROCEDURE — 85027 COMPLETE CBC AUTOMATED: CPT

## 2022-05-12 PROCEDURE — 2500000003 HC RX 250 WO HCPCS: Performed by: SURGERY

## 2022-05-12 PROCEDURE — 87070 CULTURE OTHR SPECIMN AEROBIC: CPT

## 2022-05-12 PROCEDURE — 3700000000 HC ANESTHESIA ATTENDED CARE: Performed by: SURGERY

## 2022-05-12 PROCEDURE — 82805 BLOOD GASES W/O2 SATURATION: CPT

## 2022-05-12 PROCEDURE — 2580000003 HC RX 258: Performed by: NURSE ANESTHETIST, CERTIFIED REGISTERED

## 2022-05-12 PROCEDURE — 94003 VENT MGMT INPAT SUBQ DAY: CPT

## 2022-05-12 PROCEDURE — 36600 WITHDRAWAL OF ARTERIAL BLOOD: CPT

## 2022-05-12 PROCEDURE — 2000000000 HC ICU R&B

## 2022-05-12 PROCEDURE — 82947 ASSAY GLUCOSE BLOOD QUANT: CPT

## 2022-05-12 PROCEDURE — 2709999900 HC NON-CHARGEABLE SUPPLY: Performed by: SURGERY

## 2022-05-12 PROCEDURE — 0B9F8ZX DRAINAGE OF RIGHT LOWER LUNG LOBE, VIA NATURAL OR ARTIFICIAL OPENING ENDOSCOPIC, DIAGNOSTIC: ICD-10-PCS | Performed by: INTERNAL MEDICINE

## 2022-05-12 PROCEDURE — 3700000001 HC ADD 15 MINUTES (ANESTHESIA): Performed by: SURGERY

## 2022-05-12 PROCEDURE — 2500000003 HC RX 250 WO HCPCS: Performed by: STUDENT IN AN ORGANIZED HEALTH CARE EDUCATION/TRAINING PROGRAM

## 2022-05-12 PROCEDURE — 0B113F4 BYPASS TRACHEA TO CUTANEOUS WITH TRACHEOSTOMY DEVICE, PERCUTANEOUS APPROACH: ICD-10-PCS | Performed by: STUDENT IN AN ORGANIZED HEALTH CARE EDUCATION/TRAINING PROGRAM

## 2022-05-12 PROCEDURE — 6360000002 HC RX W HCPCS: Performed by: NURSE ANESTHETIST, CERTIFIED REGISTERED

## 2022-05-12 PROCEDURE — 99291 CRITICAL CARE FIRST HOUR: CPT | Performed by: INTERNAL MEDICINE

## 2022-05-12 PROCEDURE — 99233 SBSQ HOSP IP/OBS HIGH 50: CPT | Performed by: INTERNAL MEDICINE

## 2022-05-12 PROCEDURE — 3E0G76Z INTRODUCTION OF NUTRITIONAL SUBSTANCE INTO UPPER GI, VIA NATURAL OR ARTIFICIAL OPENING: ICD-10-PCS | Performed by: STUDENT IN AN ORGANIZED HEALTH CARE EDUCATION/TRAINING PROGRAM

## 2022-05-12 PROCEDURE — 87186 SC STD MICRODIL/AGAR DIL: CPT

## 2022-05-12 PROCEDURE — 6360000002 HC RX W HCPCS: Performed by: STUDENT IN AN ORGANIZED HEALTH CARE EDUCATION/TRAINING PROGRAM

## 2022-05-12 PROCEDURE — 2720000010 HC SURG SUPPLY STERILE: Performed by: SURGERY

## 2022-05-12 PROCEDURE — 80048 BASIC METABOLIC PNL TOTAL CA: CPT

## 2022-05-12 PROCEDURE — 3600000003 HC SURGERY LEVEL 3 BASE: Performed by: SURGERY

## 2022-05-12 RX ORDER — EPHEDRINE SULFATE/0.9% NACL/PF 50 MG/5 ML
SYRINGE (ML) INTRAVENOUS PRN
Status: DISCONTINUED | OUTPATIENT
Start: 2022-05-12 | End: 2022-05-12 | Stop reason: SDUPTHER

## 2022-05-12 RX ORDER — SODIUM CHLORIDE, SODIUM LACTATE, POTASSIUM CHLORIDE, CALCIUM CHLORIDE 600; 310; 30; 20 MG/100ML; MG/100ML; MG/100ML; MG/100ML
INJECTION, SOLUTION INTRAVENOUS CONTINUOUS PRN
Status: DISCONTINUED | OUTPATIENT
Start: 2022-05-12 | End: 2022-05-12 | Stop reason: SDUPTHER

## 2022-05-12 RX ORDER — LIDOCAINE HYDROCHLORIDE 10 MG/ML
INJECTION, SOLUTION INFILTRATION; PERINEURAL PRN
Status: DISCONTINUED | OUTPATIENT
Start: 2022-05-12 | End: 2022-05-12 | Stop reason: ALTCHOICE

## 2022-05-12 RX ORDER — ROCURONIUM BROMIDE 10 MG/ML
INJECTION, SOLUTION INTRAVENOUS PRN
Status: DISCONTINUED | OUTPATIENT
Start: 2022-05-12 | End: 2022-05-12 | Stop reason: SDUPTHER

## 2022-05-12 RX ADMIN — Medication 75 MCG/HR: at 16:57

## 2022-05-12 RX ADMIN — PHENYLEPHRINE HYDROCHLORIDE 200 MCG: 10 INJECTION INTRAVENOUS at 09:02

## 2022-05-12 RX ADMIN — Medication 20 MG: at 08:16

## 2022-05-12 RX ADMIN — DEXAMETHASONE SODIUM PHOSPHATE 6 MG: 10 INJECTION, SOLUTION INTRAMUSCULAR; INTRAVENOUS at 10:57

## 2022-05-12 RX ADMIN — Medication 10 MG: at 08:29

## 2022-05-12 RX ADMIN — Medication 75 MCG/HR: at 03:27

## 2022-05-12 RX ADMIN — SODIUM CHLORIDE, PRESERVATIVE FREE 10 ML: 5 INJECTION INTRAVENOUS at 19:44

## 2022-05-12 RX ADMIN — MIDAZOLAM 3 MG/HR: 5 INJECTION INTRAMUSCULAR; INTRAVENOUS at 16:56

## 2022-05-12 RX ADMIN — PHENYLEPHRINE HYDROCHLORIDE 100 MCG: 10 INJECTION INTRAVENOUS at 08:05

## 2022-05-12 RX ADMIN — Medication 20 MG: at 08:36

## 2022-05-12 RX ADMIN — ROCURONIUM BROMIDE 50 MG: 50 INJECTION, SOLUTION INTRAVENOUS at 07:56

## 2022-05-12 RX ADMIN — METOPROLOL TARTRATE 25 MG: 25 TABLET, FILM COATED ORAL at 20:08

## 2022-05-12 RX ADMIN — MIDAZOLAM HYDROCHLORIDE 2 MG: 1 INJECTION INTRAMUSCULAR; INTRAVENOUS at 20:16

## 2022-05-12 RX ADMIN — INSULIN LISPRO 2 UNITS: 100 INJECTION, SOLUTION INTRAVENOUS; SUBCUTANEOUS at 15:23

## 2022-05-12 RX ADMIN — PHENYLEPHRINE HYDROCHLORIDE 100 MCG: 10 INJECTION INTRAVENOUS at 08:44

## 2022-05-12 RX ADMIN — VANCOMYCIN HYDROCHLORIDE 1500 MG: 1.5 INJECTION, POWDER, LYOPHILIZED, FOR SOLUTION INTRAVENOUS at 22:45

## 2022-05-12 RX ADMIN — PHENYLEPHRINE HYDROCHLORIDE 100 MCG: 10 INJECTION INTRAVENOUS at 08:14

## 2022-05-12 RX ADMIN — INSULIN LISPRO 1 UNITS: 100 INJECTION, SOLUTION INTRAVENOUS; SUBCUTANEOUS at 19:46

## 2022-05-12 RX ADMIN — CHLORHEXIDINE GLUCONATE 0.12% ORAL RINSE 15 ML: 1.2 LIQUID ORAL at 19:43

## 2022-05-12 RX ADMIN — SODIUM CHLORIDE, POTASSIUM CHLORIDE, SODIUM LACTATE AND CALCIUM CHLORIDE: 600; 310; 30; 20 INJECTION, SOLUTION INTRAVENOUS at 07:54

## 2022-05-12 RX ADMIN — SENNOSIDES 8.6 MG: 8.6 TABLET, COATED ORAL at 19:43

## 2022-05-12 RX ADMIN — PHENYLEPHRINE HYDROCHLORIDE 100 MCG: 10 INJECTION INTRAVENOUS at 08:12

## 2022-05-12 RX ADMIN — MIDAZOLAM HYDROCHLORIDE 2 MG: 1 INJECTION INTRAMUSCULAR; INTRAVENOUS at 00:02

## 2022-05-12 RX ADMIN — SUGAMMADEX 138 MG: 100 INJECTION, SOLUTION INTRAVENOUS at 08:53

## 2022-05-12 RX ADMIN — VANCOMYCIN HYDROCHLORIDE 1500 MG: 1.5 INJECTION, POWDER, LYOPHILIZED, FOR SOLUTION INTRAVENOUS at 10:42

## 2022-05-12 RX ADMIN — POLYVINYL ALCOHOL 1 DROP: 14 SOLUTION/ DROPS OPHTHALMIC at 23:45

## 2022-05-12 RX ADMIN — Medication 5 MCG/MIN: at 09:57

## 2022-05-12 RX ADMIN — Medication 2.5 MCG/MIN: at 10:48

## 2022-05-12 RX ADMIN — MIDAZOLAM HYDROCHLORIDE 2 MG: 1 INJECTION INTRAMUSCULAR; INTRAVENOUS at 06:33

## 2022-05-12 ASSESSMENT — PULMONARY FUNCTION TESTS
PIF_VALUE: 29
PIF_VALUE: 33
PIF_VALUE: 27
PIF_VALUE: 27
PIF_VALUE: 1
PIF_VALUE: 28
PIF_VALUE: 25
PIF_VALUE: 26
PIF_VALUE: 26
PIF_VALUE: 30
PIF_VALUE: 34
PIF_VALUE: 26
PIF_VALUE: 25
PIF_VALUE: 35
PIF_VALUE: 28
PIF_VALUE: 26
PIF_VALUE: 16
PIF_VALUE: 25
PIF_VALUE: 24
PIF_VALUE: 25
PIF_VALUE: 25
PIF_VALUE: 31
PIF_VALUE: 27
PIF_VALUE: 30
PIF_VALUE: 23
PIF_VALUE: 5
PIF_VALUE: 27
PIF_VALUE: 24
PIF_VALUE: 36
PIF_VALUE: 25
PIF_VALUE: 22
PIF_VALUE: 22
PIF_VALUE: 24
PIF_VALUE: 25
PIF_VALUE: 26
PIF_VALUE: 31
PIF_VALUE: 22
PIF_VALUE: 22
PIF_VALUE: 28
PIF_VALUE: 29
PIF_VALUE: 28
PIF_VALUE: 26
PIF_VALUE: 27
PIF_VALUE: 32
PIF_VALUE: 25
PIF_VALUE: 27
PIF_VALUE: 26
PIF_VALUE: 26
PIF_VALUE: 27
PIF_VALUE: 27
PIF_VALUE: 25
PIF_VALUE: 29
PIF_VALUE: 26
PIF_VALUE: 25
PIF_VALUE: 24
PIF_VALUE: 28
PIF_VALUE: 26
PIF_VALUE: 29
PIF_VALUE: 26
PIF_VALUE: 24
PIF_VALUE: 31
PIF_VALUE: 27
PIF_VALUE: 26
PIF_VALUE: 29
PIF_VALUE: 2
PIF_VALUE: 26
PIF_VALUE: 28
PIF_VALUE: 27
PIF_VALUE: 26
PIF_VALUE: 25
PIF_VALUE: 25
PIF_VALUE: 28
PIF_VALUE: 2
PIF_VALUE: 27
PIF_VALUE: 27
PIF_VALUE: 31
PIF_VALUE: 32
PIF_VALUE: 33
PIF_VALUE: 31
PIF_VALUE: 25
PIF_VALUE: 28
PIF_VALUE: 29
PIF_VALUE: 20
PIF_VALUE: 31
PIF_VALUE: 24
PIF_VALUE: 27
PIF_VALUE: 26
PIF_VALUE: 27
PIF_VALUE: 26
PIF_VALUE: 22
PIF_VALUE: 25
PIF_VALUE: 30

## 2022-05-12 NOTE — PLAN OF CARE
Problem: Discharge Planning  Goal: Discharge to home or other facility with appropriate resources  5/12/2022 1814 by Zaida Winn RN  Outcome: Progressing  5/12/2022 0445 by Efraín Blood RN  Outcome: Progressing     Problem: Skin/Tissue Integrity  Goal: Absence of new skin breakdown  Description: 1. Monitor for areas of redness and/or skin breakdown  2. Assess vascular access sites hourly  3. Every 4-6 hours minimum:  Change oxygen saturation probe site  4. Every 4-6 hours:  If on nasal continuous positive airway pressure, respiratory therapy assess nares and determine need for appliance change or resting period.   5/12/2022 1814 by Zaida Winn RN  Outcome: Progressing  5/12/2022 0445 by Efraín Blood RN  Outcome: Progressing     Problem: Pain  Goal: Verbalizes/displays adequate comfort level or baseline comfort level  5/12/2022 1814 by Zaida Winn RN  Outcome: Progressing  Flowsheets  Taken 5/12/2022 1600  Verbalizes/displays adequate comfort level or baseline comfort level: Assess pain using appropriate pain scale  Taken 5/12/2022 1200  Verbalizes/displays adequate comfort level or baseline comfort level: Assess pain using appropriate pain scale  5/12/2022 0445 by Efraín Blood RN  Outcome: Progressing  Flowsheets (Taken 5/11/2022 1600 by Zaida Winn RN)  Verbalizes/displays adequate comfort level or baseline comfort level:   Assess pain using appropriate pain scale   Administer analgesics based on type and severity of pain and evaluate response   Implement non-pharmacological measures as appropriate and evaluate response     Problem: Safety - Adult  Goal: Free from fall injury  5/12/2022 1814 by Zaida Winn RN  Outcome: Progressing  5/12/2022 0445 by Efraín Blood RN  Outcome: Progressing  Flowsheets (Taken 5/11/2022 2240)  Free From Fall Injury: Instruct family/caregiver on patient safety     Problem: Respiratory - Adult  Goal: Achieves optimal ventilation and oxygenation  5/12/2022 1814 by Jen Castro RN  Outcome: Progressing  5/12/2022 0445 by Kelly Walker RN  Outcome: Progressing     Problem: Nutrition Deficit:  Goal: Optimize nutritional status  5/12/2022 1814 by Jen Castro RN  Outcome: Progressing  5/12/2022 0445 by Kelly Walker RN  Outcome: Progressing     Problem: ABCDS Injury Assessment  Goal: Absence of physical injury  5/12/2022 1814 by Jen Castro RN  Outcome: Progressing  5/12/2022 0445 by Kelly Walker RN  Outcome: Progressing  Flowsheets (Taken 5/11/2022 2240)  Absence of Physical Injury: Implement safety measures based on patient assessment

## 2022-05-12 NOTE — PLAN OF CARE
Problem: Discharge Planning  Goal: Discharge to home or other facility with appropriate resources  Outcome: Progressing     Problem: Skin/Tissue Integrity  Goal: Absence of new skin breakdown  5/12/2022 0445 by Andrew Walker RN  Outcome: Progressing  5/11/2022 1624 by Ruby Rothman RCP  Outcome: Progressing     Problem: Pain  Goal: Verbalizes/displays adequate comfort level or baseline comfort level  Outcome: Progressing  Flowsheets (Taken 5/11/2022 1600 by Alberto Schmidt RN)  Verbalizes/displays adequate comfort level or baseline comfort level:   Assess pain using appropriate pain scale   Administer analgesics based on type and severity of pain and evaluate response   Implement non-pharmacological measures as appropriate and evaluate response     Problem: Safety - Adult  Goal: Free from fall injury  Outcome: Progressing  Flowsheets (Taken 5/11/2022 2240)  Free From Fall Injury: Instruct family/caregiver on patient safety     Problem: Respiratory - Adult  Goal: Achieves optimal ventilation and oxygenation  5/12/2022 0445 by Andrew Walker RN  Outcome: Progressing  5/11/2022 1624 by Ruby Rothman RCP  Outcome: Progressing     Problem: Nutrition Deficit:  Goal: Optimize nutritional status  Outcome: Progressing     Problem: ABCDS Injury Assessment  Goal: Absence of physical injury  Outcome: Progressing  Flowsheets (Taken 5/11/2022 2240)  Absence of Physical Injury: Implement safety measures based on patient assessment

## 2022-05-12 NOTE — ANESTHESIA POSTPROCEDURE EVALUATION
Department of Anesthesiology  Postprocedure Note    Patient: Nancy Koo  MRN: [id number]  YOB: 1950  Date of evaluation: 5/12/2022  Time:  9:39 AM     Procedure Summary     Date: 05/12/22 Room / Location: [facility] [address] / [facility]: CASSI KEMP    Anesthesia Start: 4176 Anesthesia Stop: 7613    Procedures:       EGD/PEG TUBE INSERTION (N/A Esophagus)      TRACHEOTOMY (N/A Neck) Diagnosis: (RESPIRATORY FAILURE & DYSPHAGIA)    Surgeons: Darcie De Jesus IV, DO Responsible Provider: Malachi Hoover MD    Anesthesia Type: general ASA Status: 4          Anesthesia Type: No value filed. Bobby Phase I:      Bobby Phase II:      Last vitals: Reviewed and per EMR flowsheets. Anesthesia Post Evaluation    Patient location during evaluation: ICU  Patient participation: complete - patient cannot participate  Level of consciousness: sedated and ventilated  Nausea & Vomiting: no vomiting  Complications: no  Cardiovascular status: blood pressure returned to baseline  Respiratory status: ventilator  Hydration status: euvolemic  Comments: Seen in ICU, desaturations noted. Respiratory reports increased secretions. D/w Dr. Salud Hill who will evaluate for bronchoscopy.     Electronically signed by Malachi Hoover MD on 5/12/2022 at 9:39 AM

## 2022-05-12 NOTE — OP NOTE
Operative Note    Patient: Darwin Freeman  YOB: 1950  MRN: [id number]    Date of Procedure: 5/12/2022    Pre-Op Diagnosis: RESPIRATORY FAILURE & DYSPHAGIA    Post-Op Diagnosis: Same       Procedure(s):  EGD/PEG TUBE INSERTION  TRACHEOTOMY    Surgeon(s):  Piter Torres IV, DO    Assistant:   Resident: Clayton Kruse DO    Anesthesia: General    Estimated Blood Loss (mL): Minimal    Complications: None    Specimens:   * No specimens in log *    Implants:  * No implants in log *      Drains:   NG/OG/NJ/NE Tube Orogastric 16 fr Center mouth (Active)   Surrounding Skin Clean, dry & intact 05/12/22 0400   Securement device Other (comment) 05/12/22 0400   Status Clamped 05/12/22 0400   Placement Verified External Catheter Length;Gastric Contents 05/12/22 0400   NG/OG/NJ/NE External Measurement (cm) 60 cm 05/12/22 0400   Drainage Appearance Brown;Clear 05/06/22 1600   Tube Feeding High Protein 05/12/22 0400   Tube feeding/verify rate (mL/hr) 60 mL/hr 05/11/22 2045   Tube Feeding Supplement (Product) Protein Modular 05/12/22 0400   Tube Feeding Intake (mL) 680 ml 05/11/22 1745   Tube Feeding Supplement Amount (mL) 918 05/09/22 1821   Free Water/Flush (mL) 30 mL 05/11/22 2045   Output (mL) 10 ml 05/08/22 1200   Action Taken Placement verified (comment) 05/11/22 1600   Residual Volume (ml) 0 ml 05/12/22 0400       Urinary Catheter (Active)   Catheter Indications Need for fluid volume management of the critically ill patient in a critical care setting 05/12/22 0400   Site Assessment No urethral drainage 05/12/22 0400   Urine Color Gertrude 05/12/22 0400   Urine Appearance Clear 05/12/22 0400   Urine Odor Malodorous 05/12/22 0400   Collection Container Standard 05/12/22 0400   Securement Method Securing device (Describe) 05/12/22 0400   Catheter Care Completed Yes 05/12/22 0400   Catheter Best Practices  Drainage tube clipped to bed; Bag below bladder;Drainage bag less than half full;Catheter secured to thigh 05/12/22 0400   Status Draining;Patent 05/12/22 0400   Output (mL) 200 mL 05/12/22 0530     Findings: PEG at 3cm, 8-0 Proximal shiley     Detailed Description of Procedure:   Consent was obtained from the patient's family prior to the procedure. Indications, risks, and benefits were explained at length. HISTORY: The patient is a 74y.o. year old male with history of above preop diagnosis. recommended esophagogastroduodenoscopy with placement of G tube with possible biopsy and a tracheostomy tube placement. explained the risk, benefits, expected outcome, and alternatives to the procedure. Risks included but are not limited to bleeding, infection, respiratory distress, hypotension, and perforation of the esophagus, stomach, or duodenum. Family understands and is in agreement. PROCEDURE: The pt was brought to the OR. A time out was made to verify correct pt and procedure type. The patient was given sedation per anesthesia. The patient's SPO2 remained above 90% throughout the procedure. A mouthpiece was placed in pt's mouth. The endoscope was inserted orally and advanced under direct vision through the esophagus, through the stomach, through the pylorus, and into the descending duodenum. The stomach was then insufflated with air and positioned in the midportion and directed towards the anterior abdominal wall. With the room darkened and intensity turned up on the endoscope, a good light reflex was noted on the skin of the abdominal wall in the left upper quadrant. Finger pressure was applied at the light reflex with adequate indentation on the stomach wall on endoscopy. A polypectomy snare was passed into the stomach, opened fully, and positioned to loop encircle the point of demonstrated finger indentation. The overlying skin was anesthetized with lidocaine and a 1.0 cm incision was made at the chosen site.   The introducer needle with overlying catheter was passed through this incision and needle and catheter were gently captured by the endoscopic snare. The guide wire was passed and snared. The endoscope, snare, and guide wire were then withdrawn and pulled back out of the mouth. The gastrostomy tube was attached to the loop of the guide wire and the whole thing pulled back into the stomach until the 3cm anaid of the gastrostomy tube was noted at skin level. The gastrostomy tube end was cut and the cramping appendage was placed. The tube was then taped to skin. Abdominal binder was ordered. Findings: Stomach:    Antrum: normal    Body: normal    Fundus: normal  Esophagus: normal  Larynx: normal    The scope was removed and the patient tolerated the procedure well. The patient was then position for the tracheostomy procedure. His neck was positioned with a towel roll behind his shoulder blades. The patient's head was restrained and the trach collar was removed. The neck was then prepped and draped in a sterile fashion. Attention was directed at the midline trachea, where the cricothyroid membrane was palpated. Approximately two fingerbreadths above the sternal notch, a midline vertical incision was created with a scalpel. Of note, the patient was adequately sedated per anesthesia. Next, dissection was carried forth down to the subcutaneous tissue in a careful blunt manner with hemostats. The anterior trachea was visualized with the tracheal rings. A  #8 proximal Shiley was then opened. The balloon was checked. Approximately the second tracheal ring was identified. Stay sutures were placed laterally with 2-0 prolene. A vertical incision was made over tracheal rings 2-3 with a #11 blade. A tracheal  was used to dilate the incision. Anesthesia withdrew the ET tube under visualization. The Shiley tracheostomy tube was passed in the trachea with little resistance. A obturator was removed and the inner cannula was placed and connected to the vent.   Anesthesia confirmed end tidal. Surgicel packing was left in incision site due to mild oozing noted. Hemostasis was confrimed after a short period of monitoring. The tracheostomy was then secured at the anterior neck with 2-0 nylon x4. Adequate tidal volumes were noted. The cuff was inflated and no evidence of air leak was noted. No evidence of bleeding was noted. At this point, the procedure was concluded and the patient tolerated the procedure well. A stat CXR will be ordered and reviewed. Dr. Kiet Higuera was present throughout the procedure and all instruments were accounted for.     Electronically signed by Mallorie Ulrich DO on 5/12/2022 at 9:26 AM

## 2022-05-12 NOTE — CONSULTS
Date:   5/12/2022  Patient name: Nathan Peng  Date of admission:  5/5/2022  8:35 AM  MRN:   [id number]  YOB: 1950  PCP: Susana Simpson MD    Reason for Admission: Respiratory failure [facility]) [S16.31]    Cardiology consult       Referring physician: Dr Nilsa Meyer    COVID-19 pneumonia with a superimposed MRSA pneumonia  Acute hypoxic respiratory failure required intubation on ventilator  Severe ALS  Tracheostomy placement and PEG tube placement 5/11/2022  History of thoracic aortic aneurysm repair 2016 at [facility]  Normal LV systolic function  History of hypertension  Sick sinus syndrome, status post dual-chamber pacemaker, Biotronik 7/1/2020        2D echo 5/6/2022  Technically difficult study patient on ventilator  Normal LV size and wall thickness, normal wall motion, ejection fraction more than 55%  No obvious valvular abnormality noted  ICD lead in the right heart chamber    ECG 5/5/2022  Sinus rhythm heart rate 80, PAC    History of present illness  57-year-old male with a past medical history of ALS, CAD, CABG, sick sinus syndrome got hospitalized on 5/5/2022 with increasing shortness of breath. The symptoms started about 3 or 4 days prior to admission. Chest X-Ray showed bilateral infiltrate. Admission WBC count was elevated at 12.8, C-reactive protein 259 and D-dimer 0.423 and procalcitonin troponin 35. 19. COVID-19 PCR was positive. He got intubated for hypoxic respiratory failure.     Current evaluation  Patient seen and examined in ICU  He was on ventilator  He had bronchoscopy today, white mucous plugging occluding the right mainstem bronchus and to lesser extent secretion in the posterior aspect of the left main leading to the lower lobe bilaterally, bronc Kuehler alveolar lavage right lobe  Since family in the room  Difficult to communicate with the patient  ECG monitor showed sinus rhythm, frequent PAC, intermittent AV pacing      Chest x-ray showed endotracheal tube terminating 5 cm above the chance, and the Steinmann tube, bipolar pacemaker, bilateral perihilar and bibasilar opacities and right pleural effusion, right-sided PICC line    Sodium 134, potassium 4.0, BUN 18, creatinine less than 0.40  Hemoglobin 12.1, WBC 16.0, platelets 526  Drug allergies ciprofloxacin, furosemide, penicillin, sulfa antibiotic and duloxetine    Medications:   Scheduled Meds:   vancomycin  1,500 mg IntraVENous Q12H    metoprolol tartrate  25 mg Oral BID    midodrine  5 mg Oral BID    insulin lispro  0-6 Units SubCUTAneous Q6H    vancomycin (VANCOCIN) intermittent dosing (placeholder)   Other RX Placeholder    senna  1 tablet PEG Tube BID    polyvinyl alcohol  1 drop Both Eyes Q4H    sodium chloride flush  5-40 mL IntraVENous 2 times per day    dexamethasone  6 mg IntraVENous Q24H    enoxaparin  40 mg SubCUTAneous Daily    pantoprazole (PROTONIX) 40 mg injection  40 mg IntraVENous Daily    chlorhexidine  15 mL Mouth/Throat BID     Continuous Infusions:   midazolam 3 mg/hr (05/12/22 0739)    dextrose      norepinephrine 2.5 mcg/min (05/12/22 1048)    fentaNYL 75 mcg/hr (05/12/22 0739)    sodium chloride      propofol Stopped (05/10/22 2322)     CBC:   Recent Labs     05/10/22  0525 05/11/22  0440 05/12/22  0438   WBC 19.2* 18.3* 16.0*   HGB 12.5* 11.8* 12.1*    444 484*     BMP:    Recent Labs     05/10/22  0525 05/11/22  0440 05/12/22  0438    138 134*   K 4.1 4.2 4.0   CL 98 100 96*   CO2 30 32* 31   BUN 18 22 18   CREATININE <0.40* <0.40* <0.40*   GLUCOSE 148* 136* 102*     Hepatic: No results for input(s): AST, ALT, ALB, BILITOT, ALKPHOS in the last 72 hours. Troponin: No results for input(s): TROPONINI in the last 72 hours. BNP: No results for input(s): BNP in the last 72 hours. Lipids: No results for input(s): CHOL, HDL in the last 72 hours. Invalid input(s): LDLCALCU  INR: No results for input(s): INR in the last 72 hours.     Objective: Vitals: BP (!) 129/54   Pulse 74   Temp 98.2 °F (36.8 °C) (Oral)   Resp 20   Ht 5' 10.05\" (1.779 m)   Wt 151 lb 14.4 oz (68.9 kg)   SpO2 100%   BMI 21.76 kg/m²   General appearance: Very frail looking male on ventilator  HEENT: Head: Normal, normocephalic, atraumatic. Neck: Tracheostomy noted  Lungs: Diminished breath sounds  Heart: Cardiac apical impulse not palpable heart sounds distant  Abdomen: PEG tube noted  Extremities: Homans sign is negative, no sign of DVT  Neurologic: Mental status: Drowsy    EKG: Sinus rhythm, frequent PAC, intermittent AV pacing. ECHO: reviewed.    Ejection fraction: 55%    Assessment / Acute Cardiac Problems:   Respiratory failure, COVID-19 pneumonia, hypoxia on ventilator  Status post tracheostomy, PEG tube placement  Bilateral mucous plugging, status post bronchoscopy, bronchoalveolar lavage  ALS  Sick sinus syndrome, status post dual-chamber pacemaker placement Biotronik  Pacemaker function checkup satisfactory    Patient Active Problem List:     Amyotrophic lateral sclerosis (ALS) (HCC)     Muscle spasm     Macrocytosis     Rheumatic tricuspid valve regurgitation     Spinal stenosis of lumbar region     Calculus of kidney and ureter     Hypoglycemia     Diastolic dysfunction     Bilateral enlargement of atria     Mitral valve regurgitation     Thoracic aortic aneurysm, without rupture (HCC)     Paroxysmal atrial fibrillation ([facility])     Aortic valve disorder     Bilateral hearing loss     Acquired cystic kidney disease     History of hypertension     Other atopic dermatitis     Closed fracture of base of distal phalanx of finger     Epididymitis     Osteopenia     Hyperparathyroidism, unspecified (HCC)     Primary osteoarthritis     Senile hyperkeratosis     MVP (mitral valve prolapse)     Presbyopia     Hydrocele     Other hyperlipidemia     Carpal tunnel syndrome     Rheumatic aortic valve insufficiency     Closed fracture of one rib of right side with routine healing Tinnitus of both ears     Concussion with loss of consciousness     Supraventricular beat, premature     Varicose veins of both lower extremities     Intervertebral disc prolapse with impingement     Obstructive sleep apnea     Prostatitis     Stress     Dermatitis     Weakness of both lower extremities     Insulin resistance     At high risk for falls     Other specified diseases of blood and blood-forming organs     S/P CABG (coronary artery bypass graft)     Cardiac segmental configuration with atrial configuration unknown, ventricular configuration unknown, and inverted normally aligned great arteries     Vitamin A deficiency     Fatigue     Muscular atrophy     Cervical stenosis of spine     Diarrhea     Atrial flutter (HCC)     Tachy-ralf syndrome (HCC)     Anticoagulated on Coumadin     History of permanent cardiac pacemaker placement 7/1/2020:       Cardiac abnormality     Severe malnutrition ([facility])     SOB (shortness of breath)     Monoplegia of left leg ([facility])     Malfunction of percutaneous endoscopic gastrostomy (PEG) tube ([facility])     Post-nasal drip     Physical deconditioning     Infection of PEG site ([facility])     Rash in adult     Respiratory failure ([facility])     COVID-19     ALS (amyotrophic lateral sclerosis) ([facility])     Elevated procalcitonin     Elevated C-reactive protein (CRP)     Elevated ferritin     Elevated erythrocyte sedimentation rate     Leukocytosis     Allergy to multiple antibiotics      Plan of Treatment:   Medications reviewed  Continue current dose of beta-blocker and midodrine  Patient is also on antibiotic  Continue with vent support  Prognosis guarded    Electronically signed by Judi Cavazos MD on 5/12/2022 at 11:02 AM

## 2022-05-12 NOTE — PROGRESS NOTES
[address] [facility] Internal Medicine  Kortney Avina MD; Moy Wilson MD; Pamela Cervantes MD; MD Lauren Blackburn MD; MD YANET Huber [facility] Internal Medicine   [facility]    HISTORY AND PHYSICAL EXAMINATION            Date:   5/12/2022  Patient name:  Yadira Koch  Date of admission:  5/5/2022  8:35 AM  MRN:   [id number]  Account:  [de-identified]  YOB: 1950  PCP:    Jayleen Zuniga MD  Room:   [de-identified]  Code Status:    Full Code    Chief Complaint:     Chief Complaint   Patient presents with    Shortness of Breath   dyspnea    History Obtained From:     Medical record and nursing staff  Pt sedated      History of Present Illness:     Yadira Koch is a 74 y.o. Non- / non  male who presents with Shortness of Breath   and is admitted to the hospital for the management of Respiratory failure ([facility] [address]     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 sclerosis well-known to pulmonary team admitted with increasing cough shortness of breath for 3 to 4 days chest x-ray shows bilateral infiltrate elevated procalcitonin elevated leukocytosis patient was tested positive for COVID-19 pneumonia on May 5  Acute respiratory failure secondary to ALS with multifocal COVID-19 pneumonia intubated and sedated treated in the intensive care unit  5/8  Patient, still intubated, sedated with fentanyl propofol  On tube feeds  Had reading of low blood pressure, On Levophed  Antibiotics changed to Zyvox, Sputum Culture growing MRSA         Post trach peg      Past Medical History:     Past Medical History:   Diagnosis Date    ALS (amyotrophic lateral sclerosis) ([facility] [address]     Aortic root aneurysm ([facility] [address] 11/08/2016    Aortic valve replaced 11/08/2016    Hypertension         Past Surgical History:     Past Surgical History:   Procedure Laterality Date    CARDIOVERSION  06/27/2020    w/    HERNIA REPAIR      PARATHYROID GLAND SURGERY  1980    TRACHEOSTOMY N/A 5/12/2022    TRACHEOTOMY performed by Kofi De Jesus IV DO at [address] N/A 5/12/2022    EGD/PEG TUBE INSERTION performed by Kofi De Jesus IV, DO at [address]        Medications Prior to Admission:     Prior to Admission medications    Medication Sig Start Date End Date Taking? Authorizing Provider   neomycin-bacitracin-polymyxin (NEOSPORIN) 3.5-400-5000 OINT ointment Apply topically 3 times daily Apply topically 4 times daily. 4/4/22   Reba Ricketts MD   Handicap Placard MISC by Does not apply route Dx: ALS    Duration: 5 years 3/23/22   Reba Ricketts MD   medical marijuana Place under the tongue 2 times daily. Historical Provider, MD   acetaminophen (TYLENOL) 325 MG tablet Take 650 mg by mouth every 6 hours as needed 9/25/20   Historical Provider, MD   fluticasone McRae Frank) 50 MCG/ACT nasal spray 1 spray by Nasal route daily 6/8/21   Reba Ricketts MD   SPIRULINA PO Take by mouth    Historical Provider, MD   Vitamin E 100 UNITS TABS Take 60 Units by mouth daily    Historical Provider, MD   SELENIUM PO Take 75 mcg by mouth daily    Historical Provider, MD   SOYA LECITHIN PO Take 1,000 mg by mouth daily    Historical Provider, MD   Multiple Vitamins-Minerals (THERAPEUTIC MULTIVITAMIN-MINERALS) tablet Take 1 tablet by mouth daily    Historical Provider, MD   OMEGA-3 FATTY ACIDS-VITAMIN E PO Take 1 tablet by mouth daily    Historical Provider, MD   Probiotic Product (PROBIOTIC PO) Take 1 tablet by mouth daily    Historical Provider, MD   Ascorbic Acid (VITAMIN C) 500 MG tablet Take 120 mg by mouth daily    Historical Provider, MD        Allergies:     Ciprofloxacin, Lasix [furosemide], Penicillins, Sulfa antibiotics, and Duloxetine    Social History:     Tobacco:    reports that he has never smoked.  He has never used smokeless tobacco.  Alcohol:      reports current alcohol use of about 1.0 standard drink of alcohol per week. Drug Use:  reports no history of drug use.     Family History:     Family History   Problem Relation Age of Onset    Lung Cancer Mother     Heart Disease Father        Review of Systems:     Unable to get ros  SEDATED    Physical Exam:   BP (!) 129/54   Pulse 93   Temp 98.6 °F (37 °C) (Oral)   Resp 24   Ht 5' 10.05\" (1.779 m)   Wt 151 lb 14.4 oz (68.9 kg)   SpO2 98%   BMI 21.76 kg/m²   Temp (24hrs), Av.9 °F (36.6 °C), Min:96.8 °F (36 °C), Max:98.6 °F (37 °C)    Recent Labs     22  0736 22  2101 22  0219 22  1146   POCGLU 105 142* 99 135*       Intake/Output Summary (Last 24 hours) at 2022 1456  Last data filed at 2022 1100  Gross per 24 hour   Intake 1480.19 ml   Output 2100 ml   Net -619.81 ml       General Appearance: post trach and peg  Sedated    Mental status: sedated  Trach peg    Head: normocephalic, atraumatic  Eye: no icterus, redness, pupils equal and reactive, extraocular eye movements intact, conjunctiva clear  Ear: normal external ear, no discharge, hearing intact  Nose: no drainage noted  Mouth: mucous membranes moist  Neck: supple, no carotid bruits, thyroid not palpable  Lungs: on vent  Crackles  Cardiovascular: normal rate, regular rhythm, no murmur, gallop, rub  Abdomen: Soft, nontender, nondistended, normal bowel sounds, no hepatomegaly or splenomegaly  PEG IN PLACE    Neurologic: ALS  Skin: No gross lesions, rashes, bruising or bleeding on exposed skin area  Extremities: peripheral pulses palpable, no pedal edema or calf pain with palpation, RUE is Sligtly swollen       Investigations:      Laboratory Testing:  Recent Results (from the past 24 hour(s))   POC Glucose Fingerstick    Collection Time: 22  9:01 PM   Result Value Ref Range    POC Glucose 142 (H) 75 - 110 mg/dL   POC Glucose Fingerstick    Collection Time: 22  2:19 AM   Result Value Ref Range    POC Glucose 99 75 - 110 mg/dL   CBC Collection Time: 05/12/22  4:38 AM   Result Value Ref Range    WBC 16.0 (H) 3.5 - 11.0 k/uL    RBC 3.67 (L) 4.5 - 5.9 m/uL    Hemoglobin 12.1 (L) 13.5 - 17.5 g/dL    Hematocrit 35.5 (L) 41 - 53 %    MCV 96.9 80 - 100 fL    MCH 32.9 26 - 34 pg    MCHC 34.0 31 - 37 g/dL    RDW 13.6 11.5 - 14.9 %    Platelets 511 (H) 235 - 450 k/uL    MPV 6.9 6.0 - 12.0 fL   Basic Metabolic Panel w/ Reflex to MG    Collection Time: 05/12/22  4:38 AM   Result Value Ref Range    Glucose 102 (H) 70 - 99 mg/dL    BUN 18 8 - 23 mg/dL    CREATININE <0.40 (L) 0.70 - 1.20 mg/dL    Calcium 8.5 (L) 8.6 - 10.4 mg/dL    Sodium 134 (L) 135 - 144 mmol/L    Potassium 4.0 3.7 - 5.3 mmol/L    Chloride 96 (L) 98 - 107 mmol/L    CO2 31 20 - 31 mmol/L    Anion Gap 7 (L) 9 - 17 mmol/L    GFR Non-African American Can not be calculated >60 mL/min    GFR  Can not be calculated >60 mL/min    GFR Comment         Blood gas, arterial    Collection Time: 05/12/22  4:59 AM   Result Value Ref Range    pH, Arterial 7.485 (H) 7.350 - 7.450    pCO2, Arterial 44.9 35.0 - 45.0 mmHg    pO2, Arterial 58.5 (LL) 80.0 - 100.0 mmHg    HCO3, Arterial 33.8 (H) 22.0 - 26.0 mmol/L    Positive Base Excess, Art 10.3 (H) 0.0 - 2.0 mmol/L    O2 Sat, Arterial 90.0 (L) 95 - 98 %    Carboxyhemoglobin 0.4 0 - 5 %    Methemoglobin 0.9 0.0 - 1.9 %    Pt Temp 37.0     O2 Device/Flow/% VENTILATOR     Respiratory Rate 20     Rosalino Test PASS     Sample Site Right Radial Artery     Pt. Position SEMI-FOWLERS     Mode PRVC     Set Rate 20     Total Rate 20          FIO2 40     Peep/Cpap 7    POC Glucose Fingerstick    Collection Time: 05/12/22 11:46 AM   Result Value Ref Range    POC Glucose 135 (H) 75 - 110 mg/dL       Imaging/Diagnostics:  XR CHEST PORTABLE    Result Date: 5/6/2022  1. New right arm PICC extending toward SVC, but distal tip is obscured by overlying pacemaker leads. 2.  Endotracheal tube and enteric tube remain in place.  3.  Unchanged right basilar consolidation and small right pleural effusion. Mild patchy left basilar opacities. XR CHEST PORTABLE    Result Date: 5/5/2022  Endotracheal tube in satisfactory position above the chance NG tip and side-port in gastric fundus Otherwise, unchanged chest demonstrating bibasilar opacities suggesting atelectasis or infection     XR CHEST PORTABLE    Result Date: 5/5/2022  Bibasilar opacities with blunting of right costophrenic angle possibly combination of pneumonia and small right pleural effusion. There is what appears to be a left chest tube terminating in the periphery of the upper lung partly obscured by pacemaker electrode. Please correlate clinically. No recent comparisons.        Assessment :      Hospital Problems           Last Modified POA    * (Principal) Respiratory failure ([facility] [address] 5/5/2022 Yes    COVID-19 5/8/2022 Yes    ALS (amyotrophic lateral sclerosis) ([facility] [address] 5/8/2022 Yes    Elevated procalcitonin 5/8/2022 Yes    Elevated C-reactive protein (CRP) 5/8/2022 Yes    Elevated ferritin 5/8/2022 Yes    Elevated erythrocyte sedimentation rate 5/8/2022 Yes    Leukocytosis 5/8/2022 Yes    Allergy to multiple antibiotics 5/8/2022 Yes      cc 35 mins    Plan:     64-year-old gentleman with a history of ALS  Admitted for multifocal COVID-19 pneumonia with acute respiratory failure,   Possible superimposed bacterial pneumonia with underlying neuromuscular disorder of ALS, Pro-Luis is high  Acute respiratory failure secondary to ALS plus COVID-19 pneumonia intubated sedated  MRSA IN SPUTUM on vanco , respiratory Cultures growing MRSA   For COVID-19 patient received Actemra, on Decadron    History of coronary artery disease s/p CABG,  On Protonix for gastric prophylaxis  Hypokalemia, Improved   DVT prophylaxis Lovenox  Patient critically sick, high chance of clinical deterioration, seen in ICU  Plan for RUE Ultrasound per ID ( has good pulsations)   afib with rvr  Will attempt rate control  Cardio consult  Post trach and peg tube  On levophed for shock post op  Wife and daughter in room    CC time 35 minutes        Nicolas Douglass MD  5/12/2022  2:56 PM    Copy sent to Dr. Jenelle Chadwick MD    Please note that this chart was generated using voice recognition Dragon dictation software. Although every effort was made to ensure the accuracy of this automated transcription, some errors in transcription may have occurred.

## 2022-05-12 NOTE — CARE COORDINATION
ONGOING DISCHARGE PLAN:    Pt had trach and peg placed today. Pt also having bronch done. On Vent. FIO2 100 % . Pulm following. [facility] following. IV decadron, protonix and Vanco. IV levophed gtt. WBC 16.0 ,     Will continue to follow for additional discharge needs.     Electronically signed by Danette Calvin RN on 5/12/2022 at 11:50 AM

## 2022-05-12 NOTE — OP NOTE
[address]                 [address] [place], [address]                                OPERATIVE REPORT    PATIENT NAME: Saroj Greer                   :        1950  MED REC NO:   [id number]                              ROOM:         ACCOUNT NO:   [de-identified]                           ADMIT DATE: 2022  PROVIDER:     Bel Varner    DATE OF PROCEDURE:  2022    PREOPERATIVE DIAGNOSES:  1. Worsening hypoxemia status post tracheostomy, evaluate for mucus  plugging and therapeutic removal.  2.  Recent diagnosis of MRSA pneumonia. POSTOPERATIVE DIAGNOSES:  1. Worsening hypoxemia status post tracheostomy, evaluate for mucus  plugging and therapeutic removal.  2.  White mucus plugging appreciated occluding the right mainstem  bronchus and lesser extent secretions in the posterior aspect of the  left mainstem leading to the lower lobes bilaterally. PROCEDURE:  Fiberoptic bronchoscopy with bronchoalveolar lavage right  lower lobe. SURGEON:  Bel Varner MD    ESTIMATED BLOOD LOSS:  0.    INDICATION:  1. Worsening hypoxemia status post tracheostomy, evaluate for mucus  plugging and therapeutic removal.  2.  Recent diagnosis of MRSA pneumonia. DESCRIPTION OF PROCEDURE:  After informed consent was obtained via  telephone from the patient's wife with two nurses witnessing, a timeout  was called. The patient was already sedated on vent support. The  patient was actually receiving Levophed prior to the procedure where the  systolic blood pressures were in the high 70s to low 80s. The patient  was also on fentanyl and Versed. The patient was given an extra 2 mg of  Versed prior to the procedure. We were able to support the patient as  we underwent bag tracheostomy ventilation with the respiratory  therapist.  We were able to pass the scope through the existing  tracheostomy with the existing Bodai adapter.   Immediately, we saw  secretions in the posterior aspect of the left mainstem as well as mucus  plugging completely occluding the right mainstem. We were able to  remove the secretions to the subsegmental level on the left. No  endobronchial lesions appreciated. On the right, we used saline  washings to remove the mucus plugging to the subsegmental level in the  right lower lobe and the right upper lobe. Once the mucus was cleaned  out, we placed a _____ and did a BAL of the medial basal segment of the  right lower lobe. Instillation of about 60 mL of fluid with return of  approximately 15 mL. We sent this for Gram-stain and culture. The  patient tolerated the procedure with no immediate complications. O2  saturations in the high 90s at that time. I was able to update the  patient's daughter Mango Neither. This is the patient's daughter and  RN.         Sara Gutiérrez    D: 05/12/2022 10:54:44       T: 05/12/2022 14:26:11     [email or web address]  Job#: [id number]     Doc#: [id number]    CC:

## 2022-05-12 NOTE — PROGRESS NOTES
[facility] -   Infectious diseases evaluation  admission date 5/5/2022    reason for consultation:   Covid-19 Infection    Impression :   Current:  · COVID-19 with superimposed MRSA pneumonia. · Acute hypoxic respiratory failure required intubation status post tracheostomy 5/12/2022  · Leukocytosis suspect steroid effect. · Severe ALS  · History of coronary artery disease status post CABG 2016  · Hypertension  · Penicillin, sulfa and Cipro allergy  ·     Other:  · PPM  Discussion / summary of stay / plan of care   · Patient received Actemra 5/5 22. Recommendations     · IV vancomycin, likely change to oral Zyvox upon discharge  through 5/21/22  · On Dexamethasone 6 mg IV daily. · On Lovenox 40 mg sq daily. Infection Control Recommendations   · Universal Precautions  · Droplet Plus Precautions through 5/10/22. Antimicrobial Stewardship Recommendations   · Simplification of therapy  · Targeted therapy    History of Present Illness:   Initial history:  Zack Ramírez is a 74y.o.-year-old male a with history of severe ALS who presented to the hospital with worsening cough and shortness of for 3 days prior to admission. Reportedly cough started around 4/20/2022. Chest x-ray showed bilateral infiltrates  Initial labs showed a procalcitonin level of 35.19, last WBC 12.8, creatinine less than 0.4, C-reactive protein 259, D-dimer 0.43  5/5/22 COVID-19 PCR positive  Urinalysis showed 0-2 WBC. The patient was admitted to ICU, was intubated. The patient is intubated, unable to provide history that was obtained from chart review and nursing staff. Mild amount of respiratory secretion, Brunner catheter in place. PICC line in place  The patient was exposed to COVID-19 positive person on 4/16/2022 reportedly. The patient had a positive COVID 19 test at home. Interval changes  5/12/2022   Afebrile.   Status post tracheostomy and PEG tube placement earlier today, on low-dose of Levophed. MRSA growth on respiratory culture from 2022  No growth on blood cultures from 2022  Patient Vitals for the past 8 hrs:   BP Temp Temp src Pulse Resp SpO2   22 1230 -- -- -- 93 24 98 %   22 1023 -- -- -- 74 20 100 %   22 0945 -- 98.6 °F (37 °C) Oral -- -- --   22 0923 -- -- -- 72 (!) 33 (!) 86 %   22 0700 (!) 129/54 -- -- 74 20 (!) 89 %   22 0655 -- -- -- 80 20 (!) 87 %   22 0645 (!) 74/36 -- -- 78 20 (!) 89 %   22 0600 137/87 -- -- 80 20 91 %   22 0545 (!) 105/46 -- -- 81 20 95 %       Summary of relevant labs:  Labs:  Cre: <0.40  Procalcitonin: 35.19-6.54  CRP: 259.2-36.7  WBC: 11.5-8.5-9.1  Plts: 421  Ferritin: 734  ESR: 63-2    Micro:   BC x 2-Negative to date.  Sputum Cx- MRSA    Imagin/8 CXR-Stable bibasilar opacities    I have personally reviewed the past medical history, past surgical history, medications, social history, and family history, and I haveupdated the database accordingly. Allergies:   Ciprofloxacin, Lasix [furosemide], Penicillins, Sulfa antibiotics, and Duloxetine     Review of Systems:   Status post tracheostomy, nonverbal, unable to provide    Physical Examination :       Physical Exam  Vitals and nursing note reviewed. HENT:      Head: Normocephalic and atraumatic. Right Ear: External ear normal.      Left Ear: External ear normal.   Eyes:      Conjunctiva/sclera: Conjunctivae normal.      Pupils: Pupils are equal, round, and reactive to light. Neck:      Comments: Tracheostomy in place  Cardiovascular:      Rate and Rhythm: Normal rate and regular rhythm. Heart sounds: Normal heart sounds. No murmur heard. Pulmonary:      Effort: Pulmonary effort is normal. No respiratory distress. Breath sounds: Normal breath sounds. No wheezing. Abdominal:      General: Bowel sounds are normal. There is no distension. Palpations: Abdomen is soft. Tenderness:  There is no abdominal tenderness. Genitourinary:     Comments: Brunner. Urine clear. Musculoskeletal:      Cervical back: No rigidity. Right lower leg: No edema. Left lower leg: No edema. Skin:     General: Skin is dry. 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    Aortic valve replaced 11/08/2016    Hypertension        Past Surgical  History:     Past Surgical History:   Procedure Laterality Date    CARDIOVERSION  06/27/2020    w/    HERNIA REPAIR      PARATHYROID GLAND SURGERY  1980    TRACHEOSTOMY N/A 5/12/2022    TRACHEOTOMY performed by Torsten De Jesus IV, DO at [address] N/A 5/12/2022    EGD/PEG TUBE INSERTION performed by Torsten De Jesus IV, DO at [address] [facility] OR       Medications:      vancomycin  1,500 mg IntraVENous Q12H    metoprolol tartrate  25 mg Oral BID    midodrine  5 mg Oral BID    insulin lispro  0-6 Units SubCUTAneous Q6H    vancomycin (VANCOCIN) intermittent dosing (placeholder)   Other RX Placeholder    senna  1 tablet PEG Tube BID    polyvinyl alcohol  1 drop Both Eyes Q4H    sodium chloride flush  5-40 mL IntraVENous 2 times per day    dexamethasone  6 mg IntraVENous Q24H    enoxaparin  40 mg SubCUTAneous Daily    pantoprazole (PROTONIX) 40 mg injection  40 mg IntraVENous Daily    chlorhexidine  15 mL Mouth/Throat BID       Social History:     Social History     Socioeconomic History    Marital status:      Spouse name: Not on file    Number of children: Not on file    Years of education: Not on file    Highest education level: Not on file   Occupational History    Not on file   Tobacco Use    Smoking status: Never Smoker    Smokeless tobacco: Never Used   Vaping Use    Vaping Use: Never used   Substance and Sexual Activity    Alcohol use:  Yes     Alcohol/week: 1.0 standard drink     Types: 1 Cans of beer per week    Drug use: No    Sexual activity: Not on file   Other Topics Concern    Not on file   Social History Narrative    Not on file     Social Determinants of Health     Financial Resource Strain:     Difficulty of Paying Living Expenses: Not on file   Food Insecurity:     Worried About Running Out of Food in the Last Year: Not on file    Fabio of Food in the Last Year: Not on file   Transportation Needs:     Lack of Transportation (Medical): Not on file    Lack of Transportation (Non-Medical):  Not on file   Physical Activity:     Days of Exercise per Week: Not on file    Minutes of Exercise per Session: Not on file   Stress:     Feeling of Stress : Not on file   Social Connections:     Frequency of Communication with Friends and Family: Not on file    Frequency of Social Gatherings with Friends and Family: Not on file    Attends Zoroastrian Services: Not on file    Active Member of [address] [place] [address] [employer] or Organizations: Not on file    Attends Club or Organization Meetings: Not on file    Marital Status: Not on file   Intimate Partner Violence:     Fear of Current or Ex-Partner: Not on file    Emotionally Abused: Not on file    Physically Abused: Not on file    Sexually Abused: Not on file   Housing Stability:     Unable to Pay for Housing in the Last Year: Not on file    Number of [place] in the Last Year: Not on file    Unstable Housing in the Last Year: Not on file       Family History:     Family History   Problem Relation Age of Onset    Lung Cancer Mother     Heart Disease Father       Medical Decision Making:   I have independently reviewed/ordered the following labs:    CBC with Differential:   Recent Labs     05/11/22 0440 05/12/22  0438   WBC 18.3* 16.0*   HGB 11.8* 12.1*   HCT 35.6* 35.5*    484*     BMP:  Recent Labs     05/11/22 0440 05/12/22  0438    134*   K 4.2 4.0    96*   CO2 32* 31   BUN 22 18   CREATININE <0.40* <0.40*     Hepatic Function Panel: No results for input(s): PROT, LABALBU, BILIDIR, IBILI, BILITOT, ALKPHOS, ALT, AST in the last 72 hours. No results for input(s): RPR in the last 72 hours. No results for input(s): HIV in the last 72 hours. No results for input(s): BC in the last 72 hours. Lab Results   Component Value Date    CREATININE <0.40 05/12/2022    GLUCOSE 102 05/12/2022       Detailed results: Thank you for allowing us to participate in the care of this patient. Please call with questions. This note is created with the assistance of a speech recognition program.  While intending to generate adocument that actually reflects the content of the visit, the document can still have some errors including those of syntax and sound a like substitutions which may escape proof reading. It such instances, actual meaningcan be extrapolated by contextual diversion.     Fay Soliman MD  Office: [phone]  Perfect serve / office [phone]

## 2022-05-12 NOTE — FLOWSHEET NOTE
Patient's wife was update multiple times during the day, with the last one being at 1800. Covid update between 1993-2128 was omitted.

## 2022-05-12 NOTE — PROGRESS NOTES
Vancomycin Dosing by Pharmacy - Daily Note   Vancomycin Therapy Day:  4  Indication: CAP     Allergies:  Ciprofloxacin, Lasix [furosemide], Penicillins, Sulfa antibiotics, and Duloxetine   Actual Weight:    Wt Readings from Last 1 Encounters:   05/12/22 151 lb 14.4 oz (68.9 kg)       Labs/Ancillary Data  Estimated Creatinine Clearance: 158 mL/min (based on SCr of 0.4 mg/dL). Recent Labs     05/10/22  0525 05/11/22  0440 05/12/22  0438   CREATININE <0.40* <0.40* <0.40*   BUN 18 22 18   WBC 19.2* 18.3* 16.0*     Procalcitonin   Date Value Ref Range Status   05/07/2022 6.54 (H) <0.09 ng/mL Final     Comment:           Suspected Sepsis:  <0.50 ng/mL     Low likelihood of sepsis. 0.50-2.00 ng/mL     Increased likelihood of sepsis. Antibiotics encouraged. >2.00 ng/mL     High risk of sepsis/shock. Antibiotics strongly encouraged. Suspected Lower Resp Tract Infections:  <0.24 ng/mL     Low likelihood of bacterial infection. >0.24 ng/mL     Increased likelihood of bacterial infection. Antibiotics encouraged. With successful antibiotic therapy, PCT levels should decrease rapidly. (Half-life of 24 to   36 hours.)        Procalcitonin values from samples collected within the first 6 hours of systemic infection   may still be low. Retesting may be indicated. Values from day 1 and day 4 can be entered into the Change in Procalcitonin Calculator   ([email or web address]-[email or web address]) to determine the patient's Mortality Risk Prognosis        In healthy neonates, plasma Procalcitonin (PCT) concentrations increase gradually after   birth, reaching peak values at about 24 hours of age then decrease to normal values below   0.5 ng/mL by 48-72 hours of age.          Intake/Output Summary (Last 24 hours) at 5/12/2022 1012  Last data filed at 5/12/2022 0530  Gross per 24 hour   Intake 1510.19 ml   Output 1700 ml   Net -189.81 ml     Temp: 98.4    Culture Date / Source  /  Results  See micro   Recent vancomycin administrations                     vancomycin (VANCOCIN) 1,250 mg in dextrose 5 % 250 mL IVPB (ADDAVIAL) (mg) 1,250 mg New Bag 05/11/22 2140     1,250 mg New Bag  0842     1,250 mg New Bag 05/10/22 2149     1,250 mg New Bag  1000    vancomycin (VANCOCIN) 1,750 mg in dextrose 5 % 500 mL IVPB (mg) 1,750 mg New Bag 05/09/22 2101                    Vancomycin Concentrations:   TROUGH:  No results for input(s): VANCOTROUGH in the last 72 hours. RANDOM:    Recent Labs     05/11/22  0440   VANCORANDOM 16.1       MRSA Nasal Swab: showed MRSA positive result on 05/10/2022 . PLAN     Increase dose to 1500 mg q12h IV  Ensured BUN/sCr ordered at baseline and every 48 hours x at least 3 levels, then at least weekly. Repeat vancomycin concentration ordered for 05/13 @ 0600   Pharmacy will continue to monitor patient and adjust therapy as indicated      Vancomycin Target Concentration Parameters  Treatment  Population Target AUC/HARDEEP Target Trough   Invasive MRSA Infection (bacteremia, pneumonia, meningitis, endocarditis, osteomyelitis)  Sepsis (undifferentiated) 400-600 N/A   Infection due to non-MRSA pathogen  Empiric treatment of non-invasive MRSA infection  (SSTI, UTI) <500 10-15 mg/L   CrCl < 29 mL/min  Rapidly fluctuating serum creatinine   PATI N/A < 15 mg/L     Renal replacement therapy is dosed by levels, per hospital protocol. Abbreviations  * Pauc: probability that AUC is >400 (efficacy); Pconc: probability that Ctrough is above 20 ?g/mL (toxicity); Tox: Probability of nephrotoxicity, based on Osiris et al. Clin Infect Dis 2009. Loading dose: N/A  Regimen: 1500 mg IV every 12 hours. Start time: 21:40 on 05/12/2022  Exposure target: AUC24 (range)400-600 mg/L.hr   AUC24,ss: 548 mg/L.hr  Probability of AUC24 > 400: 96 %  Ctrough,ss: 14.9 mg/L  Probability of Ctrough,ss > 20: 15 %  Probability of nephrotoxicity (Lodise MARILEE 2009): 10 %    Thank you for the consult. Pharmacy will continue to follow.     Sina PharmD, BCPS  5/12/2022 10:13 AM

## 2022-05-12 NOTE — FLOWSHEET NOTE
Dr. Ramos Mews at the bedside for Time Out prior to bronchoscopy. Writer confirmed that all information regarding the patient was correct.

## 2022-05-12 NOTE — PROGRESS NOTES
ICU Progress Note (Vent)   Pulmonary and Critical Care Specialists    Patient - Farhan Mendoza,  Age - 74 y.o.    - 1950      Room Number -    N -  [id number]   [facility]  Date of Admission -  2022  8:35 AM    Events of Past 24 Hours   Patient is status post tracheostomy and PEG placement earlier today. I was asked to see the patient because of problems with desaturations    Vitals    height is 5' 10.05\" (1.779 m) and weight is 151 lb 14.4 oz (68.9 kg). His oral temperature is 98.2 °F (36.8 °C). His blood pressure is 129/54 (abnormal) and his pulse is 72. His respiration is 33 (abnormal) and oxygen saturation is 86% (abnormal). Temperature Range: Temp: 98.2 °F (36.8 °C) Temp  Av.8 °F (36.6 °C)  Min: 96.8 °F (36 °C)  Max: 98.2 °F (36.8 °C)  BP Range:  Systolic (70HVX), OMS:159 , Min:61 , GLN:354     Diastolic (99YEC), NDJ:76, Min:36, Max:117    Pulse Range: Pulse  Av.7  Min: 69  Max: 116  Respiration Range: Resp  Av.9  Min: 0  Max: 33  Current Pulse Ox[de-identified]  SpO2: (!) 86 %  24HR Pulse Ox Range:  SpO2  Av.8 %  Min: 86 %  Max: 100 %  Oxygen Amount and Delivery: O2 Flow Rate (L/min): 8 L/min      Wt Readings from Last 3 Encounters:   22 151 lb 14.4 oz (68.9 kg)   22 115 lb (52.2 kg)   22 120 lb (54.4 kg)     I/O       Intake/Output Summary (Last 24 hours) at 2022 0937  Last data filed at 2022 0530  Gross per 24 hour   Intake 1510.19 ml   Output 1700 ml   Net -189.81 ml     I/O last 3 completed shifts:   In: 2697.1 [I.V.:359.4; NG/GT:1562; IV Piggyback:775.8]  Out: 2500 [Urine:2500]     DRAIN/TUBE OUTPUT:     Invasive Lines   ETT Day -   tracheostomy today  Placement today  PICC line 8      Mechanical Ventilation Data   SETTINGS (Comprehensive)  Vent Information  Ventilator Day(s): 5  Ventilator ID: servo  Vent Mode: PRVC  Ventilator Initiate: Yes  Additional Respiratory Assessments  Pulse: 72  Resp: (!) 33  SpO2: (!) 86 %  End Tidal CO2: 30 (%)  Position: Semi-Christie's  Humidification Source: HME  Cuff Pressure (cm H2O): 30 cm H2O  Skin Barrier Applied: No       ABGs:   Lab Results   Component Value Date    PHART 7.485 05/12/2022    PO2ART 58.5 05/12/2022    GFS7WLS 44.9 05/12/2022       Lab Results   Component Value Date    MODE PRVC 05/12/2022         Medications   IV   [MAR Hold] midazolam 3 mg/hr (05/12/22 0739)    [MAR Hold] dextrose      [MAR Hold] norepinephrine Stopped (05/10/22 1154)    [MAR Hold] fentaNYL 75 mcg/hr (05/12/22 0739)    [MAR Hold] sodium chloride      [MAR Hold] propofol Stopped (05/10/22 2322)      [MAR Hold] metoprolol tartrate  25 mg Oral BID    [MAR Hold] midodrine  5 mg Oral BID    [MAR Hold] insulin lispro  0-6 Units SubCUTAneous Q6H    [MAR Hold] vancomycin (VANCOCIN) intermittent dosing (placeholder)   Other RX Placeholder    [MAR Hold] vancomycin  1,250 mg IntraVENous Q12H    [MAR Hold] senna  1 tablet PEG Tube BID    [MAR Hold] polyvinyl alcohol  1 drop Both Eyes Q4H    [MAR Hold] sodium chloride flush  5-40 mL IntraVENous 2 times per day    [MAR Hold] dexamethasone  6 mg IntraVENous Q24H    [MAR Hold] enoxaparin  40 mg SubCUTAneous Daily    [MAR Hold] pantoprazole (PROTONIX) 40 mg injection  40 mg IntraVENous Daily    [MAR Hold] chlorhexidine  15 mL Mouth/Throat BID       Diet/Nutrition   Diet NPO  ADULT TUBE FEEDING; Orogastric; Peptide Based; Continuous; 20; Yes; 10; Q 4 hours; 65; 30; Q 4 hours    Exam   VITALS    height is 5' 10.05\" (1.779 m) and weight is 151 lb 14.4 oz (68.9 kg). His oral temperature is 98.2 °F (36.8 °C). His blood pressure is 129/54 (abnormal) and his pulse is 72. His respiration is 33 (abnormal) and oxygen saturation is 86% (abnormal). Ventilator Settings (Basic)  Vent Mode: PRVC Resp Rate (Set): 20 bmp/Vt (Set, mL): 500 mL/ /FiO2 : 40 %    Constitutional -sedated trached on vent support. General Appearance thin.   HEENT - Life support devices in place (ET,),normocephalic, atraumatic. Lungs - Chest expands equally, no wheezes, rales or rhonchi. Cardiovascular - Heart sounds are normal.  normal rate and rhythm regular, no murmur, gallop or rub. Abdomen - soft, nontender, nondistended,  Extremities - no cyanosis, clubbing     Lab Results   CBC     Lab Results   Component Value Date    WBC 16.0 05/12/2022    RBC 3.67 05/12/2022    HGB 12.1 05/12/2022    HCT 35.5 05/12/2022     05/12/2022    MCV 96.9 05/12/2022    MCH 32.9 05/12/2022    MCHC 34.0 05/12/2022    RDW 13.6 05/12/2022    LYMPHOPCT 1 05/06/2022    MONOPCT 3 05/06/2022    BASOPCT 0 05/06/2022    MONOSABS 0.35 05/06/2022    LYMPHSABS 0.12 05/06/2022    EOSABS 0.00 05/06/2022    BASOSABS 0.00 05/06/2022    DIFFTYPE NOT REPORTED 08/25/2021       BMP   Lab Results   Component Value Date     05/12/2022    K 4.0 05/12/2022    CL 96 05/12/2022    CO2 31 05/12/2022    BUN 18 05/12/2022    CREATININE <0.40 05/12/2022    GLUCOSE 102 05/12/2022    CALCIUM 8.5 05/12/2022       LFTS  Lab Results   Component Value Date    ALKPHOS 137 05/05/2022    ALT 46 05/05/2022    AST 30 05/05/2022    PROT 7.5 05/05/2022    BILITOT 0.66 05/05/2022    LABALBU 3.0 05/05/2022       INR  No results for input(s): PROTIME, INR in the last 72 hours. APTT  No results for input(s): APTT in the last 72 hours. Lactic Acid  No results found for: LACTA     BNP   No results for input(s): BNP in the last 72 hours. Cultures     MRSA in sputum, may 6  Radiology     Plain Films         X-ray reveals right lower lobe infiltrates greater than left lower lobe infiltrates    See actual reports for details    SYSTEM ASSESSMENT    .  Acute respiratory failure with hypoxemia. 2.  Severe ALS. 3.  History of coronary artery disease status post bypass graft surgery  10/2016. 4.  Malnutrition. 5.  Severe debilitation. 6.  Hypertension. 7.  MRSA pneumonia  8. Status post tracheostomy PEG placement today.       Neuro   Currently

## 2022-05-13 ENCOUNTER — APPOINTMENT (OUTPATIENT)
Dept: GENERAL RADIOLOGY | Age: 72
DRG: 004 | End: 2022-05-13
Payer: MEDICARE

## 2022-05-13 ENCOUNTER — APPOINTMENT (OUTPATIENT)
Dept: CT IMAGING | Age: 72
DRG: 004 | End: 2022-05-13
Payer: MEDICARE

## 2022-05-13 LAB
ALLEN TEST: ABNORMAL
ANION GAP SERPL CALCULATED.3IONS-SCNC: 8 MMOL/L (ref 9–17)
BUN BLDV-MCNC: 17 MG/DL (ref 8–23)
CALCIUM SERPL-MCNC: 8.5 MG/DL (ref 8.6–10.4)
CARBOXYHEMOGLOBIN: 0.3 % (ref 0–5)
CHLORIDE BLD-SCNC: 97 MMOL/L (ref 98–107)
CO2: 30 MMOL/L (ref 20–31)
CREAT SERPL-MCNC: <0.4 MG/DL (ref 0.7–1.2)
FIO2: 40
GFR AFRICAN AMERICAN: ABNORMAL ML/MIN
GFR NON-AFRICAN AMERICAN: ABNORMAL ML/MIN
GFR SERPL CREATININE-BSD FRML MDRD: ABNORMAL ML/MIN/{1.73_M2}
GLUCOSE BLD-MCNC: 108 MG/DL (ref 75–110)
GLUCOSE BLD-MCNC: 114 MG/DL (ref 75–110)
GLUCOSE BLD-MCNC: 123 MG/DL (ref 70–99)
GLUCOSE BLD-MCNC: 141 MG/DL (ref 75–110)
GLUCOSE BLD-MCNC: 147 MG/DL (ref 75–110)
GLUCOSE BLD-MCNC: 159 MG/DL (ref 75–110)
HCO3 ARTERIAL: 33.9 MMOL/L (ref 22–26)
HCT VFR BLD CALC: 34.8 % (ref 41–53)
HEMOGLOBIN: 11.6 G/DL (ref 13.5–17.5)
MCH RBC QN AUTO: 31.9 PG (ref 26–34)
MCHC RBC AUTO-ENTMCNC: 33.2 G/DL (ref 31–37)
MCV RBC AUTO: 96.1 FL (ref 80–100)
METHEMOGLOBIN: 1.1 % (ref 0–1.9)
MODE: ABNORMAL
O2 DEVICE/FLOW/%: ABNORMAL
O2 SAT, ARTERIAL: 96.5 % (ref 95–98)
PATIENT TEMP: 37
PCO2 ARTERIAL: 45.9 MMHG (ref 35–45)
PDW BLD-RTO: 13.7 % (ref 11.5–14.9)
PEEP/CPAP: 10
PH ARTERIAL: 7.48 (ref 7.35–7.45)
PLATELET # BLD: 490 K/UL (ref 150–450)
PMV BLD AUTO: 6.7 FL (ref 6–12)
PO2 ARTERIAL: 101 MMHG (ref 80–100)
POSITIVE BASE EXCESS, ART: 10.3 MMOL/L (ref 0–2)
POTASSIUM SERPL-SCNC: 3.8 MMOL/L (ref 3.7–5.3)
PT. POSITION: ABNORMAL
RBC # BLD: 3.62 M/UL (ref 4.5–5.9)
RESPIRATORY RATE: 20
SAMPLE SITE: ABNORMAL
SET RATE: 20
SODIUM BLD-SCNC: 135 MMOL/L (ref 135–144)
TEXT FOR RESPIRATORY: ABNORMAL
TOTAL RATE: 20
VANCOMYCIN RANDOM DATE LAST DOSE: NORMAL
VANCOMYCIN RANDOM DOSE AMOUNT: 1500
VANCOMYCIN RANDOM TIME LAST DOSE: 2245
VANCOMYCIN RANDOM: 25.1 UG/ML
VT: 500
WBC # BLD: 20.1 K/UL (ref 3.5–11)

## 2022-05-13 PROCEDURE — 80048 BASIC METABOLIC PNL TOTAL CA: CPT

## 2022-05-13 PROCEDURE — 82805 BLOOD GASES W/O2 SATURATION: CPT

## 2022-05-13 PROCEDURE — 71250 CT THORAX DX C-: CPT

## 2022-05-13 PROCEDURE — 99233 SBSQ HOSP IP/OBS HIGH 50: CPT | Performed by: INTERNAL MEDICINE

## 2022-05-13 PROCEDURE — 71045 X-RAY EXAM CHEST 1 VIEW: CPT

## 2022-05-13 PROCEDURE — 2500000003 HC RX 250 WO HCPCS: Performed by: INTERNAL MEDICINE

## 2022-05-13 PROCEDURE — 2580000003 HC RX 258: Performed by: INTERNAL MEDICINE

## 2022-05-13 PROCEDURE — 94761 N-INVAS EAR/PLS OXIMETRY MLT: CPT

## 2022-05-13 PROCEDURE — 94003 VENT MGMT INPAT SUBQ DAY: CPT

## 2022-05-13 PROCEDURE — 2500000003 HC RX 250 WO HCPCS: Performed by: STUDENT IN AN ORGANIZED HEALTH CARE EDUCATION/TRAINING PROGRAM

## 2022-05-13 PROCEDURE — C9113 INJ PANTOPRAZOLE SODIUM, VIA: HCPCS | Performed by: STUDENT IN AN ORGANIZED HEALTH CARE EDUCATION/TRAINING PROGRAM

## 2022-05-13 PROCEDURE — 80202 ASSAY OF VANCOMYCIN: CPT

## 2022-05-13 PROCEDURE — 6370000000 HC RX 637 (ALT 250 FOR IP): Performed by: STUDENT IN AN ORGANIZED HEALTH CARE EDUCATION/TRAINING PROGRAM

## 2022-05-13 PROCEDURE — 6360000002 HC RX W HCPCS: Performed by: STUDENT IN AN ORGANIZED HEALTH CARE EDUCATION/TRAINING PROGRAM

## 2022-05-13 PROCEDURE — 82947 ASSAY GLUCOSE BLOOD QUANT: CPT

## 2022-05-13 PROCEDURE — 36600 WITHDRAWAL OF ARTERIAL BLOOD: CPT

## 2022-05-13 PROCEDURE — 85027 COMPLETE CBC AUTOMATED: CPT

## 2022-05-13 PROCEDURE — 2700000000 HC OXYGEN THERAPY PER DAY

## 2022-05-13 PROCEDURE — 99291 CRITICAL CARE FIRST HOUR: CPT | Performed by: INTERNAL MEDICINE

## 2022-05-13 PROCEDURE — 6360000002 HC RX W HCPCS: Performed by: INTERNAL MEDICINE

## 2022-05-13 PROCEDURE — 2580000003 HC RX 258: Performed by: STUDENT IN AN ORGANIZED HEALTH CARE EDUCATION/TRAINING PROGRAM

## 2022-05-13 PROCEDURE — 2000000000 HC ICU R&B

## 2022-05-13 PROCEDURE — A4216 STERILE WATER/SALINE, 10 ML: HCPCS | Performed by: STUDENT IN AN ORGANIZED HEALTH CARE EDUCATION/TRAINING PROGRAM

## 2022-05-13 RX ORDER — DEXMEDETOMIDINE HYDROCHLORIDE 4 UG/ML
.1-1.5 INJECTION, SOLUTION INTRAVENOUS CONTINUOUS
Status: DISCONTINUED | OUTPATIENT
Start: 2022-05-13 | End: 2022-05-17 | Stop reason: HOSPADM

## 2022-05-13 RX ORDER — 0.9 % SODIUM CHLORIDE 0.9 %
500 INTRAVENOUS SOLUTION INTRAVENOUS ONCE
Status: COMPLETED | OUTPATIENT
Start: 2022-05-13 | End: 2022-05-13

## 2022-05-13 RX ADMIN — INSULIN LISPRO 1 UNITS: 100 INJECTION, SOLUTION INTRAVENOUS; SUBCUTANEOUS at 19:48

## 2022-05-13 RX ADMIN — MIDODRINE HYDROCHLORIDE 5 MG: 5 TABLET ORAL at 21:18

## 2022-05-13 RX ADMIN — FENTANYL CITRATE 50 MCG: 0.05 INJECTION, SOLUTION INTRAMUSCULAR; INTRAVENOUS at 08:07

## 2022-05-13 RX ADMIN — Medication 25 MCG/HR: at 19:08

## 2022-05-13 RX ADMIN — SODIUM CHLORIDE, PRESERVATIVE FREE 40 MG: 5 INJECTION INTRAVENOUS at 08:10

## 2022-05-13 RX ADMIN — SODIUM CHLORIDE 500 ML: 9 INJECTION, SOLUTION INTRAVENOUS at 12:55

## 2022-05-13 RX ADMIN — SENNOSIDES 8.6 MG: 8.6 TABLET, COATED ORAL at 19:47

## 2022-05-13 RX ADMIN — INSULIN LISPRO 1 UNITS: 100 INJECTION, SOLUTION INTRAVENOUS; SUBCUTANEOUS at 14:09

## 2022-05-13 RX ADMIN — DEXAMETHASONE SODIUM PHOSPHATE 6 MG: 10 INJECTION, SOLUTION INTRAMUSCULAR; INTRAVENOUS at 10:15

## 2022-05-13 RX ADMIN — SODIUM CHLORIDE, PRESERVATIVE FREE 10 ML: 5 INJECTION INTRAVENOUS at 19:48

## 2022-05-13 RX ADMIN — FENTANYL CITRATE 50 MCG: 0.05 INJECTION, SOLUTION INTRAMUSCULAR; INTRAVENOUS at 12:59

## 2022-05-13 RX ADMIN — MIDODRINE HYDROCHLORIDE 5 MG: 5 TABLET ORAL at 11:35

## 2022-05-13 RX ADMIN — SODIUM CHLORIDE, PRESERVATIVE FREE 10 ML: 5 INJECTION INTRAVENOUS at 16:17

## 2022-05-13 RX ADMIN — MIDAZOLAM HYDROCHLORIDE 2 MG: 1 INJECTION INTRAMUSCULAR; INTRAVENOUS at 22:00

## 2022-05-13 RX ADMIN — SENNOSIDES 8.6 MG: 8.6 TABLET, COATED ORAL at 08:10

## 2022-05-13 RX ADMIN — VANCOMYCIN HYDROCHLORIDE 1250 MG: 1.25 INJECTION, POWDER, LYOPHILIZED, FOR SOLUTION INTRAVENOUS at 11:18

## 2022-05-13 RX ADMIN — ENOXAPARIN SODIUM 40 MG: 100 INJECTION SUBCUTANEOUS at 08:10

## 2022-05-13 RX ADMIN — CHLORHEXIDINE GLUCONATE 0.12% ORAL RINSE 15 ML: 1.2 LIQUID ORAL at 16:17

## 2022-05-13 RX ADMIN — FENTANYL CITRATE 50 MCG: 0.05 INJECTION, SOLUTION INTRAMUSCULAR; INTRAVENOUS at 16:12

## 2022-05-13 RX ADMIN — VANCOMYCIN HYDROCHLORIDE 1250 MG: 1.25 INJECTION, POWDER, LYOPHILIZED, FOR SOLUTION INTRAVENOUS at 23:38

## 2022-05-13 RX ADMIN — DEXMEDETOMIDINE HYDROCHLORIDE 0.2 MCG/KG/HR: 4 INJECTION, SOLUTION INTRAVENOUS at 17:29

## 2022-05-13 RX ADMIN — CHLORHEXIDINE GLUCONATE 0.12% ORAL RINSE 15 ML: 1.2 LIQUID ORAL at 19:47

## 2022-05-13 ASSESSMENT — PULMONARY FUNCTION TESTS
PIF_VALUE: 24
PIF_VALUE: 23

## 2022-05-13 ASSESSMENT — PAIN SCALES - GENERAL: PAINLEVEL_OUTOF10: 10

## 2022-05-13 NOTE — CARE COORDINATION
ONGOING DISCHARGE PLAN:    Trach and peg placed 5/12, Pt to start TF today per peg.    IV Vanco per ID. Poss change to Zyvox at discharge. IV Decadron. Call out to [facility] to check on admission. Awaiting return call. Will continue to follow for additional discharge needs.     Electronically signed by Jace Montelongo RN on 5/13/2022 at 10:30 AM

## 2022-05-13 NOTE — PROGRESS NOTES
[facility] Internal Medicine  Vimal Johnson MD; Flavia Lomeli MD; Charlie Shine MD; MD Eugene Pinto MD; MD YANET [facility] Internal Medicine   [facility]    HISTORY AND PHYSICAL EXAMINATION            Date:   5/13/2022  Patient name:  Channing Denson  Date of admission:  5/5/2022  8:35 AM  MRN:   [id number]  Account:  [de-identified]  YOB: 1950  PCP:    Jose Vazquez MD  Room:   [de-identified]  Code Status:    Full Code    Chief Complaint:     Chief Complaint   Patient presents with    Shortness of Breath   dyspnea    History Obtained From:     Medical record and nursing staff  Pt sedated      History of Present Illness:     Channing Denson is a 74 y.o. Non- / non  male who presents with Shortness of Breath   and is admitted to the hospital for the management of Respiratory failure ([facility] [address]     a70-year-old  gentleman with history of MR atrophic lateral sclerosis well-known to pulmonary team admitted with increasing cough shortness of breath for 3 to 4 days chest x-ray shows bilateral infiltrate elevated procalcitonin elevated leukocytosis patient was tested positive for COVID-19 pneumonia on May 5  Acute respiratory failure secondary to ALS with multifocal COVID-19 pneumonia intubated and sedated treated in the intensive care unit  5/8  Patient, still intubated, sedated with fentanyl propofol  On tube feeds  Had reading of low blood pressure, On Levophed  Antibiotics changed to Zyvox, Sputum Culture growing MRSA         Post trach peg      Past Medical History:     Past Medical History:   Diagnosis Date    ALS (amyotrophic lateral sclerosis) ([facility]     Aortic root aneurysm ([facility] [address] [facility] 11/08/2016    Aortic valve replaced 11/08/2016    Hypertension         Past Surgical History:     Past Surgical History:   Procedure Laterality Date    CARDIOVERSION  06/27/2020    w/    HERNIA REPAIR      PARATHYROID GLAND SURGERY  1980    TRACHEOSTOMY N/A 5/12/2022    TRACHEOTOMY performed by Geovanny De Jesus IV, DO at [facility] [address] N/A 5/12/2022    EGD/PEG TUBE INSERTION performed by Geovanny Pena Cankatlyn IV, DO at [id number]        Medications Prior to Admission:     Prior to Admission medications    Medication Sig Start Date End Date Taking? Authorizing Provider   neomycin-bacitracin-polymyxin (NEOSPORIN) 3.5-400-5000 OINT ointment Apply topically 3 times daily Apply topically 4 times daily. 4/4/22   Murlene Rinne, MD   Handicap Placard MISC by Does not apply route Dx: ALS    Duration: 5 years 3/23/22   Murlene Rinne, MD   medical marijuana Place under the tongue 2 times daily. Historical Provider, MD   acetaminophen (TYLENOL) 325 MG tablet Take 650 mg by mouth every 6 hours as needed 9/25/20   Historical Provider, MD   fluticasone Marda Gasman) 50 MCG/ACT nasal spray 1 spray by Nasal route daily 6/8/21   Murlene Rinne, MD   SPIRULINA PO Take by mouth    Historical Provider, MD   Vitamin E 100 UNITS TABS Take 60 Units by mouth daily    Historical Provider, MD   SELENIUM PO Take 75 mcg by mouth daily    Historical Provider, MD   SOYA LECITHIN PO Take 1,000 mg by mouth daily    Historical Provider, MD   Multiple Vitamins-Minerals (THERAPEUTIC MULTIVITAMIN-MINERALS) tablet Take 1 tablet by mouth daily    Historical Provider, MD   OMEGA-3 FATTY ACIDS-VITAMIN E PO Take 1 tablet by mouth daily    Historical Provider, MD   Probiotic Product (PROBIOTIC PO) Take 1 tablet by mouth daily    Historical Provider, MD   Ascorbic Acid (VITAMIN C) 500 MG tablet Take 120 mg by mouth daily    Historical Provider, MD        Allergies:     Ciprofloxacin, Lasix [furosemide], Penicillins, Sulfa antibiotics, and Duloxetine    Social History:     Tobacco:    reports that he has never smoked.  He has never used smokeless tobacco.  Alcohol:      reports current alcohol use of about 1.0 standard drink of alcohol per week. Drug Use:  reports no history of drug use.     Family History:     Family History   Problem Relation Age of Onset    Lung Cancer Mother     Heart Disease Father        Review of Systems:     Unable to get ros  SEDATED    Physical Exam:   BP (!) 143/84   Pulse 80   Temp 98.5 °F (36.9 °C) (Oral)   Resp 20   Ht 5' 10.05\" (1.779 m)   Wt 154 lb 12.2 oz (70.2 kg)   SpO2 93%   BMI 22.17 kg/m²   Temp (24hrs), Av.9 °F (37.2 °C), Min:98.5 °F (36.9 °C), Max:99.3 °F (37.4 °C)    Recent Labs     22  1934 22  0248 22  0720 22  1127   POCGLU 180* 114* 108 141*       Intake/Output Summary (Last 24 hours) at 2022 1354  Last data filed at 2022 1120  Gross per 24 hour   Intake 880.75 ml   Output 1250 ml   Net -369.25 ml       General Appearance: post trach and peg  Sedated    Mental status: sedated  Trach peg    Head: normocephalic, atraumatic  Eye: no icterus, redness, pupils equal and reactive, extraocular eye movements intact, conjunctiva clear  Ear: normal external ear, no discharge, hearing intact  Nose: no drainage noted  Mouth: mucous membranes moist  Neck: supple, no carotid bruits, thyroid not palpable  Lungs: on vent  Crackles  Cardiovascular: normal rate, regular rhythm, no murmur, gallop, rub  Abdomen: Soft, nontender, nondistended, normal bowel sounds, no hepatomegaly or splenomegaly  PEG IN PLACE    Neurologic: ALS  Skin: No gross lesions, rashes, bruising or bleeding on exposed skin area  Extremities: peripheral pulses palpable, no pedal edema or calf pain with palpation, RUE is Sligtly swollen       Investigations:      Laboratory Testing:  Recent Results (from the past 24 hour(s))   POC Glucose Fingerstick    Collection Time: 22  3:18 PM   Result Value Ref Range    POC Glucose 206 (H) 75 - 110 mg/dL   POC Glucose Fingerstick    Collection Time: 22  4:31 PM   Result Value Ref Range    POC Glucose 187 (H) 75 - 110 mg/dL   POC Glucose Fingerstick    Collection Time: 05/12/22  7:34 PM   Result Value Ref Range    POC Glucose 180 (H) 75 - 110 mg/dL   POC Glucose Fingerstick    Collection Time: 05/13/22  2:48 AM   Result Value Ref Range    POC Glucose 114 (H) 75 - 110 mg/dL   Blood gas, arterial    Collection Time: 05/13/22  4:33 AM   Result Value Ref Range    pH, Arterial 7.476 (H) 7.350 - 7.450    pCO2, Arterial 45.9 (H) 35.0 - 45.0 mmHg    pO2, Arterial 101.0 (H) 80.0 - 100.0 mmHg    HCO3, Arterial 33.9 (H) 22.0 - 26.0 mmol/L    Positive Base Excess, Art 10.3 (H) 0.0 - 2.0 mmol/L    O2 Sat, Arterial 96.5 95 - 98 %    Carboxyhemoglobin 0.3 0 - 5 %    Methemoglobin 1.1 0.0 - 1.9 %    Pt Temp 37     O2 Device/Flow/% VENTILATOR     Respiratory Rate 20     Rosalino Test PASS     Sample Site Left Radial Artery     Pt.  Position SEMI-FOWLERS     Mode PRVC     Set Rate 20     Total Rate 20          FIO2 40     Peep/Cpap 10     Text for Respiratory RESULTS GIVEN TO RN    CBC    Collection Time: 05/13/22  4:39 AM   Result Value Ref Range    WBC 20.1 (H) 3.5 - 11.0 k/uL    RBC 3.62 (L) 4.5 - 5.9 m/uL    Hemoglobin 11.6 (L) 13.5 - 17.5 g/dL    Hematocrit 34.8 (L) 41 - 53 %    MCV 96.1 80 - 100 fL    MCH 31.9 26 - 34 pg    MCHC 33.2 31 - 37 g/dL    RDW 13.7 11.5 - 14.9 %    Platelets 122 (H) 039 - 450 k/uL    MPV 6.7 6.0 - 12.0 fL   Vancomycin Level, Random    Collection Time: 05/13/22  4:39 AM   Result Value Ref Range    Vancomycin Rm 25.1 ug/mL    Vancomycin Random Dose amount 1,500     Vancomycin Random Date last dose 5,122,022     Vancomycin Random Time last dose 5,625    Basic Metabolic Panel w/ Reflex to MG    Collection Time: 05/13/22  4:39 AM   Result Value Ref Range    Glucose 123 (H) 70 - 99 mg/dL    BUN 17 8 - 23 mg/dL    CREATININE <0.40 (L) 0.70 - 1.20 mg/dL    Calcium 8.5 (L) 8.6 - 10.4 mg/dL    Sodium 135 135 - 144 mmol/L    Potassium 3.8 3.7 - 5.3 mmol/L    Chloride 97 (L) 98 - 107 mmol/L    CO2 30 20 - 31 mmol/L    Anion Gap 8 (L) 9 - 17 mmol/L    GFR Non-African American Can not be calculated >60 mL/min    GFR  Can not be calculated >60 mL/min    GFR Comment         POC Glucose Fingerstick    Collection Time: 05/13/22  7:20 AM   Result Value Ref Range    POC Glucose 108 75 - 110 mg/dL   POC Glucose Fingerstick    Collection Time: 05/13/22 11:27 AM   Result Value Ref Range    POC Glucose 141 (H) 75 - 110 mg/dL       Imaging/Diagnostics:  XR CHEST PORTABLE    Result Date: 5/6/2022  1. New right arm PICC extending toward SVC, but distal tip is obscured by overlying pacemaker leads. 2.  Endotracheal tube and enteric tube remain in place. 3.  Unchanged right basilar consolidation and small right pleural effusion. Mild patchy left basilar opacities. XR CHEST PORTABLE    Result Date: 5/5/2022  Endotracheal tube in satisfactory position above the chance NG tip and side-port in gastric fundus Otherwise, unchanged chest demonstrating bibasilar opacities suggesting atelectasis or infection     XR CHEST PORTABLE    Result Date: 5/5/2022  Bibasilar opacities with blunting of right costophrenic angle possibly combination of pneumonia and small right pleural effusion. There is what appears to be a left chest tube terminating in the periphery of the upper lung partly obscured by pacemaker electrode. Please correlate clinically. No recent comparisons.        Assessment :      Hospital Problems           Last Modified POA    * (Principal) Respiratory failure ([address] 5/5/2022 Yes    COVID-19 5/8/2022 Yes    ALS (amyotrophic lateral sclerosis) ([address] 5/8/2022 Yes    Elevated procalcitonin 5/8/2022 Yes    Elevated C-reactive protein (CRP) 5/8/2022 Yes    Elevated ferritin 5/8/2022 Yes    Elevated erythrocyte sedimentation rate 5/8/2022 Yes    Leukocytosis 5/8/2022 Yes    Allergy to multiple antibiotics 5/8/2022 Yes      cc 35 mins    Plan:     66-year-old gentleman with a history of ALS  Admitted for multifocal COVID-19 pneumonia with acute respiratory failure,   Possible superimposed bacterial pneumonia with underlying neuromuscular disorder of ALS, Pro-Luis is high  Acute respiratory failure secondary to ALS plus COVID-19 pneumonia intubated sedated  MRSA IN SPUTUM on vanco , respiratory Cultures growing MRSA   For COVID-19 patient received Actemra, on Decadron    History of coronary artery disease s/p CABG,  On Protonix for gastric prophylaxis  Hypokalemia, Improved   DVT prophylaxis Lovenox  Patient critically sick, high chance of clinical deterioration, seen in ICU  Cardio consult for afib  Post trach and peg tube  On levophed for shock post op  Wife and daughter in room  On sedation  And fentanyl  Will need LTAC  Tube feed to start today  CC time 35 minutes        Lynnette Pena MD  5/13/2022  1:54 PM    Copy sent to Dr. Tatum Vila MD    Please note that this chart was generated using voice recognition Dragon dictation software. Although every effort was made to ensure the accuracy of this automated transcription, some errors in transcription may have occurred.

## 2022-05-13 NOTE — PLAN OF CARE
Problem: Discharge Planning  Goal: Discharge to home or other facility with appropriate resources  5/13/2022 0414 by Maggy Melo RN  Outcome: Progressing  5/12/2022 1814 by Ca Rebolledo RN  Outcome: Progressing     Problem: Skin/Tissue Integrity  Goal: Absence of new skin breakdown  5/13/2022 0414 by Maggy Melo RN  Outcome: Progressing  5/12/2022 1814 by Ca Rebolledo RN  Outcome: Progressing     Problem: Pain  Goal: Verbalizes/displays adequate comfort level or baseline comfort level  5/13/2022 0414 by Maggy Melo RN  Outcome: Progressing  5/12/2022 1814 by Ca Rebolledo RN  Outcome: Progressing  Flowsheets  Taken 5/12/2022 1600  Verbalizes/displays adequate comfort level or baseline comfort level: Assess pain using appropriate pain scale  Taken 5/12/2022 1200  Verbalizes/displays adequate comfort level or baseline comfort level: Assess pain using appropriate pain scale     Problem: Safety - Adult  Goal: Free from fall injury  5/13/2022 0414 by Maggy Melo RN  Outcome: Progressing  Flowsheets (Taken 5/12/2022 2158)  Free From Fall Injury: Instruct family/caregiver on patient safety  5/12/2022 1814 by Ca Rebolledo RN  Outcome: Progressing     Problem: Respiratory - Adult  Goal: Achieves optimal ventilation and oxygenation  5/13/2022 0414 by Maggy Melo RN  Outcome: Progressing  5/12/2022 1814 by Ca Rebolledo RN  Outcome: Progressing     Problem: Nutrition Deficit:  Goal: Optimize nutritional status  5/13/2022 0414 by Maggy Melo RN  Outcome: Progressing  5/12/2022 1814 by Ca Rebolledo RN  Outcome: Progressing     Problem: ABCDS Injury Assessment  Goal: Absence of physical injury  5/13/2022 0414 by Maggy Melo RN  Outcome: Progressing  Flowsheets (Taken 5/12/2022 2158)  Absence of Physical Injury: Implement safety measures based on patient assessment  5/12/2022 1814 by Ca Rebolledo RN  Outcome: Progressing

## 2022-05-13 NOTE — PROGRESS NOTES
ICU Progress Note (Vent)   [facility]    Patient - Katelyn Topete,  Age - 74 y.o.    - 1950      Room Number -    MRN -  [id number]   Acct # - [de-identified]  Date of Admission -  2022  8:35 AM    Events of Past 24 Hours   Patient is post tracheostomy. Patient appears to be able to understand. Wife at bedside. Vitals    height is 5' 10.05\" (1.779 m) and weight is 154 lb 12.2 oz (70.2 kg). His oral temperature is 98.5 °F (36.9 °C). His blood pressure is 92/47 (abnormal) and his pulse is 83. His respiration is 20 and oxygen saturation is 92%. Temperature Range: Temp: 98.5 °F (36.9 °C) Temp  Av.8 °F (37.1 °C)  Min: 98.5 °F (36.9 °C)  Max: 99.3 °F (37.4 °C)  BP Range:  Systolic (92RFC), WCE:661 , Min:53 , IXW:057     Diastolic (70VAK), YUX:81, Min:25, Max:126    Pulse Range: Pulse  Av.6  Min: 66  Max: 120  Respiration Range: Resp  Av.7  Min: 18  Max: 30  Current Pulse Ox[de-identified]  SpO2: 92 %  24HR Pulse Ox Range:  SpO2  Av.5 %  Min: 90 %  Max: 100 %  Oxygen Amount and Delivery: O2 Flow Rate (L/min): 8 L/min      Wt Readings from Last 3 Encounters:   22 154 lb 12.2 oz (70.2 kg)   22 115 lb (52.2 kg)   22 120 lb (54.4 kg)     I/O       Intake/Output Summary (Last 24 hours) at 2022 1039  Last data filed at 2022 0800  Gross per 24 hour   Intake 850.75 ml   Output 1650 ml   Net -799.25 ml     I/O last 3 completed shifts: In: 1560.9 [I.V.:495.4; NG/GT:90; IV Piggyback:975.5]  Out: 2648 [Urine:2550]     DRAIN/TUBE OUTPUT:     Invasive Lines   ETT Day -   tracheostomy yesterday.   PICC line day #9       Mechanical Ventilation Data   SETTINGS (Comprehensive)  Vent Information  Ventilator Day(s): 5  Ventilator ID: servo  Vent Mode: PRVC  Ventilator Initiate: Yes  Additional Respiratory Assessments  Pulse: 83  Resp: 20  SpO2: 92 %  End Tidal CO2: 33 (%)  Position: Semi-Christie's  Humidification Source: HME  Cuff Pressure (cm H2O): 30 cm H2O  Skin Barrier Applied: No       ABGs:   Lab Results   Component Value Date    PHART 7.476 05/13/2022    PO2ART 101.0 05/13/2022    ZVY4ZIA 45.9 05/13/2022       Lab Results   Component Value Date    MODE PRVC 05/13/2022         Medications   IV   dexmedetomidine      midazolam 2 mg/hr (05/13/22 0418)    dextrose      norepinephrine Stopped (05/13/22 0742)    fentaNYL 25 mcg/hr (05/13/22 0572)    sodium chloride      propofol Stopped (05/10/22 2322)      vancomycin  1,250 mg IntraVENous Q12H    metoprolol tartrate  25 mg Oral BID    midodrine  5 mg Oral BID    insulin lispro  0-6 Units SubCUTAneous Q6H    vancomycin (VANCOCIN) intermittent dosing (placeholder)   Other RX Placeholder    senna  1 tablet PEG Tube BID    polyvinyl alcohol  1 drop Both Eyes Q4H    sodium chloride flush  5-40 mL IntraVENous 2 times per day    dexamethasone  6 mg IntraVENous Q24H    enoxaparin  40 mg SubCUTAneous Daily    pantoprazole (PROTONIX) 40 mg injection  40 mg IntraVENous Daily    chlorhexidine  15 mL Mouth/Throat BID       Diet/Nutrition   Diet NPO  ADULT TUBE FEEDING; Orogastric; Peptide Based; Continuous; 20; Yes; 10; Q 4 hours; 65; 30; Q 4 hours    Exam   VITALS    height is 5' 10.05\" (1.779 m) and weight is 154 lb 12.2 oz (70.2 kg). His oral temperature is 98.5 °F (36.9 °C). His blood pressure is 92/47 (abnormal) and his pulse is 83. His respiration is 20 and oxygen saturation is 92%. Ventilator Settings (Basic)  Vent Mode: PRVC Resp Rate (Set): 20 bmp/Vt (Set, mL): 500 mL/ /FiO2 : 30 %    Constitutional - Sedated  General Appearance  well developed, well nourished  HEENT - Life support devices in place (ET,),normocephalic, atraumatic. Lungs - Chest expands equally, no wheezes, rales or rhonchi. Cardiovascular - Heart sounds are normal.  normal rate and rhythm regular, no murmur, gallop or rub.   Abdomen - soft, nontender,   Extremities - no cyanosis, clubbing     Lab Results   CBC     Lab Results Component Value Date    WBC 20.1 05/13/2022    RBC 3.62 05/13/2022    HGB 11.6 05/13/2022    HCT 34.8 05/13/2022     05/13/2022    MCV 96.1 05/13/2022    MCH 31.9 05/13/2022    MCHC 33.2 05/13/2022    RDW 13.7 05/13/2022    LYMPHOPCT 1 05/06/2022    MONOPCT 3 05/06/2022    BASOPCT 0 05/06/2022    MONOSABS 0.35 05/06/2022    LYMPHSABS 0.12 05/06/2022    EOSABS 0.00 05/06/2022    BASOSABS 0.00 05/06/2022    DIFFTYPE NOT REPORTED 08/25/2021       BMP   Lab Results   Component Value Date     05/13/2022    K 3.8 05/13/2022    CL 97 05/13/2022    CO2 30 05/13/2022    BUN 17 05/13/2022    CREATININE <0.40 05/13/2022    GLUCOSE 123 05/13/2022    CALCIUM 8.5 05/13/2022       LFTS  Lab Results   Component Value Date    ALKPHOS 137 05/05/2022    ALT 46 05/05/2022    AST 30 05/05/2022    PROT 7.5 05/05/2022    BILITOT 0.66 05/05/2022    LABALBU 3.0 05/05/2022       INR  No results for input(s): PROTIME, INR in the last 72 hours. APTT  No results for input(s): APTT in the last 72 hours. Lactic Acid  No results found for: LACTA     BNP   No results for input(s): BNP in the last 72 hours. Cultures       Radiology     Plain Films       Chest x-ray reviewed earlier, density of the right lower lobe looks improved      SYSTEM ASSESSMENT      1. Acute respiratory failure with hypoxemia. 2.  Severe ALS. 3.  History of coronary artery disease status post bypass graft surgery  10/2016. 4.  Malnutrition. 5.  Severe debilitation. 6.  Hypertension. 7.  MRSA pneumonia  8. Status post tracheostomy PEG placement today.         Neuro   Apparently he appears to be able to understand    Respiratory   With agitation when we wean sedation down, not ready for weaning    Hemodynamics   Not any pressors    Gastrointestinal/Nutrition   On enteric feeds    Renal   Less than 0.4    Infectious Disease       Hematology/Oncology   Currently on Decadron.   On vancomycin versus pneumonia  Endocrine Social/Spiritual/DNR/Disposition/Other     Patient wife, Varinder Barnes, updated at bedside. She voiced understanding appreciation with ICU staff care.   She knows that there are plans to send patient to a skilled [address] Time   30 min    Electronically signed by Kyle Sanchez MD on 5/13/2022 at 10:39 AM

## 2022-05-13 NOTE — FLOWSHEET NOTE
Prayed outside patient's room     05/13/22 6793   Encounter Summary   Encounter Overview/Reason  Spiritual/Emotional Needs   Service Provided For: Patient   Referral/Consult From: Rounding   Complexity of Encounter Low   Spiritual/Emotional needs   Type Spiritual Support   Assessment/Intervention/Outcome   Intervention Prayer (assurance of)/[place]

## 2022-05-13 NOTE — CARE COORDINATION
DISCHARGE PLANNING NOTE:    Maria Teresa Dies from  [facility] called back and she will look at bed availability for the weekend and call back. [facility] called back and they are unable to accept patient on Saturday due to staffing issues. Cm can call over the weekend to the on-call liaison to check staffing for Sunday. ON CALL Liaison for the weekend: Rasheed Nieves [phone].   Electronically signed by Marylee Leitz, RN on 5/13/2022 at 3:42 PM

## 2022-05-13 NOTE — PROGRESS NOTES
to COVID-19 positive person on 4/16/2022 reportedly. The patient had a positive COVID 19 test at home. Interval changes  5/13/2022   Afebrile. Status post tracheostomy and PEG tube placement 5/12, Levophed was weaned off, awake, nodding to questions appropriately, responds to commands. WBC 20.1 showed stable bilateral airspace disease and right pleural effusion.   Chest x-ray from yesterday  MRSA growth on respiratory culture from 5/6/2022  No growth on blood cultures from 5/5/2022  Patient Vitals for the past 8 hrs:   BP Temp Temp src Pulse Resp SpO2 Weight   05/13/22 0945 (!) 92/47 -- -- 83 20 92 % --   05/13/22 0930 (!) 85/52 -- -- 81 18 91 % --   05/13/22 0915 (!) 87/55 -- -- 86 20 92 % --   05/13/22 0900 (!) 96/50 -- -- 88 22 93 % --   05/13/22 0845 (!) 165/89 -- -- 98 23 97 % --   05/13/22 0830 (!) 167/91 -- -- 91 28 93 % --   05/13/22 0815 (!) 150/83 -- -- 95 26 92 % --   05/13/22 0800 (!) 195/90 -- -- 91 30 95 % --   05/13/22 0745 (!) 179/84 -- -- 95 25 95 % --   05/13/22 0730 (!) 178/91 -- -- 85 22 97 % --   05/13/22 0715 (!) 159/86 -- -- 77 20 98 % --   05/13/22 0700 (!) 141/64 -- -- 71 20 95 % --   05/13/22 0645 113/75 -- -- 80 20 95 % --   05/13/22 0615 (!) 72/43 -- -- 76 20 93 % --   05/13/22 0600 138/73 -- -- 76 20 95 % --   05/13/22 0545 (!) 77/50 -- -- 72 20 94 % --   05/13/22 0530 (!) 102/56 -- -- 66 20 96 % --   05/13/22 0515 95/69 -- -- 75 20 95 % --   05/13/22 0502 -- -- -- -- 20 95 % --   05/13/22 0500 (!) 74/43 -- -- 74 20 95 % --   05/13/22 0445 (!) 69/33 -- -- 73 20 94 % --   05/13/22 0430 126/76 98.5 °F (36.9 °C) Oral 74 20 98 % 154 lb 12.2 oz (70.2 kg)   05/13/22 0415 (!) 81/53 -- -- 73 20 97 % --   05/13/22 0400 (!) 54/35 -- -- 73 20 95 % --   05/13/22 0345 (!) 61/38 -- -- 78 20 95 % --   05/13/22 0330 -- -- -- 75 20 93 % --   05/13/22 0317 -- -- -- 76 20 94 % --   05/13/22 0315 (!) 57/26 -- -- 80 20 94 % --   05/13/22 0300 (!) 53/25 -- -- 80 20 93 % --   05/13/22 0245 (!) 152/69 -- -- 108 18 90 % --       Summary of relevant labs:  Labs:      Micro:   BC x 2-Negative to date.  Sputum Cx- MRSA    Imagin/8 CXR-Stable bibasilar opacities    I have personally reviewed the past medical history, past surgical history, medications, social history, and family history, and I haveupdated the database accordingly. Allergies:   Ciprofloxacin, Lasix [furosemide], Penicillins, Sulfa antibiotics, and Duloxetine     Review of Systems:   Status post tracheostomy, nonverbal, unable to provide    Physical Examination :       Physical Exam  Vitals and nursing note reviewed. HENT:      Head: Normocephalic and atraumatic. Right Ear: External ear normal.      Left Ear: External ear normal.   Eyes:      Conjunctiva/sclera: Conjunctivae normal.      Pupils: Pupils are equal, round, and reactive to light. Neck:      Comments: Tracheostomy in place  Cardiovascular:      Rate and Rhythm: Normal rate and regular rhythm. Heart sounds: Normal heart sounds. No murmur heard. Pulmonary:      Effort: Pulmonary effort is normal. No respiratory distress. Breath sounds: Normal breath sounds. No wheezing. Abdominal:      General: Bowel sounds are normal. There is no distension. Palpations: Abdomen is soft. Tenderness: There is no abdominal tenderness. Genitourinary:     Comments: Brunner. Urine clear. Musculoskeletal:      Cervical back: No rigidity. Right lower leg: No edema. Left lower leg: No edema. Skin:     General: Skin is dry.          Past Medical History:     Past Medical History:   Diagnosis Date    [facility]     Aortic root aneurysm ([facility] 2016    Aortic valve replaced 2016    Hypertension        Past Surgical  History:     Past Surgical History:   Procedure Laterality Date    CARDIOVERSION  2020    w/    HERNIA REPAIR      PARATHYROID GLAND SURGERY  1980    TRACHEOSTOMY N/A 2022 TRAC[facility]OTOMY performed by Preeti De [facility] at [facility] N/A 5/12/2022    EGD/PEG TUBE INSERTION performed by Preeti De Jesus IV, DO at [facility] OR       Medications:      vancomycin  1,250 mg IntraVENous Q12H    metoprolol tartrate  25 mg Oral BID    midodrine  5 mg Oral BID    insulin lispro  0-6 Units SubCUTAneous Q6H    vancomycin (VANCOCIN) intermittent dosing (placeholder)   Other RX Placeholder    senna  1 tablet PEG Tube BID    polyvinyl alcohol  1 drop Both Eyes Q4H    sodium chloride flush  5-40 mL IntraVENous 2 times per day    dexamethasone  6 mg IntraVENous Q24H    enoxaparin  40 mg SubCUTAneous Daily    pantoprazole (PROTONIX) 40 mg injection  40 mg IntraVENous Daily    chlorhexidine  15 mL Mouth/Throat BID       Social History:     Social History     Socioeconomic History    Marital status:      Spouse name: Not on file    Number of children: Not on file    Years of education: Not on file    Highest education level: Not on file   Occupational History    Not on file   Tobacco Use    Smoking status: Never Smoker    Smokeless tobacco: Never Used   Vaping Use    Vaping Use: Never used   Substance and Sexual Activity    Alcohol use: Yes     Alcohol/week: 1.0 standard drink     Types: 1 Cans of beer per week    Drug use: No    Sexual activity: Not on file   Other Topics Concern    Not on file   Social History Narrative    Not on file     Social Determinants of Health     Financial Resource Strain:     Difficulty of Paying Living Expenses: Not on file   Food Insecurity:     Worried About Running Out of Food in the Last Year: Not on file    Fabio of Food in the Last Year: Not on file   Transportation Needs:     Lack of Transportation (Medical): Not on file    Lack of Transportation (Non-Medical):  Not on file   Physical Activity:     Days of Exercise per Week: Not on file    Minutes of Exercise per Session: Not on file Stress:     Feeling of Stress : Not on file   Social Connections:     Frequency of Communication with Friends and Family: Not on file    Frequency of Social Gatherings with Friends and Family: Not on file    Attends Cheondoism Services: Not on file    Active Member of [address] [place] [address] [employer] or Organizations: Not on file    Attends Club or Organization Meetings: Not on file    Marital Status: Not on file   Intimate Partner Violence:     Fear of Current or Ex-Partner: Not on file    Emotionally Abused: Not on file    Physically Abused: Not on file    Sexually Abused: Not on file   Housing Stability:     Unable to Pay for Housing in the Last Year: Not on file    Number of [place] in the Last Year: Not on file    Unstable Housing in the Last Year: Not on file       Family History:     Family History   Problem Relation Age of Onset    Lung Cancer Mother     Heart Disease Father       Medical Decision Making:   I have independently reviewed/ordered the following labs:    CBC with Differential:   Recent Labs     05/12/22 0438 05/13/22 0439   WBC 16.0* 20.1*   HGB 12.1* 11.6*   HCT 35.5* 34.8*   * 490*     BMP:  Recent Labs     05/12/22 0438 05/13/22 0439   * 135   K 4.0 3.8   CL 96* 97*   CO2 31 30   BUN 18 17   CREATININE <0.40* <0.40*     Hepatic Function Panel: No results for input(s): PROT, LABALBU, BILIDIR, IBILI, BILITOT, ALKPHOS, ALT, AST in the last 72 hours. No results for input(s): RPR in the last 72 hours. No results for input(s): HIV in the last 72 hours. No results for input(s): BC in the last 72 hours. Lab Results   Component Value Date    CREATININE <0.40 05/13/2022    GLUCOSE 123 05/13/2022       Detailed results: Thank you for allowing us to participate in the care of this patient. Please call with questions.     This note is created with the assistance of a speech recognition program.  While intending to generate adocument that actually reflects the content of the visit, the document can still have some errors including those of syntax and sound a like substitutions which may escape proof reading. It such instances, actual meaningcan be extrapolated by contextual diversion.     Jake Isidro MD  Office: [phone]  Perfect serve / office [phone]

## 2022-05-13 NOTE — PROGRESS NOTES
RN attempted to take patient down to CT chest scan. Patient immediately become anxious with hyperventilating and increased heart rate and mouthed that he could not breathe. Patient's wife at bedside informed nurse that patient could not lie flat and that he was unable to do previous CT scans. Patient nodded head in agreement. RN started precedex to wean versed drip down, in order to calm patient and potentially try again later. Will continue to monitor.

## 2022-05-13 NOTE — PROGRESS NOTES
Comprehensive Nutrition Assessment    Type and Reason for Visit:  Reassess    Nutrition Recommendations/Plan:   1. Pt s/p PEG with Vital AF restarting at 20 ml/hr going up to 70 ml/hr. Follow for tolerance. Malnutrition Assessment:  Malnutrition Status: At risk for malnutrition (Comment) (05/06/22 1132)    Context:  Acute Illness     Findings of the 6 clinical characteristics of malnutrition:  Energy Intake:  Unable to assess  Weight Loss:  No significant weight loss     Body Fat Loss:  Unable to assess     Muscle Mass Loss:  Unable to assess    Fluid Accumulation:  No significant fluid accumulation     Strength:  Not Performed    Nutrition Assessment:    Pt s/p trach/PEG with tube feedings to start today. Pt remains intubated but no longer on Propofol therefore increasing Vital AF to 70 ml/hr to provide: 2016 kcals, 126 gm protein. Nutrition Related Findings:    Edema: +1 all extremities. Labs & meds reviewed. Hx: ALS for approximately 5 years, 2 feeding tubes in the past, the last one removed 16 months ago after it got infected. Wound Type: Stage II,Pressure Injury       Current Nutrition Intake & Therapies:    Average Meal Intake: NPO     Diet NPO  ADULT TUBE FEEDING; PEG; Peptide Based; Continuous; 20; Yes; 10; Q 4 hours; 70; 60; Q 4 hours  Current Tube Feeding (TF) Orders:  · Feeding Route: PEG  · Formula: Peptide Based  · Schedule: Continuous  · Feeding Regimen: 20 to 70 ml  · Water Flushes: 60 ml every 4 hours  · Current TF & Flush Orders Provides:    · Goal TF & Flush Orders Provides: 2016 kcals, 126 gm protein      Anthropometric Measures:  Height: 5' 10.05\" (177.9 cm)  Ideal Body Weight (IBW): 166 lbs (75 kg)    Admission Body Weight: 132 lb (59.9 kg)  Current Body Weight: 154 lb (69.9 kg),   IBW.  Weight Source: Bed Scale  Current BMI (kg/m2): 22.1  Usual Body Weight: 128 lb (58.1 kg) (9/18/20)  % Weight Change (Calculated): 3.1                    BMI Categories: Underweight (BMI less than 22) age over 72    Estimated Daily Nutrient Needs:  Energy Requirements Based On: Kcal/kg (revised)  Weight Used for Energy Requirements: Admission  Energy (kcal/day): 9070-1296 kcals based on 32-34 kcals/kg using adm wt 60 kg  Weight Used for Protein Requirements: Admission  Protein (g/day): 108- 120 gm protein based on adm wt     Nutrition Diagnosis:   · Inadequate oral intake related to impaired respiratory function as evidenced by NPO or clear liquid status due to medical condition,intubation      Nutrition Interventions:   Food and/or Nutrient Delivery: Start Tube Feeding  Nutrition Education/Counseling: No recommendation at this time  Coordination of Nutrition Care: Continue to monitor while inpatient       Goals:  Previous Goal Met: Progressing toward Goal(s)  Goals: Meet at least 75% of estimated needs       Nutrition Monitoring and Evaluation:   Behavioral-Environmental Outcomes: None Identified  Food/Nutrient Intake Outcomes: Enteral Nutrition Intake/Tolerance  Physical Signs/Symptoms Outcomes: Biochemical Data,GI Status,Fluid Status or Edema,Skin,Weight    Discharge Planning:    Enteral Nutrition     Lanny Vann, [address] [place] [address] [facility]

## 2022-05-13 NOTE — PROGRESS NOTES
Vancomycin Dosing by Pharmacy - Daily Note   Vancomycin Therapy Day:  5  Indication: CAP with ALS    Allergies:  Ciprofloxacin, Lasix [furosemide], Penicillins, Sulfa antibiotics, and Duloxetine   Actual Weight:    Wt Readings from Last 1 Encounters:   05/13/22 154 lb 12.2 oz (70.2 kg)       Labs/Ancillary Data  Estimated Creatinine Clearance: 161 mL/min (based on SCr of 0.4 mg/dL). Recent Labs     05/11/22  0440 05/12/22  0438 05/13/22  0439   CREATININE <0.40* <0.40* <0.40*   BUN 22 18 17   WBC 18.3* 16.0* 20.1*     Procalcitonin   Date Value Ref Range Status   05/07/2022 6.54 (H) <0.09 ng/mL Final     Comment:           Suspected Sepsis:  <0.50 ng/mL     Low likelihood of sepsis. 0.50-2.00 ng/mL     Increased likelihood of sepsis. Antibiotics encouraged. >2.00 ng/mL     High risk of sepsis/shock. Antibiotics strongly encouraged. Suspected Lower Resp Tract Infections:  <0.24 ng/mL     Low likelihood of bacterial infection. >0.24 ng/mL     Increased likelihood of bacterial infection. Antibiotics encouraged. With successful antibiotic therapy, PCT levels should decrease rapidly. (Half-life of 24 to   36 hours.)        Procalcitonin values from samples collected within the first 6 hours of systemic infection   may still be low. Retesting may be indicated. Values from day 1 and day 4 can be entered into the Change in Procalcitonin Calculator   ([email or web address].[email or web address]-pct-[email or web address] [employer]) to determine the patient's Mortality Risk Prognosis        In healthy neonates, plasma Procalcitonin (PCT) concentrations increase gradually after   birth, reaching peak values at about 24 hours of age then decrease to normal values below   0.5 ng/mL by 48-72 hours of age.          Intake/Output Summary (Last 24 hours) at 5/13/2022 0556  Last data filed at 5/13/2022 0554  Gross per 24 hour   Intake 790.75 ml   Output 1650 ml   Net -859.25 ml     Temp: 98.5    Culture Date / Source  /  Results  See micro reports  Recent vancomycin administrations                     vancomycin (VANCOCIN) 1,500 mg in dextrose 5 % 250 mL IVPB (ADDAVIAL) (mg) 1,500 mg New Bag 05/12/22 2245     1,500 mg New Bag  1042    vancomycin (VANCOCIN) 1,250 mg in dextrose 5 % 250 mL IVPB (ADDAVIAL) (mg) 1,250 mg New Bag 05/11/22 2140     1,250 mg New Bag  0842     1,250 mg New Bag 05/10/22 2149     1,250 mg New Bag  1000                    Vancomycin Concentrations:   TROUGH:  No results for input(s): VANCOTROUGH in the last 72 hours. RANDOM:    Recent Labs     05/11/22  0440 05/13/22  0439   VANCORANDOM 16.1 25.1       MRSA Nasal Swab:  resp culture MRSA heavy growth . PLAN     Decrease dose to 1250 mg q12h IV  Ensured BUN/sCr ordered at baseline and every 48 hours x at least 3 levels, then at least weekly. Repeat vancomycin concentration ordered for 5/14/22 @ 0600  Pharmacy will continue to monitor patient and adjust therapy as indicated      Vancomycin Target Concentration Parameters  Treatment  Population Target AUC/HARDEEP Target Trough   Invasive MRSA Infection (bacteremia, pneumonia, meningitis, endocarditis, osteomyelitis)  Sepsis (undifferentiated) 400-600 N/A   Infection due to non-MRSA pathogen  Empiric treatment of non-invasive MRSA infection  (SSTI, UTI) <500 10-15 mg/L   CrCl < 29 mL/min  Rapidly fluctuating serum creatinine   PATI N/A < 15 mg/L     Renal replacement therapy is dosed by levels, per hospital protocol. Abbreviations  * Pauc: probability that AUC is >400 (efficacy); Pconc: probability that Ctrough is above 20 ?g/mL (toxicity); Tox: Probability of nephrotoxicity, based on Osiris et al. Clin Infect Dis 2009. Loading dose: N/A  Regimen: 1250 mg IV every 12 hours. Start time: 10:45 on 05/13/2022  Exposure target: AUC24 (range)400-600 mg/L.hr   AUC24,ss: 502 mg/L.hr  Probability of AUC24 > 400: 95 %  Ctrough,ss: 14.3 mg/L  Probability of Ctrough,ss > 20: 8 %  Probability of nephrotoxicity (Osiris MARILEE 2009): 9 %      Thank you for the consult. Pharmacy will continue to follow.

## 2022-05-13 NOTE — PROGRESS NOTES
Date:   5/13/2022  Patient name: Nathan Peng  Date of admission:  5/5/2022  8:35 AM  MRN:   [id number]  YOB: 1950  PCP: Susana Simpson MD    Reason for Admission: Respiratory failure [facility]) [J96.90]    Cardiology consult: Sick sinus syndrome, status post dual-chamber pacemaker       Referring physician: Dr Sepideh Lopez  COVID-19 pneumonia with a superimposed MRSA pneumonia  Acute hypoxic respiratory failure required intubation on ventilator  Severe ALS  Tracheostomy placement and PEG tube placement 5/11/2022  Status post tracheostomy severe mucous plugging required bronchoscopy can bronchoalveolar lavage  History of thoracic aortic aneurysm repair 2016 at [facility]  Normal LV systolic function  History of hypertension  Sick sinus syndrome, status post dual-chamber pacemaker, Biotronik 7/1/2020           2D echo 5/6/2022  Technically difficult study patient on ventilator  Normal LV size and wall thickness, normal wall motion, ejection fraction more than 55%  No obvious valvular abnormality noted  ICD lead in the right heart chamber     ECG 5/5/2022  Sinus rhythm heart rate 80, PAC      History of present illness  75-year-old male with a past medical history of ALS, CAD, CABG, sick sinus syndrome got hospitalized on 5/5/2022 with increasing shortness of breath. The symptoms started about 3 or 4 days prior to admission. Chest X-Ray showed bilateral infiltrate. Admission WBC count was elevated at 12.8, C-reactive protein 259 and D-dimer 0.423 and procalcitonin troponin 35. 19. COVID-19 PCR was positive.   He got intubated for hypoxic respiratory failure.     Current evaluation  Patient seen and examined in ICU, patient's wife and daughter in the room  He was on ventilator, he was awake complaining of discomfort at site of tracheostomy  He was on norepinephrine infusion for hypotension  ECG monitor a paced, PAC  Oxygen saturation 95%  Normal spontaneous movements in arms or lower extremity  Edema upper extremity    I/O negative balance    WBC 20.1, hemoglobin 11.6, platelets 472  Sodium 135, potassium 3.8, BUN 17, creatinine less than 0.40, glucose 140    Chest x-ray today showed right pleural effusion and basilar opacity are unchanged mild patchy densities in the left base are unchanged no pneumothorax    Medications:   Scheduled Meds:   vancomycin  1,250 mg IntraVENous Q12H    sodium chloride  500 mL IntraVENous Once    metoprolol tartrate  25 mg Oral BID    midodrine  5 mg Oral BID    insulin lispro  0-6 Units SubCUTAneous Q6H    vancomycin (VANCOCIN) intermittent dosing (placeholder)   Other RX Placeholder    senna  1 tablet PEG Tube BID    polyvinyl alcohol  1 drop Both Eyes Q4H    sodium chloride flush  5-40 mL IntraVENous 2 times per day    dexamethasone  6 mg IntraVENous Q24H    enoxaparin  40 mg SubCUTAneous Daily    pantoprazole (PROTONIX) 40 mg injection  40 mg IntraVENous Daily    chlorhexidine  15 mL Mouth/Throat BID     Continuous Infusions:   dexmedetomidine      midazolam 2 mg/hr (05/13/22 0418)    dextrose      norepinephrine 2 mcg/min (05/13/22 1203)    fentaNYL 25 mcg/hr (05/13/22 0528)    sodium chloride      propofol Stopped (05/10/22 0912)     CBC:   Recent Labs     05/11/22  0440 05/12/22  0438 05/13/22  0439   WBC 18.3* 16.0* 20.1*   HGB 11.8* 12.1* 11.6*    484* 490*     BMP:    Recent Labs     05/11/22  0440 05/12/22  0438 05/13/22  0439    134* 135   K 4.2 4.0 3.8    96* 97*   CO2 32* 31 30   BUN 22 18 17   CREATININE <0.40* <0.40* <0.40*   GLUCOSE 136* 102* 123*     Hepatic: No results for input(s): AST, ALT, ALB, BILITOT, ALKPHOS in the last 72 hours. Troponin: No results for input(s): TROPONINI in the last 72 hours. BNP: No results for input(s): BNP in the last 72 hours. Lipids: No results for input(s): CHOL, HDL in the last 72 hours.     Invalid input(s): LDLCALCU  INR: No results for input(s): INR in the last 72 hours.    Objective:   Vitals: BP (!) 143/84   Pulse 80   Temp 98.5 °F (36.9 °C) (Oral)   Resp 20   Ht 5' 10.05\" (1.779 m)   Wt 154 lb 12.2 oz (70.2 kg)   SpO2 93%   BMI 22.17 kg/m²   General appearance: Very frail looking male on ventilator  HEENT: Head: Normal, normocephalic, atraumatic. Neck: Tracheostomy noted difficult to assess neck veins  Lungs: bronchophony bilaterally  Heart: Cardiac apical impulse not palpable heart sounds distant  Abdomen: Abdominal bandage noted PEG tube noted bowel sounds present  Extremities: No spontaneous movements of upper or lower extremities , arm edema  Neurologic: Mental status: Awake difficult to communicate patient on ventilator    EKG: A paced. ECHO: reviewed.    Ejection fraction: 55%    Assessment / Acute Cardiac Problems:     Respiratory failure, COVID-19 pneumonia, hypoxia on ventilator  Status post tracheostomy, PEG tube placement  Bilateral mucous plugging, status post bronchoscopy, bronchoalveolar lavage  ALS  Sick sinus syndrome, status post dual-chamber pacemaker placement Biotronik  Pacemaker checkup satisfactory 5/12/2022 5/13/2022 patient remains on ventilator, hypotensive, increasing leukocytosis      Patient Active Problem List:     Amyotrophic lateral sclerosis (ALS) ([facility])     Muscle spasm     Macrocytosis     Rheumatic tricuspid valve regurgitation     Spinal stenosis of lumbar region     Calculus of kidney and ureter     Hypoglycemia     Diastolic dysfunction     Bilateral enlargement of atria     Mitral valve regurgitation     Thoracic aortic aneurysm, without rupture ([facility])     Paroxysmal atrial fibrillation ([facility])     Aortic valve disorder     Bilateral hearing loss     Acquired cystic kidney disease     History of hypertension     Other atopic dermatitis     Closed fracture of base of distal phalanx of finger     Epididymitis     Osteopenia     Hyperparathyroidism, unspecified ([facility])     Primary osteoarthritis     Senile hyperkeratosis     MVP (mitral valve prolapse)     Presbyopia     Hydrocele     Other hyperlipidemia     Carpal tunnel syndrome     Rheumatic aortic valve insufficiency     Closed fracture of one rib of right side with routine healing     Tinnitus of both ears     Concussion with loss of consciousness     Supraventricular beat, premature     Varicose veins of both lower extremities     Intervertebral disc prolapse with impingement     Obstructive sleep apnea     Prostatitis     Stress     Dermatitis     Weakness of both lower extremities     Insulin resistance     At high risk for falls     Other specified diseases of blood and blood-forming organs     S/P CABG (coronary artery bypass graft)     Cardiac segmental configuration with atrial configuration unknown, ventricular configuration unknown, and inverted normally aligned great arteries     Vitamin A deficiency     Fatigue     Muscular atrophy     Cervical stenosis of spine     Diarrhea     Atrial flutter (HCC)     Tachy-ralf syndrome (HCC)     Anticoagulated on Coumadin     History of permanent cardiac pacemaker placement 7/1/2020:       Cardiac abnormality     Severe malnutrition (HCC)     SOB (shortness of breath)     Monoplegia of left leg (HCC)     Malfunction of percutaneous endoscopic gastrostomy (PEG) tube (HCC)     Post-nasal drip     Physical deconditioning     Infection of PEG site (HCC)     Rash in adult     Respiratory failure (HCC)     COVID-19     ALS (amyotrophic lateral sclerosis) (HCC)     Elevated procalcitonin     Elevated C-reactive protein (CRP)     Elevated ferritin     Elevated erythrocyte sedimentation rate     Leukocytosis     Allergy to multiple antibiotics      Plan of Treatment:   Medications reviewed Case discussed with the patient nurse  Continue low-dose metoprolol continue current dose of midodrine  Give normal saline 500 mL over 2 hours  Continue with the full vent support  Patient is also on antibiotics    Prognosis very poor    Electronically signed by Charis Jean Baptiste MD on 5/13/2022 at 1:18 PM

## 2022-05-13 NOTE — PROGRESS NOTES
PATIENT NAME: Cuca Trejo     TODAY'S DATE: 5/13/2022, 11:01 AM    SUBJECTIVE:    75 y/o male POD 1 trach and peg. No acute post op events overnight. No issues with trach or peg. OBJECTIVE:   VITALS:  BP (!) 92/47   Pulse 83   Temp 98.5 °F (36.9 °C) (Oral)   Resp 20   Ht 5' 10.05\" (1.779 m)   Wt 154 lb 12.2 oz (70.2 kg)   SpO2 92%   BMI 22.17 kg/m²      INTAKE/OUTPUT:      Intake/Output Summary (Last 24 hours) at 5/13/2022 1101  Last data filed at 5/13/2022 0800  Gross per 24 hour   Intake 850.75 ml   Output 1250 ml   Net -399.25 ml                 Exam:    Neck: trach is intact with no active bleeding  Abdomen: soft. PEG tube in place with no active bleeding        ASSESSMENT   1. POD 1 trach and peg    Plan  1. Okay to start tube feeds through peg tube. General surgery to sign off. Please call if there are any questions or concerns.       Electronically signed by Aries De Jesus IV, DO  on 5/13/2022 at 11:01 AM

## 2022-05-14 LAB
ALLEN TEST: ABNORMAL
ANION GAP SERPL CALCULATED.3IONS-SCNC: 8 MMOL/L (ref 9–17)
BUN BLDV-MCNC: 17 MG/DL (ref 8–23)
CALCIUM SERPL-MCNC: 8.6 MG/DL (ref 8.6–10.4)
CARBOXYHEMOGLOBIN: 0.6 % (ref 0–5)
CHLORIDE BLD-SCNC: 100 MMOL/L (ref 98–107)
CO2: 30 MMOL/L (ref 20–31)
CREAT SERPL-MCNC: <0.4 MG/DL (ref 0.7–1.2)
CULTURE: ABNORMAL
DIRECT EXAM: ABNORMAL
FIO2: 30
GFR AFRICAN AMERICAN: ABNORMAL ML/MIN
GFR NON-AFRICAN AMERICAN: ABNORMAL ML/MIN
GFR SERPL CREATININE-BSD FRML MDRD: ABNORMAL ML/MIN/{1.73_M2}
GLUCOSE BLD-MCNC: 128 MG/DL (ref 70–99)
GLUCOSE BLD-MCNC: 149 MG/DL (ref 75–110)
GLUCOSE BLD-MCNC: 153 MG/DL (ref 75–110)
GLUCOSE BLD-MCNC: 178 MG/DL (ref 75–110)
GLUCOSE BLD-MCNC: 191 MG/DL (ref 75–110)
HCO3 ARTERIAL: 32.4 MMOL/L (ref 22–26)
HCT VFR BLD CALC: 33.6 % (ref 41–53)
HEMOGLOBIN: 11.6 G/DL (ref 13.5–17.5)
MAGNESIUM: 1.8 MG/DL (ref 1.6–2.6)
MCH RBC QN AUTO: 33.4 PG (ref 26–34)
MCHC RBC AUTO-ENTMCNC: 34.5 G/DL (ref 31–37)
MCV RBC AUTO: 97 FL (ref 80–100)
METHEMOGLOBIN: 0.9 % (ref 0–1.9)
MODE: ABNORMAL
O2 DEVICE/FLOW/%: ABNORMAL
O2 SAT, ARTERIAL: 90.5 % (ref 95–98)
PATIENT TEMP: 37
PCO2 ARTERIAL: 40.3 MMHG (ref 35–45)
PDW BLD-RTO: 13.7 % (ref 11.5–14.9)
PEEP/CPAP: 10
PH ARTERIAL: 7.51 (ref 7.35–7.45)
PLATELET # BLD: 450 K/UL (ref 150–450)
PMV BLD AUTO: 7.1 FL (ref 6–12)
PO2 ARTERIAL: 59.4 MMHG (ref 80–100)
POSITIVE BASE EXCESS, ART: 9.4 MMOL/L (ref 0–2)
POTASSIUM SERPL-SCNC: 3.3 MMOL/L (ref 3.7–5.3)
PROCALCITONIN: 0.25 NG/ML
PT. POSITION: ABNORMAL
RBC # BLD: 3.46 M/UL (ref 4.5–5.9)
RESPIRATORY RATE: 20
SAMPLE SITE: ABNORMAL
SET RATE: 20
SODIUM BLD-SCNC: 138 MMOL/L (ref 135–144)
SPECIMEN DESCRIPTION: ABNORMAL
TEXT FOR RESPIRATORY: ABNORMAL
TOTAL RATE: 22
VANCOMYCIN RANDOM DATE LAST DOSE: NORMAL
VANCOMYCIN RANDOM DOSE AMOUNT: NORMAL
VANCOMYCIN RANDOM TIME LAST DOSE: NORMAL
VANCOMYCIN RANDOM: 27.1 UG/ML
VT: 500
WBC # BLD: 13.2 K/UL (ref 3.5–11)

## 2022-05-14 PROCEDURE — 6360000002 HC RX W HCPCS: Performed by: STUDENT IN AN ORGANIZED HEALTH CARE EDUCATION/TRAINING PROGRAM

## 2022-05-14 PROCEDURE — 2580000003 HC RX 258: Performed by: STUDENT IN AN ORGANIZED HEALTH CARE EDUCATION/TRAINING PROGRAM

## 2022-05-14 PROCEDURE — 99291 CRITICAL CARE FIRST HOUR: CPT | Performed by: INTERNAL MEDICINE

## 2022-05-14 PROCEDURE — 36415 COLL VENOUS BLD VENIPUNCTURE: CPT

## 2022-05-14 PROCEDURE — 80048 BASIC METABOLIC PNL TOTAL CA: CPT

## 2022-05-14 PROCEDURE — 6370000000 HC RX 637 (ALT 250 FOR IP): Performed by: STUDENT IN AN ORGANIZED HEALTH CARE EDUCATION/TRAINING PROGRAM

## 2022-05-14 PROCEDURE — C9113 INJ PANTOPRAZOLE SODIUM, VIA: HCPCS | Performed by: STUDENT IN AN ORGANIZED HEALTH CARE EDUCATION/TRAINING PROGRAM

## 2022-05-14 PROCEDURE — 6360000002 HC RX W HCPCS: Performed by: INTERNAL MEDICINE

## 2022-05-14 PROCEDURE — 84145 PROCALCITONIN (PCT): CPT

## 2022-05-14 PROCEDURE — 82805 BLOOD GASES W/O2 SATURATION: CPT

## 2022-05-14 PROCEDURE — 2580000003 HC RX 258: Performed by: INTERNAL MEDICINE

## 2022-05-14 PROCEDURE — 80202 ASSAY OF VANCOMYCIN: CPT

## 2022-05-14 PROCEDURE — 82947 ASSAY GLUCOSE BLOOD QUANT: CPT

## 2022-05-14 PROCEDURE — 99233 SBSQ HOSP IP/OBS HIGH 50: CPT | Performed by: INTERNAL MEDICINE

## 2022-05-14 PROCEDURE — 83735 ASSAY OF MAGNESIUM: CPT

## 2022-05-14 PROCEDURE — 85027 COMPLETE CBC AUTOMATED: CPT

## 2022-05-14 PROCEDURE — 2000000000 HC ICU R&B

## 2022-05-14 PROCEDURE — 36600 WITHDRAWAL OF ARTERIAL BLOOD: CPT

## 2022-05-14 PROCEDURE — 94003 VENT MGMT INPAT SUBQ DAY: CPT

## 2022-05-14 PROCEDURE — 94640 AIRWAY INHALATION TREATMENT: CPT

## 2022-05-14 PROCEDURE — 2500000003 HC RX 250 WO HCPCS: Performed by: INTERNAL MEDICINE

## 2022-05-14 RX ORDER — FENTANYL CITRATE 50 UG/ML
50 INJECTION, SOLUTION INTRAMUSCULAR; INTRAVENOUS
Status: DISCONTINUED | OUTPATIENT
Start: 2022-05-14 | End: 2022-05-17 | Stop reason: HOSPADM

## 2022-05-14 RX ORDER — NOREPINEPHRINE BIT/0.9 % NACL 16MG/250ML
1-100 INFUSION BOTTLE (ML) INTRAVENOUS CONTINUOUS
Status: DISCONTINUED | OUTPATIENT
Start: 2022-05-14 | End: 2022-05-16

## 2022-05-14 RX ORDER — MIDAZOLAM HYDROCHLORIDE 1 MG/ML
2 INJECTION INTRAMUSCULAR; INTRAVENOUS
Status: DISCONTINUED | OUTPATIENT
Start: 2022-05-14 | End: 2022-05-17

## 2022-05-14 RX ORDER — LEVALBUTEROL 1.25 MG/.5ML
1.25 SOLUTION, CONCENTRATE RESPIRATORY (INHALATION) EVERY 6 HOURS
Status: DISCONTINUED | OUTPATIENT
Start: 2022-05-14 | End: 2022-05-17 | Stop reason: HOSPADM

## 2022-05-14 RX ORDER — ACETYLCYSTEINE 200 MG/ML
200 SOLUTION ORAL; RESPIRATORY (INHALATION) EVERY 6 HOURS
Status: DISCONTINUED | OUTPATIENT
Start: 2022-05-14 | End: 2022-05-17 | Stop reason: HOSPADM

## 2022-05-14 RX ORDER — SODIUM CHLORIDE FOR INHALATION 0.9 %
3 VIAL, NEBULIZER (ML) INHALATION EVERY 8 HOURS PRN
Status: DISCONTINUED | OUTPATIENT
Start: 2022-05-14 | End: 2022-05-17 | Stop reason: HOSPADM

## 2022-05-14 RX ORDER — 0.9 % SODIUM CHLORIDE 0.9 %
1000 INTRAVENOUS SOLUTION INTRAVENOUS ONCE
Status: COMPLETED | OUTPATIENT
Start: 2022-05-14 | End: 2022-05-14

## 2022-05-14 RX ADMIN — INSULIN LISPRO 1 UNITS: 100 INJECTION, SOLUTION INTRAVENOUS; SUBCUTANEOUS at 16:31

## 2022-05-14 RX ADMIN — SODIUM CHLORIDE, PRESERVATIVE FREE 10 ML: 5 INJECTION INTRAVENOUS at 20:39

## 2022-05-14 RX ADMIN — MIDAZOLAM 2 MG: 1 INJECTION INTRAMUSCULAR; INTRAVENOUS at 17:53

## 2022-05-14 RX ADMIN — POTASSIUM CHLORIDE 10 MEQ: 7.46 INJECTION, SOLUTION INTRAVENOUS at 12:47

## 2022-05-14 RX ADMIN — LEVALBUTEROL HYDROCHLORIDE 1.25 MG: 1.25 SOLUTION, CONCENTRATE RESPIRATORY (INHALATION) at 19:50

## 2022-05-14 RX ADMIN — DEXMEDETOMIDINE HYDROCHLORIDE 1.4 MCG/KG/HR: 4 INJECTION, SOLUTION INTRAVENOUS at 10:08

## 2022-05-14 RX ADMIN — POTASSIUM CHLORIDE 10 MEQ: 7.46 INJECTION, SOLUTION INTRAVENOUS at 10:02

## 2022-05-14 RX ADMIN — DEXMEDETOMIDINE HYDROCHLORIDE 1.4 MCG/KG/HR: 4 INJECTION, SOLUTION INTRAVENOUS at 17:52

## 2022-05-14 RX ADMIN — INSULIN LISPRO 1 UNITS: 100 INJECTION, SOLUTION INTRAVENOUS; SUBCUTANEOUS at 02:26

## 2022-05-14 RX ADMIN — POTASSIUM CHLORIDE 10 MEQ: 7.46 INJECTION, SOLUTION INTRAVENOUS at 06:14

## 2022-05-14 RX ADMIN — MIDAZOLAM 2 MG: 1 INJECTION INTRAMUSCULAR; INTRAVENOUS at 09:44

## 2022-05-14 RX ADMIN — SODIUM CHLORIDE, PRESERVATIVE FREE 10 ML: 5 INJECTION INTRAVENOUS at 09:48

## 2022-05-14 RX ADMIN — DEXMEDETOMIDINE HYDROCHLORIDE 1.4 MCG/KG/HR: 4 INJECTION, SOLUTION INTRAVENOUS at 15:00

## 2022-05-14 RX ADMIN — LEVALBUTEROL HYDROCHLORIDE 1.25 MG: 1.25 SOLUTION, CONCENTRATE RESPIRATORY (INHALATION) at 14:54

## 2022-05-14 RX ADMIN — ACETYLCYSTEINE 200 MG: 200 SOLUTION ORAL; RESPIRATORY (INHALATION) at 19:50

## 2022-05-14 RX ADMIN — POLYVINYL ALCOHOL 1 DROP: 14 SOLUTION/ DROPS OPHTHALMIC at 19:20

## 2022-05-14 RX ADMIN — VANCOMYCIN HYDROCHLORIDE 1250 MG: 1.25 INJECTION, POWDER, LYOPHILIZED, FOR SOLUTION INTRAVENOUS at 10:38

## 2022-05-14 RX ADMIN — MIDAZOLAM HYDROCHLORIDE 2 MG: 1 INJECTION INTRAMUSCULAR; INTRAVENOUS at 04:45

## 2022-05-14 RX ADMIN — DEXAMETHASONE SODIUM PHOSPHATE 6 MG: 10 INJECTION, SOLUTION INTRAMUSCULAR; INTRAVENOUS at 09:44

## 2022-05-14 RX ADMIN — VANCOMYCIN HYDROCHLORIDE 1250 MG: 1.25 INJECTION, POWDER, LYOPHILIZED, FOR SOLUTION INTRAVENOUS at 23:11

## 2022-05-14 RX ADMIN — ENOXAPARIN SODIUM 40 MG: 100 INJECTION SUBCUTANEOUS at 09:44

## 2022-05-14 RX ADMIN — SODIUM CHLORIDE, PRESERVATIVE FREE 40 MG: 5 INJECTION INTRAVENOUS at 09:44

## 2022-05-14 RX ADMIN — POTASSIUM CHLORIDE 10 MEQ: 7.46 INJECTION, SOLUTION INTRAVENOUS at 17:53

## 2022-05-14 RX ADMIN — METOPROLOL TARTRATE 25 MG: 25 TABLET, FILM COATED ORAL at 09:44

## 2022-05-14 RX ADMIN — SODIUM CHLORIDE 1000 ML: 9 INJECTION, SOLUTION INTRAVENOUS at 19:18

## 2022-05-14 RX ADMIN — INSULIN LISPRO 1 UNITS: 100 INJECTION, SOLUTION INTRAVENOUS; SUBCUTANEOUS at 20:53

## 2022-05-14 RX ADMIN — DEXMEDETOMIDINE HYDROCHLORIDE 1 MCG/KG/HR: 4 INJECTION, SOLUTION INTRAVENOUS at 05:01

## 2022-05-14 RX ADMIN — ACETYLCYSTEINE 200 MG: 200 SOLUTION ORAL; RESPIRATORY (INHALATION) at 14:54

## 2022-05-14 RX ADMIN — MIDAZOLAM 2 MG: 1 INJECTION INTRAMUSCULAR; INTRAVENOUS at 23:14

## 2022-05-14 RX ADMIN — CHLORHEXIDINE GLUCONATE 0.12% ORAL RINSE 15 ML: 1.2 LIQUID ORAL at 09:43

## 2022-05-14 RX ADMIN — MIDAZOLAM HYDROCHLORIDE 2 MG: 1 INJECTION INTRAMUSCULAR; INTRAVENOUS at 02:15

## 2022-05-14 RX ADMIN — METOPROLOL TARTRATE 25 MG: 25 TABLET, FILM COATED ORAL at 22:09

## 2022-05-14 RX ADMIN — CHLORHEXIDINE GLUCONATE 0.12% ORAL RINSE 15 ML: 1.2 LIQUID ORAL at 20:39

## 2022-05-14 RX ADMIN — INSULIN LISPRO 2 UNITS: 100 INJECTION, SOLUTION INTRAVENOUS; SUBCUTANEOUS at 10:26

## 2022-05-14 RX ADMIN — POLYVINYL ALCOHOL 1 DROP: 14 SOLUTION/ DROPS OPHTHALMIC at 09:43

## 2022-05-14 RX ADMIN — SENNOSIDES 8.6 MG: 8.6 TABLET, COATED ORAL at 09:44

## 2022-05-14 ASSESSMENT — PULMONARY FUNCTION TESTS
PIF_VALUE: 23
PIF_VALUE: 21
PIF_VALUE: 23
PIF_VALUE: 23
PIF_VALUE: 22
PIF_VALUE: 23
PIF_VALUE: 25
PIF_VALUE: 23
PIF_VALUE: 13
PIF_VALUE: 23
PIF_VALUE: 26
PIF_VALUE: 22
PIF_VALUE: 24
PIF_VALUE: 25
PIF_VALUE: 22
PIF_VALUE: 23
PIF_VALUE: 23
PIF_VALUE: 25
PIF_VALUE: 28
PIF_VALUE: 22
PIF_VALUE: 23

## 2022-05-14 NOTE — PLAN OF CARE
Problem: Discharge Planning  Goal: Discharge to home or other facility with appropriate resources  Outcome: Progressing     Problem: Skin/Tissue Integrity  Goal: Absence of new skin breakdown  Outcome: Progressing     Problem: Pain  Goal: Verbalizes/displays adequate comfort level or baseline comfort level  Outcome: Progressing     Problem: Safety - Adult  Goal: Free from fall injury  Outcome: Progressing  Flowsheets (Taken 5/14/2022 0355)  Free From Fall Injury: Instruct family/caregiver on patient safety     Problem: Respiratory - Adult  Goal: Achieves optimal ventilation and oxygenation  Outcome: Progressing     Problem: Nutrition Deficit:  Goal: Optimize nutritional status  Outcome: Progressing     Problem: ABCDS Injury Assessment  Goal: Absence of physical injury  Outcome: Progressing  Flowsheets (Taken 5/14/2022 0355)  Absence of Physical Injury: Implement safety measures based on patient assessment

## 2022-05-14 NOTE — PLAN OF CARE
Problem: Discharge Planning  Goal: Discharge to home or other facility with appropriate resources  5/14/2022 1725 by Fidelia Pelletier RN  Outcome: Progressing  Flowsheets (Taken 5/14/2022 1600)  Discharge to home or other facility with appropriate resources: Arrange for needed discharge resources and transportation as appropriate     Problem: Skin/Tissue Integrity  Goal: Absence of new skin breakdown  Description: 1. Monitor for areas of redness and/or skin breakdown  2. Assess vascular access sites hourly  3. Every 4-6 hours minimum:  Change oxygen saturation probe site  4. Every 4-6 hours:  If on nasal continuous positive airway pressure, respiratory therapy assess nares and determine need for appliance change or resting period.   5/14/2022 1725 by Fidelia Pelletier RN  Outcome: Progressing     Problem: Pain  Goal: Verbalizes/displays adequate comfort level or baseline comfort level  5/14/2022 1725 by Fidelia Pelletier RN  Outcome: Progressing     Problem: Safety - Adult  Goal: Free from fall injury  5/14/2022 1725 by Fidelia Pelletier RN  Outcome: Progressing     Problem: Respiratory - Adult  Goal: Achieves optimal ventilation and oxygenation  5/14/2022 1725 by Fidelia Pelletier RN  Outcome: Progressing     Problem: Nutrition Deficit:  Goal: Optimize nutritional status  5/14/2022 1725 by Fidelia Pelletier RN  Outcome: Progressing

## 2022-05-14 NOTE — PROGRESS NOTES
[facility] -   Infectious diseases evaluation  admission date 5/5/2022    reason for consultation:   Covid-19 Infection    Impression :   Current:  · COVID-19 with superimposed MRSA pneumonia. · Acute hypoxic respiratory failure required intubation status post tracheostomy 5/12/2022  · Leukocytosis suspect steroid effect. · Status post PEG tube placement 5/12/2022  · Severe ALS  · History of coronary artery disease status post CABG 2016  · Hypertension  · Penicillin, sulfa and Cipro allergy  ·     Other:  · PPM  Discussion / summary of stay / plan of care   · Patient received Actemra 5/5 22. Recommendations     · IV vancomycin, likely change to  Zyvox per PEG tube upon discharge  through 5/21/22  · CT chest without contrast from yesterday reviewed showed right more than left airspace disease and small effusions, no loculation. · Procalcitonin 0.25 today, WBC down to 13.2  · On Dexamethasone 6 mg IV daily. · Follow CBC and renal function  · Continue supportive care        Infection Control Recommendations   · [place] Precautions  · Contact precaution  · Droplet Plus Precautions removed 5/10/2022    Antimicrobial Stewardship Recommendations   · Simplification of therapy  · Targeted therapy    History of Present Illness:   Initial history:  Yadira Koch is a 74y.o.-year-old male a with history of severe ALS who presented to the hospital with worsening cough and shortness of for 3 days prior to admission. Reportedly cough started around 4/20/2022. Chest x-ray showed bilateral infiltrates  Initial labs showed a procalcitonin level of 35.19, last WBC 12.8, creatinine less than 0.4, C-reactive protein 259, D-dimer 0.43  5/5/22 COVID-19 PCR positive  Urinalysis showed 0-2 WBC. The patient was admitted to ICU, was intubated. The patient is intubated, unable to provide history that was obtained from chart review and nursing staff.   Mild amount of respiratory secretion, Brunner catheter in place. PICC line in place  The patient was exposed to COVID-19 positive person on 2022 reportedly. The patient had a positive COVID 19 test at home. Interval changes  2022   Afebrile. Status post tracheostomy and PEG tube placement , Levophed was weaned off, had episodes of hallucinations earlier today, fentanyl and Versed drip discontinued, currently on Precedex. WBC down to 13 today  MRSA growth on respiratory culture from 2022  No growth on blood cultures from 2022  Patient Vitals for the past 8 hrs:   BP Temp Temp src Pulse Resp SpO2 Weight   22 0900 (!) 146/104 -- -- 87 27 94 % --   22 0800 (!) 178/89 97.7 °F (36.5 °C) Axillary 90 28 96 % --   22 0730 (!) 159/90 -- -- 66 20 95 % --   22 0715 (!) 161/94 -- -- 69 20 95 % --   22 0700 (!) 151/89 -- -- 72 28 95 % --   22 0657 -- -- -- 65 20 94 % --   22 0630 135/83 -- -- 69 20 94 % --   22 0615 (!) 158/97 -- -- 70 20 94 % --   22 0600 (!) 153/83 -- -- 73 20 94 % --   22 0545 (!) 171/90 -- -- 77 20 95 % --   22 0530 (!) 166/91 -- -- 78 21 94 % --   22 0515 (!) 172/85 -- -- 70 20 95 % --   22 0502 -- -- -- -- -- -- 155 lb 6.8 oz (70.5 kg)   22 0500 (!) 178/116 -- -- 85 22 96 % --   22 0445 (!) 160/89 -- -- 69 20 95 % --   22 0430 104/70 98.3 °F (36.8 °C) Oral 72 20 93 % --   22 0418 -- -- -- 69 20 94 % --   22 0415 132/79 -- -- 69 20 94 % --   22 0400 113/70 -- -- 67 20 93 % --   22 0345 101/69 -- -- 69 20 92 % --   22 0330 103/62 -- -- 70 20 91 % --   22 0315 (!) 94/58 -- -- 64 20 90 % --   22 0300 109/64 -- -- 68 20 90 % --   22 0245 (!) 140/81 -- -- 76 21 91 % --   22 0230 (!) 166/79 -- -- 91 25 92 % --       Summary of relevant labs:  Labs:      Micro:   BC x 2-Negative to date.    Sputum Cx- MRSA    Imagin/8 CXR-Stable bibasilar opacities    I have personally reviewed the past medical history, past surgical history, medications, social history, and family history, and I haveupdated the database accordingly. Allergies:   Ciprofloxacin, Lasix [furosemide], Penicillins, Sulfa antibiotics, and Duloxetine     Review of Systems:   Status post tracheostomy, nonverbal, unable to provide    Physical Examination :       Physical Exam  Vitals and nursing note reviewed. HENT:      Head: Normocephalic and atraumatic. Right Ear: External ear normal.      Left Ear: External ear normal.   Eyes:      Conjunctiva/sclera: Conjunctivae normal.      Pupils: Pupils are equal, round, and reactive to light. Neck:      Comments: Tracheostomy in place  Cardiovascular:      Rate and Rhythm: Normal rate and regular rhythm. Heart sounds: Normal heart sounds. No murmur heard. Pulmonary:      Effort: Pulmonary effort is normal. No respiratory distress. Breath sounds: Normal breath sounds. No wheezing. Abdominal:      General: Bowel sounds are normal. There is no distension. Palpations: Abdomen is soft. Tenderness: There is no abdominal tenderness. Genitourinary:     Comments: Brunner. Urine clear. Musculoskeletal:      Cervical back: No rigidity. Right lower leg: No edema. Left lower leg: No edema. Skin:     General: Skin is dry.          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    Aortic valve replaced 11/08/2016    Hypertension        Past Surgical  History:     Past Surgical History:   Procedure Laterality Date    CARDIOVERSION  06/27/2020    w/    HERNIA REPAIR      PARATHYROID GLAND SURGERY  1980    TRACHEOSTOMY N/A 5/12/2022    TRACHEOTOMY performed by Gal De Jesus IV, DO at [facility] [facility] N/A 5/12/2022    EGD/PEG TUBE INSERTION performed by Gal De [facility] IV, DO at [facility]: Not on file    Frequency of Social Gatherings with Friends and Family: Not on file    Attends Taoism Services: Not on file    Active Member of Clubs or Organizations: Not on file    Attends Club or Organization Meetings: Not on file    Marital Status: Not on file   Intimate Partner Violence:     Fear of Current or Ex-Partner: Not on file    Emotionally Abused: Not on file    Physically Abused: Not on file    Sexually Abused: Not on file   Housing Stability:     Unable to Pay for Housing in the Last Year: Not on file    Number of [place] in the Last Year: Not on file    Unstable Housing in the Last Year: Not on file       Family History:     Family History   Problem Relation Age of Onset    Lung Cancer Mother     Heart Disease Father       Medical Decision Making:   I have independently reviewed/ordered the following labs:    CBC with Differential:   Recent Labs     05/13/22 0439 05/14/22 0427   WBC 20.1* 13.2*   HGB 11.6* 11.6*   HCT 34.8* 33.6*   * 450     BMP:  Recent Labs     05/13/22 0439 05/14/22 0427    138   K 3.8 3.3*   CL 97* 100   CO2 30 30   BUN 17 17   CREATININE <0.40* <0.40*   MG  --  1.8     Hepatic Function Panel: No results for input(s): PROT, LABALBU, BILIDIR, IBILI, BILITOT, ALKPHOS, ALT, AST in the last 72 hours. No results for input(s): RPR in the last 72 hours. No results for input(s): HIV in the last 72 hours. No results for input(s): BC in the last 72 hours. Lab Results   Component Value Date    CREATININE <0.40 05/14/2022    GLUCOSE 128 05/14/2022       Detailed results: Thank you for allowing us to participate in the care of this patient. Please call with questions.     This note is created with the assistance of a speech recognition program.  While intending to generate adocument that actually reflects the content of the visit, the document can still have some errors including those of syntax and sound a like substitutions which may escape proof reading. It such instances, actual meaningcan be extrapolated by contextual diversion.     Daniele MD Fatemeh  Office: [phone]  Perfect serve / office [phone]

## 2022-05-14 NOTE — PROGRESS NOTES
ICU Progress Note (Vent)   [facility]    Patient - Chris Huggins,  Age - 74 y.o.    - 1950      Room Number -    N -  [id number]   [facility]  Date of Admission -  2022  8:35 AM    Events of Past 24 Hours   This a.m., very confused, claims that there is something next at the clock that he is seeing and it is frightening for him. Currently has been on Versed drip, fentanyl drip, and Precedex drip    Vitals    height is 5' 10.05\" (1.779 m) and weight is 155 lb 6.8 oz (70.5 kg). His oral temperature is 98.3 °F (36.8 °C). His blood pressure is 159/90 (abnormal) and his pulse is 66. His respiration is 20 and oxygen saturation is 95%. Temperature Range: Temp: 98.3 °F (36.8 °C) Temp  Av.4 °F (36.9 °C)  Min: 98.2 °F (36.8 °C)  Max: 98.5 °F (36.9 °C)  BP Range:  Systolic (79QST), JYS:484 , Min:67 , EFM:421     Diastolic (06RMU), LPN:46, Min:34, Max:142    Pulse Range: Pulse  Av.7  Min: 60  Max: 111  Respiration Range: Resp  Av.9  Min: 18  Max: 31  Current Pulse Ox[de-identified]  SpO2: 95 %  24HR Pulse Ox Range:  SpO2  Av.3 %  Min: 88 %  Max: 99 %  Oxygen Amount and Delivery: O2 Flow Rate (L/min): 8 L/min      Wt Readings from Last 3 Encounters:   22 155 lb 6.8 oz (70.5 kg)   22 115 lb (52.2 kg)   22 120 lb (54.4 kg)     I/O       Intake/Output Summary (Last 24 hours) at 2022 0809  Last data filed at 2022 0502  Gross per 24 hour   Intake 1600.73 ml   Output 1725 ml   Net -124.27 ml     I/O last 3 completed shifts:   In:  [I.V.:307.5; NG/GT:970; IV Piggyback:745.5]  Out: 2175 [Urine:2175]     DRAIN/TUBE OUTPUT:     Invasive Lines   Tracheostomy   Lines -PICC line day 10    ICP PRESSURE RANGE:  No data recorded  CVP PRESSURE RANGE:  No data recorded  Mechanical Ventilation Data   SETTINGS (Comprehensive)  Vent Information  Ventilator Day(s): 5  Ventilator ID: servo  Vent Mode: PRVC  Ventilator Initiate: Yes  Additional Respiratory Assessments  Pulse: 66  Resp: 20  SpO2: 95 %  End Tidal CO2: 30 (%)  Position: Semi-Christie's  Humidification Source: HME  Cuff Pressure (cm H2O): 30 cm H2O  Skin Barrier Applied: No       ABGs:   Lab Results   Component Value Date    PHART 7.514 05/14/2022    PO2ART 59.4 05/14/2022    RJJ5OLX 40.3 05/14/2022       Lab Results   Component Value Date    MODE PRVC 05/14/2022         Medications   IV   dexmedetomidine 0.8 mcg/kg/hr (05/14/22 0530)    midazolam Stopped (05/14/22 0631)    dextrose      norepinephrine Stopped (05/14/22 0700)    fentaNYL 25 mcg/hr (05/13/22 1908)    sodium chloride      propofol Stopped (05/10/22 2322)      vancomycin  1,250 mg IntraVENous Q12H    metoprolol tartrate  25 mg Oral BID    midodrine  5 mg Oral BID    insulin lispro  0-6 Units SubCUTAneous Q6H    vancomycin (VANCOCIN) intermittent dosing (placeholder)   Other RX Placeholder    senna  1 tablet PEG Tube BID    polyvinyl alcohol  1 drop Both Eyes Q4H    sodium chloride flush  5-40 mL IntraVENous 2 times per day    dexamethasone  6 mg IntraVENous Q24H    enoxaparin  40 mg SubCUTAneous Daily    pantoprazole (PROTONIX) 40 mg injection  40 mg IntraVENous Daily    chlorhexidine  15 mL Mouth/Throat BID       Diet/Nutrition   Diet NPO  ADULT TUBE FEEDING; PEG; Peptide Based; Continuous; 20; Yes; 10; Q 4 hours; 70; 60; Q 4 hours    Exam   VITALS    height is 5' 10.05\" (1.779 m) and weight is 155 lb 6.8 oz (70.5 kg). His oral temperature is 98.3 °F (36.8 °C). His blood pressure is 159/90 (abnormal) and his pulse is 66. His respiration is 20 and oxygen saturation is 95%. Ventilator Settings (Basic)  Vent Mode: PRVC Resp Rate (Set): 20 bmp/Vt (Set, mL): 500 mL/ /FiO2 : 30 %    Constitutional -alert   General Appearance anxious   HEENT -tracheostomy in place,normocephalic, atraumatic. PERRLA  Lungs - Chest expands equally, no wheezes, rales or rhonchi.   Diminished breath sounds right side  Cardiovascular - Heart sounds are normal.  normal rate and rhythm regular, no murmur, gallop or rub. Abdomen - soft, nontender, nondistended, no masses or organomegaly  Neurologic - CN II-XII are grossly intact. Confused   Skin - no bruising or bleeding  Extremities - no cyanosis, clubbing or edema    Lab Results   CBC     Lab Results   Component Value Date    WBC 13.2 05/14/2022    RBC 3.46 05/14/2022    HGB 11.6 05/14/2022    HCT 33.6 05/14/2022     05/14/2022    MCV 97.0 05/14/2022    MCH 33.4 05/14/2022    MCHC 34.5 05/14/2022    RDW 13.7 05/14/2022    LYMPHOPCT 1 05/06/2022    MONOPCT 3 05/06/2022    BASOPCT 0 05/06/2022    MONOSABS 0.35 05/06/2022    LYMPHSABS 0.12 05/06/2022    EOSABS 0.00 05/06/2022    BASOSABS 0.00 05/06/2022    DIFFTYPE NOT REPORTED 08/25/2021       BMP   Lab Results   Component Value Date     05/14/2022    K 3.3 05/14/2022     05/14/2022    CO2 30 05/14/2022    BUN 17 05/14/2022    CREATININE <0.40 05/14/2022    GLUCOSE 128 05/14/2022    CALCIUM 8.6 05/14/2022       LFTS  Lab Results   Component Value Date    ALKPHOS 137 05/05/2022    ALT 46 05/05/2022    AST 30 05/05/2022    PROT 7.5 05/05/2022    BILITOT 0.66 05/05/2022    LABALBU 3.0 05/05/2022       INR  No results for input(s): PROTIME, INR in the last 72 hours. APTT  No results for input(s): APTT in the last 72 hours. Lactic Acid  No results found for: LACTA     BNP   No results for input(s): BNP in the last 72 hours.      Cultures       Radiology     Plain Films         CT Scans    See actual reports for details    SYSTEM ASSESSMENT    Acute hypoxic respiratory failure, status post tracheostomy/PEG 5/12  Severe ALS  Multifocal pneumonia, MRSA  Acute mental status changes/hallucinations  History of hypertension  Severe protein calorie malnutrition          Neuro   Discontinue fentanyl drip and Versed drip  Use Precedex primarily  Can use fentanyl for pain and Versed for anxiety as needed  Hallucinations persist, may need to start on Geodon    Respiratory   Wean oxygen as tolerated.  Keep O2 sat > 88%  Tried on CPAP and pressure support, but failed due to increased respiratory rate and slight decrease in tidal volume  Will likely need at the very least nocturnal ventilation  Place on Mucomyst and Xopenex aerosols  Hemodynamics   Wean off norepinephrine    Gastrointestinal/Nutrition   Tube feeds per nutrition  GI prophylaxis    Renal   Start free water flushes  Replace potassium    Infectious Disease   Continue vancomycin    Hematology/Oncology   DVT prophylaxis    Endocrine   Blood sugars are okay    Social/Spiritual/DNR/Disposition/Other   Need an LTAC      Critical Care Time   35 min    Electronically signed by Robert Navarro MD on 5/14/2022 at 8:09 AM

## 2022-05-14 NOTE — PROGRESS NOTES
Dr Beatriz Lennox rounded on pt. Pt mouthing words the the effect of not wanting the current measures. Pt is agitated, anxious.

## 2022-05-14 NOTE — PROGRESS NOTES
[address] [facility] Internal Medicine  Sandra Bullock MD; Flakita Macario MD; Syed Yarbrough MD; MD Kaitlin Vaughan MD; MD YANET Mullins [facility] Internal Medicine   [facility]    HISTORY AND PHYSICAL EXAMINATION            Date:   5/14/2022  Patient name:  Georgiana Tapia  Date of admission:  5/5/2022  8:35 AM  MRN:   [id number]  Account:  [de-identified]  YOB: 1950  PCP:    Carin Morin MD  Room:   [de-identified]  Code Status:    Full Code    Chief Complaint:     Chief Complaint   Patient presents with    Shortness of Breath   dyspnea    History Obtained From:     Medical record and nursing staff  Pt sedated      History of Present Illness:     Georgiana Tapia is a 74 y.o. Non- / non  male who presents with Shortness of Breath   and is admitted to the hospital for the management of Respiratory failure ([facility] [address]     75-year-old  gentleman with history of MR atrophic lateral sclerosis well-known to pulmonary team admitted with increasing cough shortness of breath for 3 to 4 days chest x-ray shows bilateral infiltrate elevated procalcitonin elevated leukocytosis patient was tested positive for COVID-19 pneumonia on May 5  Acute respiratory failure secondary to ALS with multifocal COVID-19 pneumonia intubated and sedated treated in the intensive care unit  5/8  Patient, still intubated, sedated with fentanyl propofol  On tube feeds  Had reading of low blood pressure, On Levophed  Antibiotics changed to Zyvox, Sputum Culture growing MRSA         Post trach peg      Past Medical History:     Past Medical History:   Diagnosis Date    ALS (amyotrophic lateral sclerosis) ([facility] [address]     Aortic root aneurysm ([facility] [address] 11/08/2016    Aortic valve replaced 11/08/2016    Hypertension         Past Surgical History:     Past Surgical History:   Procedure Laterality Date    CARDIOVERSION  06/27/2020    w/    HERNIA REPAIR      PARATHYROID GLAND SURGERY  1980    TRACHEOSTOMY N/A 5/12/2022    TRACHEOTOMY performed by Adore Francois Canos IV, DO at [address] N/A 5/12/2022    EGD/PEG TUBE INSERTION performed by Adore iDentiMobyosvany Cankatlyn IV, DO at [address]        Medications Prior to Admission:     Prior to Admission medications    Medication Sig Start Date End Date Taking? Authorizing Provider   neomycin-bacitracin-polymyxin (NEOSPORIN) 3.5-400-5000 OINT ointment Apply topically 3 times daily Apply topically 4 times daily. 4/4/22   Susana Simpson MD   Handicap Placard MISC by Does not apply route Dx: ALS    Duration: 5 years 3/23/22   Susana Simpson MD   medical marijuana Place under the tongue 2 times daily. Historical Provider, MD   acetaminophen (TYLENOL) 325 MG tablet Take 650 mg by mouth every 6 hours as needed 9/25/20   Historical Provider, MD   fluticasone [facility]) 50 MCG/ACT nasal spray 1 spray by Nasal route daily 6/8/21   Susana Simpson MD   SPIRULINA PO Take by mouth    Historical Provider, MD   Vitamin E 100 UNITS TABS Take 60 Units by mouth daily    Historical Provider, MD   SELENIUM PO Take 75 mcg by mouth daily    Historical Provider, MD   SOYA LECITHIN PO Take 1,000 mg by mouth daily    Historical Provider, MD   Multiple Vitamins-Minerals (THERAPEUTIC MULTIVITAMIN-MINERALS) tablet Take 1 tablet by mouth daily    Historical Provider, MD   OMEGA-3 FATTY ACIDS-VITAMIN E PO Take 1 tablet by mouth daily    Historical Provider, MD   Probiotic Product (PROBIOTIC PO) Take 1 tablet by mouth daily    Historical Provider, MD   Ascorbic Acid (VITAMIN C) 500 MG tablet Take 120 mg by mouth daily    Historical Provider, MD        Allergies:     Ciprofloxacin, Lasix [furosemide], Penicillins, Sulfa antibiotics, and Duloxetine    Social History:     Tobacco:    reports that he has never smoked.  He has never used smokeless tobacco.  Alcohol:      reports current alcohol use of about 1.0 standard drink of alcohol per week. Drug Use:  reports no history of drug use.     Family History:     Family History   Problem Relation Age of Onset    Lung Cancer Mother     Heart Disease Father        Review of Systems:     Unable to get ros  SEDATED    Physical Exam:   BP (!) 155/77   Pulse 80   Temp 98.2 °F (36.8 °C) (Axillary)   Resp 21   Ht 5' 10.05\" (1.779 m)   Wt 155 lb 6.8 oz (70.5 kg)   SpO2 95%   BMI 22.27 kg/m²   Temp (24hrs), Av.1 °F (36.7 °C), Min:97.7 °F (36.5 °C), Max:98.5 °F (36.9 °C)    Recent Labs     22  1933 22  0222 22  0930 22  1352   POCGLU 147* 149* 153* 191*       Intake/Output Summary (Last 24 hours) at 2022 1717  Last data filed at 2022 0800  Gross per 24 hour   Intake 1770.73 ml   Output 1725 ml   Net 45.73 ml       General Appearance: post trach and peg  Sedated    Mental status: sedated  Trach peg    Head: normocephalic, atraumatic  Eye: no icterus, redness, pupils equal and reactive, extraocular eye movements intact, conjunctiva clear  Ear: normal external ear, no discharge, hearing intact  Nose: no drainage noted  Mouth: mucous membranes moist  Neck: supple, no carotid bruits, thyroid not palpable  Lungs: on vent  Crackles  Cardiovascular: normal rate, regular rhythm, no murmur, gallop, rub  Abdomen: Soft, nontender, nondistended, normal bowel sounds, no hepatomegaly or splenomegaly  PEG IN PLACE    Neurologic: ALS  Skin: No gross lesions, rashes, bruising or bleeding on exposed skin area  Extremities: peripheral pulses palpable, no pedal edema or calf pain with palpation, RUE is Sligtly swollen       Investigations:      Laboratory Testing:  Recent Results (from the past 24 hour(s))   POC Glucose Fingerstick    Collection Time: 22  7:33 PM   Result Value Ref Range    POC Glucose 147 (H) 75 - 110 mg/dL   POC Glucose Fingerstick    Collection Time: 22  2:22 AM   Result Value Ref Range    POC Glucose 149 (H) 75 - 110 mg/dL CBC    Collection Time: 05/14/22  4:27 AM   Result Value Ref Range    WBC 13.2 (H) 3.5 - 11.0 k/uL    RBC 3.46 (L) 4.5 - 5.9 m/uL    Hemoglobin 11.6 (L) 13.5 - 17.5 g/dL    Hematocrit 33.6 (L) 41 - 53 %    MCV 97.0 80 - 100 fL    MCH 33.4 26 - 34 pg    MCHC 34.5 31 - 37 g/dL    RDW 13.7 11.5 - 14.9 %    Platelets 740 239 - 196 k/uL    MPV 7.1 6.0 - 12.0 fL   Basic Metabolic Panel w/ Reflex to MG    Collection Time: 05/14/22  4:27 AM   Result Value Ref Range    Glucose 128 (H) 70 - 99 mg/dL    BUN 17 8 - 23 mg/dL    CREATININE <0.40 (L) 0.70 - 1.20 mg/dL    Calcium 8.6 8.6 - 10.4 mg/dL    Sodium 138 135 - 144 mmol/L    Potassium 3.3 (L) 3.7 - 5.3 mmol/L    Chloride 100 98 - 107 mmol/L    CO2 30 20 - 31 mmol/L    Anion Gap 8 (L) 9 - 17 mmol/L    GFR Non-African American Can not be calculated >60 mL/min    GFR  Can not be calculated >60 mL/min    GFR Comment         Vancomycin Level, Random    Collection Time: 05/14/22  4:27 AM   Result Value Ref Range    Vancomycin Rm 27.1 ug/mL    Vancomycin Random Dose amount UNKNOWN     Vancomycin Random Date last dose UNKNOWN     Vancomycin Random Time last dose UNKNOWN    Procalcitonin    Collection Time: 05/14/22  4:27 AM   Result Value Ref Range    Procalcitonin 0.25 (H) <0.09 ng/mL   Magnesium    Collection Time: 05/14/22  4:27 AM   Result Value Ref Range    Magnesium 1.8 1.6 - 2.6 mg/dL   Blood gas, arterial    Collection Time: 05/14/22  5:09 AM   Result Value Ref Range    pH, Arterial 7.514 (H) 7.350 - 7.450    pCO2, Arterial 40.3 35.0 - 45.0 mmHg    pO2, Arterial 59.4 (LL) 80.0 - 100.0 mmHg    HCO3, Arterial 32.4 (H) 22.0 - 26.0 mmol/L    Positive Base Excess, Art 9.4 (H) 0.0 - 2.0 mmol/L    O2 Sat, Arterial 90.5 (L) 95 - 98 %    Carboxyhemoglobin 0.6 0 - 5 %    Methemoglobin 0.9 0.0 - 1.9 %    Pt Temp 37     O2 Device/Flow/% VENTILATOR     Respiratory Rate 20     Rosalino Test PASS     Sample Site Right Radial Artery     Pt.  Position SEMI-FOWLERS     Mode PRVC     Set Rate 20     Total Rate 22          FIO2 30     Peep/Cpap 10     Text for Respiratory RESULTS GIVEN TO RN    POC Glucose Fingerstick    Collection Time: 05/14/22  9:30 AM   Result Value Ref Range    POC Glucose 153 (H) 75 - 110 mg/dL   POC Glucose Fingerstick    Collection Time: 05/14/22  1:52 PM   Result Value Ref Range    POC Glucose 191 (H) 75 - 110 mg/dL       Imaging/Diagnostics:  XR CHEST PORTABLE    Result Date: 5/6/2022  1. New right arm PICC extending toward SVC, but distal tip is obscured by overlying pacemaker leads. 2.  Endotracheal tube and enteric tube remain in place. 3.  Unchanged right basilar consolidation and small right pleural effusion. Mild patchy left basilar opacities. XR CHEST PORTABLE    Result Date: 5/5/2022  Endotracheal tube in satisfactory position above the chance NG tip and side-port in gastric fundus Otherwise, unchanged chest demonstrating bibasilar opacities suggesting atelectasis or infection     XR CHEST PORTABLE    Result Date: 5/5/2022  Bibasilar opacities with blunting of right costophrenic angle possibly combination of pneumonia and small right pleural effusion. There is what appears to be a left chest tube terminating in the periphery of the upper lung partly obscured by pacemaker electrode. Please correlate clinically. No recent comparisons.        Assessment :      Hospital Problems           Last Modified POA    * (Principal) Respiratory failure ([address] 5/5/2022 Yes    COVID-19 5/8/2022 Yes    ALS (amyotrophic lateral sclerosis) ([address] 5/8/2022 Yes    Elevated procalcitonin 5/8/2022 Yes    Elevated C-reactive protein (CRP) 5/8/2022 Yes    Elevated ferritin 5/8/2022 Yes    Elevated erythrocyte sedimentation rate 5/8/2022 Yes    Leukocytosis 5/8/2022 Yes    Allergy to multiple antibiotics 5/8/2022 Yes      cc 35 mins    Plan:     49-year-old gentleman with a history of ALS  Admitted for multifocal COVID-19 pneumonia with acute respiratory failure, Possible superimposed bacterial pneumonia with underlying neuromuscular disorder of ALS, Pro-Luis is high  Acute respiratory failure secondary to ALS plus COVID-19 pneumonia intubated sedated  MRSA IN SPUTUM on vanco , respiratory Cultures growing MRSA   For COVID-19 patient received Actemra, on Decadron    History of coronary artery disease s/p CABG,  On Protonix for gastric prophylaxis  Hypokalemia, Improved   DVT prophylaxis Lovenox  Patient critically sick, high chance of clinical deterioration, seen in ICU  Cardio consult for afib  Post trach and peg tube  Wife and daughter in room  On sedation  And fentanyl  Will need LTAC  Tube feed to started  ID consult MRSA noted in sptum  On vanco  CC time 35 minutes        Matthieu Lal MD  5/14/2022  5:17 PM    Copy sent to Dr. Suzanna Sousa MD    Please note that this chart was generated using voice recognition Dragon dictation software. Although every effort was made to ensure the accuracy of this automated transcription, some errors in transcription may have occurred.

## 2022-05-14 NOTE — PROGRESS NOTES
Vancomycin Dosing by Pharmacy - Daily Note   Vancomycin Therapy Day:  6  Indication: CAP with ALS    Allergies:  Ciprofloxacin, Lasix [furosemide], Penicillins, Sulfa antibiotics, and Duloxetine   Actual Weight:    Wt Readings from Last 1 Encounters:   05/14/22 155 lb 6.8 oz (70.5 kg)       Labs/Ancillary Data  Estimated Creatinine Clearance: 162 mL/min (based on SCr of 0.4 mg/dL). Recent Labs     05/12/22  0438 05/13/22  0439 05/14/22  0427   CREATININE <0.40* <0.40* <0.40*   BUN 18 17 17   WBC 16.0* 20.1* 13.2*     Procalcitonin   Date Value Ref Range Status   05/14/2022 0.25 (H) <0.09 ng/mL Final     Comment:           Suspected Sepsis:  <0.50 ng/mL     Low likelihood of sepsis. 0.50-2.00 ng/mL     Increased likelihood of sepsis. Antibiotics encouraged. >2.00 ng/mL     High risk of sepsis/shock. Antibiotics strongly encouraged. Suspected Lower Resp Tract Infections:  <0.24 ng/mL     Low likelihood of bacterial infection. >0.24 ng/mL     Increased likelihood of bacterial infection. Antibiotics encouraged. With successful antibiotic therapy, PCT levels should decrease rapidly. (Half-life of 24 to   36 hours.)        Procalcitonin values from samples collected within the first 6 hours of systemic infection   may still be low. Retesting may be indicated. Values from day 1 and day 4 can be entered into the Change in Procalcitonin Calculator   ([email or web address]-pct-[email or web address]) to determine the patient's Mortality Risk Prognosis        In healthy neonates, plasma Procalcitonin (PCT) concentrations increase gradually after   birth, reaching peak values at about 24 hours of age then decrease to normal values below   0.5 ng/mL by 48-72 hours of age.          Intake/Output Summary (Last 24 hours) at 5/14/2022 0612  Last data filed at 5/14/2022 0502  Gross per 24 hour   Intake 1660.73 ml   Output 1725 ml   Net -64.27 ml     Temp: 98.3    Culture Date / Source  /  Results  See micro report  Recent vancomycin administrations                     vancomycin (VANCOCIN) 1,250 mg in dextrose 5 % 250 mL IVPB (ADDAVIAL) (mg) 1,250 mg New Bag 05/13/22 2338     1,250 mg New Bag  1118    vancomycin (VANCOCIN) 1,500 mg in dextrose 5 % 250 mL IVPB (ADDAVIAL) (mg) 1,500 mg New Bag 05/12/22 2245     1,500 mg New Bag  1042    vancomycin (VANCOCIN) 1,250 mg in dextrose 5 % 250 mL IVPB (ADDAVIAL) (mg) 1,250 mg New Bag 05/11/22 2140     1,250 mg New Bag  0842                    Vancomycin Concentrations:   TROUGH:  No results for input(s): VANCOTROUGH in the last 72 hours. RANDOM:    Recent Labs     05/13/22  0439 05/14/22  0427   VANCORANDOM 25.1 27.1       MRSA Nasal Swab:  resp culture MRSA heavy growth . PLAN     Continue current dose of 1250 mg q12h IV  Ensured BUN/sCr ordered at baseline and every 48 hours x at least 3 levels, then at least weekly. Repeat vancomycin concentration as needed in 7 days. Pharmacy will continue to monitor patient and adjust therapy as indicated      Vancomycin Target Concentration Parameters  Treatment  Population Target AUC/HARDEEP Target Trough   Invasive MRSA Infection (bacteremia, pneumonia, meningitis, endocarditis, osteomyelitis)  Sepsis (undifferentiated) 400-600 N/A   Infection due to non-MRSA pathogen  Empiric treatment of non-invasive MRSA infection  (SSTI, UTI) <500 10-15 mg/L   CrCl < 29 mL/min  Rapidly fluctuating serum creatinine   PATI N/A < 15 mg/L     Renal replacement therapy is dosed by levels, per hospital protocol. Abbreviations  * Pauc: probability that AUC is >400 (efficacy); Pconc: probability that Ctrough is above 20 ?g/mL (toxicity); Tox: Probability of nephrotoxicity, based on Osiris et al. Clin Infect Dis 2009. Loading dose: N/A  Regimen: 1250 mg IV every 12 hours.   Start time: 11:38 on 05/14/2022  Exposure target: AUC24 (range)400-600 mg/L.hr   AUC24,ss: 534 mg/L.hr  Probability of AUC24 > 400: 99 %  Ctrough,ss: 15.6 mg/L  Probability of Ctrough,ss > 20: 12 %  Probability of nephrotoxicity (Lodise MARILEE 2009): 11 %      Thank you for the consult. Pharmacy will continue to follow.

## 2022-05-15 ENCOUNTER — APPOINTMENT (OUTPATIENT)
Dept: GENERAL RADIOLOGY | Age: 72
DRG: 004 | End: 2022-05-15
Payer: MEDICARE

## 2022-05-15 LAB
ANION GAP SERPL CALCULATED.3IONS-SCNC: 11 MMOL/L (ref 9–17)
BUN BLDV-MCNC: 17 MG/DL (ref 8–23)
CALCIUM SERPL-MCNC: 8.5 MG/DL (ref 8.6–10.4)
CHLORIDE BLD-SCNC: 98 MMOL/L (ref 98–107)
CO2: 28 MMOL/L (ref 20–31)
CREAT SERPL-MCNC: <0.4 MG/DL (ref 0.7–1.2)
GFR AFRICAN AMERICAN: ABNORMAL ML/MIN
GFR NON-AFRICAN AMERICAN: ABNORMAL ML/MIN
GFR SERPL CREATININE-BSD FRML MDRD: ABNORMAL ML/MIN/{1.73_M2}
GLUCOSE BLD-MCNC: 166 MG/DL (ref 70–99)
GLUCOSE BLD-MCNC: 166 MG/DL (ref 75–110)
GLUCOSE BLD-MCNC: 168 MG/DL (ref 75–110)
GLUCOSE BLD-MCNC: 175 MG/DL (ref 75–110)
GLUCOSE BLD-MCNC: 198 MG/DL (ref 75–110)
HCT VFR BLD CALC: 37.5 % (ref 41–53)
HEMOGLOBIN: 12.7 G/DL (ref 13.5–17.5)
MCH RBC QN AUTO: 33.2 PG (ref 26–34)
MCHC RBC AUTO-ENTMCNC: 33.9 G/DL (ref 31–37)
MCV RBC AUTO: 98 FL (ref 80–100)
PDW BLD-RTO: 13.3 % (ref 11.5–14.9)
PLATELET # BLD: 436 K/UL (ref 150–450)
PMV BLD AUTO: 7.9 FL (ref 6–12)
POTASSIUM SERPL-SCNC: 3.7 MMOL/L (ref 3.7–5.3)
RBC # BLD: 3.83 M/UL (ref 4.5–5.9)
SODIUM BLD-SCNC: 137 MMOL/L (ref 135–144)
WBC # BLD: 15.6 K/UL (ref 3.5–11)

## 2022-05-15 PROCEDURE — 6370000000 HC RX 637 (ALT 250 FOR IP): Performed by: INTERNAL MEDICINE

## 2022-05-15 PROCEDURE — 94003 VENT MGMT INPAT SUBQ DAY: CPT

## 2022-05-15 PROCEDURE — 94640 AIRWAY INHALATION TREATMENT: CPT

## 2022-05-15 PROCEDURE — 6370000000 HC RX 637 (ALT 250 FOR IP): Performed by: STUDENT IN AN ORGANIZED HEALTH CARE EDUCATION/TRAINING PROGRAM

## 2022-05-15 PROCEDURE — 2580000003 HC RX 258: Performed by: INTERNAL MEDICINE

## 2022-05-15 PROCEDURE — 2000000000 HC ICU R&B

## 2022-05-15 PROCEDURE — 99291 CRITICAL CARE FIRST HOUR: CPT | Performed by: INTERNAL MEDICINE

## 2022-05-15 PROCEDURE — 80048 BASIC METABOLIC PNL TOTAL CA: CPT

## 2022-05-15 PROCEDURE — 99233 SBSQ HOSP IP/OBS HIGH 50: CPT | Performed by: INTERNAL MEDICINE

## 2022-05-15 PROCEDURE — 2500000003 HC RX 250 WO HCPCS: Performed by: INTERNAL MEDICINE

## 2022-05-15 PROCEDURE — 6360000002 HC RX W HCPCS: Performed by: INTERNAL MEDICINE

## 2022-05-15 PROCEDURE — 94760 N-INVAS EAR/PLS OXIMETRY 1: CPT

## 2022-05-15 PROCEDURE — 71045 X-RAY EXAM CHEST 1 VIEW: CPT

## 2022-05-15 PROCEDURE — 6360000002 HC RX W HCPCS: Performed by: STUDENT IN AN ORGANIZED HEALTH CARE EDUCATION/TRAINING PROGRAM

## 2022-05-15 PROCEDURE — 2700000000 HC OXYGEN THERAPY PER DAY

## 2022-05-15 PROCEDURE — 36415 COLL VENOUS BLD VENIPUNCTURE: CPT

## 2022-05-15 PROCEDURE — 2580000003 HC RX 258: Performed by: STUDENT IN AN ORGANIZED HEALTH CARE EDUCATION/TRAINING PROGRAM

## 2022-05-15 PROCEDURE — 85027 COMPLETE CBC AUTOMATED: CPT

## 2022-05-15 PROCEDURE — C9113 INJ PANTOPRAZOLE SODIUM, VIA: HCPCS | Performed by: STUDENT IN AN ORGANIZED HEALTH CARE EDUCATION/TRAINING PROGRAM

## 2022-05-15 PROCEDURE — 82947 ASSAY GLUCOSE BLOOD QUANT: CPT

## 2022-05-15 RX ORDER — BUSPIRONE HYDROCHLORIDE 10 MG/1
10 TABLET ORAL 4 TIMES DAILY
Status: DISCONTINUED | OUTPATIENT
Start: 2022-05-15 | End: 2022-05-16

## 2022-05-15 RX ORDER — ACETAMINOPHEN 500 MG
500 TABLET ORAL EVERY 4 HOURS PRN
Status: DISCONTINUED | OUTPATIENT
Start: 2022-05-15 | End: 2022-05-17 | Stop reason: HOSPADM

## 2022-05-15 RX ADMIN — POLYVINYL ALCOHOL 1 DROP: 14 SOLUTION/ DROPS OPHTHALMIC at 07:48

## 2022-05-15 RX ADMIN — DEXMEDETOMIDINE HYDROCHLORIDE 1 MCG/KG/HR: 4 INJECTION, SOLUTION INTRAVENOUS at 00:04

## 2022-05-15 RX ADMIN — MIDAZOLAM 2 MG: 1 INJECTION INTRAMUSCULAR; INTRAVENOUS at 15:46

## 2022-05-15 RX ADMIN — BUSPIRONE HYDROCHLORIDE 10 MG: 10 TABLET ORAL at 17:35

## 2022-05-15 RX ADMIN — VANCOMYCIN HYDROCHLORIDE 1250 MG: 1.25 INJECTION, POWDER, LYOPHILIZED, FOR SOLUTION INTRAVENOUS at 23:12

## 2022-05-15 RX ADMIN — LEVALBUTEROL HYDROCHLORIDE 1.25 MG: 1.25 SOLUTION, CONCENTRATE RESPIRATORY (INHALATION) at 19:46

## 2022-05-15 RX ADMIN — SODIUM CHLORIDE, PRESERVATIVE FREE 10 ML: 5 INJECTION INTRAVENOUS at 07:49

## 2022-05-15 RX ADMIN — ACETYLCYSTEINE 200 MG: 200 SOLUTION ORAL; RESPIRATORY (INHALATION) at 19:47

## 2022-05-15 RX ADMIN — DEXMEDETOMIDINE HYDROCHLORIDE 1.4 MCG/KG/HR: 4 INJECTION, SOLUTION INTRAVENOUS at 16:04

## 2022-05-15 RX ADMIN — POLYVINYL ALCOHOL 1 DROP: 14 SOLUTION/ DROPS OPHTHALMIC at 12:08

## 2022-05-15 RX ADMIN — ACETYLCYSTEINE 200 MG: 200 SOLUTION ORAL; RESPIRATORY (INHALATION) at 02:08

## 2022-05-15 RX ADMIN — DEXMEDETOMIDINE HYDROCHLORIDE 1.4 MCG/KG/HR: 4 INJECTION, SOLUTION INTRAVENOUS at 08:14

## 2022-05-15 RX ADMIN — MIDAZOLAM 2 MG: 1 INJECTION INTRAMUSCULAR; INTRAVENOUS at 23:19

## 2022-05-15 RX ADMIN — VANCOMYCIN HYDROCHLORIDE 1250 MG: 1.25 INJECTION, POWDER, LYOPHILIZED, FOR SOLUTION INTRAVENOUS at 12:08

## 2022-05-15 RX ADMIN — ENOXAPARIN SODIUM 40 MG: 100 INJECTION SUBCUTANEOUS at 07:40

## 2022-05-15 RX ADMIN — DEXAMETHASONE SODIUM PHOSPHATE 6 MG: 10 INJECTION, SOLUTION INTRAMUSCULAR; INTRAVENOUS at 12:08

## 2022-05-15 RX ADMIN — INSULIN LISPRO 1 UNITS: 100 INJECTION, SOLUTION INTRAVENOUS; SUBCUTANEOUS at 01:56

## 2022-05-15 RX ADMIN — SODIUM CHLORIDE, PRESERVATIVE FREE 10 ML: 5 INJECTION INTRAVENOUS at 20:05

## 2022-05-15 RX ADMIN — ACETYLCYSTEINE 200 MG: 200 SOLUTION ORAL; RESPIRATORY (INHALATION) at 07:39

## 2022-05-15 RX ADMIN — INSULIN LISPRO 1 UNITS: 100 INJECTION, SOLUTION INTRAVENOUS; SUBCUTANEOUS at 07:42

## 2022-05-15 RX ADMIN — DEXMEDETOMIDINE HYDROCHLORIDE 1.4 MCG/KG/HR: 4 INJECTION, SOLUTION INTRAVENOUS at 12:07

## 2022-05-15 RX ADMIN — MIDAZOLAM 2 MG: 1 INJECTION INTRAMUSCULAR; INTRAVENOUS at 05:15

## 2022-05-15 RX ADMIN — MIDODRINE HYDROCHLORIDE 5 MG: 5 TABLET ORAL at 08:04

## 2022-05-15 RX ADMIN — ACETYLCYSTEINE 200 MG: 200 SOLUTION ORAL; RESPIRATORY (INHALATION) at 13:12

## 2022-05-15 RX ADMIN — MIDAZOLAM 2 MG: 1 INJECTION INTRAMUSCULAR; INTRAVENOUS at 02:57

## 2022-05-15 RX ADMIN — SODIUM CHLORIDE, PRESERVATIVE FREE 40 MG: 5 INJECTION INTRAVENOUS at 07:41

## 2022-05-15 RX ADMIN — MIDAZOLAM 2 MG: 1 INJECTION INTRAMUSCULAR; INTRAVENOUS at 17:20

## 2022-05-15 RX ADMIN — LEVALBUTEROL HYDROCHLORIDE 1.25 MG: 1.25 SOLUTION, CONCENTRATE RESPIRATORY (INHALATION) at 13:12

## 2022-05-15 RX ADMIN — DEXMEDETOMIDINE HYDROCHLORIDE 1.2 MCG/KG/HR: 4 INJECTION, SOLUTION INTRAVENOUS at 04:16

## 2022-05-15 RX ADMIN — INSULIN LISPRO 1 UNITS: 100 INJECTION, SOLUTION INTRAVENOUS; SUBCUTANEOUS at 20:25

## 2022-05-15 RX ADMIN — ACETAMINOPHEN 500 MG: 500 TABLET ORAL at 15:46

## 2022-05-15 RX ADMIN — DEXMEDETOMIDINE HYDROCHLORIDE 1.4 MCG/KG/HR: 4 INJECTION, SOLUTION INTRAVENOUS at 20:18

## 2022-05-15 RX ADMIN — MIDAZOLAM 2 MG: 1 INJECTION INTRAMUSCULAR; INTRAVENOUS at 20:24

## 2022-05-15 RX ADMIN — CHLORHEXIDINE GLUCONATE 0.12% ORAL RINSE 15 ML: 1.2 LIQUID ORAL at 20:05

## 2022-05-15 RX ADMIN — DEXMEDETOMIDINE HYDROCHLORIDE 1.4 MCG/KG/HR: 4 INJECTION, SOLUTION INTRAVENOUS at 23:15

## 2022-05-15 RX ADMIN — CHLORHEXIDINE GLUCONATE 0.12% ORAL RINSE 15 ML: 1.2 LIQUID ORAL at 07:40

## 2022-05-15 RX ADMIN — METOPROLOL TARTRATE 25 MG: 25 TABLET, FILM COATED ORAL at 20:24

## 2022-05-15 RX ADMIN — LEVALBUTEROL HYDROCHLORIDE 1.25 MG: 1.25 SOLUTION, CONCENTRATE RESPIRATORY (INHALATION) at 02:08

## 2022-05-15 RX ADMIN — MIDAZOLAM 2 MG: 1 INJECTION INTRAMUSCULAR; INTRAVENOUS at 12:18

## 2022-05-15 RX ADMIN — MIDAZOLAM 2 MG: 1 INJECTION INTRAMUSCULAR; INTRAVENOUS at 01:12

## 2022-05-15 RX ADMIN — LEVALBUTEROL HYDROCHLORIDE 1.25 MG: 1.25 SOLUTION, CONCENTRATE RESPIRATORY (INHALATION) at 07:39

## 2022-05-15 ASSESSMENT — PULMONARY FUNCTION TESTS
PIF_VALUE: 23
PIF_VALUE: 24
PIF_VALUE: 24
PIF_VALUE: 22
PIF_VALUE: 27
PIF_VALUE: 22
PIF_VALUE: 23
PIF_VALUE: 23
PIF_VALUE: 25
PIF_VALUE: 22
PIF_VALUE: 23
PIF_VALUE: 22
PIF_VALUE: 23
PIF_VALUE: 23
PIF_VALUE: 22
PIF_VALUE: 23
PIF_VALUE: 23
PIF_VALUE: 22
PIF_VALUE: 21
PIF_VALUE: 26
PIF_VALUE: 25
PIF_VALUE: 22
PIF_VALUE: 23
PIF_VALUE: 23
PIF_VALUE: 22

## 2022-05-15 ASSESSMENT — PAIN SCALES - GENERAL: PAINLEVEL_OUTOF10: 7

## 2022-05-15 ASSESSMENT — PAIN DESCRIPTION - LOCATION: LOCATION: GENERALIZED

## 2022-05-15 NOTE — PROGRESS NOTES
Date:   5/15/2022  Patient name: Chris Huggins  Date of admission:  5/5/2022  8:35 AM  MRN:   [id number]  YOB: 1950  PCP: Eugene Gilford, MD    Reason for Admission: Respiratory failure [facility]) [J96.90]    Cardiology follow up: Sick sinus syndrome, status post dual-chamber pacemaker       Referring physician: Dr Marylou Maynard  COVID-19 pneumonia with a superimposed MRSA pneumonia  Acute hypoxic respiratory failure required intubation on ventilator  Severe ALS, no movements of arms or legs  Tracheostomy placement and PEG tube placement 5/11/2022  Status post tracheostomy severe mucous plugging required bronchoscopy can bronchoalveolar lavage  History of thoracic aortic aneurysm repair 2016 at [facility]  Normal LV systolic function  History of hypertension  Sick sinus syndrome, status post dual-chamber pacemaker, Biotronik 7/1/2020           2D echo 5/6/2022  Technically difficult study patient on ventilator  Normal LV size and wall thickness, normal wall motion, ejection fraction more than 55%  No obvious valvular abnormality noted  ICD lead in the right heart chamber     ECG 5/5/2022  Sinus rhythm heart rate 80, PAC    History of present illness  70-year-old male with a past medical history of ALS, CAD, CABG, sick sinus syndrome got hospitalized on 5/5/2022 with increasing shortness of breath.  The symptoms started about 3 or 4 days prior to admission. Chest X-Ray showed bilateral infiltrate.  Admission WBC count was elevated at 12.8, C-reactive protein 259 and D-dimer 0.423 and procalcitonin troponin 35.19.  COVID-19 PCR was positive.  He got intubated for hypoxic respiratory failure.     Current evaluation  Patient seen and examined yesterday and in ICU, discussed with the patient and nurse  Very frail looking male he was on ventilator he was awake he was restless  As per patient's nurse information he is requesting frequent tracheal suction  He is having large amount of liquid stool  He is off the Levophed  He had episode of hypotension after receiving Versed, required IV fluids  Difficult to wean off the vent  ECG monitor a paced, PAC  Afebrile blood pressure stable oxygen saturation 96%, FiO2 30%    At present of the PEG tube feeding    Sodium 137, potassium 3.7, creatinine less than 0.40, glucose 168  WBC 15.6, hemoglobin 12.7, platelets 032    Medications:   Scheduled Meds:   acetylcysteine  200 mg Inhalation Q6H    levalbuterol  1.25 mg Nebulization Q6H    vancomycin  1,250 mg IntraVENous Q12H    metoprolol tartrate  25 mg Oral BID    midodrine  5 mg Oral BID    insulin lispro  0-6 Units SubCUTAneous Q6H    vancomycin (VANCOCIN) intermittent dosing (placeholder)   Other RX Placeholder    senna  1 tablet PEG Tube BID    polyvinyl alcohol  1 drop Both Eyes Q4H    sodium chloride flush  5-40 mL IntraVENous 2 times per day    dexamethasone  6 mg IntraVENous Q24H    enoxaparin  40 mg SubCUTAneous Daily    pantoprazole (PROTONIX) 40 mg injection  40 mg IntraVENous Daily    chlorhexidine  15 mL Mouth/Throat BID     Continuous Infusions:   norepinephrine      dexmedetomidine 1.4 mcg/kg/hr (05/15/22 0814)    dextrose      sodium chloride      propofol Stopped (05/10/22 2322)     CBC:   Recent Labs     05/13/22  0439 05/14/22 0427 05/15/22  0413   WBC 20.1* 13.2* 15.6*   HGB 11.6* 11.6* 12.7*   * 450 436     BMP:    Recent Labs     05/13/22  0439 05/14/22 0427 05/15/22  0413    138 137   K 3.8 3.3* 3.7   CL 97* 100 98   CO2 30 30 28   BUN 17 17 17   CREATININE <0.40* <0.40* <0.40*   GLUCOSE 123* 128* 166*     Hepatic: No results for input(s): AST, ALT, ALB, BILITOT, ALKPHOS in the last 72 hours. Troponin: No results for input(s): TROPONINI in the last 72 hours. BNP: No results for input(s): BNP in the last 72 hours. Lipids: No results for input(s): CHOL, HDL in the last 72 hours.     Invalid input(s): LDLCALCU  INR: No results for input(s): INR in the last 72 hours. Objective:   Vitals: /77   Pulse 67   Temp 98.2 °F (36.8 °C) (Axillary)   Resp 20   Ht 5' 10.05\" (1.779 m)   Wt 155 lb 6.8 oz (70.5 kg)   SpO2 96%   BMI 22.27 kg/m²   General appearance: Very frail looking male  HEENT: Head: Normal, normocephalic, atraumatic. Neck: Tracheostomy noted difficult to assess neck veins  Lungs: Poor chest expansion, diminished breath sounds  Heart: Cardiac apical impulse not palpable heart sounds distant  Abdomen: Abdominal bandage noted PEG tube noted  Extremities: No movements of arms or legs  Neurologic: Mental status: Awake difficult to communicate patient on ventilator    EKG: A paced. ECHO: reviewed.    Ejection fraction: 55%    Assessment / Acute Cardiac Problems:     Respiratory failure, COVID-19 pneumonia, hypoxia on ventilator  Normal LV systolic function  Status post tracheostomy, PEG tube placement  Bilateral mucous plugging, status post bronchoscopy, bronchoalveolar lavage  ALS, no movement arms or legs  Sick sinus syndrome, status post dual-chamber pacemaker placement Biotronik  Pacemaker checkup satisfactory 5/12/2022      Patient Active Problem List:     Amyotrophic lateral sclerosis (ALS) (HCC)     Muscle spasm     Macrocytosis     Rheumatic tricuspid valve regurgitation     Spinal stenosis of lumbar region     Calculus of kidney and ureter     Hypoglycemia     Diastolic dysfunction     Bilateral enlargement of atria     Mitral valve regurgitation     Thoracic aortic aneurysm, without rupture ([facility])     Paroxysmal atrial fibrillation ([facility])     Aortic valve disorder     Bilateral hearing loss     Acquired cystic kidney disease     History of hypertension     Other atopic dermatitis     Closed fracture of base of distal phalanx of finger     Epididymitis     Osteopenia     Hyperparathyroidism, unspecified ([facility])     Primary osteoarthritis     Senile hyperkeratosis     MVP (mitral valve prolapse)     Presbyopia     Hydrocele     Other hyperlipidemia     Carpal tunnel syndrome     Rheumatic aortic valve insufficiency     Closed fracture of one rib of right side with routine healing     Tinnitus of both ears     Concussion with loss of consciousness     Supraventricular beat, premature     Varicose veins of both lower extremities     Intervertebral disc prolapse with impingement     Obstructive sleep apnea     Prostatitis     Stress     Dermatitis     Weakness of both lower extremities     Insulin resistance     At high risk for falls     Other specified diseases of blood and blood-forming organs     S/P CABG (coronary artery bypass graft)     Cardiac segmental configuration with atrial configuration unknown, ventricular configuration unknown, and inverted normally aligned great arteries     Vitamin A deficiency     Fatigue     Muscular atrophy     Cervical stenosis of spine     Diarrhea     Atrial flutter (HCC)     Tachy-ralf syndrome (HCC)     Anticoagulated on Coumadin     History of permanent cardiac pacemaker placement 7/1/2020:       Cardiac abnormality     Severe malnutrition (HCC)     SOB (shortness of breath)     Monoplegia of left leg (HCC)     Malfunction of percutaneous endoscopic gastrostomy (PEG) tube (HCC)     Post-nasal drip     Physical deconditioning     Infection of PEG site (HCC)     Rash in adult     Respiratory failure (HCC)     COVID-19     ALS (amyotrophic lateral sclerosis) (HCC)     Elevated procalcitonin     Elevated C-reactive protein (CRP)     Elevated ferritin     Elevated erythrocyte sedimentation rate     Leukocytosis     Allergy to multiple antibiotics      Plan of Treatment:   Medications reviewed  Continue low-dose beta-blocker continue current dose of midodrine  Critical care notes reviewed  Difficult to wean off the vent  Prognosis very poor    Electronically signed by Wyatt Herbert MD on 5/15/2022 at 11:34 AM

## 2022-05-15 NOTE — PROGRESS NOTES
[facility] -   Infectious diseases evaluation  admission date 5/5/2022    reason for consultation:   Covid-19 Infection    Impression :   Current:  · COVID-19 with superimposed MRSA pneumonia. · Acute hypoxic respiratory failure required intubation status post tracheostomy 5/12/2022  · Leukocytosis suspect steroid effect. · Status post PEG tube placement 5/12/2022  · Severe ALS  · History of coronary artery disease status post CABG 2016  · Hypertension  · Penicillin, sulfa and Cipro allergy  ·     Other:  · PPM  Discussion / summary of stay / plan of care   · Patient received Actemra 5/5 22. Recommendations     · IV vancomycin, likely change to  Zyvox per PEG tube upon discharge  through 5/21/22  · Procalcitonin 0.25 on 5/14/22  · On Dexamethasone 6 mg IV daily. · Follow CBC and renal function  · Continue supportive care        Infection Control Recommendations   · [place] Precautions  · Contact precaution  · Droplet Plus Precautions removed 5/10/2022    Antimicrobial Stewardship Recommendations   · Simplification of therapy  · Targeted therapy    History of Present Illness:   Initial history:  Yanira Villa is a 74y.o.-year-old male a with history of severe ALS who presented to the hospital with worsening cough and shortness of for 3 days prior to admission. Reportedly cough started around 4/20/2022. Chest x-ray showed bilateral infiltrates  Initial labs showed a procalcitonin level of 35.19, last WBC 12.8, creatinine less than 0.4, C-reactive protein 259, D-dimer 0.43  5/5/22 COVID-19 PCR positive  Urinalysis showed 0-2 WBC. The patient was admitted to ICU, was intubated. The patient is intubated, unable to provide history that was obtained from chart review and nursing staff. Mild amount of respiratory secretion, Brunner catheter in place. PICC line in place  The patient was exposed to COVID-19 positive person on 4/16/2022 reportedly.   The patient had a positive COVID 19 test at home. Interval changes  5/15/2022   Afebrile. He is awake ,had an episode of hypotension that responded to fluid resuscitation,no pressors. Status post tracheostomy and PEG tube placement   Bronchial washing 22 grew MRSA  CT chest without contrast from 22 reviewed showed right more than left airspace disease and small effusions, no loculation. MRSA growth on respiratory culture from 2022  No growth on blood cultures from 2022  Patient Vitals for the past 8 hrs:   BP Temp Temp src Pulse Resp SpO2   05/15/22 1319 -- -- -- 72 19 99 %   05/15/22 1314 -- -- -- -- 22 94 %   05/15/22 1300 (!) 142/85 -- -- 69 24 94 %   05/15/22 1200 133/76 98.2 °F (36.8 °C) Axillary 64 19 94 %   05/15/22 1114 -- -- -- 67 20 96 %   05/15/22 0745 -- 98.2 °F (36.8 °C) Axillary 76 20 95 %   05/15/22 0741 -- -- -- -- 20 95 %   05/15/22 0740 -- -- -- 76 22 95 %   05/15/22 0700 -- -- -- 79 24 95 %       Micro:   BC x 2-Negative to date.  Sputum Cx- MRSA    Imagin/8 CXR-Stable bibasilar opacities    I have personally reviewed the past medical history, past surgical history, medications, social history, and family history, and I haveupdated the database accordingly. Allergies:   Ciprofloxacin, Lasix [furosemide], Penicillins, Sulfa antibiotics, and Duloxetine     Review of Systems:   Status post tracheostomy, nonverbal, unable to provide    Physical Examination :       Physical Exam  Vitals and nursing note reviewed. HENT:      Head: Normocephalic and atraumatic. Right Ear: External ear normal.      Left Ear: External ear normal.   Eyes:      Conjunctiva/sclera: Conjunctivae normal.      Pupils: Pupils are equal, round, and reactive to light. Neck:      Comments: Tracheostomy in place  Cardiovascular:      Rate and Rhythm: Normal rate and regular rhythm. Heart sounds: Normal heart sounds. No murmur heard.       Pulmonary:      Effort: Pulmonary effort is normal. No respiratory distress. Breath sounds: Normal breath sounds. No wheezing. Abdominal:      General: Bowel sounds are normal. There is no distension. Palpations: Abdomen is soft. Tenderness: There is no abdominal tenderness. Genitourinary:     Comments: Brunner. Urine clear. Musculoskeletal:      Cervical back: No rigidity. Right lower leg: No edema. Left lower leg: No edema. Skin:     General: Skin is dry.          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 OR       Medications:      acetylcysteine  200 mg Inhalation Q6H    levalbuterol  1.25 mg Nebulization Q6H    vancomycin  1,250 mg IntraVENous Q12H    metoprolol tartrate  25 mg Oral BID    midodrine  5 mg Oral BID    insulin lispro  0-6 Units SubCUTAneous Q6H    vancomycin (VANCOCIN) intermittent dosing (placeholder)   Other RX Placeholder    senna  1 tablet PEG Tube BID    polyvinyl alcohol  1 drop Both Eyes Q4H    sodium chloride flush  5-40 mL IntraVENous 2 times per day    dexamethasone  6 mg IntraVENous Q24H    enoxaparin  40 mg SubCUTAneous Daily    pantoprazole (PROTONIX) 40 mg injection  40 mg IntraVENous Daily    chlorhexidine  15 mL Mouth/Throat BID       Social History:     Social History     Socioeconomic History    Marital status:      Spouse name: Not on file    Number of children: Not on file    Years of education: Not on file   [place] Sicard Highest education level: Not on file   Occupational History    Not on file   Tobacco Use    Smoking status: Never Smoker    Smokeless tobacco: Never Used   Vaping Use    Vaping Use: Never used   Substance and Sexual Activity    Alcohol use: Yes     Alcohol/week: 1.0 standard drink     Types: 1 Cans of beer per week    Drug use: No    Sexual activity: Not on file   Other Topics Concern    Not on file   Social History Narrative    Not on file     Social Determinants of Health     Financial Resource Strain:     Difficulty of Paying Living Expenses: Not on file   Food Insecurity:     Worried About Running Out of Food in the Last Year: Not on file    Fabio of Food in the Last Year: Not on file   Transportation Needs:     Lack of Transportation (Medical): Not on file    Lack of Transportation (Non-Medical):  Not on file   Physical Activity:     Days of Exercise per Week: Not on file    Minutes of Exercise per Session: Not on file   Stress:     Feeling of Stress : Not on file   Social Connections:     Frequency of Communication with Friends and Family: Not on file    Frequency of Social Gatherings with Friends and Family: Not on file    Attends Yazdanism Services: Not on file    Active Member of [address] [place] [address] [employer] or Organizations: Not on file    Attends Club or Organization Meetings: Not on file    Marital Status: Not on file   Intimate Partner Violence:     Fear of Current or Ex-Partner: Not on file    Emotionally Abused: Not on file    Physically Abused: Not on file    Sexually Abused: Not on file   Housing Stability:     Unable to Pay for Housing in the Last Year: Not on file    Number of [place] in the Last Year: Not on file    Unstable Housing in the Last Year: Not on file       Family History:     Family History   Problem Relation Age of Onset    Lung Cancer Mother     Heart Disease Father       Medical Decision Making:   I have independently reviewed/ordered the following labs:    CBC with Differential:   Recent Labs     05/14/22  0427 05/15/22  0413   WBC 13.2* 15.6*   HGB 11.6* 12.7*   HCT 33.6* 37.5*    436     BMP:  Recent Labs     05/14/22  0427 05/15/22  0413    137   K 3.3* 3.7    98   CO2 30 28   BUN 17 17   CREATININE <0.40* <0.40*   MG 1.8  --        Lab Results   Component Value Date    CREATININE <0.40 05/15/2022    GLUCOSE 166 05/15/2022       Detailed results: Thank you for allowing us to participate in the care of this patient. Please call with questions. This note is created with the assistance of a speech recognition program.  While intending to generate adocument that actually reflects the content of the visit, the document can still have some errors including those of syntax and sound a like substitutions which may escape proof reading. It such instances, actual meaningcan be extrapolated by contextual diversion.     Maryana Morales MD  Office: [phone]  Perfect serve / office [phone]

## 2022-05-15 NOTE — PROGRESS NOTES
Vancomycin Dosing by Pharmacy - Daily Note   Vancomycin Therapy Day:  7  Indication: CAP with ALS    Allergies:  Ciprofloxacin, Lasix [furosemide], Penicillins, Sulfa antibiotics, and Duloxetine   Actual Weight:    Wt Readings from Last 1 Encounters:   05/14/22 155 lb 6.8 oz (70.5 kg)       Labs/Ancillary Data  Estimated Creatinine Clearance: 162 mL/min (based on SCr of 0.4 mg/dL). Recent Labs     05/13/22  0439 05/14/22  0427 05/15/22  0413   CREATININE <0.40* <0.40* <0.40*   BUN 17 17 17   WBC 20.1* 13.2* 15.6*     Procalcitonin   Date Value Ref Range Status   05/14/2022 0.25 (H) <0.09 ng/mL Final     Comment:           Suspected Sepsis:  <0.50 ng/mL     Low likelihood of sepsis. 0.50-2.00 ng/mL     Increased likelihood of sepsis. Antibiotics encouraged. >2.00 ng/mL     High risk of sepsis/shock. Antibiotics strongly encouraged. Suspected Lower Resp Tract Infections:  <0.24 ng/mL     Low likelihood of bacterial infection. >0.24 ng/mL     Increased likelihood of bacterial infection. Antibiotics encouraged. With successful antibiotic therapy, PCT levels should decrease rapidly. (Half-life of 24 to   36 hours.)        Procalcitonin values from samples collected within the first 6 hours of systemic infection   may still be low. Retesting may be indicated. Values from day 1 and day 4 can be entered into the Change in Procalcitonin Calculator   (www.[employer]-pct-calculator. [employer]) to determine the patient's Mortality Risk Prognosis        In healthy neonates, plasma Procalcitonin (PCT) concentrations increase gradually after   birth, reaching peak values at about 24 hours of age then decrease to normal values below   0.5 ng/mL by 48-72 hours of age.          Intake/Output Summary (Last 24 hours) at 5/15/2022 0743  Last data filed at 5/15/2022 7758  Gross per 24 hour   Intake 2884 ml   Output 2100 ml   Net 784 ml     Temp: 98.1 F    Culture Date / Source  /  Results  See micro  Recent vancomycin administrations                     vancomycin (VANCOCIN) 1,250 mg in dextrose 5 % 250 mL IVPB (ADDAVIAL) (mg) 1,250 mg New Bag 05/14/22 2311     1,250 mg New Bag  1038     1,250 mg New Bag 05/13/22 2338     1,250 mg New Bag  1118    vancomycin (VANCOCIN) 1,500 mg in dextrose 5 % 250 mL IVPB (ADDAVIAL) (mg) 1,500 mg New Bag 05/12/22 2245     1,500 mg New Bag  1042                    Vancomycin Concentrations:   TROUGH:  No results for input(s): VANCOTROUGH in the last 72 hours. RANDOM:    Recent Labs     05/13/22  0439 05/14/22  0427   VANCORANDOM 25.1 27.1       MRSA Nasal Swab: Resp culture with MRSA heavy growth. PLAN     Continue current dose of 1250 mg q12h IV  Ensured BUN/sCr ordered at baseline and every 48 hours x at least 3 levels, then at least weekly. Pharmacy will continue to monitor patient and adjust therapy as indicated      Vancomycin Target Concentration Parameters  Treatment  Population Target AUC/HARDEEP Target Trough   Invasive MRSA Infection (bacteremia, pneumonia, meningitis, endocarditis, osteomyelitis)  Sepsis (undifferentiated) 400-600 N/A   Infection due to non-MRSA pathogen  Empiric treatment of non-invasive MRSA infection  (SSTI, UTI) <500 10-15 mg/L   CrCl < 29 mL/min  Rapidly fluctuating serum creatinine   PATI N/A < 15 mg/L     Renal replacement therapy is dosed by levels, per hospital protocol. Abbreviations  * Pauc: probability that AUC is >400 (efficacy); Pconc: probability that Ctrough is above 20 ?g/mL (toxicity); Tox: Probability of nephrotoxicity, based on Lodnegro et al. Clin Infect Dis 2009. Loading dose: N/A  Regimen: 1250 mg IV every 12 hours. Start time: 11:00 on 05/15/2022  Exposure target: AUC24 (range)400-600 mg/L.hr   AUC24,ss: 534 mg/L.hr  Probability of AUC24 > 400: 99 %  Ctrough,ss: 15.6 mg/L  Probability of Ctrough,ss > 20: 12 %  Probability of nephrotoxicity (Osiris MARILEE 2009): 11 %      Thank you for the consult. Pharmacy will continue to follow.   Desiree Angel

## 2022-05-15 NOTE — PROGRESS NOTES
ICU Progress Note (Vent)   [facility]    Patient - Eva Bravo,  Age - 74 y.o.    - 1950      Room Number -    N -  [id number]   Acct # - [de-identified]  Date of Admission -  2022  8:35 AM    Events of Past 24 Hours   Episodes of agitation which seem to improve when he is given Versed. However, had an episode of hypotension which she did respond to IV fluids overnight. Norepinephrine did not have to be started. Failed weaning trial today. Vitals    height is 5' 10.05\" (1.779 m) and weight is 155 lb 6.8 oz (70.5 kg). His axillary temperature is 98.2 °F (36.8 °C). His blood pressure is 133/77 and his pulse is 76. His respiration is 20 and oxygen saturation is 95%. Temperature Range: Temp: 98.2 °F (36.8 °C) Temp  Av.9 °F (36.6 °C)  Min: 97.6 °F (36.4 °C)  Max: 98.2 °F (36.8 °C)  BP Range:  Systolic (51XYW), AXR:149 , Min:70 , ZFI:804     Diastolic (76IKF), FQ, Min:41, Max:131    Pulse Range: Pulse  Av.8  Min: 66  Max: 96  Respiration Range: Resp  Av  Min: 17  Max: 29  Current Pulse Ox[de-identified]  SpO2: 95 %  24HR Pulse Ox Range:  SpO2  Av.7 %  Min: 85 %  Max: 98 %  Oxygen Amount and Delivery: O2 Flow Rate (L/min): 8 L/min      Wt Readings from Last 3 Encounters:   22 155 lb 6.8 oz (70.5 kg)   22 115 lb (52.2 kg)   22 120 lb (54.4 kg)     I/O       Intake/Output Summary (Last 24 hours) at 5/15/2022 0752  Last data filed at 5/15/2022 0521  Gross per 24 hour   Intake 2884 ml   Output 2100 ml   Net 784 ml     I/O last 3 completed shifts:   In: 4191.7 [I.V.:602.1; NG/GT:2; IV Piggyback:1497.6]  Out: 2700 [Urine:2700]     DRAIN/TUBE OUTPUT:     Invasive Lines   Tracheostomy   Lines -PICC line day     ICP PRESSURE RANGE:  No data recorded  CVP PRESSURE RANGE:  No data recorded  Mechanical Ventilation Data   SETTINGS (Comprehensive)  Vent Information  Ventilator Day(s): 9  Ventilator ID: servo  Vent Mode: PRVC  Ventilator Initiate: Yes  Additional Respiratory Assessments  Pulse: 76  Resp: 20  SpO2: 95 %  End Tidal CO2: 21 (%)  Position: Semi-Christie's  Humidification Source: HME  Cuff Pressure (cm H2O): 30 cm H2O  Skin Barrier Applied: No       ABGs:   Lab Results   Component Value Date    PHART 7.514 05/14/2022    PO2ART 59.4 05/14/2022    KND0DQJ 40.3 05/14/2022       Lab Results   Component Value Date    MODE PRVC 05/14/2022         Medications   IV   norepinephrine      dexmedetomidine 1.4 mcg/kg/hr (05/15/22 0442)    dextrose      sodium chloride      propofol Stopped (05/10/22 2322)      acetylcysteine  200 mg Inhalation Q6H    levalbuterol  1.25 mg Nebulization Q6H    vancomycin  1,250 mg IntraVENous Q12H    metoprolol tartrate  25 mg Oral BID    midodrine  5 mg Oral BID    insulin lispro  0-6 Units SubCUTAneous Q6H    vancomycin (VANCOCIN) intermittent dosing (placeholder)   Other RX Placeholder    senna  1 tablet PEG Tube BID    polyvinyl alcohol  1 drop Both Eyes Q4H    sodium chloride flush  5-40 mL IntraVENous 2 times per day    dexamethasone  6 mg IntraVENous Q24H    enoxaparin  40 mg SubCUTAneous Daily    pantoprazole (PROTONIX) 40 mg injection  40 mg IntraVENous Daily    chlorhexidine  15 mL Mouth/Throat BID       Diet/Nutrition   Diet NPO  ADULT TUBE FEEDING; PEG; Peptide Based; Continuous; 20; Yes; 10; Q 4 hours; 70; 60; Q 4 hours    Exam   VITALS    height is 5' 10.05\" (1.779 m) and weight is 155 lb 6.8 oz (70.5 kg). His axillary temperature is 98.2 °F (36.8 °C). His blood pressure is 133/77 and his pulse is 76. His respiration is 20 and oxygen saturation is 95%. Ventilator Settings (Basic)  Vent Mode: PRVC Resp Rate (Set): 20 bmp/Vt (Set, mL): 500 mL/ /FiO2 : 30 %    Constitutional -alert, off sedation, trying to mouth words   General Appearance  well developed, well nourished  HEENT - Life support devices in place (ET, OG),normocephalic, atraumatic.  PERRLA  Lungs - Chest Versed  Hallucinations have improved    Respiratory   Wean oxygen as tolerated.  Keep O2 sat > 88%  Continues to fail weaning trials  Continue Mucomyst and Xopenex  May need to do another bronchoscopy for airway inspection  Hemodynamics     Less episodes of hypotension, and more episodes of elevated blood pressure in part due to agitation  Metoprolol has been ordered    Gastrointestinal/Nutrition   Continue tube feeds  GI prophylaxis    Renal   Edema and potassium slightly better    Infectious Disease     Monitor white count  Continue to treat for MRSA  Hematology/Oncology   DVT prophylaxis    Endocrine   Sugars are stable    Social/Spiritual/DNR/Disposition/Other     Hopefully, once his pneumonia is cleared, he will be able to wean better but he will need an LTAC in the interim    Critical Care Time   35 min    Electronically signed by Thai Correa MD on 5/15/2022 at 7:59 AM

## 2022-05-15 NOTE — PROGRESS NOTES
[facility] Internal Medicine  Mayra Buckley MD; Lizzy Henderson MD; Belgica Randhawa MD; MD Jolie Badillo MD; MD YANET [facility]   [facility]    HISTORY AND PHYSICAL EXAMINATION            Date:   5/15/2022  Patient name:  Meg Brown  Date of admission:  5/5/2022  8:35 AM  MRN:   [id number]  Account:  [de-identified]  YOB: 1950  PCP:    Kapil Contreras MD  Room:   [de-identified]  Code Status:    Full Code    Chief Complaint:     Chief Complaint   Patient presents with    Shortness of Breath   dyspnea    History Obtained From:     Medical record and nursing staff  Pt sedated      History of Present Illness:     Meg Brown is a 74 y.o. Non- / non  male who presents with Shortness of Breath   and is admitted to the hospital for the management of Respiratory failure ([facility] [address]     75-year-old  gentleman with history of MR atrophic lateral sclerosis well-known to pulmonary team admitted with increasing cough shortness of breath for 3 to 4 days chest x-ray shows bilateral infiltrate elevated procalcitonin elevated leukocytosis patient was tested positive for COVID-19 pneumonia on May 5  Acute respiratory failure secondary to ALS with multifocal COVID-19 pneumonia intubated and sedated treated in the intensive care unit  5/8  Patient, still intubated, sedated with fentanyl propofol  On tube feeds  Had reading of low blood pressure, On Levophed  Antibiotics changed to Zyvox, Sputum Culture growing MRSA         Post trach peg      Past Medical History:     Past Medical History:   Diagnosis Date    ALS (amyotrophic lateral sclerosis) ([facility] [address]     Aortic root aneurysm ([facility] [address] 11/08/2016    Aortic valve replaced 11/08/2016    Hypertension         Past Surgical History:     Past Surgical History:   Procedure Laterality Date    CARDIOVERSION  06/27/2020    w/    HERNIA REPAIR      PARATHYROID GLAND SURGERY  1980    TRACHEOSTOMY N/A 5/12/2022    TRACHEOTOMY performed by Ronnell De Jesus IV, DO at [address] [facility] N/A 5/12/2022    EGD/PEG TUBE INSERTION performed by Ronnell De Jesus IV, DO at [address]        Medications Prior to Admission:     Prior to Admission medications    Medication Sig Start Date End Date Taking? Authorizing Provider   neomycin-bacitracin-polymyxin (NEOSPORIN) 3.5-400-5000 OINT ointment Apply topically 3 times daily Apply topically 4 times daily. 4/4/22   Tisha Johns MD   Handicap Placard MISC by Does not apply route Dx: ALS    Duration: 5 years 3/23/22   Tisha Johns MD   medical marijuana Place under the tongue 2 times daily. Historical Provider, MD   acetaminophen (TYLENOL) 325 MG tablet Take 650 mg by mouth every 6 hours as needed 9/25/20   Historical Provider, MD   fluticasone Verlin Grime) 50 MCG/ACT nasal spray 1 spray by Nasal route daily 6/8/21   Tisha Johns MD   SPIRULINA PO Take by mouth    Historical Provider, MD   Vitamin E 100 UNITS TABS Take 60 Units by mouth daily    Historical Provider, MD   SELENIUM PO Take 75 mcg by mouth daily    Historical Provider, MD   SOYA LECITHIN PO Take 1,000 mg by mouth daily    Historical Provider, MD   Multiple Vitamins-Minerals (THERAPEUTIC MULTIVITAMIN-MINERALS) tablet Take 1 tablet by mouth daily    Historical Provider, MD   OMEGA-3 FATTY ACIDS-VITAMIN E PO Take 1 tablet by mouth daily    Historical Provider, MD   Probiotic Product (PROBIOTIC PO) Take 1 tablet by mouth daily    Historical Provider, MD   Ascorbic Acid (VITAMIN C) 500 MG tablet Take 120 mg by mouth daily    Historical Provider, MD        Allergies:     Ciprofloxacin, Lasix [furosemide], Penicillins, Sulfa antibiotics, and Duloxetine    Social History:     Tobacco:    reports that he has never smoked.  He has never used smokeless tobacco.  Alcohol:      reports current alcohol use of about 1.0 standard drink of alcohol per week. Drug Use:  reports no history of drug use.     Family History:     Family History   Problem Relation Age of Onset    Lung Cancer Mother     Heart Disease Father        Review of Systems:     Unable to get ros  SEDATED    Physical Exam:   /77   Pulse 67   Temp 98.2 °F (36.8 °C) (Axillary)   Resp 20   Ht 5' 10.05\" (1.779 m)   Wt 155 lb 6.8 oz (70.5 kg)   SpO2 96%   BMI 22.27 kg/m²   Temp (24hrs), Av °F (36.7 °C), Min:97.6 °F (36.4 °C), Max:98.2 °F (36.8 °C)    Recent Labs     05/14/22  2037 05/15/22  0154 05/15/22  0741 05/15/22  1136   POCGLU 178* 166* 175* 168*       Intake/Output Summary (Last 24 hours) at 5/15/2022 1240  Last data filed at 5/15/2022 0513  Gross per 24 hour   Intake 2684 ml   Output 2100 ml   Net 584 ml       General Appearance: post trach and peg  Sedated    Mental status: sedated  Trach peg    Head: normocephalic, atraumatic  Eye: no icterus, redness, pupils equal and reactive, extraocular eye movements intact, conjunctiva clear  Ear: normal external ear, no discharge, hearing intact  Nose: no drainage noted  Mouth: mucous membranes moist  Neck: supple, no carotid bruits, thyroid not palpable  Lungs: on vent  Crackles  Cardiovascular: normal rate, regular rhythm, no murmur, gallop, rub  Abdomen: Soft, nontender, nondistended, normal bowel sounds, no hepatomegaly or splenomegaly  PEG IN PLACE    Neurologic: ALS  Skin: No gross lesions, rashes, bruising or bleeding on exposed skin area  Extremities: peripheral pulses palpable, no pedal edema or calf pain with palpation, RUE is Sligtly swollen       Investigations:      Laboratory Testing:  Recent Results (from the past 24 hour(s))   POC Glucose Fingerstick    Collection Time: 22  1:52 PM   Result Value Ref Range    POC Glucose 191 (H) 75 - 110 mg/dL   POC Glucose Fingerstick    Collection Time: 22  8:37 PM   Result Value Ref Range    POC Glucose 178 (H) 75 - 110 mg/dL   POC Glucose Fingerstick    Collection Time: 05/15/22  1:54 AM   Result Value Ref Range    POC Glucose 166 (H) 75 - 110 mg/dL   CBC    Collection Time: 05/15/22  4:13 AM   Result Value Ref Range    WBC 15.6 (H) 3.5 - 11.0 k/uL    RBC 3.83 (L) 4.5 - 5.9 m/uL    Hemoglobin 12.7 (L) 13.5 - 17.5 g/dL    Hematocrit 37.5 (L) 41 - 53 %    MCV 98.0 80 - 100 fL    MCH 33.2 26 - 34 pg    MCHC 33.9 31 - 37 g/dL    RDW 13.3 11.5 - 14.9 %    Platelets 200 373 - 170 k/uL    MPV 7.9 6.0 - 12.0 fL   Basic Metabolic Panel w/ Reflex to MG    Collection Time: 05/15/22  4:13 AM   Result Value Ref Range    Glucose 166 (H) 70 - 99 mg/dL    BUN 17 8 - 23 mg/dL    CREATININE <0.40 (L) 0.70 - 1.20 mg/dL    Calcium 8.5 (L) 8.6 - 10.4 mg/dL    Sodium 137 135 - 144 mmol/L    Potassium 3.7 3.7 - 5.3 mmol/L    Chloride 98 98 - 107 mmol/L    CO2 28 20 - 31 mmol/L    Anion Gap 11 9 - 17 mmol/L    GFR Non-African American Can not be calculated >60 mL/min    GFR  Can not be calculated >60 mL/min    GFR Comment         POC Glucose Fingerstick    Collection Time: 05/15/22  7:41 AM   Result Value Ref Range    POC Glucose 175 (H) 75 - 110 mg/dL   POC Glucose Fingerstick    Collection Time: 05/15/22 11:36 AM   Result Value Ref Range    POC Glucose 168 (H) 75 - 110 mg/dL       Imaging/Diagnostics:  XR CHEST PORTABLE    Result Date: 5/6/2022  1. New right arm PICC extending toward SVC, but distal tip is obscured by overlying pacemaker leads. 2.  Endotracheal tube and enteric tube remain in place. 3.  Unchanged right basilar consolidation and small right pleural effusion. Mild patchy left basilar opacities.      XR CHEST PORTABLE    Result Date: 5/5/2022  Endotracheal tube in satisfactory position above the chance NG tip and side-port in gastric fundus Otherwise, unchanged chest demonstrating bibasilar opacities suggesting atelectasis or infection     XR CHEST PORTABLE    Result Date: 5/5/2022  Bibasilar opacities with blunting of right costophrenic angle possibly combination of pneumonia and small right pleural effusion. There is what appears to be a left chest tube terminating in the periphery of the upper lung partly obscured by pacemaker electrode. Please correlate clinically. No recent comparisons. Assessment :      Hospital Problems           Last Modified POA    * (Principal) Respiratory failure ([address] 5/5/2022 Yes    COVID-19 5/8/2022 Yes    ALS (amyotrophic lateral sclerosis) ([address] 5/8/2022 Yes    Elevated procalcitonin 5/8/2022 Yes    Elevated C-reactive protein (CRP) 5/8/2022 Yes    Elevated ferritin 5/8/2022 Yes    Elevated erythrocyte sedimentation rate 5/8/2022 Yes    Leukocytosis 5/8/2022 Yes    Allergy to multiple antibiotics 5/8/2022 Yes      cc 35 mins    Plan:     70-year-old gentleman with a history of ALS  Admitted for multifocal COVID-19 pneumonia with acute respiratory failure,   Possible superimposed bacterial pneumonia with underlying neuromuscular disorder of ALS, Pro-Luis is high  Acute respiratory failure secondary to ALS plus COVID-19 pneumonia intubated sedated  MRSA IN SPUTUM on vanco , respiratory Cultures growing MRSA   For COVID-19 patient received Actemra, on Decadron    History of coronary artery disease s/p CABG,  On Protonix for gastric prophylaxis  Hypokalemia, Improved   DVT prophylaxis Lovenox    Cardio consult for afib  Post trach and peg tube  Wife  in room  On sedation  And fentanyl  Will need LTAC  Tube feed to started  ID consult MRSA noted in sptum  On [facility] MD Hola  5/15/2022  12:40 PM    Copy sent to Dr. Jean Lakhani MD    Please note that this chart was generated using voice recognition Dragon dictation software. Although every effort was made to ensure the accuracy of this automated transcription, some errors in transcription may have occurred.

## 2022-05-15 NOTE — PROGRESS NOTES
BRONCHOSPASM/BRONCHOCONSTRICTION     [x]         IMPROVE AERATION/BREATH SOUNDS  [x]   ADMINISTER BRONCHODILATOR THERAPY AS APPROPRIATE  []   ASSESS BREATH SOUNDS  [x]   IMPLEMENT AEROSOL/MDI PROTOCOL  [x]   PATIENT EDUCATION AS NEEDED    PROVIDE ADEQUATE OXYGENATION WITH ACCEPTABLE SP02/ABG'S    [x]  IDENTIFY APPROPRIATE OXYGEN THERAPY  [x]   MONITOR SP02/ABG'S AS NEEDED   [x]   PATIENT EDUCATION AS NEEDED    Pt remains on full support ventilation, breath sounds diminished through out anteriorly. Small amount of thick yellow secretions when suctioned.  Inner cannula changed out for [place]

## 2022-05-15 NOTE — FLOWSHEET NOTE
Prayed outside patient's room     05/15/22 1522   Encounter Summary   Encounter Overview/Reason  Spiritual/Emotional Needs   Service Provided For: Patient   Referral/Consult From: Rounding   Complexity of Encounter Low   Spiritual/Emotional needs   Type Spiritual Support   Assessment/Intervention/Outcome   Assessment Unable to assess   Intervention Prayer (assurance of)/[place]

## 2022-05-15 NOTE — CARE COORDINATION
ONGOING DISCHARGE PLAN:    COVID +, Per Nursing, Out of Isolation    Afebrile    30% FIO2, Vent. Patient is alert and oriented x4. Pt is from home w/ Wife & current w/ [facility]. Spoke with patient's Wife, regarding discharge plan and she confirms that plan is still to have LSW follow for DC to [facility]. Miguel could not take Saturday, R/T Staffing. Writer left , for On Call Ghulam Yanez, [phone], to check status for today. Awaiting return call. Trach/Peg on 5/12. Pulmonary following. Remains on Precedex/Levophed. ID on board. Per notes, IV Vanco, likely Zyvox per PEG, through 5/21/22. Will continue to follow for additional discharge needs.     Electronically signed by Tracey Butler, DELORES on 5/15/2022 at 4:03 PM

## 2022-05-16 LAB
ANION GAP SERPL CALCULATED.3IONS-SCNC: 8 MMOL/L (ref 9–17)
BUN BLDV-MCNC: 16 MG/DL (ref 8–23)
CALCIUM SERPL-MCNC: 8.3 MG/DL (ref 8.6–10.4)
CHLORIDE BLD-SCNC: 94 MMOL/L (ref 98–107)
CO2: 31 MMOL/L (ref 20–31)
CREAT SERPL-MCNC: <0.4 MG/DL (ref 0.7–1.2)
GFR AFRICAN AMERICAN: ABNORMAL ML/MIN
GFR NON-AFRICAN AMERICAN: ABNORMAL ML/MIN
GFR SERPL CREATININE-BSD FRML MDRD: ABNORMAL ML/MIN/{1.73_M2}
GLUCOSE BLD-MCNC: 158 MG/DL (ref 75–110)
GLUCOSE BLD-MCNC: 159 MG/DL (ref 75–110)
GLUCOSE BLD-MCNC: 164 MG/DL (ref 75–110)
GLUCOSE BLD-MCNC: 168 MG/DL (ref 75–110)
GLUCOSE BLD-MCNC: 174 MG/DL (ref 70–99)
GLUCOSE BLD-MCNC: 210 MG/DL (ref 75–110)
HCT VFR BLD CALC: 37.2 % (ref 41–53)
HEMOGLOBIN: 12.3 G/DL (ref 13.5–17.5)
MCH RBC QN AUTO: 32.5 PG (ref 26–34)
MCHC RBC AUTO-ENTMCNC: 33.2 G/DL (ref 31–37)
MCV RBC AUTO: 98.1 FL (ref 80–100)
PDW BLD-RTO: 13.9 % (ref 11.5–14.9)
PLATELET # BLD: 401 K/UL (ref 150–450)
PMV BLD AUTO: 7.4 FL (ref 6–12)
POTASSIUM SERPL-SCNC: 3.6 MMOL/L (ref 3.7–5.3)
RBC # BLD: 3.79 M/UL (ref 4.5–5.9)
SODIUM BLD-SCNC: 133 MMOL/L (ref 135–144)
WBC # BLD: 13.5 K/UL (ref 3.5–11)

## 2022-05-16 PROCEDURE — 6360000002 HC RX W HCPCS: Performed by: INTERNAL MEDICINE

## 2022-05-16 PROCEDURE — 2000000000 HC ICU R&B

## 2022-05-16 PROCEDURE — 6370000000 HC RX 637 (ALT 250 FOR IP): Performed by: INTERNAL MEDICINE

## 2022-05-16 PROCEDURE — 2580000003 HC RX 258: Performed by: STUDENT IN AN ORGANIZED HEALTH CARE EDUCATION/TRAINING PROGRAM

## 2022-05-16 PROCEDURE — 6370000000 HC RX 637 (ALT 250 FOR IP): Performed by: STUDENT IN AN ORGANIZED HEALTH CARE EDUCATION/TRAINING PROGRAM

## 2022-05-16 PROCEDURE — 82947 ASSAY GLUCOSE BLOOD QUANT: CPT

## 2022-05-16 PROCEDURE — 94761 N-INVAS EAR/PLS OXIMETRY MLT: CPT

## 2022-05-16 PROCEDURE — 2700000000 HC OXYGEN THERAPY PER DAY

## 2022-05-16 PROCEDURE — 99233 SBSQ HOSP IP/OBS HIGH 50: CPT | Performed by: INTERNAL MEDICINE

## 2022-05-16 PROCEDURE — 94640 AIRWAY INHALATION TREATMENT: CPT

## 2022-05-16 PROCEDURE — 85027 COMPLETE CBC AUTOMATED: CPT

## 2022-05-16 PROCEDURE — C9113 INJ PANTOPRAZOLE SODIUM, VIA: HCPCS | Performed by: STUDENT IN AN ORGANIZED HEALTH CARE EDUCATION/TRAINING PROGRAM

## 2022-05-16 PROCEDURE — 94003 VENT MGMT INPAT SUBQ DAY: CPT

## 2022-05-16 PROCEDURE — 6360000002 HC RX W HCPCS: Performed by: STUDENT IN AN ORGANIZED HEALTH CARE EDUCATION/TRAINING PROGRAM

## 2022-05-16 PROCEDURE — A4216 STERILE WATER/SALINE, 10 ML: HCPCS | Performed by: STUDENT IN AN ORGANIZED HEALTH CARE EDUCATION/TRAINING PROGRAM

## 2022-05-16 PROCEDURE — 2500000003 HC RX 250 WO HCPCS: Performed by: INTERNAL MEDICINE

## 2022-05-16 PROCEDURE — 99239 HOSP IP/OBS DSCHRG MGMT >30: CPT | Performed by: INTERNAL MEDICINE

## 2022-05-16 PROCEDURE — 80048 BASIC METABOLIC PNL TOTAL CA: CPT

## 2022-05-16 RX ORDER — METOPROLOL TARTRATE 50 MG/1
50 TABLET, FILM COATED ORAL 2 TIMES DAILY
Status: DISCONTINUED | OUTPATIENT
Start: 2022-05-16 | End: 2022-05-17 | Stop reason: HOSPADM

## 2022-05-16 RX ORDER — LINEZOLID 600 MG/1
600 TABLET, FILM COATED ORAL EVERY 12 HOURS SCHEDULED
Qty: 9 TABLET | Refills: 0 | Status: SHIPPED | OUTPATIENT
Start: 2022-05-16 | End: 2022-05-21

## 2022-05-16 RX ORDER — SODIUM CHLORIDE FOR INHALATION 0.9 %
3 VIAL, NEBULIZER (ML) INHALATION EVERY 8 HOURS PRN
Refills: 0 | Status: CANCELLED | OUTPATIENT
Start: 2022-05-16 | End: 2022-06-15

## 2022-05-16 RX ORDER — LORAZEPAM 1 MG/1
1 TABLET ORAL EVERY 4 HOURS PRN
Qty: 30 TABLET | Refills: 0 | Status: SHIPPED | OUTPATIENT
Start: 2022-05-16 | End: 2022-05-26

## 2022-05-16 RX ORDER — ONDANSETRON 4 MG/1
4 TABLET, ORALLY DISINTEGRATING ORAL EVERY 8 HOURS PRN
Qty: 30 TABLET | Refills: 0 | Status: SHIPPED | OUTPATIENT
Start: 2022-05-16 | End: 2022-05-26

## 2022-05-16 RX ORDER — PANTOPRAZOLE SODIUM 40 MG/1
40 TABLET, DELAYED RELEASE ORAL
Qty: 30 TABLET | Refills: 3 | Status: SHIPPED | OUTPATIENT
Start: 2022-05-17

## 2022-05-16 RX ORDER — LINEZOLID 600 MG/1
600 TABLET, FILM COATED ORAL EVERY 12 HOURS SCHEDULED
Status: DISCONTINUED | OUTPATIENT
Start: 2022-05-16 | End: 2022-05-17 | Stop reason: HOSPADM

## 2022-05-16 RX ORDER — MIDODRINE HYDROCHLORIDE 5 MG/1
5 TABLET ORAL 2 TIMES DAILY
Qty: 90 TABLET | Refills: 3 | Status: SHIPPED | OUTPATIENT
Start: 2022-05-16

## 2022-05-16 RX ORDER — BUSPIRONE HYDROCHLORIDE 15 MG/1
15 TABLET ORAL 4 TIMES DAILY
Status: DISCONTINUED | OUTPATIENT
Start: 2022-05-16 | End: 2022-05-17 | Stop reason: HOSPADM

## 2022-05-16 RX ORDER — POLYVINYL ALCOHOL 14 MG/ML
1 SOLUTION/ DROPS OPHTHALMIC EVERY 4 HOURS
Qty: 1 EACH | Refills: 4 | Status: SHIPPED | OUTPATIENT
Start: 2022-05-16 | End: 2022-06-15

## 2022-05-16 RX ORDER — ACETYLCYSTEINE 200 MG/ML
200 SOLUTION ORAL; RESPIRATORY (INHALATION) EVERY 6 HOURS
Qty: 120 ML | Refills: 0 | Status: SHIPPED | OUTPATIENT
Start: 2022-05-16 | End: 2022-06-15

## 2022-05-16 RX ORDER — LORAZEPAM 1 MG/1
1 TABLET ORAL EVERY 4 HOURS PRN
Status: DISCONTINUED | OUTPATIENT
Start: 2022-05-16 | End: 2022-05-17 | Stop reason: HOSPADM

## 2022-05-16 RX ORDER — PANTOPRAZOLE SODIUM 40 MG/1
40 TABLET, DELAYED RELEASE ORAL
Status: DISCONTINUED | OUTPATIENT
Start: 2022-05-17 | End: 2022-05-17 | Stop reason: HOSPADM

## 2022-05-16 RX ORDER — INSULIN LISPRO 100 [IU]/ML
0-6 INJECTION, SOLUTION INTRAVENOUS; SUBCUTANEOUS
Qty: 1 EACH | Refills: 0 | Status: SHIPPED | OUTPATIENT
Start: 2022-05-16 | End: 2022-06-15

## 2022-05-16 RX ORDER — CHLORHEXIDINE GLUCONATE 0.12 MG/ML
15 RINSE ORAL 2 TIMES DAILY
Qty: 420 ML | Refills: 0 | Status: SHIPPED | OUTPATIENT
Start: 2022-05-16 | End: 2022-05-30

## 2022-05-16 RX ORDER — SENNA PLUS 8.6 MG/1
1 TABLET ORAL 2 TIMES DAILY
Qty: 60 TABLET | Refills: 0 | Status: SHIPPED | OUTPATIENT
Start: 2022-05-16 | End: 2022-06-15

## 2022-05-16 RX ORDER — MINERAL OIL AND WHITE PETROLATUM 150; 830 MG/G; MG/G
OINTMENT OPHTHALMIC PRN
Refills: 0 | Status: CANCELLED | OUTPATIENT
Start: 2022-05-16

## 2022-05-16 RX ORDER — ENOXAPARIN SODIUM 100 MG/ML
40 INJECTION SUBCUTANEOUS DAILY
Qty: 12 ML | Refills: 0 | Status: SHIPPED | OUTPATIENT
Start: 2022-05-17 | End: 2022-06-16

## 2022-05-16 RX ORDER — BUSPIRONE HYDROCHLORIDE 15 MG/1
15 TABLET ORAL 4 TIMES DAILY
Qty: 120 TABLET | Refills: 0 | Status: SHIPPED | OUTPATIENT
Start: 2022-05-16 | End: 2022-06-15

## 2022-05-16 RX ORDER — LEVALBUTEROL 1.25 MG/.5ML
1.25 SOLUTION, CONCENTRATE RESPIRATORY (INHALATION) EVERY 6 HOURS
Qty: 120 EACH | Refills: 0 | Status: SHIPPED | OUTPATIENT
Start: 2022-05-16 | End: 2022-06-15

## 2022-05-16 RX ADMIN — INSULIN LISPRO 1 UNITS: 100 INJECTION, SOLUTION INTRAVENOUS; SUBCUTANEOUS at 08:28

## 2022-05-16 RX ADMIN — ACETYLCYSTEINE 200 MG: 200 SOLUTION ORAL; RESPIRATORY (INHALATION) at 21:06

## 2022-05-16 RX ADMIN — LORAZEPAM 1 MG: 1 TABLET ORAL at 19:11

## 2022-05-16 RX ADMIN — ENOXAPARIN SODIUM 40 MG: 100 INJECTION SUBCUTANEOUS at 08:27

## 2022-05-16 RX ADMIN — BUSPIRONE HYDROCHLORIDE 15 MG: 15 TABLET ORAL at 21:29

## 2022-05-16 RX ADMIN — LEVALBUTEROL HYDROCHLORIDE 1.25 MG: 1.25 SOLUTION, CONCENTRATE RESPIRATORY (INHALATION) at 13:39

## 2022-05-16 RX ADMIN — CHLORHEXIDINE GLUCONATE 0.12% ORAL RINSE 15 ML: 1.2 LIQUID ORAL at 19:12

## 2022-05-16 RX ADMIN — METOPROLOL TARTRATE 50 MG: 50 TABLET, FILM COATED ORAL at 22:23

## 2022-05-16 RX ADMIN — DEXAMETHASONE SODIUM PHOSPHATE 6 MG: 10 INJECTION, SOLUTION INTRAMUSCULAR; INTRAVENOUS at 10:24

## 2022-05-16 RX ADMIN — DEXMEDETOMIDINE HYDROCHLORIDE 1.4 MCG/KG/HR: 4 INJECTION, SOLUTION INTRAVENOUS at 02:36

## 2022-05-16 RX ADMIN — MIDAZOLAM 2 MG: 1 INJECTION INTRAMUSCULAR; INTRAVENOUS at 02:21

## 2022-05-16 RX ADMIN — BUSPIRONE HYDROCHLORIDE 15 MG: 15 TABLET ORAL at 16:24

## 2022-05-16 RX ADMIN — METOPROLOL TARTRATE 25 MG: 25 TABLET, FILM COATED ORAL at 08:27

## 2022-05-16 RX ADMIN — LEVALBUTEROL HYDROCHLORIDE 1.25 MG: 1.25 SOLUTION, CONCENTRATE RESPIRATORY (INHALATION) at 21:05

## 2022-05-16 RX ADMIN — MIDAZOLAM 2 MG: 1 INJECTION INTRAMUSCULAR; INTRAVENOUS at 10:24

## 2022-05-16 RX ADMIN — FENTANYL CITRATE 50 MCG: 50 INJECTION, SOLUTION INTRAMUSCULAR; INTRAVENOUS at 16:24

## 2022-05-16 RX ADMIN — LEVALBUTEROL HYDROCHLORIDE 1.25 MG: 1.25 SOLUTION, CONCENTRATE RESPIRATORY (INHALATION) at 08:37

## 2022-05-16 RX ADMIN — FENTANYL CITRATE 50 MCG: 50 INJECTION, SOLUTION INTRAMUSCULAR; INTRAVENOUS at 08:26

## 2022-05-16 RX ADMIN — SODIUM CHLORIDE, PRESERVATIVE FREE 10 ML: 5 INJECTION INTRAVENOUS at 21:50

## 2022-05-16 RX ADMIN — MIDODRINE HYDROCHLORIDE 5 MG: 5 TABLET ORAL at 08:27

## 2022-05-16 RX ADMIN — POLYVINYL ALCOHOL 1 DROP: 14 SOLUTION/ DROPS OPHTHALMIC at 08:43

## 2022-05-16 RX ADMIN — LORAZEPAM 1 MG: 1 TABLET ORAL at 12:57

## 2022-05-16 RX ADMIN — INSULIN LISPRO 2 UNITS: 100 INJECTION, SOLUTION INTRAVENOUS; SUBCUTANEOUS at 17:48

## 2022-05-16 RX ADMIN — ACETYLCYSTEINE 200 MG: 200 SOLUTION ORAL; RESPIRATORY (INHALATION) at 08:37

## 2022-05-16 RX ADMIN — ACETYLCYSTEINE 200 MG: 200 SOLUTION ORAL; RESPIRATORY (INHALATION) at 13:40

## 2022-05-16 RX ADMIN — DEXMEDETOMIDINE HYDROCHLORIDE 1.4 MCG/KG/HR: 4 INJECTION, SOLUTION INTRAVENOUS at 16:25

## 2022-05-16 RX ADMIN — INSULIN LISPRO 1 UNITS: 100 INJECTION, SOLUTION INTRAVENOUS; SUBCUTANEOUS at 02:22

## 2022-05-16 RX ADMIN — LINEZOLID 600 MG: 600 TABLET, FILM COATED ORAL at 21:29

## 2022-05-16 RX ADMIN — SENNOSIDES 8.6 MG: 8.6 TABLET, COATED ORAL at 21:29

## 2022-05-16 RX ADMIN — BUSPIRONE HYDROCHLORIDE 10 MG: 10 TABLET ORAL at 08:27

## 2022-05-16 RX ADMIN — LINEZOLID 600 MG: 600 TABLET, FILM COATED ORAL at 10:24

## 2022-05-16 RX ADMIN — SODIUM CHLORIDE, PRESERVATIVE FREE 40 MG: 5 INJECTION INTRAVENOUS at 08:27

## 2022-05-16 RX ADMIN — MIDAZOLAM 2 MG: 1 INJECTION INTRAMUSCULAR; INTRAVENOUS at 06:39

## 2022-05-16 RX ADMIN — POLYVINYL ALCOHOL 1 DROP: 14 SOLUTION/ DROPS OPHTHALMIC at 19:11

## 2022-05-16 RX ADMIN — DEXMEDETOMIDINE HYDROCHLORIDE 1.4 MCG/KG/HR: 4 INJECTION, SOLUTION INTRAVENOUS at 07:45

## 2022-05-16 RX ADMIN — SODIUM CHLORIDE, PRESERVATIVE FREE 10 ML: 5 INJECTION INTRAVENOUS at 08:43

## 2022-05-16 RX ADMIN — INSULIN LISPRO 1 UNITS: 100 INJECTION, SOLUTION INTRAVENOUS; SUBCUTANEOUS at 21:31

## 2022-05-16 RX ADMIN — DEXMEDETOMIDINE HYDROCHLORIDE 1.4 MCG/KG/HR: 4 INJECTION, SOLUTION INTRAVENOUS at 20:49

## 2022-05-16 RX ADMIN — DEXMEDETOMIDINE HYDROCHLORIDE 1.4 MCG/KG/HR: 4 INJECTION, SOLUTION INTRAVENOUS at 13:00

## 2022-05-16 RX ADMIN — BUSPIRONE HYDROCHLORIDE 15 MG: 15 TABLET ORAL at 12:57

## 2022-05-16 RX ADMIN — MIDAZOLAM 2 MG: 1 INJECTION INTRAMUSCULAR; INTRAVENOUS at 04:12

## 2022-05-16 RX ADMIN — POLYVINYL ALCOHOL 1 DROP: 14 SOLUTION/ DROPS OPHTHALMIC at 10:26

## 2022-05-16 ASSESSMENT — PULMONARY FUNCTION TESTS
PIF_VALUE: 23
PIF_VALUE: 22
PIF_VALUE: 21
PIF_VALUE: 23
PIF_VALUE: 23
PIF_VALUE: 24
PIF_VALUE: 23
PIF_VALUE: 22
PIF_VALUE: 24
PIF_VALUE: 23
PIF_VALUE: 24
PIF_VALUE: 22
PIF_VALUE: 22
PIF_VALUE: 23
PIF_VALUE: 22
PIF_VALUE: 23
PIF_VALUE: 22
PIF_VALUE: 23
PIF_VALUE: 23
PIF_VALUE: 21
PIF_VALUE: 24
PIF_VALUE: 22
PIF_VALUE: 23
PIF_VALUE: 23
PIF_VALUE: 12
PIF_VALUE: 21
PIF_VALUE: 23
PIF_VALUE: 22
PIF_VALUE: 21
PIF_VALUE: 22
PIF_VALUE: 21
PIF_VALUE: 20
PIF_VALUE: 21
PIF_VALUE: 24
PIF_VALUE: 24
PIF_VALUE: 22
PIF_VALUE: 23
PIF_VALUE: 23
PIF_VALUE: 22

## 2022-05-16 ASSESSMENT — PAIN SCALES - GENERAL
PAINLEVEL_OUTOF10: 4
PAINLEVEL_OUTOF10: 5

## 2022-05-16 ASSESSMENT — PAIN DESCRIPTION - LOCATION: LOCATION: NECK

## 2022-05-16 ASSESSMENT — PAIN DESCRIPTION - PAIN TYPE: TYPE: SURGICAL PAIN

## 2022-05-16 ASSESSMENT — PAIN DESCRIPTION - DESCRIPTORS: DESCRIPTORS: ACHING;BURNING

## 2022-05-16 ASSESSMENT — PAIN DESCRIPTION - ORIENTATION: ORIENTATION: ANTERIOR

## 2022-05-16 NOTE — PROGRESS NOTES
Vancomycin Dosing by Pharmacy - Daily Note   Vancomycin Therapy Day:  8  Indication: MRSA pneumonia     Allergies:  Ciprofloxacin, Lasix [furosemide], Penicillins, Sulfa antibiotics, and Duloxetine   Actual Weight:    Wt Readings from Last 1 Encounters:   05/14/22 155 lb 6.8 oz (70.5 kg)       Labs/Ancillary Data  Estimated Creatinine Clearance: 162 mL/min (based on SCr of 0.4 mg/dL). Recent Labs     05/14/22  0427 05/15/22  0413 05/16/22  0433   CREATININE <0.40* <0.40* <0.40*   BUN 17 17 16   WBC 13.2* 15.6* 13.5*     Procalcitonin   Date Value Ref Range Status   05/14/2022 0.25 (H) <0.09 ng/mL Final     Comment:           Suspected Sepsis:  <0.50 ng/mL     Low likelihood of sepsis. 0.50-2.00 ng/mL     Increased likelihood of sepsis. Antibiotics encouraged. >2.00 ng/mL     High risk of sepsis/shock. Antibiotics strongly encouraged. Suspected Lower Resp Tract Infections:  <0.24 ng/mL     Low likelihood of bacterial infection. >0.24 ng/mL     Increased likelihood of bacterial infection. Antibiotics encouraged. With successful antibiotic therapy, PCT levels should decrease rapidly. (Half-life of 24 to   36 hours.)        Procalcitonin values from samples collected within the first 6 hours of systemic infection   may still be low. Retesting may be indicated. Values from day 1 and day 4 can be entered into the Change in Procalcitonin Calculator   ([email or web address]-[email or web address] com) to determine the patient's Mortality Risk Prognosis        In healthy neonates, plasma Procalcitonin (PCT) concentrations increase gradually after   birth, reaching peak values at about 24 hours of age then decrease to normal values below   0.5 ng/mL by 48-72 hours of age.          Intake/Output Summary (Last 24 hours) at 5/16/2022 0810  Last data filed at 5/16/2022 0446  Gross per 24 hour   Intake 1619 ml   Output 3900 ml   Net -2281 ml     Temp: 98.5    Culture Date / Source  /  Results  See micro   Recent vancomycin administrations                     vancomycin (VANCOCIN) 1,250 mg in dextrose 5 % 250 mL IVPB (ADDAVIAL) (mg) 1,250 mg New Bag 05/15/22 2312     1,250 mg New Bag  1208     1,250 mg New Bag 05/14/22 2311     1,250 mg New Bag  1038     1,250 mg New Bag 05/13/22 2338     1,250 mg New Bag  1118                    Vancomycin Concentrations:   TROUGH:  No results for input(s): VANCOTROUGH in the last 72 hours. RANDOM:    Recent Labs     05/14/22  0427   VANCORANDOM 27.1       MRSA Nasal Swab: was ordered by provider, awaiting results. Albert Ra PLAN     Continue current dose of 1250 mg q12h IV  Ensured BUN/sCr ordered at baseline and every 48 hours x at least 3 levels, then at least weekly. Pharmacy will continue to monitor patient and adjust therapy as indicated      Vancomycin Target Concentration Parameters  Treatment  Population Target AUC/HARDEEP Target Trough   Invasive MRSA Infection (bacteremia, pneumonia, meningitis, endocarditis, osteomyelitis)  Sepsis (undifferentiated) 400-600 N/A   Infection due to non-MRSA pathogen  Empiric treatment of non-invasive MRSA infection  (SSTI, UTI) <500 10-15 mg/L   CrCl < 29 mL/min  Rapidly fluctuating serum creatinine   PATI N/A < 15 mg/L     Renal replacement therapy is dosed by levels, per hospital protocol. Abbreviations  * Pauc: probability that AUC is >400 (efficacy); Pconc: probability that Ctrough is above 20 ?g/mL (toxicity); Tox: Probability of nephrotoxicity, based on Osiris et al. Clin Infect Dis 2009. Loading dose: N/A  Regimen: 1250 mg IV every 12 hours. Start time: 11:12 on 05/16/2022  Exposure target: AUC24 (range)400-600 mg/L.hr   AUC24,ss: 534 mg/L.hr  Probability of AUC24 > 400: 99 %  Ctrough,ss: 15.4 mg/L  Probability of Ctrough,ss > 20: 11 %  Probability of nephrotoxicity (Lodise MARILEE 2009): 11 %    Thank you for the consult. Pharmacy will continue to follow.     Wang Broderick, PharmD, BCPS  5/16/2022 8:11 AM

## 2022-05-16 NOTE — PROGRESS NOTES
PATIENT NAME: Stacey Abbott     TODAY'S DATE: 5/16/2022, 1:30 PM    SUBJECTIVE:    75 y/o male s/p trach and peg for respiratory failure and dysphagia with no post op surgical issues. Patient is at goal for tube feeds. Mucous discharge around the trach. OBJECTIVE:   VITALS:  BP (!) 223/93   Pulse 85   Temp 98.6 °F (37 °C) (Axillary)   Resp 18   Ht 5' 10.05\" (1.779 m)   Wt 155 lb 6.8 oz (70.5 kg)   SpO2 94%   BMI 22.27 kg/m²      INTAKE/OUTPUT:      Intake/Output Summary (Last 24 hours) at 5/16/2022 1330  Last data filed at 5/16/2022 1106  Gross per 24 hour   Intake 2172.2 ml   Output 3900 ml   Net -1727.8 ml     Exam  Neck: trach in place with mucous discharge but bleeding  Abdomen: PEG in place with drainage                ASSESSMENT   1. S/p trach and PEG    Plan  1.  Continue local wound care around trach and peg    Electronically signed by Brown Garza DO  on 5/16/2022 at 1:30 PM

## 2022-05-16 NOTE — CARE COORDINATION
DISCHARGE PLANNING NOTE:      Writer spoke with Yana from [facility] ([phone]) in regards to patient. Pt is accepted to [facility] however they are discharge dependent at this time. The expect discharges today. Mindee to contact writer in regards to tentatively setting patient up for transport for tonight. Will follow.     Electronically signed by Salud Terry RN on 5/16/2022 at 11:07 AM

## 2022-05-16 NOTE — PLAN OF CARE
Problem: Discharge Planning  Goal: Discharge to home or other facility with appropriate resources  5/16/2022 0306 by Maura Cooper RN  Outcome: Progressing    Problem: Skin/Tissue Integrity  Goal: Absence of new skin breakdown  5/16/2022 0306 by Maura Cooper RN  Outcome: Progressing  Note: Patient turned and repositioned every 2 hours and as needed for comfort. Skin kept clean and dry.       Problem: Pain  Goal: Verbalizes/displays adequate comfort level or baseline comfort level  5/16/2022 0306 by Maura Cooper RN  Outcome: Progressing     Problem: Safety - Adult  Goal: Free from fall injury  5/16/2022 0306 by Maura Cooper RN  Outcome: Progressing     Problem: Respiratory - Adult  Goal: Achieves optimal ventilation and oxygenation  5/16/2022 0306 by Maura Cooper RN  Outcome: Progressing     Problem: Nutrition Deficit:  Goal: Optimize nutritional status  5/16/2022 0306 by Maura Cooper RN  Outcome: Progressing  Note: Pt tolerating tube feed at goal.     Problem: ABCDS Injury Assessment  Goal: Absence of physical injury  5/16/2022 0306 by Maura Cooper RN  Outcome: Progressing

## 2022-05-16 NOTE — PROGRESS NOTES
Levophed    Difficult to wean off the vent  ECG monitor a paced, PAC  Afebrile blood pressure 140/90,  oxygen saturation 93%, FiO2 30%  Very cold feet he does not want to cover feet he said he is feeling hot    Globin 12.3, WBC 13.5, platelets 657  Sodium 133, potassium 3.6, creatinine less than 0.40, glucose 160    X-ray yesterday showed persistent opacity in the mid and lower right lung, likely a combination of atelectasis and pleural fluid, mild hazy opacity at the left base is unchanged no pneumothorax      Medications:   Scheduled Meds:   linezolid  600 mg PEG Tube 2 times per day    busPIRone  15 mg Oral 4x Daily    [START ON 5/17/2022] pantoprazole  40 mg Oral QAM AC    acetylcysteine  200 mg Inhalation Q6H    levalbuterol  1.25 mg Nebulization Q6H    metoprolol tartrate  25 mg Oral BID    midodrine  5 mg Oral BID    insulin lispro  0-6 Units SubCUTAneous Q6H    senna  1 tablet PEG Tube BID    polyvinyl alcohol  1 drop Both Eyes Q4H    sodium chloride flush  5-40 mL IntraVENous 2 times per day    enoxaparin  40 mg SubCUTAneous Daily    chlorhexidine  15 mL Mouth/Throat BID     Continuous Infusions:   dexmedetomidine Stopped (05/16/22 1103)    dextrose      sodium chloride       CBC:   Recent Labs     05/14/22  0427 05/15/22  0413 05/16/22  0433   WBC 13.2* 15.6* 13.5*   HGB 11.6* 12.7* 12.3*    436 401     BMP:    Recent Labs     05/14/22  0427 05/15/22  0413 05/16/22  0433    137 133*   K 3.3* 3.7 3.6*    98 94*   CO2 30 28 31   BUN 17 17 16   CREATININE <0.40* <0.40* <0.40*   GLUCOSE 128* 166* 174*     Hepatic: No results for input(s): AST, ALT, ALB, BILITOT, ALKPHOS in the last 72 hours. Troponin: No results for input(s): TROPONINI in the last 72 hours. BNP: No results for input(s): BNP in the last 72 hours. Lipids: No results for input(s): CHOL, HDL in the last 72 hours.     Invalid input(s): LDLCALCU  INR: No results for input(s): INR in the last 72 hours.    Objective:   Vitals: BP (!) 147/93   Pulse 79   Temp 98.6 °F (37 °C) (Axillary)   Resp 20   Ht 5' 10.05\" (1.779 m)   Wt 155 lb 6.8 oz (70.5 kg)   SpO2 93%   BMI 22.27 kg/m²   General appearance: Very frail looking male on ventilator  HEENT: Head: Normal, normocephalic, atraumatic. Neck: Tracheostomy noted difficult to assess neck veins  Lungs: diminished breath sounds bilaterally  Heart: Heart sounds are distant  Abdomen: Abdominal bandage noted   Extremities: cold feet, no movements of arms or legs  Neurologic: Mental status: Awake difficult to communicate he is on ventilator    EKG: A paced. ,  PAC  ECHO: reviewed.    Ejection fraction: 55%      Assessment / Acute Cardiac Problems:   Respiratory failure, COVID-19 pneumonia, hypoxia on ventilator  Normal LV systolic function  Status post tracheostomy, PEG tube placement  Bilateral mucous plugging, status post bronchoscopy, bronchoalveolar lavage  ALS, no movement arms or legs  Sick sinus syndrome, status post dual-chamber pacemaker placement Biotronik  Pacemaker checkup satisfactory 5/12/2022 5/16/2022 patient remains on ventilator difficult to wean off continue to have liquid stool no change in the neurological findings no movements in the arms or legs    Patient Active Problem List:     Amyotrophic lateral sclerosis (ALS) (HCC)     Muscle spasm     Macrocytosis     Rheumatic tricuspid valve regurgitation     Spinal stenosis of lumbar region     Calculus of kidney and ureter     Hypoglycemia     Diastolic dysfunction     Bilateral enlargement of atria     Mitral valve regurgitation     Thoracic aortic aneurysm, without rupture (HCC)     Paroxysmal atrial fibrillation (HCC)     Aortic valve disorder     Bilateral hearing loss     Acquired cystic kidney disease     History of hypertension     Other atopic dermatitis     Closed fracture of base of distal phalanx of finger     Epididymitis     Osteopenia     Hyperparathyroidism, unspecified ([facility] [address] Primary osteoarthritis     Senile hyperkeratosis     MVP (mitral valve prolapse)     Presbyopia     Hydrocele     Other hyperlipidemia     Carpal tunnel syndrome     Rheumatic aortic valve insufficiency     Closed fracture of one rib of right side with routine healing     Tinnitus of both ears     Concussion with loss of consciousness     Supraventricular beat, premature     Varicose veins of both lower extremities     Intervertebral disc prolapse with impingement     Obstructive sleep apnea     Prostatitis     Stress     Dermatitis     Weakness of both lower extremities     Insulin resistance     At high risk for falls     Other specified diseases of blood and blood-forming organs     S/P CABG (coronary artery bypass graft)     Cardiac segmental configuration with atrial configuration unknown, ventricular configuration unknown, and inverted normally aligned great arteries     Vitamin A deficiency     Fatigue     Muscular atrophy     Cervical stenosis of spine     Diarrhea     Atrial flutter (HCC)     Tachy-ralf syndrome (HCC)     Anticoagulated on Coumadin     History of permanent cardiac pacemaker placement 7/1/2020:       Cardiac abnormality     Severe malnutrition (HCC)     SOB (shortness of breath)     Monoplegia of left leg (HCC)     Malfunction of percutaneous endoscopic gastrostomy (PEG) tube (HCC)     Post-nasal drip     Physical deconditioning     Infection of PEG site (HCC)     Rash in adult     Respiratory failure (HCC)     COVID-19     ALS (amyotrophic lateral sclerosis) (HCC)     Elevated procalcitonin     Elevated C-reactive protein (CRP)     Elevated ferritin     Elevated erythrocyte sedimentation rate     Leukocytosis     Allergy to multiple antibiotics      Plan of Treatment:   Medications reviewed  Continue with low-dose beta-blocker  ID and critical care notes reviewed  Prognosis remains guarded/ poor  Keep patient well-hydrated    Electronically signed by Alessia Ace MD on 5/16/2022 at 3:09 PM

## 2022-05-16 NOTE — PROGRESS NOTES
ICU Progress Note (Vent)   [facility]    Patient - Zuleyka Camarena,  Age - 74 y.o.    - 1950      Room Number -    N -  [id number]   Acct # - [de-identified]  Date of Admission -  2022  8:35 AM    Events of Past 24 Hours   Hallucinations better, but still having episodes of significant anxiety    Vitals    height is 5' 10.05\" (1.779 m) and weight is 155 lb 6.8 oz (70.5 kg). His axillary temperature is 98.5 °F (36.9 °C). His blood pressure is 173/83 (abnormal) and his pulse is 71. His respiration is 21 and oxygen saturation is 94%. Temperature Range: Temp: 98.5 °F (36.9 °C) Temp  Av.6 °F (37 °C)  Min: 98.2 °F (36.8 °C)  Max: 99.2 °F (37.3 °C)  BP Range:  Systolic (11JUY), OHF:139 , Min:82 , RQP:453     Diastolic (54SBM), MQU:30, Min:44, Max:108    Pulse Range: Pulse  Av.5  Min: 60  Max: 85  Respiration Range: Resp  Av.7  Min: 18  Max: 29  Current Pulse Ox[de-identified]  SpO2: 94 %  24HR Pulse Ox Range:  SpO2  Av.2 %  Min: 91 %  Max: 100 %  Oxygen Amount and Delivery: O2 Flow Rate (L/min): 30 L/min      Wt Readings from Last 3 Encounters:   22 155 lb 6.8 oz (70.5 kg)   22 115 lb (52.2 kg)   22 120 lb (54.4 kg)     I/O       Intake/Output Summary (Last 24 hours) at 2022 1007  Last data filed at 2022 0446  Gross per 24 hour   Intake 1619 ml   Output 3900 ml   Net -2281 ml     I/O last 3 completed shifts:   In: 12 [I.V.:759; NG/GT:2294; IV Piggyback:1250]  Out: 26 [Urine:5800; Stool:200]     DRAIN/TUBE OUTPUT:     Invasive Lines   Tracheostomy placed last week   Lines -PICC line day 12    ICP PRESSURE RANGE:  No data recorded  CVP PRESSURE RANGE:  No data recorded  Mechanical Ventilation Data   SETTINGS (Comprehensive)  Vent Information  Ventilator Day(s):   Ventilator ID: servo  Vent Mode: PRVC  Ventilator Initiate: Yes  Additional Respiratory Assessments  Pulse: 71  Resp: 21  SpO2: 94 %  End Tidal CO2: 48 (%)  Position: Semi-Christie's  Humidification Source: HME  Cuff Pressure (cm H2O): 30 cm H2O  Skin Barrier Applied: No       ABGs:   Lab Results   Component Value Date    PHART 7.514 05/14/2022    PO2ART 59.4 05/14/2022    PJC9QUP 40.3 05/14/2022       Lab Results   Component Value Date    MODE PRVC 05/14/2022         Medications   IV   norepinephrine      dexmedetomidine 1.4 mcg/kg/hr (05/16/22 2404)    dextrose      sodium chloride      propofol Stopped (05/10/22 8032)      linezolid  600 mg PEG Tube 2 times per day    busPIRone  10 mg Oral 4x Daily    acetylcysteine  200 mg Inhalation Q6H    levalbuterol  1.25 mg Nebulization Q6H    metoprolol tartrate  25 mg Oral BID    midodrine  5 mg Oral BID    insulin lispro  0-6 Units SubCUTAneous Q6H    senna  1 tablet PEG Tube BID    polyvinyl alcohol  1 drop Both Eyes Q4H    sodium chloride flush  5-40 mL IntraVENous 2 times per day    dexamethasone  6 mg IntraVENous Q24H    enoxaparin  40 mg SubCUTAneous Daily    pantoprazole (PROTONIX) 40 mg injection  40 mg IntraVENous Daily    chlorhexidine  15 mL Mouth/Throat BID       Diet/Nutrition   Diet NPO  ADULT TUBE FEEDING; PEG; Peptide Based; Continuous; 20; Yes; 10; Q 4 hours; 70; 60; Q 4 hours    Exam   VITALS    height is 5' 10.05\" (1.779 m) and weight is 155 lb 6.8 oz (70.5 kg). His axillary temperature is 98.5 °F (36.9 °C). His blood pressure is 173/83 (abnormal) and his pulse is 71. His respiration is 21 and oxygen saturation is 94%. Ventilator Settings (Basic)  Vent Mode: PRVC Resp Rate (Set): 24 bmp/Vt (Set, mL): 500 mL/ /FiO2 : 30 %    Constitutional -alert, mouthing words   General Appearance appears to be anxious tracheostomy in place life support devices in place (ET, OG),normocephalic, atraumatic. PERRLA  Lungs - Chest expands equally, no wheezes, rales or rhonchi. Cardiovascular - Heart sounds are normal.  normal rate and rhythm regular, no murmur, gallop or rub.   Abdomen - soft, nontender, nondistended, no masses or organomegaly  Neurologic - CN II-XII are grossly intact. Skin - no bruising or bleeding  Extremities - no cyanosis, clubbing or edema    Lab Results   CBC     Lab Results   Component Value Date    WBC 13.5 05/16/2022    RBC 3.79 05/16/2022    HGB 12.3 05/16/2022    HCT 37.2 05/16/2022     05/16/2022    MCV 98.1 05/16/2022    MCH 32.5 05/16/2022    MCHC 33.2 05/16/2022    RDW 13.9 05/16/2022    LYMPHOPCT 1 05/06/2022    MONOPCT 3 05/06/2022    BASOPCT 0 05/06/2022    MONOSABS 0.35 05/06/2022    LYMPHSABS 0.12 05/06/2022    EOSABS 0.00 05/06/2022    BASOSABS 0.00 05/06/2022    DIFFTYPE NOT REPORTED 08/25/2021       BMP   Lab Results   Component Value Date     05/16/2022    K 3.6 05/16/2022    CL 94 05/16/2022    CO2 31 05/16/2022    BUN 16 05/16/2022    CREATININE <0.40 05/16/2022    GLUCOSE 174 05/16/2022    CALCIUM 8.3 05/16/2022       LFTS  Lab Results   Component Value Date    ALKPHOS 137 05/05/2022    ALT 46 05/05/2022    AST 30 05/05/2022    PROT 7.5 05/05/2022    BILITOT 0.66 05/05/2022    LABALBU 3.0 05/05/2022       INR  No results for input(s): PROTIME, INR in the last 72 hours. APTT  No results for input(s): APTT in the last 72 hours. Lactic Acid  No results found for: LACTA     BNP   No results for input(s): BNP in the last 72 hours.      Cultures       Radiology     Plain Films         CT Scans    See actual reports for details    SYSTEM ASSESSMENT    Acute hypoxic respiratory failure, status post tracheostomy/PEG 5/12  Severe ALS  Multifocal pneumonia, MRSA  History of COVID-pneumonia, tested +4/21  Acute mental status changes/hallucinations  History of hypertension  Severe protein calorie malnutrition    Neuro     Continue Precedex drip, will try full dose Versed and as needed fentanyl  We will increase BuSpar to 15 mg 4 times a day  Down the road, may need to schedule Xanax  Seems to be better now in terms of hallucinations    Respiratory   Wean oxygen as tolerated.  Keep O2 sat > 88%  Follow-up chest x-ray tomorrow  Still no improvement, may need to repeat bronchoscopy  Continues to fail weaning trials  Continue Mucomyst and Xopenex     Hemodynamics   Blood pressure still elevated  Metoprolol may need to be increased  Discontinue norepinephrine    Gastrointestinal/Nutrition     Tube feed need to be continued  On GI prophylaxis-switch to oral Protonix      Renal   Replacing potassium as needed  Decreased free water bolus 200 mL an hour since sodium has decreased  Infectious Disease   Remains on vancomycin through 5/21    Hematology/Oncology   Continue enoxaparin for DVT prophylaxis    Endocrine   Blood sugars being monitored    Social/Spiritual/DNR/Disposition/Other     Discussed with patient's wife in detail, told her that he will need an LTAC    Critical Care Time   35 min    Electronically signed by Ridge Martel MD on 5/16/2022 at 10:07 AM

## 2022-05-16 NOTE — PROGRESS NOTES
[id number] Internal Medicine  Sam Hodges MD; Jose Luis Rich MD; Dayana Vergara MD; MD Joni Harris MD; MD [facility]   [facility]    HISTORY AND PHYSICAL EXAMINATION            Date:   5/16/2022  Patient name:  Renay Desai  Date of admission:  5/5/2022  8:35 AM  MRN:   [id number]  Account:  [de-identified]  YOB: 1950  PCP:    Jorge A Aguilar MD  Room:   [de-identified]  Code Status:    Full Code    Chief Complaint:     Chief Complaint   Patient presents with    Shortness of Breath   dyspnea    History Obtained From:     Medical record and nursing staff  Pt sedated      History of Present Illness:     Renay Desai is a 74 y.o. Non- / non  male who presents with Shortness of Breath   and is admitted to the hospital for the management of Respiratory failure ([facility] [address]     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 sclerosis well-known to pulmonary team admitted with increasing cough shortness of breath for 3 to 4 days chest x-ray shows bilateral infiltrate elevated procalcitonin elevated leukocytosis patient was tested positive for COVID-19 pneumonia on May 5  Acute respiratory failure secondary to ALS with multifocal COVID-19 pneumonia intubated and sedated treated in the intensive care unit  5/8  Patient, still intubated, sedated with fentanyl propofol  On tube feeds  Had reading of low blood pressure, On Levophed  Antibiotics changed to Zyvox, Sputum Culture growing MRSA         Post trach peg      Past Medical History:     Past Medical History:   Diagnosis Date    ALS (amyotrophic lateral sclerosis) ([facility] [address]     Aortic root aneurysm ([facility] [address] 11/08/2016    Aortic valve replaced 11/08/2016    Hypertension         Past Surgical History:     Past Surgical History:   Procedure Laterality Date    CARDIOVERSION  06/27/2020    w/    HERNIA REPAIR      PARATHYROID GLAND SURGERY  1980    TRACHEOSTOMY N/A 5/12/2022    TRACHEOTOMY performed by Stephen De Jesus IV, DO at [place] [address] N/A 5/12/2022    EGD/PEG TUBE INSERTION performed by Stephen De Jesus IV, DO at [id number]        Medications Prior to Admission:     Prior to Admission medications    Medication Sig Start Date End Date Taking?  Authorizing Provider   linezolid (ZYVOX) 600 MG tablet 1 tablet by PEG Tube route every 12 hours for 9 doses 5/16/22 5/21/22 Yes Gavino Lo MD   metoprolol tartrate (LOPRESSOR) 25 MG tablet Take 1 tablet by mouth 2 times daily 5/16/22  Yes Gavino Lo MD   midodrine (PROAMATINE) 5 MG tablet Take 1 tablet by mouth 2 times daily For systolic less than 126 3/90/05  Yes Gavino Lo MD   busPIRone (BUSPAR) 15 MG tablet Take 15 mg by mouth 4 times daily 5/16/22 6/15/22 Yes Gavino Lo MD   glucose 4g chewable tablet Take 4 tablets by mouth as needed for Low blood sugar 5/16/22  Yes Gavino Lo MD   insulin lispro (HUMALOG) 100 UNIT/ML SOLN injection vial Inject 0-6 Units into the skin 3 times daily (before meals) 5/16/22 6/15/22 Yes Gavino Lo MD   ondansetron (ZOFRAN-ODT) 4 MG disintegrating tablet Take 1 tablet by mouth every 8 hours as needed for Nausea or Vomiting 5/16/22 5/26/22 Yes Gavino Lo MD   pantoprazole (PROTONIX) 40 MG tablet Take 1 tablet by mouth every morning (before breakfast) 5/17/22  Yes Gavino Lo MD   senna (SENOKOT) 8.6 MG tablet 1 tablet by PEG Tube route 2 times daily 5/16/22 6/15/22 Yes Gavino Lo MD   enoxaparin (LOVENOX) 40 MG/0.4ML Inject 0.4 mLs into the skin daily 5/17/22 6/16/22 Yes Gavino Lo MD   glucagon, rDNA, 1 MG injection Inject 1 mg into the muscle as needed for Low blood sugar (Blood glucose less than 70 mg/dL and patient NOT ALERT or NPO and does not have IV access.) 5/16/22 5/11/23 Yes Gavino Lo MD   acetylcysteine (MUCOMYST) 20 % nebulizer solution Inhale 1 mL into the lungs every 6 hours 5/16/22 6/15/22 Yes Gladis Zhu MD   levalbuterol (XOPENEX) 1.25 MG/0.5ML nebulizer solution Take 0.5 mLs by nebulization every 6 hours 5/16/22 6/15/22 Yes Gladis Zhu MD   chlorhexidine (PERIDEX) 0.12 % solution Take 15 mLs by mouth 2 times daily for 14 days 22 Yes Gladis Zhu MD   polyvinyl alcohol (LIQUIFILM TEARS) 1.4 % ophthalmic solution Place 1 drop into both eyes every 4 hours 5/16/22 6/15/22 Yes Gladis Zhu MD   LORazepam (ATIVAN) 1 MG tablet Take 1 tablet by mouth every 4 hours as needed for Anxiety for up to 10 days. 22 Yes Gladis Zhu MD   Handicap Placard MISC by Does not apply route Dx: ALS    Duration: 5 years 3/23/22   Susana Simpson MD   medical marijuana Place under the tongue 2 times daily. Historical Provider, MD        Allergies:     Ciprofloxacin, Lasix [furosemide], Penicillins, Sulfa antibiotics, and Duloxetine    Social History:     Tobacco:    reports that he has never smoked. He has never used smokeless tobacco.  Alcohol:      reports current alcohol use of about 1.0 standard drink of alcohol per week. Drug Use:  reports no history of drug use.     Family History:     Family History   Problem Relation Age of Onset    Lung Cancer Mother     Heart Disease Father        Review of Systems:     Unable to get ros  SEDATED    Physical Exam:   BP (!) 223/93   Pulse 85   Temp 98.6 °F (37 °C) (Axillary)   Resp 18   Ht 5' 10.05\" (1.779 m)   Wt 155 lb 6.8 oz (70.5 kg)   SpO2 94%   BMI 22.27 kg/m²   Temp (24hrs), Av.6 °F (37 °C), Min:98.3 °F (36.8 °C), Max:99.2 °F (37.3 °C)    Recent Labs     05/15/22  2019 22  0218 22  0751 22  1143   POCGLU 198* 158* 159* 164*       Intake/Output Summary (Last 24 hours) at 2022 1252  Last data filed at 2022 1106  Gross per 24 hour   Intake 2172.2 ml   Output 3900 ml   Net -1727.8 ml General Appearance: post trach and peg  Sedated    Mental status: sedated  Trach peg    Head: normocephalic, atraumatic  Eye: no icterus, redness, pupils equal and reactive, extraocular eye movements intact, conjunctiva clear  Ear: normal external ear, no discharge, hearing intact  Nose: no drainage noted  Mouth: mucous membranes moist  Neck: supple, no carotid bruits, thyroid not palpable  Lungs: on vent  Crackles  Cardiovascular: normal rate, regular rhythm, no murmur, gallop, rub  Abdomen: Soft, nontender, nondistended, normal bowel sounds, no hepatomegaly or splenomegaly  PEG IN PLACE    Neurologic: ALS  Skin: No gross lesions, rashes, bruising or bleeding on exposed skin area  Extremities: peripheral pulses palpable, no pedal edema or calf pain with palpation, RUE is Sligtly swollen       Investigations:      Laboratory Testing:  Recent Results (from the past 24 hour(s))   POC Glucose Fingerstick    Collection Time: 05/15/22  8:19 PM   Result Value Ref Range    POC Glucose 198 (H) 75 - 110 mg/dL   POC Glucose Fingerstick    Collection Time: 05/16/22  2:18 AM   Result Value Ref Range    POC Glucose 158 (H) 75 - 110 mg/dL   CBC    Collection Time: 05/16/22  4:33 AM   Result Value Ref Range    WBC 13.5 (H) 3.5 - 11.0 k/uL    RBC 3.79 (L) 4.5 - 5.9 m/uL    Hemoglobin 12.3 (L) 13.5 - 17.5 g/dL    Hematocrit 37.2 (L) 41 - 53 %    MCV 98.1 80 - 100 fL    MCH 32.5 26 - 34 pg    MCHC 33.2 31 - 37 g/dL    RDW 13.9 11.5 - 14.9 %    Platelets 761 289 - 655 k/uL    MPV 7.4 6.0 - 12.0 fL   Basic Metabolic Panel w/ Reflex to MG    Collection Time: 05/16/22  4:33 AM   Result Value Ref Range    Glucose 174 (H) 70 - 99 mg/dL    BUN 16 8 - 23 mg/dL    CREATININE <0.40 (L) 0.70 - 1.20 mg/dL    Calcium 8.3 (L) 8.6 - 10.4 mg/dL    Sodium 133 (L) 135 - 144 mmol/L    Potassium 3.6 (L) 3.7 - 5.3 mmol/L    Chloride 94 (L) 98 - 107 mmol/L    CO2 31 20 - 31 mmol/L    Anion Gap 8 (L) 9 - 17 mmol/L    GFR Non-African American Can not be calculated >60 mL/min    GFR  Can not be calculated >60 mL/min    GFR Comment         POC Glucose Fingerstick    Collection Time: 05/16/22  7:51 AM   Result Value Ref Range    POC Glucose 159 (H) 75 - 110 mg/dL   POC Glucose Fingerstick    Collection Time: 05/16/22 11:43 AM   Result Value Ref Range    POC Glucose 164 (H) 75 - 110 mg/dL       Imaging/Diagnostics:  XR CHEST PORTABLE    Result Date: 5/6/2022  1. New right arm PICC extending toward SVC, but distal tip is obscured by overlying pacemaker leads. 2.  Endotracheal tube and enteric tube remain in place. 3.  Unchanged right basilar consolidation and small right pleural effusion. Mild patchy left basilar opacities. XR CHEST PORTABLE    Result Date: 5/5/2022  Endotracheal tube in satisfactory position above the chance NG tip and side-port in gastric fundus Otherwise, unchanged chest demonstrating bibasilar opacities suggesting atelectasis or infection     XR CHEST PORTABLE    Result Date: 5/5/2022  Bibasilar opacities with blunting of right costophrenic angle possibly combination of pneumonia and small right pleural effusion. There is what appears to be a left chest tube terminating in the periphery of the upper lung partly obscured by pacemaker electrode. Please correlate clinically. No recent comparisons.        Assessment :      Hospital Problems           Last Modified POA    * (Principal) Respiratory failure ([address] 5/5/2022 Yes    COVID-19 5/8/2022 Yes    ALS (amyotrophic lateral sclerosis) ([address] 5/8/2022 Yes    Elevated procalcitonin 5/8/2022 Yes    Elevated C-reactive protein (CRP) 5/8/2022 Yes    Elevated ferritin 5/8/2022 Yes    Elevated erythrocyte sedimentation rate 5/8/2022 Yes    Leukocytosis 5/8/2022 Yes    Allergy to multiple antibiotics 5/8/2022 Yes      cc 35 mins    Plan:     78-year-old gentleman with a history of ALS  Admitted for multifocal COVID-19 pneumonia with acute respiratory failure,   Possible superimposed bacterial pneumonia with underlying neuromuscular disorder of ALS, Pro-Luis is high  Acute respiratory failure secondary to ALS plus COVID-19 pneumonia intubated sedated  MRSA IN SPUTUM on vanco , respiratory Cultures growing MRSA   For COVID-19 patient received Actemra, on Decadron    History of coronary artery disease s/p CABG,  On Protonix for gastric prophylaxis  Hypokalemia, Improved   DVT prophylaxis Lovenox    Cardio consult for afib  Post trach and peg tube  Wife  in room  On sedation  And fentanyl  Will need LTAC  Tube feed to started  ID consult MRSA noted in sptum      meds done ok to dc to LTAC  On zyvox      Gerhardt Lovings, MD  5/16/2022  12:52 PM    Copy sent to Dr. Reba Ricketts MD    Please note that this chart was generated using voice recognition Dragon dictation software. Although every effort was made to ensure the accuracy of this automated transcription, some errors in transcription may have occurred.

## 2022-05-16 NOTE — PLAN OF CARE
Problem: Discharge Planning  Goal: Discharge to home or other facility with appropriate resources  5/16/2022 0848 by Phil Homans, RN  Outcome: Progressing  5/16/2022 0306 by Esther Lazaro RN  Outcome: Progressing  Flowsheets (Taken 5/15/2022 1600 by Pardeep Alex RN)  Discharge to home or other facility with appropriate resources: Identify barriers to discharge with patient and caregiver     Problem: Skin/Tissue Integrity  Goal: Absence of new skin breakdown  Description: 1. Monitor for areas of redness and/or skin breakdown  2. Assess vascular access sites hourly  3. Every 4-6 hours minimum:  Change oxygen saturation probe site  4. Every 4-6 hours:  If on nasal continuous positive airway pressure, respiratory therapy assess nares and determine need for appliance change or resting period. 5/16/2022 0848 by Phil Homans, RN  Outcome: Progressing  5/16/2022 0306 by Esther Lazaro RN  Outcome: Progressing  Note: Patient turned and repositioned every 2 hours and as needed for comfort. Skin kept clean and dry.       Problem: Pain  Goal: Verbalizes/displays adequate comfort level or baseline comfort level  5/16/2022 0848 by Phil Homans, RN  Outcome: Progressing  5/16/2022 0306 by Esther Lazaro RN  Outcome: Progressing     Problem: Safety - Adult  Goal: Free from fall injury  5/16/2022 0848 by Phil Homans, RN  Outcome: Progressing  5/16/2022 0306 by Esther Lazaro RN  Outcome: Progressing     Problem: Respiratory - Adult  Goal: Achieves optimal ventilation and oxygenation  5/16/2022 0848 by Phil Homans, RN  Outcome: Progressing  Flowsheets (Taken 5/16/2022 0848)  Achieves optimal ventilation and oxygenation:   Assess for changes in respiratory status   Assess for changes in mentation and behavior   Position to facilitate oxygenation and minimize respiratory effort   Oxygen supplementation based on oxygen saturation or arterial blood gases   Assess the need for suctioning and aspirate as needed   Assess and instruct to report shortness of breath or any respiratory difficulty   Respiratory therapy support as indicated  5/16/2022 0306 by Ruby Bowles RN  Outcome: Progressing     Problem: Nutrition Deficit:  Goal: Optimize nutritional status  5/16/2022 0848 by Edita Reaves RN  Outcome: Progressing  5/16/2022 0306 by Ruby Bowles RN  Outcome: Progressing  Note: Pt tolerating tube feed at goal.     Problem: ABCDS Injury Assessment  Goal: Absence of physical injury  5/16/2022 0848 by Edita Reaves RN  Outcome: Progressing  5/16/2022 0306 by Ruby Bowles RN  Outcome: Progressing

## 2022-05-16 NOTE — PROGRESS NOTES
[facility] -   Infectious diseases evaluation  admission date 5/5/2022    reason for consultation:   Covid-19 Infection    Impression :   Current:  · COVID-19 with superimposed MRSA pneumonia. · Acute hypoxic respiratory failure required intubation status post tracheostomy 5/12/2022  · Leukocytosis suspect steroid effect. · Status post PEG tube placement 5/12/2022  · Severe ALS  · History of coronary artery disease status post CABG 2016  · Hypertension  · Penicillin, sulfa and Cipro allergy  ·     Other:  · PPM  Discussion / summary of stay / plan of care   · Patient received Actemra 5/5 22. Recommendations     · Discontinue IV vancomycin  ·  Zyvox per PEG tube upon discharge  through 5/21/22  · Procalcitonin 0.25 on 5/14/22  · On Dexamethasone since 5/5/2022, may be discontinued if okay with pulmonary. · Follow CBC and renal function  · Continue supportive care  · Discussed with nursing staff  · No objection for discharge to LTAC from infectious disease point of view. Infection Control Recommendations   · [place] Precautions  · Contact precaution  · Droplet Plus Precautions removed 5/10/2022    Antimicrobial Stewardship Recommendations   · Simplification of therapy  · Targeted therapy    History of Present Illness:   Initial history:  Yanira Villa is a 74y.o.-year-old male a with history of severe ALS who presented to the hospital with worsening cough and shortness of for 3 days prior to admission. Reportedly cough started around 4/20/2022. Chest x-ray showed bilateral infiltrates  Initial labs showed a procalcitonin level of 35.19, last WBC 12.8, creatinine less than 0.4, C-reactive protein 259, D-dimer 0.43  5/5/22 COVID-19 PCR positive  Urinalysis showed 0-2 WBC. The patient was admitted to ICU, was intubated. The patient is intubated, unable to provide history that was obtained from chart review and nursing staff.   Mild amount of respiratory secretion, Brunner catheter in place. PICC line in place  The patient was exposed to COVID-19 positive person on 2022 reportedly. The patient had a positive COVID 19 test at home. Interval changes  2022   Afebrile. He is awake , anxious, asking for frequent suctioning, had bloody secretions, no reported fever, tolerating tube feed, no acute events. Status post tracheostomy and PEG tube placement   Bronchial washing 22 grew MRSA  CT chest without contrast from 22 reviewed showed right more than left airspace disease and small effusions, no loculation. MRSA growth on respiratory culture from 2022  No growth on blood cultures from 2022  Patient Vitals for the past 8 hrs:   BP Temp Temp src Pulse Resp SpO2   22 0838 -- -- -- -- 20 94 %   22 0835 -- -- -- 71 21 94 %   22 0648 (!) 173/83 -- -- 76 20 92 %   22 0630 (!) 258/108 -- -- 69 21 94 %   22 0603 97/72 -- -- 75 25 92 %   22 0530 (!) 82/44 -- -- 70 20 92 %   22 0430 (!) 157/78 -- -- 83 21 91 %   22 0423 (!) 83/54 -- -- 71 20 93 %   22 0410 (!) 175/83 98.5 °F (36.9 °C) Axillary 81 22 93 %   22 0400 (!) 185/92 -- -- 79 23 93 %   22 0230 138/81 -- -- 64 21 92 %   22 0200 (!) 215/85 -- -- 66 21 97 %       Micro:   BC x 2-Negative to date.  Sputum Cx- MRSA    Imagin/8 CXR-Stable bibasilar opacities    I have personally reviewed the past medical history, past surgical history, medications, social history, and family history, and I haveupdated the database accordingly. Allergies:   Ciprofloxacin, Lasix [furosemide], Penicillins, Sulfa antibiotics, and Duloxetine     Review of Systems:   Status post tracheostomy, nonverbal, unable to provide    Physical Examination :       Physical Exam  Vitals and nursing note reviewed. HENT:      Head: Normocephalic and atraumatic.       Right Ear: External ear normal.      Left Ear: External ear normal.   Eyes: Conjunctiva/sclera: Conjunctivae normal.      Pupils: Pupils are equal, round, and reactive to light. Neck:      Comments: Tracheostomy in place  Cardiovascular:      Rate and Rhythm: Normal rate and regular rhythm. Heart sounds: Normal heart sounds. No murmur heard. Pulmonary:      Effort: Pulmonary effort is normal. No respiratory distress. Breath sounds: Normal breath sounds. No wheezing. Abdominal:      General: Bowel sounds are normal. There is no distension. Palpations: Abdomen is soft. Tenderness: There is no abdominal tenderness. Musculoskeletal:      Cervical back: No rigidity. Right lower leg: No edema. Left lower leg: No edema. Skin:     General: Skin is dry.          Past Medical History:     Past Medical History:   Diagnosis Date    [facility] ([facility] [facility]     Aortic root aneurysm ([facility] [address] 11/08/2016    Aortic valve replaced 11/08/2016    Hypertension        Past Surgical  History:     Past Surgical History:   Procedure Laterality Date    CARDIOVERSION  06/27/2020    w/    HERNIA REPAIR      PARATHYROID GLAND SURGERY  1980    TRACHEOSTOMY N/A 5/12/2022    TRACHEOTOMY performed by Trista De Jesus IV, DO at [address] [facility] N/A 5/12/2022    EGD/PEG TUBE INSERTION performed by Trista De Jesus IV, DO at [address] [place] [address] OR       Medications:      busPIRone  10 mg Oral 4x Daily    acetylcysteine  200 mg Inhalation Q6H    levalbuterol  1.25 mg Nebulization Q6H    vancomycin  1,250 mg IntraVENous Q12H    metoprolol tartrate  25 mg Oral BID    midodrine  5 mg Oral BID    insulin lispro  0-6 Units SubCUTAneous Q6H    vancomycin (VANCOCIN) intermittent dosing (placeholder)   Other RX Placeholder    senna  1 tablet PEG Tube BID    polyvinyl alcohol  1 drop Both Eyes Q4H    sodium chloride flush  5-40 mL IntraVENous 2 times per day    dexamethasone  6 mg IntraVENous Q24H    enoxaparin  40 mg SubCUTAneous Daily    pantoprazole (PROTONIX) 40 mg injection  40 mg IntraVENous Daily    chlorhexidine  15 mL Mouth/Throat BID       Social History:     Social History     Socioeconomic History    Marital status:      Spouse name: Not on file    Number of children: Not on file    Years of education: Not on file    Highest education level: Not on file   Occupational History    Not on file   Tobacco Use    Smoking status: Never Smoker    Smokeless tobacco: Never Used   Vaping Use    Vaping Use: Never used   Substance and Sexual Activity    Alcohol use: Yes     Alcohol/week: 1.0 standard drink     Types: 1 Cans of beer per week    Drug use: No    Sexual activity: Not on file   Other Topics Concern    Not on file   Social History Narrative    Not on file     Social Determinants of Health     Financial Resource Strain:     Difficulty of Paying Living Expenses: Not on file   Food Insecurity:     Worried About Running Out of Food in the Last Year: Not on file    Fabio of Food in the Last Year: Not on file   Transportation Needs:     Lack of Transportation (Medical): Not on file    Lack of Transportation (Non-Medical):  Not on file   Physical Activity:     Days of Exercise per Week: Not on file    Minutes of Exercise per Session: Not on file   Stress:     Feeling of Stress : Not on file   Social Connections:     Frequency of Communication with Friends and Family: Not on file    Frequency of Social Gatherings with Friends and Family: Not on file    Attends Rastafarian Services: Not on file    Active Member of Clubs or Organizations: Not on file    Attends Club or Organization Meetings: Not on file    Marital Status: Not on file   Intimate Partner Violence:     Fear of Current or Ex-Partner: Not on file    Emotionally Abused: Not on file    Physically Abused: Not on file    Sexually Abused: Not on file   Housing Stability:     Unable to Pay for Housing in the Last Year: Not on file    Number of Places Lived in the Last Year: Not on file    Unstable Housing in the Last Year: Not on file       Family History:     Family History   Problem Relation Age of Onset    Lung Cancer Mother     Heart Disease Father       Medical Decision Making:   I have independently reviewed/ordered the following labs:    CBC with Differential:   Recent Labs     05/15/22  0413 05/16/22  0433   WBC 15.6* 13.5*   HGB 12.7* 12.3*   HCT 37.5* 37.2*    401     BMP:  Recent Labs     05/14/22  0427 05/14/22  0427 05/15/22  0413 05/16/22  0433      < > 137 133*   K 3.3*   < > 3.7 3.6*      < > 98 94*   CO2 30   < > 28 31   BUN 17   < > 17 16   CREATININE <0.40*   < > <0.40* <0.40*   MG 1.8  --   --   --     < > = values in this interval not displayed. Lab Results   Component Value Date    CREATININE <0.40 05/16/2022    GLUCOSE 174 05/16/2022       Detailed results: Thank you for allowing us to participate in the care of this patient. Please call with questions. This note is created with the assistance of a speech recognition program.  While intending to generate adocument that actually reflects the content of the visit, the document can still have some errors including those of syntax and sound a like substitutions which may escape proof reading. It such instances, actual meaningcan be extrapolated by contextual diversion.     Mandeep Burgos MD  Office: [phone]  Perfect serve / office [phone]

## 2022-05-17 ENCOUNTER — HOSPITAL ENCOUNTER (OUTPATIENT)
Dept: ICU | Age: 72
Discharge: HOME HEALTH | End: 2022-05-17
Attending: INTERNAL MEDICINE | Admitting: INTERNAL MEDICINE

## 2022-05-17 ENCOUNTER — APPOINTMENT (OUTPATIENT)
Dept: GENERAL RADIOLOGY | Age: 72
DRG: 004 | End: 2022-05-17
Payer: MEDICARE

## 2022-05-17 VITALS
SYSTOLIC BLOOD PRESSURE: 177 MMHG | HEIGHT: 70 IN | TEMPERATURE: 98.5 F | HEART RATE: 71 BPM | WEIGHT: 155.42 LBS | RESPIRATION RATE: 20 BRPM | OXYGEN SATURATION: 95 % | BODY MASS INDEX: 22.25 KG/M2 | DIASTOLIC BLOOD PRESSURE: 92 MMHG

## 2022-05-17 LAB
ANION GAP SERPL CALCULATED.3IONS-SCNC: 4 MMOL/L (ref 9–17)
BUN BLDV-MCNC: 20 MG/DL (ref 8–23)
CALCIUM SERPL-MCNC: 8.6 MG/DL (ref 8.6–10.4)
CHLORIDE BLD-SCNC: 92 MMOL/L (ref 98–107)
CO2: 33 MMOL/L (ref 20–31)
CREAT SERPL-MCNC: <0.4 MG/DL (ref 0.7–1.2)
GFR AFRICAN AMERICAN: ABNORMAL ML/MIN
GFR NON-AFRICAN AMERICAN: ABNORMAL ML/MIN
GFR SERPL CREATININE-BSD FRML MDRD: ABNORMAL ML/MIN/{1.73_M2}
GLUCOSE BLD-MCNC: 151 MG/DL (ref 75–110)
GLUCOSE BLD-MCNC: 174 MG/DL (ref 75–110)
GLUCOSE BLD-MCNC: 187 MG/DL (ref 70–99)
GLUCOSE BLD-MCNC: 190 MG/DL (ref 75–110)
HCT VFR BLD CALC: 37.8 % (ref 41–53)
HEMOGLOBIN: 13 G/DL (ref 13.5–17.5)
MCH RBC QN AUTO: 33.4 PG (ref 26–34)
MCHC RBC AUTO-ENTMCNC: 34.4 G/DL (ref 31–37)
MCV RBC AUTO: 97.3 FL (ref 80–100)
PDW BLD-RTO: 13.9 % (ref 11.5–14.9)
PLATELET # BLD: 430 K/UL (ref 150–450)
PMV BLD AUTO: 7.3 FL (ref 6–12)
POTASSIUM SERPL-SCNC: 3.8 MMOL/L (ref 3.7–5.3)
RBC # BLD: 3.88 M/UL (ref 4.5–5.9)
SODIUM BLD-SCNC: 129 MMOL/L (ref 135–144)
WBC # BLD: 18.9 K/UL (ref 3.5–11)

## 2022-05-17 PROCEDURE — 36415 COLL VENOUS BLD VENIPUNCTURE: CPT

## 2022-05-17 PROCEDURE — 6370000000 HC RX 637 (ALT 250 FOR IP): Performed by: STUDENT IN AN ORGANIZED HEALTH CARE EDUCATION/TRAINING PROGRAM

## 2022-05-17 PROCEDURE — 6370000000 HC RX 637 (ALT 250 FOR IP): Performed by: INTERNAL MEDICINE

## 2022-05-17 PROCEDURE — 71045 X-RAY EXAM CHEST 1 VIEW: CPT

## 2022-05-17 PROCEDURE — 2580000003 HC RX 258: Performed by: STUDENT IN AN ORGANIZED HEALTH CARE EDUCATION/TRAINING PROGRAM

## 2022-05-17 PROCEDURE — 2700000000 HC OXYGEN THERAPY PER DAY

## 2022-05-17 PROCEDURE — 99239 HOSP IP/OBS DSCHRG MGMT >30: CPT | Performed by: INTERNAL MEDICINE

## 2022-05-17 PROCEDURE — 6360000002 HC RX W HCPCS: Performed by: INTERNAL MEDICINE

## 2022-05-17 PROCEDURE — 80048 BASIC METABOLIC PNL TOTAL CA: CPT

## 2022-05-17 PROCEDURE — 82947 ASSAY GLUCOSE BLOOD QUANT: CPT

## 2022-05-17 PROCEDURE — 94640 AIRWAY INHALATION TREATMENT: CPT

## 2022-05-17 PROCEDURE — 94003 VENT MGMT INPAT SUBQ DAY: CPT

## 2022-05-17 PROCEDURE — 2500000003 HC RX 250 WO HCPCS: Performed by: INTERNAL MEDICINE

## 2022-05-17 PROCEDURE — 85027 COMPLETE CBC AUTOMATED: CPT

## 2022-05-17 PROCEDURE — 6360000002 HC RX W HCPCS: Performed by: STUDENT IN AN ORGANIZED HEALTH CARE EDUCATION/TRAINING PROGRAM

## 2022-05-17 PROCEDURE — 94761 N-INVAS EAR/PLS OXIMETRY MLT: CPT

## 2022-05-17 RX ORDER — MIDODRINE HYDROCHLORIDE 5 MG/1
5 TABLET ORAL 3 TIMES DAILY
Status: DISCONTINUED | OUTPATIENT
Start: 2022-05-17 | End: 2022-05-17

## 2022-05-17 RX ORDER — MIDODRINE HYDROCHLORIDE 5 MG/1
5 TABLET ORAL EVERY 8 HOURS PRN
Qty: 90 TABLET | Refills: 3 | Status: SHIPPED | OUTPATIENT
Start: 2022-05-17

## 2022-05-17 RX ORDER — MIDODRINE HYDROCHLORIDE 5 MG/1
5 TABLET ORAL EVERY 8 HOURS PRN
Status: DISCONTINUED | OUTPATIENT
Start: 2022-05-17 | End: 2022-05-17 | Stop reason: HOSPADM

## 2022-05-17 RX ORDER — DEXMEDETOMIDINE HYDROCHLORIDE 4 UG/ML
.1-1.5 INJECTION, SOLUTION INTRAVENOUS CONTINUOUS
Qty: 100 ML | Refills: 0 | Status: SHIPPED | OUTPATIENT
Start: 2022-05-17

## 2022-05-17 RX ADMIN — DEXMEDETOMIDINE HYDROCHLORIDE 1.4 MCG/KG/HR: 4 INJECTION, SOLUTION INTRAVENOUS at 01:25

## 2022-05-17 RX ADMIN — CHLORHEXIDINE GLUCONATE 0.12% ORAL RINSE 15 ML: 1.2 LIQUID ORAL at 08:00

## 2022-05-17 RX ADMIN — ENOXAPARIN SODIUM 40 MG: 100 INJECTION SUBCUTANEOUS at 08:02

## 2022-05-17 RX ADMIN — INSULIN LISPRO 1 UNITS: 100 INJECTION, SOLUTION INTRAVENOUS; SUBCUTANEOUS at 01:33

## 2022-05-17 RX ADMIN — DEXMEDETOMIDINE HYDROCHLORIDE 1.4 MCG/KG/HR: 4 INJECTION, SOLUTION INTRAVENOUS at 08:15

## 2022-05-17 RX ADMIN — DEXMEDETOMIDINE HYDROCHLORIDE 1 MCG/KG/HR: 4 INJECTION, SOLUTION INTRAVENOUS at 11:38

## 2022-05-17 RX ADMIN — ACETYLCYSTEINE 200 MG: 200 SOLUTION ORAL; RESPIRATORY (INHALATION) at 08:07

## 2022-05-17 RX ADMIN — LEVALBUTEROL HYDROCHLORIDE 1.25 MG: 1.25 SOLUTION, CONCENTRATE RESPIRATORY (INHALATION) at 08:07

## 2022-05-17 RX ADMIN — DEXMEDETOMIDINE HYDROCHLORIDE 1.4 MCG/KG/HR: 4 INJECTION, SOLUTION INTRAVENOUS at 04:42

## 2022-05-17 RX ADMIN — LORAZEPAM 1 MG: 1 TABLET ORAL at 01:26

## 2022-05-17 RX ADMIN — SODIUM CHLORIDE, PRESERVATIVE FREE 10 ML: 5 INJECTION INTRAVENOUS at 08:11

## 2022-05-17 RX ADMIN — FENTANYL CITRATE 50 MCG: 50 INJECTION, SOLUTION INTRAMUSCULAR; INTRAVENOUS at 11:20

## 2022-05-17 RX ADMIN — LORAZEPAM 1 MG: 1 TABLET ORAL at 12:09

## 2022-05-17 RX ADMIN — INSULIN LISPRO 1 UNITS: 100 INJECTION, SOLUTION INTRAVENOUS; SUBCUTANEOUS at 07:56

## 2022-05-17 RX ADMIN — LORAZEPAM 1 MG: 1 TABLET ORAL at 07:47

## 2022-05-17 RX ADMIN — MIDODRINE HYDROCHLORIDE 5 MG: 5 TABLET ORAL at 08:00

## 2022-05-17 RX ADMIN — ACETYLCYSTEINE 200 MG: 200 SOLUTION ORAL; RESPIRATORY (INHALATION) at 00:37

## 2022-05-17 RX ADMIN — FENTANYL CITRATE 50 MCG: 50 INJECTION, SOLUTION INTRAMUSCULAR; INTRAVENOUS at 03:45

## 2022-05-17 RX ADMIN — LEVALBUTEROL HYDROCHLORIDE 1.25 MG: 1.25 SOLUTION, CONCENTRATE RESPIRATORY (INHALATION) at 00:38

## 2022-05-17 RX ADMIN — LINEZOLID 600 MG: 600 TABLET, FILM COATED ORAL at 08:01

## 2022-05-17 RX ADMIN — METOPROLOL TARTRATE 50 MG: 50 TABLET, FILM COATED ORAL at 08:00

## 2022-05-17 RX ADMIN — BUSPIRONE HYDROCHLORIDE 15 MG: 15 TABLET ORAL at 08:00

## 2022-05-17 RX ADMIN — FENTANYL CITRATE 50 MCG: 50 INJECTION, SOLUTION INTRAMUSCULAR; INTRAVENOUS at 14:23

## 2022-05-17 RX ADMIN — POLYVINYL ALCOHOL 1 DROP: 14 SOLUTION/ DROPS OPHTHALMIC at 11:28

## 2022-05-17 RX ADMIN — POLYVINYL ALCOHOL 1 DROP: 14 SOLUTION/ DROPS OPHTHALMIC at 07:59

## 2022-05-17 RX ADMIN — SENNOSIDES 8.6 MG: 8.6 TABLET, COATED ORAL at 08:01

## 2022-05-17 ASSESSMENT — PULMONARY FUNCTION TESTS
PIF_VALUE: 24
PIF_VALUE: 26
PIF_VALUE: 31
PIF_VALUE: 24
PIF_VALUE: 23
PIF_VALUE: 24
PIF_VALUE: 25
PIF_VALUE: 23
PIF_VALUE: 24
PIF_VALUE: 32
PIF_VALUE: 24
PIF_VALUE: 25

## 2022-05-17 ASSESSMENT — PAIN SCALES - GENERAL
PAINLEVEL_OUTOF10: 0
PAINLEVEL_OUTOF10: 8

## 2022-05-17 ASSESSMENT — PAIN DESCRIPTION - LOCATION: LOCATION: THROAT

## 2022-05-17 ASSESSMENT — PAIN DESCRIPTION - ORIENTATION: ORIENTATION: MID

## 2022-05-17 ASSESSMENT — PAIN DESCRIPTION - DESCRIPTORS: DESCRIPTORS: ACHING;DISCOMFORT

## 2022-05-17 ASSESSMENT — PAIN - FUNCTIONAL ASSESSMENT: PAIN_FUNCTIONAL_ASSESSMENT: ACTIVITIES ARE NOT PREVENTED

## 2022-05-17 NOTE — CARE COORDINATION
DISCHARGE PLANNING NOTE:      [facility] requests transport be pushes back an hour. Writer calls and changes PU time to 2:00 PM at their request. Notified [facility] and ICU.     Electronically signed by Caity Calhoun RN on 5/17/2022 at 12:01 PM

## 2022-05-17 NOTE — PLAN OF CARE
Problem: Discharge Planning  Goal: Discharge to home or other facility with appropriate resources  5/17/2022 1441 by Zia Benson RN  Outcome: Completed  5/17/2022 0323 by Jose Noriega RN  Outcome: Progressing     Problem: Skin/Tissue Integrity  Goal: Absence of new skin breakdown  Description: 1. Monitor for areas of redness and/or skin breakdown  2. Assess vascular access sites hourly  3. Every 4-6 hours minimum:  Change oxygen saturation probe site  4. Every 4-6 hours:  If on nasal continuous positive airway pressure, respiratory therapy assess nares and determine need for appliance change or resting period. 5/17/2022 1441 by Zia Benson RN  Outcome: Completed  5/17/2022 0323 by Jose Noriega RN  Outcome: Progressing  Note: Patient turned and repositioned every 2 hours and as needed for comfort. Skin kept clean and dry.      Problem: Pain  Goal: Verbalizes/displays adequate comfort level or baseline comfort level  5/17/2022 1441 by Zia Benson RN  Outcome: Completed  5/17/2022 0323 by Jose Noriega RN  Outcome: Progressing  Flowsheets (Taken 5/16/2022 1600 by Alberto Schmidt RN)  Verbalizes/displays adequate comfort level or baseline comfort level: Assess pain using appropriate pain scale     Problem: Safety - Adult  Goal: Free from fall injury  5/17/2022 1441 by Zia Benson RN  Outcome: Completed  5/17/2022 0323 by Jose Noriega RN  Outcome: Progressing     Problem: Respiratory - Adult  Goal: Achieves optimal ventilation and oxygenation  5/17/2022 1441 by Zia Benson RN  Outcome: Completed  5/17/2022 0323 by Jose Noriega RN  Outcome: Progressing  Flowsheets (Taken 5/16/2022 1600 by Alberto Schmidt RN)  Achieves optimal ventilation and oxygenation:   Assess for changes in respiratory status   Assess for changes in mentation and behavior   Position to facilitate oxygenation and minimize respiratory effort   Oxygen supplementation based on oxygen saturation or arterial blood gases   Assess the need for suctioning and aspirate as needed   Respiratory therapy support as indicated     Problem: Nutrition Deficit:  Goal: Optimize nutritional status  5/17/2022 1441 by Wm Lund RN  Outcome: Completed  5/17/2022 0323 by Lala Kenyon RN  Outcome: Progressing  Note: Pt tolerating tube feed at goal.     Problem: ABCDS Injury Assessment  Goal: Absence of physical injury  5/17/2022 1441 by Wm Lund RN  Outcome: Completed  5/17/2022 0323 by Lala Kenyon RN  Outcome: Progressing

## 2022-05-17 NOTE — PROGRESS NOTES
Comprehensive Nutrition Assessment    Type and Reason for Visit:  Reassess    Nutrition Recommendations/Plan:   1. Continue tube feeding regimen as ordered. Malnutrition Assessment:  Malnutrition Status: At risk for malnutrition (Comment) (05/06/22 1132)    Context:  Acute Illness     Findings of the 6 clinical characteristics of malnutrition:  Energy Intake:  Unable to assess  Weight Loss:  No significant weight loss     Body Fat Loss:  Unable to assess     Muscle Mass Loss:  Unable to assess    Fluid Accumulation:  No significant fluid accumulation     Strength:  Not Performed    Nutrition Assessment:    Pt tolerating Vital AF at 70 ml/hr. RN indicated sodium level down to 128, Dr. Joanne Curran discontinued the 100 ml water flushes 4 times daily. Pt to discharge to [facility] facility today. Nutrition Related Findings:    Edema: +1 all extremities. Labs & meds reviewed. Hx: ALS for approximately 5 years, 2 feeding tubes in the past, the last one removed 16 months ago after it got infected. Wound Type: Stage II,Pressure Injury       Current Nutrition Intake & Therapies:    Average Meal Intake: NPO     Diet NPO  ADULT TUBE FEEDING; PEG; Peptide Based; Continuous; 20; Yes; 10; Q 4 hours; 70; 60; Q 4 hours  Current Tube Feeding (TF) Orders:  · Feeding Route: PEG  · Formula: Peptide Based  · Schedule: Continuous  · Feeding Regimen: 20 to 70 ml  · Additives/Modulars:    · Water Flushes: 60 ml every 4 hours  · Current TF & Flush Orders Provides:    · Goal TF & Flush Orders Provides: 2016 kcals, 126 gm protein      Anthropometric Measures:  Height: 5' 10.05\" (177.9 cm)  Ideal Body Weight (IBW): 166 lbs (75 kg)    Admission Body Weight: 132 lb (59.9 kg)  Current Body Weight: 154 lb (69.9 kg),   IBW.  Weight Source: Bed Scale  Current BMI (kg/m2): 22.1  Usual Body Weight: 128 lb (58.1 kg) (9/18/20)  % Weight Change (Calculated): 3.1                    BMI Categories: Underweight (BMI less than 22) age over 72    Estimated Daily Nutrient Needs:  Energy Requirements Based On: Kcal/kg (revised)  Weight Used for Energy Requirements: Admission  Energy (kcal/day): 7256-9414 kcals based on 32-34 kcals/kg using adm wt 60 kg  Weight Used for Protein Requirements: Admission  Protein (g/day): 108- 120 gm protein based on adm wt           Nutrition Diagnosis:   · Inadequate oral intake related to impaired respiratory function as evidenced by NPO or clear liquid status due to medical condition,intubation      Nutrition Interventions:   Food and/or Nutrient Delivery: Continue Current Tube Feeding  Nutrition Education/Counseling: No recommendation at this time  Coordination of Nutrition Care: Continue to monitor while inpatient       Goals:  Previous Goal Met: Progressing toward Goal(s)  Goals: Meet at least 75% of estimated needs       Nutrition Monitoring and Evaluation:   Behavioral-Environmental Outcomes: None Identified  Food/Nutrient Intake Outcomes: Enteral Nutrition Intake/Tolerance  Physical Signs/Symptoms Outcomes: Biochemical Data,GI Status,Fluid Status or Edema,Skin,Weight    Discharge Planning:    Enteral Nutrition     Chichi Cat, [address] [facility]

## 2022-05-17 NOTE — PLAN OF CARE
Problem: Discharge Planning  Goal: Discharge to home or other facility with appropriate resources  Outcome: Progressing     Problem: Skin/Tissue Integrity  Goal: Absence of new skin breakdown  Outcome: Progressing  Note: Patient turned and repositioned every 2 hours and as needed for comfort. Skin kept clean and dry.      Problem: Pain  Goal: Verbalizes/displays adequate comfort level or baseline comfort level  Outcome: Progressing  Flowsheets (Taken 5/16/2022 1600 by Luna Rey RN)  Verbalizes/displays adequate comfort level or baseline comfort level: Assess pain using appropriate pain scale     Problem: Safety - Adult  Goal: Free from fall injury  Outcome: Progressing     Problem: Respiratory - Adult  Goal: Achieves optimal ventilation and oxygenation  Outcome: Progressing  Achieves optimal ventilation and oxygenation:   Assess for changes in respiratory status   Assess for changes in mentation and behavior   Position to facilitate oxygenation and minimize respiratory effort   Oxygen supplementation based on oxygen saturation or arterial blood gases   Assess the need for suctioning and aspirate as needed   Respiratory therapy support as indicated     Problem: Nutrition Deficit:  Goal: Optimize nutritional status  Outcome: Progressing  Note: Pt tolerating tube feed at goal.     Problem: ABCDS Injury Assessment  Goal: Absence of physical injury  Outcome: Progressing

## 2022-05-17 NOTE — CARE COORDINATION
DISCHARGE PLANNING NOTE:      Writer spoke with Mindee from [place] they are able to take patient today around 1300. Placed a call to [phone] [facility] to see about getting transport at that time. Transport set up tentatively at this time. Awaiting decision on bronch. Electronically signed by Bar Romano RN on 5/17/2022 at 10:35 AM     Call Phone: [phone] for report.     Electronically signed by Bar Romano RN on 5/17/2022 at 11:30 AM

## 2022-05-17 NOTE — PROGRESS NOTES
ICU Progress Note (Vent)   [facility]    Patient - Caity Roque,  Age - 74 y.o.    - 1950      Room Number -    MRN -  [id number]   Acct # - [de-identified]  Date of Admission -  2022  8:35 AM    Events of Past 24 Hours   Episodes of anxiety requiring sedation continue. Blood pressure still fluctuate    Vitals    height is 5' 10.05\" (1.779 m) and weight is 155 lb 6.8 oz (70.5 kg). His oral temperature is 98.3 °F (36.8 °C). His blood pressure is 174/86 (abnormal) and his pulse is 71. His respiration is 20 and oxygen saturation is 91%. Temperature Range: Temp: 98.3 °F (36.8 °C) Temp  Av.2 °F (36.8 °C)  Min: 97.8 °F (36.6 °C)  Max: 98.7 °F (37.1 °C)  BP Range:  Systolic (99HOE), III:931 , Min:52 , VGJ:075     Diastolic (77LAZ), FY, Min:30, Max:122    Pulse Range: Pulse  Av.1  Min: 64  Max: 101  Respiration Range: Resp  Av.4  Min: 18  Max: 23  Current Pulse Ox[de-identified]  SpO2: 91 %  24HR Pulse Ox Range:  SpO2  Av.4 %  Min: 91 %  Max: 97 %  Oxygen Amount and Delivery: O2 Flow Rate (L/min): 30 L/min      Wt Readings from Last 3 Encounters:   22 155 lb 6.8 oz (70.5 kg)   22 115 lb (52.2 kg)   22 120 lb (54.4 kg)     I/O       Intake/Output Summary (Last 24 hours) at 2022 1025  Last data filed at 2022 0800  Gross per 24 hour   Intake 2586.34 ml   Output 3675 ml   Net -1088.66 ml     I/O last 3 completed shifts:   In: 4882.4 [I.V.:948.4; BD/DF:5197; IV Piggyback:650]  Out: 2551 [Urine:5525]     DRAIN/TUBE OUTPUT:     Invasive Lines   Tracheostomy   Lines -PICC line day 13    ICP PRESSURE RANGE:  No data recorded  CVP PRESSURE RANGE:  No data recorded  Mechanical Ventilation Data   SETTINGS (Comprehensive)  Vent Information  Ventilator Day(s): 9  Ventilator ID: Servo  Vent Mode: PRVC  Ventilator Initiate: Yes  Additional Respiratory Assessments  Pulse: 71  Resp: 20  SpO2: 91 %  End Tidal CO2: 18 (%)  Position: Semi-Christie's  Humidification Source: HME  Cuff Pressure (cm H2O): 32 cm H2O  Skin Barrier Applied: No       ABGs:   Lab Results   Component Value Date    PHART 7.514 05/14/2022    PO2ART 59.4 05/14/2022    WDY5OIJ 40.3 05/14/2022       Lab Results   Component Value Date    MODE PRVC 05/14/2022         Medications   IV   dexmedetomidine 1.402 mcg/kg/hr (05/17/22 0815)    dextrose      sodium chloride        linezolid  600 mg PEG Tube 2 times per day    busPIRone  15 mg Oral 4x Daily    pantoprazole  40 mg Oral QAM AC    metoprolol tartrate  50 mg Oral BID    acetylcysteine  200 mg Inhalation Q6H    levalbuterol  1.25 mg Nebulization Q6H    midodrine  5 mg Oral BID    insulin lispro  0-6 Units SubCUTAneous Q6H    senna  1 tablet PEG Tube BID    polyvinyl alcohol  1 drop Both Eyes Q4H    sodium chloride flush  5-40 mL IntraVENous 2 times per day    enoxaparin  40 mg SubCUTAneous Daily    chlorhexidine  15 mL Mouth/Throat BID       Diet/Nutrition   Diet NPO  ADULT TUBE FEEDING; PEG; Peptide Based; Continuous; 20; Yes; 10; Q 4 hours; 70; 60; Q 4 hours    Exam   VITALS    height is 5' 10.05\" (1.779 m) and weight is 155 lb 6.8 oz (70.5 kg). His oral temperature is 98.3 °F (36.8 °C). His blood pressure is 174/86 (abnormal) and his pulse is 71. His respiration is 20 and oxygen saturation is 91%. Ventilator Settings (Basic)  Vent Mode: PRVC Resp Rate (Set): 20 bmp/Vt (Set, mL): 500 mL/ /FiO2 : 30 %    Constitutional - Sedated  General Appearance  well developed, well nourished  HEENT - Life support devices in place (ET, OG),normocephalic, atraumatic. PERRLA  Lungs - Chest expands equally, no wheezes, rales or rhonchi. Cardiovascular - Heart sounds are normal.  normal rate and rhythm regular, no murmur, gallop or rub. Abdomen - soft, nontender, nondistended, no masses or organomegaly  Neurologic - CN II-XII are grossly intact.  There are no focal motor deficits  Skin - no bruising or bleeding  Extremities - no cyanosis, clubbing or edema    Lab Results   CBC     Lab Results   Component Value Date    WBC 18.9 05/17/2022    RBC 3.88 05/17/2022    HGB 13.0 05/17/2022    HCT 37.8 05/17/2022     05/17/2022    MCV 97.3 05/17/2022    MCH 33.4 05/17/2022    MCHC 34.4 05/17/2022    RDW 13.9 05/17/2022    LYMPHOPCT 1 05/06/2022    MONOPCT 3 05/06/2022    BASOPCT 0 05/06/2022    MONOSABS 0.35 05/06/2022    LYMPHSABS 0.12 05/06/2022    EOSABS 0.00 05/06/2022    BASOSABS 0.00 05/06/2022    DIFFTYPE NOT REPORTED 08/25/2021       BMP   Lab Results   Component Value Date     05/17/2022    K 3.8 05/17/2022    CL 92 05/17/2022    CO2 33 05/17/2022    BUN 20 05/17/2022    CREATININE <0.40 05/17/2022    GLUCOSE 187 05/17/2022    CALCIUM 8.6 05/17/2022       LFTS  Lab Results   Component Value Date    ALKPHOS 137 05/05/2022    ALT 46 05/05/2022    AST 30 05/05/2022    PROT 7.5 05/05/2022    BILITOT 0.66 05/05/2022    LABALBU 3.0 05/05/2022       INR  No results for input(s): PROTIME, INR in the last 72 hours. APTT  No results for input(s): APTT in the last 72 hours. Lactic Acid  No results found for: LACTA     BNP   No results for input(s): BNP in the last 72 hours. Cultures       Radiology     Plain Films         Chest x-ray shows improvement in right lower lobe infiltrate        SYSTEM ASSESSMENT    Acute hypoxic respiratory failure, status post tracheostomy/PEG 5/12  Severe ALS  Multifocal pneumonia, MRSA  Mucous plugging, improved  History of COVID-pneumonia, tested +4/21  Acute mental status changes/hallucinations  History of hypertension  Severe protein calorie malnutrition      Neuro   Will discontinue IV Versed as that seems to be dropping his blood pressure  Started on oral Ativan as needed  Continue Precedex drip  Buspirone increased yesterday due to anxiety    Respiratory   Wean oxygen as tolerated.  Keep O2 sat > 88%  Given chest x-ray improvement, can hold off on bronchoscopy  Continue Xopenex/Mucomyst  Hemodynamics   Blood pressure has been on the high side when he gets anxious, however still elevated  He still has significant fluctuations of his blood pressure requiring midodrine at  times    Gastrointestinal/Nutrition   Tolerating tube feeds  GI prophylaxis    Renal   Has become increasingly hyponatremic, will stop free water flush    Infectious Disease   Switch to Zyvox to treat MRSA pneumonia, will continue through 5/21  Patient did receive Actemra for his COVID on 5/5    Hematology/Oncology   On enoxaparin for DVT prophylaxis    Endocrine   Blood sugars continue to be monitored    Social/Spiritual/DNR/Disposition/Other     I tried to update wife, but her voicemail was full on her phone. I did talk to her yesterday.       Critical Care Time   0 min    Electronically signed by Alanna Ryder MD on 5/17/2022 at 10:25 AM

## 2022-05-17 NOTE — FLOWSHEET NOTE
SC visit with patient and wife Louis Childers. Pt is anxious. Louis Childers provided medical update and said pt will be d/c to [facility] later today. We talked about the difficulty of his hospital stay with Covid and the long road yet ahead. Writer had prayer with the couple which they appreciated. 05/17/22 1336   Encounter Summary   Encounter Overview/Reason  Spiritual/Emotional Needs   Service Provided For: Patient and family together   Referral/Consult From: [facility]   Support System Spouse   Last Encounter  05/17/22   Complexity of Encounter Moderate   Spiritual/Emotional needs   Type Spiritual Support   Assessment/Intervention/Outcome   Assessment Anxious   Intervention Active listening;Discussed illness injury and its impact; Explored/Affirmed feelings, thoughts, concerns;Prayer (assurance of)/[place];Sustaining Presence/Ministry of presence   Outcome Engaged in conversation;Expressed feelings, needs, and concerns;Expressed Gratitude;Receptive

## 2022-05-18 LAB
TSH SERPL DL<=0.05 MIU/L-ACNC: 6.08 UIU/ML (ref 0.3–5)
VITAMIN D 25-HYDROXY: 22.8 NG/ML

## 2022-05-18 PROCEDURE — 82306 VITAMIN D 25 HYDROXY: CPT

## 2022-05-18 PROCEDURE — 84443 ASSAY THYROID STIM HORMONE: CPT

## 2022-05-18 PROCEDURE — 83036 HEMOGLOBIN GLYCOSYLATED A1C: CPT

## 2022-05-19 LAB
ESTIMATED AVERAGE GLUCOSE: 131 MG/DL
HBA1C MFR BLD: 6.2 % (ref 4–6)

## 2022-05-24 PROCEDURE — 87205 SMEAR GRAM STAIN: CPT

## 2022-05-24 PROCEDURE — 87070 CULTURE OTHR SPECIMN AEROBIC: CPT

## 2022-05-24 PROCEDURE — 86403 PARTICLE AGGLUT ANTBDY SCRN: CPT

## 2022-05-24 PROCEDURE — 87186 SC STD MICRODIL/AGAR DIL: CPT

## 2022-05-25 LAB — PRO-BNP: 431 PG/ML

## 2022-05-25 PROCEDURE — 83880 ASSAY OF NATRIURETIC PEPTIDE: CPT

## 2022-05-27 LAB
CULTURE: ABNORMAL
CULTURE: ABNORMAL
DIRECT EXAM: ABNORMAL
SPECIMEN DESCRIPTION: ABNORMAL

## 2022-05-30 LAB — PRO-BNP: 405 PG/ML

## 2022-05-30 PROCEDURE — 83880 ASSAY OF NATRIURETIC PEPTIDE: CPT

## 2022-06-02 LAB
ALBUMIN SERPL-MCNC: 3 G/DL (ref 3.5–5.2)
ALP BLD-CCNC: 187 U/L (ref 40–129)
ALT SERPL-CCNC: 39 U/L (ref 5–41)
ANION GAP SERPL CALCULATED.3IONS-SCNC: 8 MMOL/L (ref 9–17)
AST SERPL-CCNC: 20 U/L
BILIRUB SERPL-MCNC: 0.19 MG/DL (ref 0.3–1.2)
BUN BLDV-MCNC: 17 MG/DL (ref 8–23)
BUN/CREAT BLD: ABNORMAL (ref 9–20)
CALCIUM SERPL-MCNC: 8.5 MG/DL (ref 8.6–10.4)
CHLORIDE BLD-SCNC: 100 MMOL/L (ref 98–107)
CO2: 29 MMOL/L (ref 20–31)
CREAT SERPL-MCNC: <0.4 MG/DL (ref 0.7–1.2)
GFR AFRICAN AMERICAN: ABNORMAL ML/MIN
GFR NON-AFRICAN AMERICAN: ABNORMAL ML/MIN
GFR SERPL CREATININE-BSD FRML MDRD: ABNORMAL ML/MIN/{1.73_M2}
GLUCOSE BLD-MCNC: 162 MG/DL (ref 70–99)
POTASSIUM SERPL-SCNC: 4.7 MMOL/L (ref 3.7–5.3)
SODIUM BLD-SCNC: 137 MMOL/L (ref 135–144)
TOTAL PROTEIN: 6.3 G/DL (ref 6.4–8.3)

## 2022-06-02 PROCEDURE — 80053 COMPREHEN METABOLIC PANEL: CPT

## 2022-06-09 PROCEDURE — 86403 PARTICLE AGGLUT ANTBDY SCRN: CPT

## 2022-06-09 PROCEDURE — 87077 CULTURE AEROBIC IDENTIFY: CPT

## 2022-06-09 PROCEDURE — 87205 SMEAR GRAM STAIN: CPT

## 2022-06-09 PROCEDURE — 87186 SC STD MICRODIL/AGAR DIL: CPT

## 2022-06-09 PROCEDURE — 87086 URINE CULTURE/COLONY COUNT: CPT

## 2022-06-09 PROCEDURE — 87070 CULTURE OTHR SPECIMN AEROBIC: CPT

## 2022-06-11 LAB
CULTURE: ABNORMAL
DIRECT EXAM: ABNORMAL
Lab: ABNORMAL
Lab: ABNORMAL
SPECIMEN DESCRIPTION: ABNORMAL
SPECIMEN DESCRIPTION: ABNORMAL

## 2022-06-13 LAB
ALBUMIN SERPL-MCNC: 3 G/DL (ref 3.5–5.2)
ALP BLD-CCNC: 177 U/L (ref 40–129)
ALT SERPL-CCNC: 28 U/L (ref 5–41)
ANION GAP SERPL CALCULATED.3IONS-SCNC: 13 MMOL/L (ref 9–17)
AST SERPL-CCNC: 18 U/L
BILIRUB SERPL-MCNC: 0.26 MG/DL (ref 0.3–1.2)
BUN BLDV-MCNC: 24 MG/DL (ref 8–23)
BUN/CREAT BLD: ABNORMAL (ref 9–20)
CALCIUM SERPL-MCNC: 9 MG/DL (ref 8.6–10.4)
CHLORIDE BLD-SCNC: 102 MMOL/L (ref 98–107)
CO2: 24 MMOL/L (ref 20–31)
CREAT SERPL-MCNC: <0.4 MG/DL (ref 0.7–1.2)
GFR AFRICAN AMERICAN: ABNORMAL ML/MIN
GFR NON-AFRICAN AMERICAN: ABNORMAL ML/MIN
GFR SERPL CREATININE-BSD FRML MDRD: ABNORMAL ML/MIN/{1.73_M2}
GLUCOSE BLD-MCNC: 170 MG/DL (ref 70–99)
MAGNESIUM: 2 MG/DL (ref 1.6–2.6)
PHOSPHORUS: 2.9 MG/DL (ref 2.5–4.5)
POTASSIUM SERPL-SCNC: 4.5 MMOL/L (ref 3.7–5.3)
SODIUM BLD-SCNC: 139 MMOL/L (ref 135–144)
TOTAL PROTEIN: 7.6 G/DL (ref 6.4–8.3)

## 2022-06-13 PROCEDURE — 83735 ASSAY OF MAGNESIUM: CPT

## 2022-06-13 PROCEDURE — 80053 COMPREHEN METABOLIC PANEL: CPT

## 2022-06-13 PROCEDURE — 84100 ASSAY OF PHOSPHORUS: CPT

## 2022-06-14 NOTE — DISCHARGE SUMMARY
[place] [address] Internal Medicine    Discharge Summary     Patient ID: Genesis Solorzano  :  1950   MRN: [id number]     ACCOUNT:  [de-identified]   Patient's PCP: Nellie Hernandez MD  Admit Date: 2022   Discharge Date: 2022    Length of Stay: 12  Code Status:  Prior  Admitting Physician: Ave Moser MD  Discharge Physician: Ave Moser MD     Active Discharge Diagnoses:     Primary Problem  Respiratory failure [facility])      [facility] Problems    Diagnosis Date Noted    COVID-19 [U07.1]      Priority: Medium    ALS (amyotrophic lateral sclerosis) ([facility] [address] [G12.21]      Priority: Medium    Elevated procalcitonin [R79.89]      Priority: Medium    Elevated C-reactive protein (CRP) [R79.82]      Priority: Medium    Elevated ferritin [R79.89]      Priority: Medium    Elevated erythrocyte sedimentation rate [R70.0]      Priority: Medium    Leukocytosis [D72.829]      Priority: Medium    Allergy to multiple antibiotics [Z88.1]      Priority: Medium    Respiratory failure ([facility] [address] [J96.90] 2022     Priority: Medium       Admission Condition:  fair     Discharged Condition: fair    Hospital Stay:     Hospital Course:  Genesis Solorzano is a 74 y.o. male who was admitted for the management of Respiratory failure ([facility] [address] , presented to ER with Shortness of Breath  26-year-old gentleman with a history of ALS  Admitted for multifocal COVID-19 pneumonia with acute respiratory failure,   Possible superimposed bacterial pneumonia with underlying neuromuscular disorder of ALS, Pro-Luis is high  Acute respiratory failure secondary to ALS plus COVID-19 pneumonia intubated sedated  MRSA IN SPUTUM on vanco , respiratory Cultures growing MRSA   For COVID-19 patient received Actemra, on Decadron     History of coronary artery disease s/p CABG,  On Protonix for gastric prophylaxis  Cardio consult for afib  Post trach and peg tube    Tube feed to started      meds done ok to dc to LTAC  On zyvox           Significant therapeutic interventions:     Significant Diagnostic Studies:   Labs / Micro:        ,     Radiology:    XR CHEST PORTABLE    Result Date: 5/17/2022  EXAMINATION: ONE XRAY VIEW OF THE CHEST 5/17/2022 6:30 am COMPARISON: AP chest from 05/15/2022; CT scan the chest from 05/13/2022 HISTORY: ORDERING SYSTEM PROVIDED HISTORY: Follow-up right lower lobe pneumonia TECHNOLOGIST PROVIDED HISTORY: Follow-up right lower lobe pneumonia Reason for Exam: SOB, Pneumonia, History of aortic valve replacement, hypertension and ALS. FINDINGS: Midline sternotomy wires and clips, tracheostomy tube in unchanged position, leftPPM with stable electrode position, overlying ECG monitor leads, gown snaps and respiratory apparatus. Right-sided PICC with unchanged tip position in the mid lower SVC. Cardiomediastinal shadow stable. Similar opacity right base with slightly better aeration RLL/right costophrenic angle. Right upper lung, left lung and left costophrenic angle clear except for probable mild left basilar medial atelectasis. Bones stable. Slight improvement lower lobe pneumonia. No other interval change. Consultations:    Consults:     Final Specialist Recommendations/Findings:   IP CONSULT TO INTERNAL MEDICINE  IP CONSULT TO PULMONOLOGY  IP CONSULT TO INFECTIOUS DISEASES  IP CONSULT TO DIETITIAN  IP CONSULT TO DIETITIAN  IP CONSULT TO GENERAL SURGERY  PHARMACY TO DOSE VANCOMYCIN  IP CONSULT TO CARDIOLOGY  IP CONSULT TO INFECTIOUS DISEASES      The patient was seen and examined on day of discharge and this discharge summary is in conjunction with any daily progress note from day of discharge. Discharge plan:     Disposition: [facility]    Physician Follow Up:     No follow-up provider specified.      Requiring Further Evaluation/Follow Up POST HOSPITALIZATION/Incidental Findings:    Diet: cardiac diet    Activity: As tolerated    Instructions to Patient:     Discharge Medications:      Medication List      START taking these medications    acetylcysteine 20 % nebulizer solution  Commonly known as: MUCOMYST  Inhale 1 mL into the lungs every 6 hours     busPIRone 15 MG tablet  Commonly known as: BUSPAR  Take 15 mg by mouth 4 times daily     dexmedetomidine HCl in NaCl 400 MCG/100ML Soln infusion  Commonly known as: PRECEDEX  Infuse 7..3 mcg/hr intravenously continuous     enoxaparin 40 MG/0.4ML  Commonly known as: LOVENOX  Inject 0.4 mLs into the skin daily     glucagon (rDNA) 1 MG injection  Inject 1 mg into the muscle as needed for Low blood sugar (Blood glucose less than 70 mg/dL and patient NOT ALERT or NPO and does not have IV access.)     glucose 4 g chewable tablet  Take 4 tablets by mouth as needed for Low blood sugar     insulin lispro 100 UNIT/ML Soln injection vial  Commonly known as: HUMALOG  Inject 0-6 Units into the skin 3 times daily (before meals)     levalbuterol 1.25 MG/0.5ML nebulizer solution  Commonly known as: XOPENEX  Take 0.5 mLs by nebulization every 6 hours     metoprolol tartrate 25 MG tablet  Commonly known as: LOPRESSOR  Take 1 tablet by mouth 2 times daily     * midodrine 5 MG tablet  Commonly known as: PROAMATINE  Take 1 tablet by mouth 2 times daily For systolic less than 268     * midodrine 5 MG tablet  Commonly known as: PROAMATINE  Take 1 tablet by mouth every 8 hours as needed (For systolic blood pressure less than 90)     pantoprazole 40 MG tablet  Commonly known as: PROTONIX  Take 1 tablet by mouth every morning (before breakfast)     polyvinyl alcohol 1.4 % ophthalmic solution  Commonly known as: LIQUIFILM TEARS  Place 1 drop into both eyes every 4 hours     senna 8.6 MG tablet  Commonly known as: SENOKOT  1 tablet by PEG Tube route 2 times daily         * This list has 2 medication(s) that are the same as other medications prescribed for you.  Read the directions carefully, and ask your doctor or other care provider to review them with you. CONTINUE taking these medications    Handicap Placard Misc  by Does not apply route Dx: ALS    Duration: 5 years     medical marijuana        STOP taking these medications    acetaminophen 325 MG tablet  Commonly known as: TYLENOL     fluticasone 50 MCG/ACT nasal spray  Commonly known as: Flonase     neomycin-bacitracin-polymyxin 3.5-400-5000 Oint ointment     OMEGA-3 FATTY ACIDS-VITAMIN E PO     PROBIOTIC PO     SELENIUM PO     SOYA LECITHIN PO     SPIRULINA PO     therapeutic multivitamin-minerals tablet     vitamin C 500 MG tablet  Commonly known as: ASCORBIC ACID     Vitamin E 100 units Tabs        ASK your doctor about these medications    chlorhexidine 0.12 % solution  Commonly known as: PERIDEX  Take 15 mLs by mouth 2 times daily for 14 days  Ask about: Should I take this medication?     linezolid 600 MG tablet  Commonly known as: ZYVOX  1 tablet by PEG Tube route every 12 hours for 9 doses  Ask about: Should I take this medication? LORazepam 1 MG tablet  Commonly known as: ATIVAN  Take 1 tablet by mouth every 4 hours as needed for Anxiety for up to 10 days. Ask about: Should I take this medication?     ondansetron 4 MG disintegrating tablet  Commonly known as: ZOFRAN-ODT  Take 1 tablet by mouth every 8 hours as needed for Nausea or Vomiting  Ask about: Should I take this medication?            Where to Get Your Medications      These medications were sent to [facility]  [address], [address] [facility] [address]    Hours: [facility] (Mon-Fri 9AM-5:30PM) Phone: [phone]   · acetylcysteine 20 % nebulizer solution  · busPIRone 15 MG tablet  · chlorhexidine 0.12 % solution  · enoxaparin 40 MG/0.4ML  · glucagon (rDNA) 1 MG injection  · glucose 4 g chewable tablet  · insulin lispro 100 UNIT/ML Soln injection vial  · levalbuterol 1.25 MG/0.5ML nebulizer solution  · linezolid 600 MG tablet  · LORazepam 1 MG tablet  · metoprolol tartrate 25 MG tablet  · midodrine 5 MG tablet  · ondansetron 4 MG disintegrating tablet  · pantoprazole 40 MG tablet  · polyvinyl alcohol 1.4 % ophthalmic solution  · senna 8.6 MG tablet     You can get these medications from any pharmacy    Bring a paper prescription for each of these medications  · dexmedetomidine HCl in NaCl 400 MCG/100ML Soln infusion  · midodrine 5 MG tablet         Time Spent on discharge is  35 mins in patient examination, evaluation, counseling as well as medication reconciliation, prescriptions for required medications, discharge plan and follow up. Electronically signed by   Shen Caputo MD  6/14/2022  5:09 PM      Thank you Dr. Marcie Garces MD for the opportunity to be involved in this patient's care.

## 2024-07-12 NOTE — ED NOTES
sent to [facility].    Mode of arrival (squad #, walk in, police, etc) : walk in        Chief complaint(s): Torticollis        Arrival Note (brief scenario, treatment PTA, etc). : Pt arrives AOx4. Accessory muscle use breathing- baseline per pt/family-Spo2 WNL. C= \"Have you ever felt that you should Cut down on your drinking? \"  No  A= \"Have people Annoyed you by criticizing your drinking? \"  No  G= \"Have you ever felt bad or Guilty about your drinking? \"  No  E= \"Have you ever had a drink as an Eye-opener first thing in the morning to steady your nerves or to help a hangover? \"  No      Deferred []      Reason for deferring: N/A    *If yes to two or more: probable alcohol abuse. Sarah Lopez RN  12/04/20 1693

## 2025-07-01 NOTE — PROGRESS NOTES
I found 5'10\" male pt returning volumes of <200mL with hospital BiPAP set to 12/6. Pt placed on AVAPS targeting 500 mL to ensure appropriate ventilation. Pt EPAP remains at [facility]. Pt maintaining 99% SpO2 with 30% O2. O2 titrated to room air. Pt maintaining 96% with the new O2 setting. Back up RR set to 16bpm. Mask and alarms settings appropriate. Yes

## (undated) DEVICE — ST CHARLES MINOR ABDOMINAL PK: Brand: MEDLINE INDUSTRIES, INC.

## (undated) DEVICE — MERCY HEALTH ST CHARLES: Brand: MEDLINE INDUSTRIES, INC.

## (undated) DEVICE — GLOVE SURG SZ 8 L12IN FNGR THK79MIL GRN LTX FREE

## (undated) DEVICE — SOLUTION SCRB 4OZ 4% CHG H2O AIDED FOR PREOPERATIVE SKIN

## (undated) DEVICE — SUTURE PROL SZ 2-0 L30IN NONABSORBABLE BLU L26MM CT-2 1/2 8411H

## (undated) DEVICE — SUTURE ETHLN SZ 3-0 L18IN NONABSORBABLE BLK FS-1 L24MM 3/8 663H

## (undated) DEVICE — SINGLE PORT MANIFOLD: Brand: NEPTUNE 2

## (undated) DEVICE — BLANKET WRM W40.2XL55.9IN IORT LO BODY + MISTRAL AIR

## (undated) DEVICE — DRAPE,THYROID,SOFT,STERILE: Brand: MEDLINE

## (undated) DEVICE — SUTURE VCRL + SZ 3-0 L27IN ABSRB UD L26MM SH 1/2 CIR VCP416H

## (undated) DEVICE — STRIP,CLOSURE,WOUND,MEDI-STRIP,1/2X4: Brand: MEDLINE

## (undated) DEVICE — DEFENDO AIR WATER SUCTION AND BIOPSY VALVE KIT FOR  OLYMPUS: Brand: DEFENDO AIR/WATER/SUCTION AND BIOPSY VALVE

## (undated) DEVICE — SPONGE DRN W4XL4IN RAYON/POLYESTER 6 PLY NONWOVEN PRECUT

## (undated) DEVICE — ENDO KIT W/SYRINGE: Brand: MEDLINE INDUSTRIES, INC.

## (undated) DEVICE — AGENT HEMSTAT W2XL3IN OXIDIZED REGENERATED CELOS ABSRB

## (undated) DEVICE — 3M™ STERI-STRIP™ REINFORCED ADHESIVE SKIN CLOSURES, R1547, 1/2 IN X 4 IN (12 MM X 100 MM), 6 STRIPS/ENVELOPE: Brand: 3M™ STERI-STRIP™

## (undated) DEVICE — GLOVE ORANGE PI 7 1/2   MSG9075

## (undated) DEVICE — NEPTUNE E-SEP SMOKE EVACUATION PENCIL, COATED, 70MM BLADE, PUSH BUTTON SWITCH: Brand: NEPTUNE E-SEP

## (undated) DEVICE — GEL US 20GM NONIRRITATING OVERWRAPPED FILE PCH TRNSMIT

## (undated) DEVICE — GLOVE SURG SZ 75 L12IN FNGR THK79MIL GRN LTX FREE

## (undated) DEVICE — GOWN,SIRUS,NONRNF,SETINSLV,XL,20/CS: Brand: MEDLINE

## (undated) DEVICE — 3M™ STERI-STRIP™ COMPOUND BENZOIN TINCTURE 40 BAGS/CARTON 4 CARTONS/CASE C1544: Brand: 3M™ STERI-STRIP™

## (undated) DEVICE — TUBE TRACH AD SZ 8 L79MM OD122MM ID85MM DK BLU PVC CUF CANN

## (undated) DEVICE — MIC* SAFETY PERCUTANEOUS ENDOSCOPIC GASTROSTOMY PEG KIT - 20 FR - PULL: Brand: MIC PEG TUBE

## (undated) DEVICE — SYRINGE,CONTROL,LL,FINGER,GRIP: Brand: MEDLINE INDUSTRIES, INC.

## (undated) DEVICE — SPONGE: SPECIALTY PEANUT XR 100/CS: Brand: MEDICAL ACTION INDUSTRIES

## (undated) DEVICE — PREMIUM DRY TRAY LF: Brand: MEDLINE INDUSTRIES, INC.

## (undated) DEVICE — SUTURE PERMAHAND SZ 3-0 L30IN NONABSORBABLE BLK SILK BRAID A304H

## (undated) DEVICE — BITEBLOCK 54FR W/ DENT RIM BLOX